# Patient Record
Sex: MALE | Race: WHITE | NOT HISPANIC OR LATINO | Employment: OTHER | URBAN - METROPOLITAN AREA
[De-identification: names, ages, dates, MRNs, and addresses within clinical notes are randomized per-mention and may not be internally consistent; named-entity substitution may affect disease eponyms.]

---

## 2017-06-08 ENCOUNTER — OFFICE VISIT (OUTPATIENT)
Dept: MEDICAL GROUP | Facility: CLINIC | Age: 62
End: 2017-06-08
Payer: COMMERCIAL

## 2017-06-08 VITALS
OXYGEN SATURATION: 95 % | DIASTOLIC BLOOD PRESSURE: 80 MMHG | BODY MASS INDEX: 27.16 KG/M2 | TEMPERATURE: 97.9 F | SYSTOLIC BLOOD PRESSURE: 120 MMHG | HEIGHT: 71 IN | WEIGHT: 194 LBS | HEART RATE: 80 BPM | RESPIRATION RATE: 16 BRPM

## 2017-06-08 DIAGNOSIS — Z00.00 LABORATORY EXAMINATION ORDERED AS PART OF A ROUTINE GENERAL MEDICAL EXAMINATION: ICD-10-CM

## 2017-06-08 DIAGNOSIS — Z12.5 PROSTATE CANCER SCREENING: ICD-10-CM

## 2017-06-08 DIAGNOSIS — Z23 NEED FOR HEPATITIS A AND B VACCINATION: ICD-10-CM

## 2017-06-08 DIAGNOSIS — Z90.81 POST-SPLENECTOMY: ICD-10-CM

## 2017-06-08 DIAGNOSIS — Z00.00 ROUTINE GENERAL MEDICAL EXAMINATION AT A HEALTH CARE FACILITY: ICD-10-CM

## 2017-06-08 DIAGNOSIS — Z87.01 HISTORY OF PNEUMONIA: ICD-10-CM

## 2017-06-08 DIAGNOSIS — Z23 NEED FOR PNEUMOCOCCAL VACCINATION: ICD-10-CM

## 2017-06-08 PROCEDURE — 90636 HEP A/HEP B VACC ADULT IM: CPT | Performed by: FAMILY MEDICINE

## 2017-06-08 PROCEDURE — 99396 PREV VISIT EST AGE 40-64: CPT | Mod: 25 | Performed by: FAMILY MEDICINE

## 2017-06-08 PROCEDURE — 90670 PCV13 VACCINE IM: CPT | Performed by: FAMILY MEDICINE

## 2017-06-08 PROCEDURE — 90472 IMMUNIZATION ADMIN EACH ADD: CPT | Performed by: FAMILY MEDICINE

## 2017-06-08 PROCEDURE — 90471 IMMUNIZATION ADMIN: CPT | Performed by: FAMILY MEDICINE

## 2017-06-08 ASSESSMENT — ENCOUNTER SYMPTOMS
SEIZURES: 0
PALPITATIONS: 0
MEMORY LOSS: 0
SPEECH CHANGE: 0
BACK PAIN: 0
DIARRHEA: 0
DIZZINESS: 0
WEIGHT LOSS: 0
HALLUCINATIONS: 0
WHEEZING: 0
DEPRESSION: 0
FOCAL WEAKNESS: 0
BRUISES/BLEEDS EASILY: 0
CHILLS: 0
HEARTBURN: 0
TINGLING: 0
INSOMNIA: 0
FEVER: 0
SENSORY CHANGE: 0
NERVOUS/ANXIOUS: 0
VOMITING: 0
NECK PAIN: 0
ORTHOPNEA: 0
DOUBLE VISION: 0
BLURRED VISION: 0
LOSS OF CONSCIOUSNESS: 0
SHORTNESS OF BREATH: 0
HEADACHES: 0
ABDOMINAL PAIN: 0
COUGH: 0
SPUTUM PRODUCTION: 0
MYALGIAS: 0
BLOOD IN STOOL: 0
NAUSEA: 0
SORE THROAT: 0
TREMORS: 0

## 2017-06-08 ASSESSMENT — LIFESTYLE VARIABLES: SUBSTANCE_ABUSE: 0

## 2017-06-08 ASSESSMENT — PATIENT HEALTH QUESTIONNAIRE - PHQ9: CLINICAL INTERPRETATION OF PHQ2 SCORE: 0

## 2017-06-08 NOTE — PROGRESS NOTES
Subjective:      Fermín Gomez is a 61 y.o. male who presents with Annual Exam        He is here for his general medical examination.    Annual Exam  Pertinent negatives include no abdominal pain, chest pain, chills, congestion, coughing, fever, headaches, myalgias, nausea, neck pain, rash, sore throat or vomiting.    has a past medical history of Spherocytosis (CMS-HCC); History of pneumonia (9/2016); and Post-splenectomy.   has past surgical history that includes removal spleen, total (1962) and colonoscopy (1/18/13).  family history includes Blood Disease in his mother; Cancer in his mother; Diabetes in his father; Heart Disease in his father.   reports that he has never smoked. He has never used smokeless tobacco. He reports that he drinks about 8.4 oz of alcohol per week. He reports that he does not use illicit drugs.      Current outpatient prescriptions:   •  Ascorbic Acid (VITAMIN C CR PO), Take 500 mg by mouth every day., Disp: , Rfl:   •  aspirin 81 MG tablet, Take 81 mg by mouth every day., Disp: , Rfl:   •  zolpidem (AMBIEN) 5 MG TABS, Take 1 Tab by mouth at bedtime as needed for Sleep. (Patient not taking: Reported on 11/4/2016), Disp: 30 Tab, Rfl: 0  is allergic to pcn.      Review of Systems   Constitutional: Negative for fever, chills, weight loss and malaise/fatigue.   HENT: Negative for congestion, hearing loss and sore throat.    Eyes: Negative for blurred vision and double vision.   Respiratory: Negative for cough, sputum production, shortness of breath and wheezing.    Cardiovascular: Negative for chest pain, palpitations, orthopnea and leg swelling.   Gastrointestinal: Negative for heartburn, nausea, vomiting, abdominal pain, diarrhea and blood in stool.   Genitourinary: Negative for dysuria, urgency, frequency and hematuria.   Musculoskeletal: Negative for myalgias, back pain, joint pain and neck pain.   Skin: Negative for rash.   Neurological: Negative for dizziness, tingling, tremors,  "sensory change, speech change, focal weakness, seizures, loss of consciousness and headaches.   Endo/Heme/Allergies: Negative for environmental allergies. Does not bruise/bleed easily.   Psychiatric/Behavioral: Negative for depression, suicidal ideas, hallucinations, memory loss and substance abuse. The patient is not nervous/anxious and does not have insomnia.           Objective:     /80 mmHg  Pulse 80  Temp(Src) 36.6 °C (97.9 °F)  Resp 16  Ht 1.803 m (5' 11\")  Wt 87.998 kg (194 lb)  BMI 27.07 kg/m2  SpO2 95%     Physical Exam   Constitutional: He is oriented to person, place, and time. He appears well-developed and well-nourished.   HENT:   Head: Normocephalic and atraumatic.   Mouth/Throat: Oropharynx is clear and moist.   Eyes: EOM are normal. Pupils are equal, round, and reactive to light. Left eye exhibits no discharge.   Neck: Normal range of motion. Neck supple. No JVD present. No thyromegaly present.   Cardiovascular: Normal rate, regular rhythm and normal heart sounds.    No murmur heard.  Pulmonary/Chest: Effort normal and breath sounds normal. No respiratory distress. He has no wheezes. He has no rales.   Abdominal: Soft. Bowel sounds are normal. He exhibits no distension and no mass. There is no tenderness.   Musculoskeletal: Normal range of motion. He exhibits no edema.   Lymphadenopathy:     He has no cervical adenopathy.   Neurological: He is alert and oriented to person, place, and time. Coordination normal.   Skin: Skin is warm and dry. No rash noted. No erythema.   Psychiatric: He has a normal mood and affect. His behavior is normal.   Vitals reviewed.              Assessment/Plan:     1. Routine general medical examination at a health care facility  He is generally well. Stable postsplenectomy for many years.    2. Laboratory examination ordered as part of a routine general medical examination  Routine orders placed  - COMP METABOLIC PANEL; Future  - LIPID PROFILE; Future  - CBC " WITHOUT DIFFERENTIAL; Future  - URINALYSIS,CULTURE IF INDICATED; Future    3. Post-splenectomy  Has been stable. Due for pneumonia vaccine.  - PNEUMOCOCCAL CONJUGATE VACCINE 13-VALENT    4. History of pneumonia  Every 4 pneumonia vaccine with history of pneumonia.  - PNEUMOCOCCAL CONJUGATE VACCINE 13-VALENT    5. Need for pneumococcal vaccination  Every 4 pneumonia vaccine.  - PNEUMOCOCCAL CONJUGATE VACCINE 13-VALENT    6. Prostate cancer screening  Order discussed and placed  - PROSTATE SPECIFIC AG SCREENING; Future    7. Need for hepatitis A and B vaccination  Due for vaccine  - HEPATITIS A HEPATITIS B COMBINED VACCINE IM

## 2017-06-08 NOTE — MR AVS SNAPSHOT
"        Fermín Gomez   2017 1:00 PM   Office Visit   MRN: [id number]    Department:  [facility]   Dept Phone:  [phone]    Description:  Male : 1955   Provider:  Sakina Brewer M.D.           Reason for Visit     Annual Exam           Allergies as of 2017     Allergen Noted Reactions    Pcn [Penicillins] 2010   Rash      You were diagnosed with     Routine general medical examination at a health care facility   [V70.0.ICD-9-CM]       Laboratory examination ordered as part of a routine general medical examination   [V72.62.ICD-9-CM]       Post-splenectomy   [952098]       History of pneumonia   [465531]       Need for pneumococcal vaccination   [922985]       Prostate cancer screening   [701511]       Need for hepatitis A and B vaccination   [685564]         Vital Signs     Blood Pressure Pulse Temperature Respirations Height Weight    120/80 mmHg 80 36.6 °C (97.9 °F) 16 1.803 m (5' 11\") 87.998 kg (194 lb)    Body Mass Index Oxygen Saturation Smoking Status             27.07 kg/m2 95% Never Smoker          Basic Information     Date Of Birth Sex Race Ethnicity Preferred Language    1955 Male White Non- English      Problem List              ICD-10-CM Priority Class Noted - Resolved    Spherocytosis (CMS-HCC) D58.0   Unknown - Present    Post-splenectomy Z90.81   Unknown - Present    History of pneumonia Z87.01   2016 - Present      Health Maintenance        Date Due Completion Dates    IMM PNEUMOCOCCAL 19-64 (ADULT) HIGHEST RISK SERIES (2 of 3 - PPSV23) 8/3/2017 2017    IMM DTaP/Tdap/Td Vaccine (2 - Td) 2020    COLONOSCOPY 2023 (Prv Comp)    Override on 2013: Previously completed (normal)            Current Immunizations     13-VALENT PCV PREVNAR 2017    Hep A/HEP B Combined Vaccine ([employer]ix) 2017    Hepatitis A Vaccine, Ped/Adol 2010    Hepatitis B Vaccine Non-Recombivax (Ped/Adol) 2010    Pneumococcal " Vaccine (UF)Historical Data 3/2/2007    Tdap Vaccine 11/11/2010    Typhoid Vaccine 11/11/2010      Below and/or attached are the medications your provider expects you to take. Review all of your home medications and newly ordered medications with your provider and/or pharmacist. Follow medication instructions as directed by your provider and/or pharmacist. Please keep your medication list with you and share with your provider. Update the information when medications are discontinued, doses are changed, or new medications (including over-the-counter products) are added; and carry medication information at all times in the event of emergency situations     Allergies:  PCN - Rash               Medications  Valid as of: June 08, 2017 -  2:29 PM    Generic Name Brand Name Tablet Size Instructions for use    Ascorbic Acid   Take 500 mg by mouth every day.        Aspirin (Tab) aspirin 81 MG Take 81 mg by mouth every day.        Zolpidem Tartrate (Tab) AMBIEN 5 MG Take 1 Tab by mouth at bedtime as needed for Sleep.        .                 Medicines prescribed today were sent to:     [id number] - [place], [place] - [address] [id number] [facility] [address]    Phone: [phone] Fax: [phone]    Open 24 Hours?: No      Medication refill instructions:       If your prescription bottle indicates you have medication refills left, it is not necessary to call your provider’s office. Please contact your pharmacy and they will refill your medication.    If your prescription bottle indicates you do not have any refills left, you may request refills at any time through one of the following ways: The online [employer] system (except Urgent Care), by calling your provider’s office, or by asking your pharmacy to contact your provider’s office with a refill request. Medication refills are processed only during regular business hours and may not be available until the next business day. Your provider may request  additional information or to have a follow-up visit with you prior to refilling your medication.   *Please Note: Medication refills are assigned a new Rx number when refilled electronically. Your pharmacy may indicate that no refills were authorized even though a new prescription for the same medication is available at the pharmacy. Please request the medicine by name with the pharmacy before contacting your provider for a refill.        Your To Do List     Future Labs/Procedures Complete By Expires    CBC WITHOUT DIFFERENTIAL  As directed 12/9/2017    COMP METABOLIC PANEL  As directed 6/9/2018    LIPID PROFILE  As directed 6/9/2018    PROSTATE SPECIFIC AG SCREENING  As directed 6/9/2018    URINALYSIS,CULTURE IF INDICATED  As directed 6/9/2018         [employer] Access Code: [email or web address]  Expires: 7/8/2017 12:47 PM    [employer]  A secure, online tool to manage your health information     [employer]® is a secure, online tool that connects you to your personalized health information from the privacy of your home -- day or night - making it very easy for you to manage your healthcare. Once the activation process is completed, you can even access your medical information using the [employer] shahram, which is available for free in the [employer] store or Google Play store.     [employer] provides the following levels of access (as shown below):   My Chart Features   [facility] Doctor [facility]  Specialists [facility]  Urgent  Care Non-[facility]  Primary Care  Doctor   Email your healthcare team securely and privately 24/7 X X X    Manage appointments: schedule your next appointment; view details of past/upcoming appointments X      Request prescription refills. X      View recent personal medical records, including lab and immunizations X X X X   View health record, including health history, allergies, medications X X X X   Read reports about your outpatient visits, procedures, consult and ER notes X X X X   See your  discharge summary, which is a recap of your hospital and/or ER visit that includes your diagnosis, lab results, and care plan. X X       How to register for [facility]:  1. Go to  [email or web address].  2. Click on the Sign Up Now box, which takes you to the New Member Sign Up page. You will need to provide the following information:  a. Enter your [facility] Access Code exactly as it appears at the top of this page. (You will not need to use this code after you’ve completed the sign-up process. If you do not sign up before the expiration date, you must request a new code.)   b. Enter your date of birth.   c. Enter your home email address.   d. Click Submit, and follow the next screen’s instructions.  3. Create a [facility] ID. This will be your [facility] login ID and cannot be changed, so think of one that is secure and easy to remember.  4. Create a [facility] password. You can change your password at any time.  5. Enter your Password Reset Question and Answer. This can be used at a later time if you forget your password.   6. Enter your e-mail address. This allows you to receive e-mail notifications when new information is available in [facility].  7. Click Sign Up. You can now view your health information.    For assistance activating your [facility] account, call [phone]

## 2017-06-09 DIAGNOSIS — G47.25 JET LAG: ICD-10-CM

## 2017-06-09 DIAGNOSIS — F51.01 PRIMARY INSOMNIA: ICD-10-CM

## 2017-06-09 RX ORDER — ZOLPIDEM TARTRATE 5 MG/1
5 TABLET ORAL NIGHTLY PRN
Qty: 30 TAB | Refills: 0 | Status: SHIPPED
Start: 2017-06-09 | End: 2017-06-14 | Stop reason: SDUPTHER

## 2017-06-12 ENCOUNTER — PATIENT MESSAGE (OUTPATIENT)
Dept: MEDICAL GROUP | Facility: CLINIC | Age: 62
End: 2017-06-12

## 2017-06-12 DIAGNOSIS — F51.01 PRIMARY INSOMNIA: ICD-10-CM

## 2017-06-14 RX ORDER — ZOLPIDEM TARTRATE 5 MG/1
5 TABLET ORAL NIGHTLY PRN
Qty: 30 TAB | Refills: 0 | Status: SHIPPED
Start: 2017-06-14 | End: 2019-03-28 | Stop reason: SDUPTHER

## 2017-07-28 ENCOUNTER — HOSPITAL ENCOUNTER (OUTPATIENT)
Dept: LAB | Facility: MEDICAL CENTER | Age: 62
End: 2017-07-28
Attending: FAMILY MEDICINE
Payer: COMMERCIAL

## 2017-07-28 DIAGNOSIS — Z12.5 PROSTATE CANCER SCREENING: ICD-10-CM

## 2017-07-28 DIAGNOSIS — Z00.00 LABORATORY EXAMINATION ORDERED AS PART OF A ROUTINE GENERAL MEDICAL EXAMINATION: ICD-10-CM

## 2017-07-28 LAB
ALBUMIN SERPL BCP-MCNC: 4.1 G/DL (ref 3.2–4.9)
ALBUMIN/GLOB SERPL: 1.8 G/DL
ALP SERPL-CCNC: 57 U/L (ref 30–99)
ALT SERPL-CCNC: 19 U/L (ref 2–50)
ANION GAP SERPL CALC-SCNC: 6 MMOL/L (ref 0–11.9)
APPEARANCE UR: CLEAR
AST SERPL-CCNC: 20 U/L (ref 12–45)
BILIRUB SERPL-MCNC: 1.3 MG/DL (ref 0.1–1.5)
BILIRUB UR QL STRIP.AUTO: NEGATIVE
BUN SERPL-MCNC: 13 MG/DL (ref 8–22)
CALCIUM SERPL-MCNC: 9.2 MG/DL (ref 8.5–10.5)
CHLORIDE SERPL-SCNC: 105 MMOL/L (ref 96–112)
CHOLEST SERPL-MCNC: 148 MG/DL (ref 100–199)
CO2 SERPL-SCNC: 27 MMOL/L (ref 20–33)
COLOR UR: YELLOW
CREAT SERPL-MCNC: 0.85 MG/DL (ref 0.5–1.4)
CULTURE IF INDICATED INDCX: NO UA CULTURE
ERYTHROCYTE [DISTWIDTH] IN BLOOD BY AUTOMATED COUNT: 44.2 FL (ref 35.9–50)
GFR SERPL CREATININE-BSD FRML MDRD: >60 ML/MIN/1.73 M 2
GLOBULIN SER CALC-MCNC: 2.3 G/DL (ref 1.9–3.5)
GLUCOSE SERPL-MCNC: 96 MG/DL (ref 65–99)
GLUCOSE UR STRIP.AUTO-MCNC: NEGATIVE MG/DL
HCT VFR BLD AUTO: 47 % (ref 42–52)
HDLC SERPL-MCNC: 51 MG/DL
HGB BLD-MCNC: 16.3 G/DL (ref 14–18)
KETONES UR STRIP.AUTO-MCNC: NEGATIVE MG/DL
LDLC SERPL CALC-MCNC: 86 MG/DL
LEUKOCYTE ESTERASE UR QL STRIP.AUTO: NEGATIVE
MCH RBC QN AUTO: 32.3 PG (ref 27–33)
MCHC RBC AUTO-ENTMCNC: 34.7 G/DL (ref 33.7–35.3)
MCV RBC AUTO: 93.1 FL (ref 81.4–97.8)
MICRO URNS: NORMAL
NITRITE UR QL STRIP.AUTO: NEGATIVE
PH UR STRIP.AUTO: 6.5 [PH]
PLATELET # BLD AUTO: 484 K/UL (ref 164–446)
PMV BLD AUTO: 10.3 FL (ref 9–12.9)
POTASSIUM SERPL-SCNC: 4 MMOL/L (ref 3.6–5.5)
PROT SERPL-MCNC: 6.4 G/DL (ref 6–8.2)
PROT UR QL STRIP: NEGATIVE MG/DL
PSA SERPL-MCNC: 2.67 NG/ML (ref 0–4)
RBC # BLD AUTO: 5.05 M/UL (ref 4.7–6.1)
RBC UR QL AUTO: NEGATIVE
SODIUM SERPL-SCNC: 138 MMOL/L (ref 135–145)
SP GR UR STRIP.AUTO: 1.01
TRIGL SERPL-MCNC: 54 MG/DL (ref 0–149)
UROBILINOGEN UR STRIP.AUTO-MCNC: 0.2 MG/DL
WBC # BLD AUTO: 6.3 K/UL (ref 4.8–10.8)

## 2017-07-28 PROCEDURE — 85027 COMPLETE CBC AUTOMATED: CPT

## 2017-07-28 PROCEDURE — 36415 COLL VENOUS BLD VENIPUNCTURE: CPT

## 2017-07-28 PROCEDURE — 80053 COMPREHEN METABOLIC PANEL: CPT

## 2017-07-28 PROCEDURE — 80061 LIPID PANEL: CPT

## 2017-07-28 PROCEDURE — 84153 ASSAY OF PSA TOTAL: CPT

## 2017-07-28 PROCEDURE — 81003 URINALYSIS AUTO W/O SCOPE: CPT

## 2018-07-02 ENCOUNTER — OFFICE VISIT (OUTPATIENT)
Dept: URGENT CARE | Facility: PHYSICIAN GROUP | Age: 63
End: 2018-07-02
Payer: COMMERCIAL

## 2018-07-02 ENCOUNTER — HOSPITAL ENCOUNTER (OUTPATIENT)
Dept: RADIOLOGY | Facility: MEDICAL CENTER | Age: 63
End: 2018-07-02
Attending: EMERGENCY MEDICINE
Payer: COMMERCIAL

## 2018-07-02 VITALS
HEIGHT: 71 IN | OXYGEN SATURATION: 94 % | SYSTOLIC BLOOD PRESSURE: 122 MMHG | WEIGHT: 191 LBS | BODY MASS INDEX: 26.74 KG/M2 | HEART RATE: 67 BPM | DIASTOLIC BLOOD PRESSURE: 76 MMHG | TEMPERATURE: 98.8 F | RESPIRATION RATE: 16 BRPM

## 2018-07-02 DIAGNOSIS — S29.9XXA TRAUMA OF CHEST, INITIAL ENCOUNTER: ICD-10-CM

## 2018-07-02 PROCEDURE — 99214 OFFICE O/P EST MOD 30 MIN: CPT | Performed by: EMERGENCY MEDICINE

## 2018-07-02 PROCEDURE — 71046 X-RAY EXAM CHEST 2 VIEWS: CPT

## 2018-07-03 ASSESSMENT — ENCOUNTER SYMPTOMS
ABDOMINAL PAIN: 0
NERVOUS/ANXIOUS: 0
VOMITING: 0
SPUTUM PRODUCTION: 0
HEMOPTYSIS: 0
COUGH: 0
SENSORY CHANGE: 0
PALPITATIONS: 0
EYE DISCHARGE: 0
EYE REDNESS: 0
NAUSEA: 0
FEVER: 0
CHILLS: 0

## 2018-07-03 NOTE — PROGRESS NOTES
Subjective:      Fermín Gomez is a 63 y.o. male who presents with Rib Pain (pain left posterior rib area, bruising x6 days)            HPI    Patient is a pleasant 63-year-old male who was hiking approximately 6 days ago when he fell onto his left rib cage. He continued hiking and since that time he developed ecchymosis under his area of contusion which is on his left rib cage mid anterior axillary line..    Patient is not on blood thinners other than a baby aspirin.    Social History     Social History   • Marital status:      Spouse name: N/A   • Number of children: N/A   • Years of education: N/A     Occupational History   • [employer]     Social History Main Topics   • Smoking status: Never Smoker   • Smokeless tobacco: Never Used   • Alcohol use 8.4 oz/week     14 Standard drinks or equivalent per week      Comment: occ   • Drug use: No   • Sexual activity: Not on file     Other Topics Concern   • Not on file     Social History Narrative   • No narrative on file     Past Medical History:   Diagnosis Date   • History of pneumonia 9/2016   • Post-splenectomy    • Spherocytosis (HCC)    • Varicose veins of both lower extremities      Current Outpatient Prescriptions on File Prior to Visit   Medication Sig Dispense Refill   • Ascorbic Acid (VITAMIN C CR PO) Take 500 mg by mouth every day.     • aspirin 81 MG tablet Take 81 mg by mouth every day.     • zolpidem (AMBIEN) 5 MG Tab Take 1 Tab by mouth at bedtime as needed for Sleep. 30 Tab 0     No current facility-administered medications on file prior to visit.      Family History   Problem Relation Age of Onset   • Blood Disease Mother      spherocytosis   • Cancer Mother      esophageal   • Diabetes Father    • Heart Disease Father      CABG 75   .a  Review of Systems   Constitutional: Negative for chills and fever.   Eyes: Negative for discharge and redness.   Respiratory: Negative for cough, hemoptysis and sputum production.     "  Cardiovascular: Negative for chest pain and palpitations.   Gastrointestinal: Negative for abdominal pain, nausea and vomiting.   Genitourinary: Negative.    Skin: Positive for rash.        Patient has a 2 x 10 cm area of ecchymosis at the inferior aspect of his left rib cage nontender. Above this is an area of tenderness.   Neurological: Negative for sensory change.   Psychiatric/Behavioral: The patient is not nervous/anxious.           Objective:     /76   Pulse 67   Temp 37.1 °C (98.8 °F)   Resp 16   Ht 1.803 m (5' 11\")   Wt 86.6 kg (191 lb)   SpO2 94%   BMI 26.64 kg/m²      Physical Exam   Constitutional: He appears well-developed and well-nourished. No distress.       HENT:   Head: Normocephalic and atraumatic.   Right Ear: External ear normal.   Left Ear: External ear normal.   Eyes: Right eye exhibits no discharge. Left eye exhibits no discharge.   Cardiovascular: Normal rate and regular rhythm.    Pulmonary/Chest: Effort normal and breath sounds normal. He exhibits tenderness.   Abdominal: He exhibits no distension. There is no guarding.   Musculoskeletal: Normal range of motion.   Neurological: He is alert.   Skin: Skin is warm. He is not diaphoretic.   2 x 10 cm area of ecchymosis. See diagram.   Psychiatric: He has a normal mood and affect.   Vitals reviewed.         Chest x-ray shows small hemothorax with questionable ninth rib fracture. No pneumothorax.     Assessment/Plan:     1. Trauma of chest, initial encounter      2. Pneumothorax.    Chest x-ray shows what appears to be a hemothorax and possible ninth rib fracture. The hemothorax is small but the patient is planning to go on vacation in [place], planning on flying and should be reevaluated.    I've ordered a repeat x-ray to have done on July 4 prior to leaving I will call them with results.  - DX-CHEST-2 VIEWS; Future  - DX-CHEST-2 VIEWS; Future      "

## 2018-07-04 ENCOUNTER — HOSPITAL ENCOUNTER (OUTPATIENT)
Dept: RADIOLOGY | Facility: MEDICAL CENTER | Age: 63
End: 2018-07-04
Attending: EMERGENCY MEDICINE
Payer: COMMERCIAL

## 2018-07-04 DIAGNOSIS — S29.9XXA TRAUMA OF CHEST, INITIAL ENCOUNTER: ICD-10-CM

## 2018-07-04 PROCEDURE — 71046 X-RAY EXAM CHEST 2 VIEWS: CPT

## 2018-07-05 ENCOUNTER — TELEPHONE (OUTPATIENT)
Dept: URGENT CARE | Facility: PHYSICIAN GROUP | Age: 63
End: 2018-07-05

## 2018-07-06 ENCOUNTER — TELEPHONE (OUTPATIENT)
Dept: URGENT CARE | Facility: PHYSICIAN GROUP | Age: 63
End: 2018-07-06

## 2019-03-28 ENCOUNTER — OFFICE VISIT (OUTPATIENT)
Dept: MEDICAL GROUP | Facility: MEDICAL CENTER | Age: 64
End: 2019-03-28
Payer: COMMERCIAL

## 2019-03-28 VITALS
TEMPERATURE: 98.4 F | WEIGHT: 190 LBS | OXYGEN SATURATION: 93 % | RESPIRATION RATE: 14 BRPM | SYSTOLIC BLOOD PRESSURE: 116 MMHG | HEIGHT: 71 IN | HEART RATE: 83 BPM | DIASTOLIC BLOOD PRESSURE: 64 MMHG | BODY MASS INDEX: 26.6 KG/M2

## 2019-03-28 DIAGNOSIS — Z90.81 POST-SPLENECTOMY: ICD-10-CM

## 2019-03-28 DIAGNOSIS — R19.8 SYMPTOMS OF GASTROESOPHAGEAL REFLUX: ICD-10-CM

## 2019-03-28 DIAGNOSIS — R10.12 INTERMITTENT LEFT UPPER QUADRANT ABDOMINAL PAIN: ICD-10-CM

## 2019-03-28 DIAGNOSIS — F51.01 PRIMARY INSOMNIA: ICD-10-CM

## 2019-03-28 PROCEDURE — 99214 OFFICE O/P EST MOD 30 MIN: CPT | Performed by: FAMILY MEDICINE

## 2019-03-28 RX ORDER — ZOLPIDEM TARTRATE 5 MG/1
5 TABLET ORAL NIGHTLY PRN
Qty: 30 TAB | Refills: 0 | Status: SHIPPED | OUTPATIENT
Start: 2019-03-28 | End: 2019-04-27

## 2019-03-28 NOTE — PROGRESS NOTES
"Chief Complaint   Patient presents with   • Abdominal Pain       Subjective:     HPI:   Fermín Gomez presents today with the followin. 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  First weeks of march was very heavy work.  He started having symptoms then.  His  symptoms are improving.  He did feel he had some reflux.  Denies blood in the stool or change in stool pattern.  He has taken TUMs, slight help only.  He was taking aleve and tylenol frequently during the warehouse job.    2. Post-splenectomy  This was done due to the anemia from his spherocytosis many years ago.  Has done well.  Will get his pneumonia vaccine in May.    3. Symptoms of gastroesophageal reflux  These symptoms have now resolved most of the time.  He did have problems this last weekend.  He had sharp pain on the left.  Mild burning this weekend as well, appetite reduced.  The burning does improve with food.      4. Primary insomnia  He usually takes this only with travel.  Has made the last prescription last over a year and a half.        Patient Active Problem List    Diagnosis Date Noted   • Primary insomnia 2017   • Post-splenectomy    • History of pneumonia 2016   • Spherocytosis (HCC)        Current medicines (including changes today)  Current Outpatient Prescriptions   Medication Sig Dispense Refill   • zolpidem (AMBIEN) 5 MG Tab Take 1 Tab by mouth at bedtime as needed for Sleep for up to 30 days. 30 Tab 0   • Ascorbic Acid (VITAMIN C CR PO) Take 500 mg by mouth every day.     • aspirin 81 MG tablet Take 81 mg by mouth every day.       No current facility-administered medications for this visit.        Allergies   Allergen Reactions   • Pcn [Penicillins] Rash       ROS: As per HPI       Objective:     Blood pressure 116/64, pulse 83, temperature 36.9 °C (98.4 °F), resp. rate 14, height 1.803 m (5' 11\"), weight 86.2 kg " (190 lb), SpO2 93 %. Body mass index is 26.5 kg/m².    Physical Exam:  Constitutional: Well-developed and well-nourished. Not diaphoretic. No distress. Lucid and fluent.  Skin: Skin is warm and dry. No rash noted.  Head: Atraumatic without lesions.  Eyes: Conjunctivae and extraocular motions are normal. Pupils are equal, round, and reactive to light. No scleral icterus.   Mouth/Throat: Tongue normal. Oropharynx is clear and moist. Posterior pharynx without erythema or exudates.  Neck: Supple, trachea midline. No thyromegaly present. No cervical or supraclavicular lymphadenopathy. No JVD or carotid bruits appreciated  Cardiovascular: Regular rate and rhythm.  Normal S1, S2 without murmur appreciated.  Chest: Effort normal. Clear to auscultation throughout. No adventitious sounds.   Abdomen: Soft, non tender, and without distention. Active bowel sounds in all four quadrants. No rebound, guarding, masses or hepatosplenomegaly.  Patient is post splenectomy.  Well healed old midline scar.  Extremities: No cyanosis, clubbing, erythema, nor edema.   Neurological: Alert and oriented x 3. Movements are symmetric, normal.  Psychiatric:  Behavior, mood, and affect are appropriate.    CXR in July showed stable cardiomegaly compared to 2016.  Hematoma was reducing in size.     Assessment and Plan:     63 y.o. male with the following issues:    1. Intermittent left upper quadrant abdominal pain  US-ABDOMEN COMPLETE SURVEY   2. Post-splenectomy  US-ABDOMEN COMPLETE SURVEY   3. Symptoms of gastroesophageal reflux     4. Primary insomnia  zolpidem (AMBIEN) 5 MG Tab         Followup: Return in about 6 weeks (around 5/10/2019), or if symptoms worsen or fail to improve.

## 2019-05-10 ENCOUNTER — OFFICE VISIT (OUTPATIENT)
Dept: MEDICAL GROUP | Facility: MEDICAL CENTER | Age: 64
End: 2019-05-10
Payer: COMMERCIAL

## 2019-05-10 VITALS
DIASTOLIC BLOOD PRESSURE: 70 MMHG | WEIGHT: 190 LBS | TEMPERATURE: 97.9 F | RESPIRATION RATE: 14 BRPM | HEIGHT: 71 IN | BODY MASS INDEX: 26.6 KG/M2 | SYSTOLIC BLOOD PRESSURE: 130 MMHG | HEART RATE: 84 BPM | OXYGEN SATURATION: 98 %

## 2019-05-10 DIAGNOSIS — N48.89 PENILE MASS: ICD-10-CM

## 2019-05-10 DIAGNOSIS — Z00.00 ROUTINE GENERAL MEDICAL EXAMINATION AT A HEALTH CARE FACILITY: ICD-10-CM

## 2019-05-10 DIAGNOSIS — Z12.5 PROSTATE CANCER SCREENING: ICD-10-CM

## 2019-05-10 DIAGNOSIS — Z00.00 LABORATORY EXAMINATION ORDERED AS PART OF A ROUTINE GENERAL MEDICAL EXAMINATION: ICD-10-CM

## 2019-05-10 DIAGNOSIS — Z23 NEED FOR VACCINATION FOR PNEUMOCOCCUS: ICD-10-CM

## 2019-05-10 PROCEDURE — 90732 PPSV23 VACC 2 YRS+ SUBQ/IM: CPT | Performed by: FAMILY MEDICINE

## 2019-05-10 PROCEDURE — 90471 IMMUNIZATION ADMIN: CPT | Performed by: FAMILY MEDICINE

## 2019-05-10 PROCEDURE — 99396 PREV VISIT EST AGE 40-64: CPT | Mod: 25 | Performed by: FAMILY MEDICINE

## 2019-05-10 ASSESSMENT — ENCOUNTER SYMPTOMS
LOSS OF CONSCIOUSNESS: 0
WEIGHT LOSS: 0
ABDOMINAL PAIN: 0
TINGLING: 0
ORTHOPNEA: 0
VOMITING: 0
FEVER: 0
NAUSEA: 0
TREMORS: 0
SENSORY CHANGE: 0
CONSTIPATION: 0
FOCAL WEAKNESS: 0
MEMORY LOSS: 0
DIAPHORESIS: 0
HALLUCINATIONS: 0
PALPITATIONS: 0
SPEECH CHANGE: 0
BLURRED VISION: 1
SHORTNESS OF BREATH: 0
CHILLS: 0
DIZZINESS: 0
NECK PAIN: 0
DOUBLE VISION: 0
SORE THROAT: 0
MYALGIAS: 0
WHEEZING: 0
INSOMNIA: 1
DEPRESSION: 0
BLOOD IN STOOL: 0
HEARTBURN: 0
DIARRHEA: 0
COUGH: 0
SPUTUM PRODUCTION: 0
HEADACHES: 0
SEIZURES: 0
BRUISES/BLEEDS EASILY: 0
BACK PAIN: 0
NERVOUS/ANXIOUS: 0

## 2019-05-10 ASSESSMENT — LIFESTYLE VARIABLES: SUBSTANCE_ABUSE: 0

## 2019-05-10 ASSESSMENT — PATIENT HEALTH QUESTIONNAIRE - PHQ9: CLINICAL INTERPRETATION OF PHQ2 SCORE: 0

## 2019-05-10 NOTE — PROGRESS NOTES
Subjective:      Fermín Gomez is a 63 y.o. male who presents with Annual Exam        He is here for his general medical examination    HPI     has a past medical history of History of pneumonia (9/2016); Post-splenectomy; Spherocytosis (HCC); and Varicose veins of both lower extremities.   has a past surgical history that includes pr removal spleen, total (1962); colonoscopy (1/18/13); and inguinal hernia repair bilateral.  family history includes Blood Disease in his mother; Cancer in his mother; Diabetes in his father; Heart Disease in his father.   reports that he has never smoked. He has never used smokeless tobacco. He reports that he drinks about 6.0 oz of alcohol per week . He reports that he does not use drugs.      Current Outpatient Prescriptions:   •  Ascorbic Acid (VITAMIN C CR PO), Take 500 mg by mouth every day., Disp: , Rfl:   •  aspirin 81 MG tablet, Take 81 mg by mouth every day., Disp: , Rfl:   is allergic to pcn [penicillins].    Review of Systems   Constitutional: Negative for chills, diaphoresis, fever, malaise/fatigue and weight loss.   HENT: Negative for congestion, hearing loss, sore throat and tinnitus.    Eyes: Positive for blurred vision. Negative for double vision.        Occasionally left eye is blurred   Respiratory: Negative for cough, sputum production, shortness of breath and wheezing.    Cardiovascular: Negative for chest pain, palpitations, orthopnea and leg swelling.   Gastrointestinal: Negative for abdominal pain, blood in stool, constipation, diarrhea, heartburn, melena, nausea and vomiting.        Abdominal pain in March has fully resolved   Genitourinary: Negative for dysuria, frequency, hematuria and urgency.   Musculoskeletal: Negative for back pain, joint pain, myalgias and neck pain.   Skin: Negative for rash.   Neurological: Negative for dizziness, tingling, tremors, sensory change, speech change, focal weakness, seizures, loss of consciousness and headaches.  "  Endo/Heme/Allergies: Positive for environmental allergies. Does not bruise/bleed easily.        Mild environmental allergies   Psychiatric/Behavioral: Negative for depression, hallucinations, memory loss, substance abuse and suicidal ideas. The patient has insomnia. The patient is not nervous/anxious.         On occasion he is having anxious dreams and sometimes lashes out.          Objective:     /70   Pulse 84   Temp 36.6 °C (97.9 °F)   Resp 14   Ht 1.803 m (5' 11\")   Wt 86.2 kg (190 lb)   SpO2 98%   BMI 26.50 kg/m²      Physical Exam   Constitutional: He is oriented to person, place, and time. He appears well-developed and well-nourished.   HENT:   Head: Normocephalic and atraumatic.   Mouth/Throat: Oropharynx is clear and moist.   Eyes: Pupils are equal, round, and reactive to light. EOM are normal. Left eye exhibits no discharge.   Neck: Normal range of motion. Neck supple. No JVD present. No thyromegaly present.   Cardiovascular: Normal rate, regular rhythm and normal heart sounds.    No murmur heard.  Pulmonary/Chest: Effort normal and breath sounds normal. No respiratory distress. He has no wheezes. He has no rales.   Abdominal: Soft. Bowel sounds are normal. He exhibits no distension and no mass. There is no tenderness.   Musculoskeletal: Normal range of motion. He exhibits no edema.   Lymphadenopathy:     He has no cervical adenopathy.   Neurological: He is alert and oriented to person, place, and time. Coordination normal.   Skin: Skin is warm and dry. No rash noted. No erythema.   Psychiatric: He has a normal mood and affect. His behavior is normal.   Vitals reviewed.       he has no tremor or cogwheeling       Assessment/Plan:     1. Routine general medical examination at a health care facility  He is generally well.    2. Laboratory examination ordered as part of a routine general medical examination  Routine orders discussed and placed  - Comp Metabolic Panel; Future  - Lipid Profile; " Future  - CBC WITHOUT DIFFERENTIAL; Future    3. Prostate cancer screening  Screening test discussed  - PROSTATE SPECIFIC AG SCREENING; Future    4. Need for vaccination for pneumococcus  Administered today  - Pneumococal Polysaccharide Vaccine 23-Valent =>1YO SQ/IM

## 2019-05-21 ENCOUNTER — HOSPITAL ENCOUNTER (OUTPATIENT)
Dept: LAB | Facility: MEDICAL CENTER | Age: 64
End: 2019-05-21
Attending: FAMILY MEDICINE
Payer: COMMERCIAL

## 2019-05-21 DIAGNOSIS — Z12.5 PROSTATE CANCER SCREENING: ICD-10-CM

## 2019-05-21 DIAGNOSIS — Z00.00 LABORATORY EXAMINATION ORDERED AS PART OF A ROUTINE GENERAL MEDICAL EXAMINATION: ICD-10-CM

## 2019-05-21 LAB
ALBUMIN SERPL BCP-MCNC: 4.6 G/DL (ref 3.2–4.9)
ALBUMIN/GLOB SERPL: 2 G/DL
ALP SERPL-CCNC: 59 U/L (ref 30–99)
ALT SERPL-CCNC: 19 U/L (ref 2–50)
ANION GAP SERPL CALC-SCNC: 8 MMOL/L (ref 0–11.9)
AST SERPL-CCNC: 23 U/L (ref 12–45)
BILIRUB SERPL-MCNC: 1.5 MG/DL (ref 0.1–1.5)
BUN SERPL-MCNC: 15 MG/DL (ref 8–22)
CALCIUM SERPL-MCNC: 9.1 MG/DL (ref 8.5–10.5)
CHLORIDE SERPL-SCNC: 106 MMOL/L (ref 96–112)
CHOLEST SERPL-MCNC: 177 MG/DL (ref 100–199)
CO2 SERPL-SCNC: 29 MMOL/L (ref 20–33)
CREAT SERPL-MCNC: 0.82 MG/DL (ref 0.5–1.4)
ERYTHROCYTE [DISTWIDTH] IN BLOOD BY AUTOMATED COUNT: 46.9 FL (ref 35.9–50)
FASTING STATUS PATIENT QL REPORTED: NORMAL
GLOBULIN SER CALC-MCNC: 2.3 G/DL (ref 1.9–3.5)
GLUCOSE SERPL-MCNC: 88 MG/DL (ref 65–99)
HCT VFR BLD AUTO: 54.3 % (ref 42–52)
HDLC SERPL-MCNC: 57 MG/DL
HGB BLD-MCNC: 17.5 G/DL (ref 14–18)
LDLC SERPL CALC-MCNC: 110 MG/DL
MCH RBC QN AUTO: 31.8 PG (ref 27–33)
MCHC RBC AUTO-ENTMCNC: 32.2 G/DL (ref 33.7–35.3)
MCV RBC AUTO: 98.5 FL (ref 81.4–97.8)
PLATELET # BLD AUTO: 525 K/UL (ref 164–446)
PMV BLD AUTO: 10.3 FL (ref 9–12.9)
POTASSIUM SERPL-SCNC: 3.9 MMOL/L (ref 3.6–5.5)
PROT SERPL-MCNC: 6.9 G/DL (ref 6–8.2)
PSA SERPL-MCNC: 2.41 NG/ML (ref 0–4)
RBC # BLD AUTO: 5.51 M/UL (ref 4.7–6.1)
SODIUM SERPL-SCNC: 143 MMOL/L (ref 135–145)
TRIGL SERPL-MCNC: 52 MG/DL (ref 0–149)
WBC # BLD AUTO: 6.9 K/UL (ref 4.8–10.8)

## 2019-05-21 PROCEDURE — 85027 COMPLETE CBC AUTOMATED: CPT

## 2019-05-21 PROCEDURE — 80061 LIPID PANEL: CPT

## 2019-05-21 PROCEDURE — 84153 ASSAY OF PSA TOTAL: CPT

## 2019-05-21 PROCEDURE — 80053 COMPREHEN METABOLIC PANEL: CPT

## 2019-05-21 PROCEDURE — 36415 COLL VENOUS BLD VENIPUNCTURE: CPT

## 2021-03-15 ENCOUNTER — OFFICE VISIT (OUTPATIENT)
Dept: MEDICAL GROUP | Facility: MEDICAL CENTER | Age: 66
End: 2021-03-15
Payer: COMMERCIAL

## 2021-03-15 VITALS
HEIGHT: 71 IN | OXYGEN SATURATION: 91 % | WEIGHT: 200 LBS | DIASTOLIC BLOOD PRESSURE: 78 MMHG | BODY MASS INDEX: 28 KG/M2 | SYSTOLIC BLOOD PRESSURE: 122 MMHG | RESPIRATION RATE: 16 BRPM | HEART RATE: 89 BPM | TEMPERATURE: 98 F

## 2021-03-15 DIAGNOSIS — Z23 NEED FOR VACCINATION: ICD-10-CM

## 2021-03-15 DIAGNOSIS — Z00.00 LABORATORY EXAMINATION ORDERED AS PART OF A ROUTINE GENERAL MEDICAL EXAMINATION: ICD-10-CM

## 2021-03-15 DIAGNOSIS — Z23 NEED FOR IMMUNIZATION AGAINST INFLUENZA: ICD-10-CM

## 2021-03-15 DIAGNOSIS — Z00.00 ROUTINE GENERAL MEDICAL EXAMINATION AT A HEALTH CARE FACILITY: ICD-10-CM

## 2021-03-15 PROCEDURE — 99396 PREV VISIT EST AGE 40-64: CPT | Mod: 25 | Performed by: FAMILY MEDICINE

## 2021-03-15 PROCEDURE — 90686 IIV4 VACC NO PRSV 0.5 ML IM: CPT | Performed by: FAMILY MEDICINE

## 2021-03-15 PROCEDURE — 90471 IMMUNIZATION ADMIN: CPT | Performed by: FAMILY MEDICINE

## 2021-03-15 RX ORDER — MELATONIN
1000 DAILY
Qty: 30 TABLET | Refills: 11 | COMMUNITY
Start: 2021-03-15

## 2021-03-15 ASSESSMENT — ENCOUNTER SYMPTOMS
BACK PAIN: 0
TINGLING: 0
MYALGIAS: 0
BRUISES/BLEEDS EASILY: 0
HEADACHES: 0
FEVER: 0
VOMITING: 0
ORTHOPNEA: 0
SORE THROAT: 0
LOSS OF CONSCIOUSNESS: 0
NECK PAIN: 0
CHILLS: 0
DIARRHEA: 0
BLOOD IN STOOL: 0
SENSORY CHANGE: 0
HEARTBURN: 0
FOCAL WEAKNESS: 0
DOUBLE VISION: 0
COUGH: 0
SEIZURES: 0
ABDOMINAL PAIN: 0
WEIGHT LOSS: 0
SPUTUM PRODUCTION: 0
WEAKNESS: 0
NAUSEA: 0
SPEECH CHANGE: 0
DIAPHORESIS: 0
DIZZINESS: 0
SHORTNESS OF BREATH: 0
WHEEZING: 0
DEPRESSION: 0
PALPITATIONS: 0
HALLUCINATIONS: 0
INSOMNIA: 0
BLURRED VISION: 0
TREMORS: 0
MEMORY LOSS: 0
NERVOUS/ANXIOUS: 0

## 2021-03-15 ASSESSMENT — LIFESTYLE VARIABLES: SUBSTANCE_ABUSE: 0

## 2021-03-15 ASSESSMENT — PATIENT HEALTH QUESTIONNAIRE - PHQ9: CLINICAL INTERPRETATION OF PHQ2 SCORE: 0

## 2021-03-15 ASSESSMENT — FIBROSIS 4 INDEX: FIB4 SCORE: 0.63

## 2021-03-15 NOTE — PROGRESS NOTES
Subjective:      Fermín Gomez is a 65 y.o. male who presents with Annual Exam        He is here for his annual medical examination    HPI     has a past medical history of History of pneumonia (9/2016), Penile mass, Post-splenectomy, Spherocytosis (HCC), and Varicose veins of both lower extremities.   has a past surgical history that includes pr removal spleen, total (1962); colonoscopy (1/18/13); and inguinal hernia repair bilateral.  family history includes Blood Disease in his mother; Cancer in his mother; Diabetes in his father; Heart Disease in his father.   reports that he has never smoked. He has never used smokeless tobacco. He reports current alcohol use of about 6.0 oz of alcohol per week. He reports that he does not use drugs.      Current Outpatient Medications:   •  vitamin D (CHOLECALCIFEROL) 1000 Unit Tab, Take 1 tablet by mouth every day., Disp: 30 tablet, Rfl: 11  •  Ascorbic Acid (VITAMIN C CR PO), Take 500 mg by mouth every day., Disp: , Rfl:   is allergic to pcn [penicillins].    Review of Systems   Constitutional: Negative for chills, diaphoresis, fever, malaise/fatigue and weight loss.   HENT: Negative for congestion, hearing loss, sore throat and tinnitus.    Eyes: Negative for blurred vision and double vision.   Respiratory: Negative for cough, sputum production, shortness of breath and wheezing.    Cardiovascular: Negative for chest pain, palpitations, orthopnea and leg swelling.   Gastrointestinal: Negative for abdominal pain, blood in stool, diarrhea, heartburn, nausea and vomiting.   Genitourinary: Negative for dysuria, frequency, hematuria and urgency.        Occasional nocturia   Musculoskeletal: Negative for back pain, joint pain, myalgias and neck pain.   Skin: Negative for rash.   Neurological: Negative for dizziness, tingling, tremors, sensory change, speech change, focal weakness, seizures, loss of consciousness, weakness and headaches.   Endo/Heme/Allergies: Positive for  "environmental allergies. Does not bruise/bleed easily.   Psychiatric/Behavioral: Negative for depression, hallucinations, memory loss, substance abuse and suicidal ideas. The patient is not nervous/anxious and does not have insomnia.         Remembers differently now.  Kicks or lashes out in dreams occasionally.          Objective:     /78   Pulse 89   Temp 36.7 °C (98 °F)   Resp 16   Ht 1.803 m (5' 11\")   Wt 90.7 kg (200 lb)   SpO2 91%   BMI 27.89 kg/m²      Physical Exam  Vitals reviewed.   Constitutional:       Appearance: He is well-developed.   HENT:      Head: Normocephalic and atraumatic.   Eyes:      General:         Left eye: No discharge.      Pupils: Pupils are equal, round, and reactive to light.   Neck:      Thyroid: No thyromegaly.      Vascular: No JVD.   Cardiovascular:      Rate and Rhythm: Normal rate and regular rhythm.      Heart sounds: Normal heart sounds. No murmur.   Pulmonary:      Effort: Pulmonary effort is normal. No respiratory distress.      Breath sounds: Normal breath sounds. No wheezing or rales.   Abdominal:      General: Bowel sounds are normal. There is no distension.      Palpations: Abdomen is soft. There is no mass.      Tenderness: There is no abdominal tenderness.   Musculoskeletal:         General: Normal range of motion.      Cervical back: Normal range of motion and neck supple.   Lymphadenopathy:      Cervical: No cervical adenopathy.   Skin:     General: Skin is warm and dry.      Findings: No erythema or rash.   Neurological:      Mental Status: He is alert and oriented to person, place, and time.      Coordination: Coordination normal.   Psychiatric:         Behavior: Behavior normal.                 Assessment/Plan:        1. Routine general medical examination at a health care facility  He is generally well and medically stable.    2. Laboratory examination ordered as part of a routine general medical examination  Routine orders discussed and placed  - " Comp Metabolic Panel; Future  - Lipid Profile; Future  - CBC WITHOUT DIFFERENTIAL; Future  - TSH; Future    3. Need for immunization against influenza  Administered today  - Influenza Vaccine Quad Injection (PF)

## 2021-03-25 DIAGNOSIS — R13.19 ESOPHAGEAL DYSPHAGIA: ICD-10-CM

## 2021-03-26 ENCOUNTER — HOSPITAL ENCOUNTER (OUTPATIENT)
Dept: LAB | Facility: MEDICAL CENTER | Age: 66
End: 2021-03-26
Attending: FAMILY MEDICINE
Payer: COMMERCIAL

## 2021-03-26 DIAGNOSIS — Z00.00 LABORATORY EXAMINATION ORDERED AS PART OF A ROUTINE GENERAL MEDICAL EXAMINATION: ICD-10-CM

## 2021-03-26 LAB
ALBUMIN SERPL BCP-MCNC: 4.4 G/DL (ref 3.2–4.9)
ALBUMIN/GLOB SERPL: 1.9 G/DL
ALP SERPL-CCNC: 75 U/L (ref 30–99)
ALT SERPL-CCNC: 18 U/L (ref 2–50)
ANION GAP SERPL CALC-SCNC: 10 MMOL/L (ref 7–16)
AST SERPL-CCNC: 26 U/L (ref 12–45)
BILIRUB SERPL-MCNC: 1.5 MG/DL (ref 0.1–1.5)
BUN SERPL-MCNC: 13 MG/DL (ref 8–22)
CALCIUM SERPL-MCNC: 9.4 MG/DL (ref 8.5–10.5)
CHLORIDE SERPL-SCNC: 107 MMOL/L (ref 96–112)
CHOLEST SERPL-MCNC: 170 MG/DL (ref 100–199)
CO2 SERPL-SCNC: 26 MMOL/L (ref 20–33)
CREAT SERPL-MCNC: 0.77 MG/DL (ref 0.5–1.4)
ERYTHROCYTE [DISTWIDTH] IN BLOOD BY AUTOMATED COUNT: 47.5 FL (ref 35.9–50)
FASTING STATUS PATIENT QL REPORTED: NORMAL
GLOBULIN SER CALC-MCNC: 2.3 G/DL (ref 1.9–3.5)
GLUCOSE SERPL-MCNC: 99 MG/DL (ref 65–99)
HCT VFR BLD AUTO: 51.7 % (ref 42–52)
HDLC SERPL-MCNC: 59 MG/DL
HGB BLD-MCNC: 17.7 G/DL (ref 14–18)
LDLC SERPL CALC-MCNC: 99 MG/DL
MCH RBC QN AUTO: 32.8 PG (ref 27–33)
MCHC RBC AUTO-ENTMCNC: 34.2 G/DL (ref 33.7–35.3)
MCV RBC AUTO: 95.9 FL (ref 81.4–97.8)
PLATELET # BLD AUTO: 601 K/UL (ref 164–446)
PMV BLD AUTO: 9.9 FL (ref 9–12.9)
POTASSIUM SERPL-SCNC: 4.6 MMOL/L (ref 3.6–5.5)
PROT SERPL-MCNC: 6.7 G/DL (ref 6–8.2)
RBC # BLD AUTO: 5.39 M/UL (ref 4.7–6.1)
SODIUM SERPL-SCNC: 143 MMOL/L (ref 135–145)
TRIGL SERPL-MCNC: 59 MG/DL (ref 0–149)
WBC # BLD AUTO: 8.1 K/UL (ref 4.8–10.8)

## 2021-03-26 PROCEDURE — 36415 COLL VENOUS BLD VENIPUNCTURE: CPT

## 2021-03-26 PROCEDURE — 80053 COMPREHEN METABOLIC PANEL: CPT

## 2021-03-26 PROCEDURE — 84443 ASSAY THYROID STIM HORMONE: CPT

## 2021-03-26 PROCEDURE — 80061 LIPID PANEL: CPT

## 2021-03-26 PROCEDURE — 85027 COMPLETE CBC AUTOMATED: CPT

## 2021-03-27 LAB — TSH SERPL DL<=0.005 MIU/L-ACNC: 2.06 UIU/ML (ref 0.38–5.33)

## 2021-04-03 ENCOUNTER — IMMUNIZATION (OUTPATIENT)
Dept: FAMILY PLANNING/WOMEN'S HEALTH CLINIC | Facility: IMMUNIZATION CENTER | Age: 66
End: 2021-04-03
Attending: INTERNAL MEDICINE
Payer: COMMERCIAL

## 2021-04-03 DIAGNOSIS — Z23 ENCOUNTER FOR VACCINATION: Primary | ICD-10-CM

## 2021-04-03 DIAGNOSIS — Z23 NEED FOR VACCINATION: ICD-10-CM

## 2021-04-03 PROCEDURE — 0001A PFIZER SARS-COV-2 VACCINE: CPT

## 2021-04-03 PROCEDURE — 91300 PFIZER SARS-COV-2 VACCINE: CPT

## 2021-04-24 ENCOUNTER — IMMUNIZATION (OUTPATIENT)
Dept: FAMILY PLANNING/WOMEN'S HEALTH CLINIC | Facility: IMMUNIZATION CENTER | Age: 66
End: 2021-04-24
Payer: COMMERCIAL

## 2021-04-24 DIAGNOSIS — Z23 ENCOUNTER FOR VACCINATION: Primary | ICD-10-CM

## 2021-04-24 PROCEDURE — 0002A PFIZER SARS-COV-2 VACCINE: CPT

## 2021-04-24 PROCEDURE — 91300 PFIZER SARS-COV-2 VACCINE: CPT

## 2021-10-12 ENCOUNTER — HOSPITAL ENCOUNTER (OUTPATIENT)
Dept: RADIOLOGY | Facility: MEDICAL CENTER | Age: 66
End: 2021-10-12
Payer: COMMERCIAL

## 2021-10-12 ENCOUNTER — HOSPITAL ENCOUNTER (INPATIENT)
Facility: MEDICAL CENTER | Age: 66
LOS: 7 days | DRG: 023 | End: 2021-10-19
Attending: EMERGENCY MEDICINE | Admitting: INTERNAL MEDICINE
Payer: COMMERCIAL

## 2021-10-12 ENCOUNTER — APPOINTMENT (OUTPATIENT)
Dept: RADIOLOGY | Facility: MEDICAL CENTER | Age: 66
DRG: 023 | End: 2021-10-12
Attending: EMERGENCY MEDICINE
Payer: COMMERCIAL

## 2021-10-12 ENCOUNTER — APPOINTMENT (OUTPATIENT)
Dept: RADIOLOGY | Facility: MEDICAL CENTER | Age: 66
DRG: 023 | End: 2021-10-12
Attending: RADIOLOGY
Payer: COMMERCIAL

## 2021-10-12 DIAGNOSIS — S06.5XAA SUBDURAL HEMATOMA (HCC): ICD-10-CM

## 2021-10-12 DIAGNOSIS — I63.411 CEREBROVASCULAR ACCIDENT (CVA) DUE TO EMBOLISM OF RIGHT MIDDLE CEREBRAL ARTERY (HCC): ICD-10-CM

## 2021-10-12 PROBLEM — Z90.81 H/O SPLENECTOMY: Status: ACTIVE | Noted: 2021-10-12

## 2021-10-12 PROBLEM — D72.810 LYMPHOPENIA: Status: ACTIVE | Noted: 2021-10-12

## 2021-10-12 PROBLEM — I63.9 STROKE (CEREBRUM) (HCC): Status: ACTIVE | Noted: 2021-10-12

## 2021-10-12 LAB
ABO + RH BLD: NORMAL
ABO GROUP BLD: NORMAL
ALBUMIN SERPL BCP-MCNC: 4.5 G/DL (ref 3.2–4.9)
ALBUMIN/GLOB SERPL: 1.8 G/DL
ALP SERPL-CCNC: 79 U/L (ref 30–99)
ALT SERPL-CCNC: 20 U/L (ref 2–50)
ANION GAP SERPL CALC-SCNC: 14 MMOL/L (ref 7–16)
APTT PPP: 30.9 SEC (ref 24.7–36)
AST SERPL-CCNC: 24 U/L (ref 12–45)
BASOPHILS # BLD AUTO: 0.8 % (ref 0–1.8)
BASOPHILS # BLD: 0.14 K/UL (ref 0–0.12)
BILIRUB SERPL-MCNC: 1.2 MG/DL (ref 0.1–1.5)
BLD GP AB SCN SERPL QL: NORMAL
BUN SERPL-MCNC: 12 MG/DL (ref 8–22)
CALCIUM SERPL-MCNC: 9.5 MG/DL (ref 8.5–10.5)
CHLORIDE SERPL-SCNC: 104 MMOL/L (ref 96–112)
CO2 SERPL-SCNC: 23 MMOL/L (ref 20–33)
CREAT SERPL-MCNC: 0.77 MG/DL (ref 0.5–1.4)
EOSINOPHIL # BLD AUTO: 0.01 K/UL (ref 0–0.51)
EOSINOPHIL NFR BLD: 0.1 % (ref 0–6.9)
ERYTHROCYTE [DISTWIDTH] IN BLOOD BY AUTOMATED COUNT: 46.1 FL (ref 35.9–50)
FLUAV RNA SPEC QL NAA+PROBE: NEGATIVE
FLUBV RNA SPEC QL NAA+PROBE: NEGATIVE
GLOBULIN SER CALC-MCNC: 2.5 G/DL (ref 1.9–3.5)
GLUCOSE SERPL-MCNC: 106 MG/DL (ref 65–99)
HCT VFR BLD AUTO: 51.6 % (ref 42–52)
HGB BLD-MCNC: 18.5 G/DL (ref 14–18)
IMM GRANULOCYTES # BLD AUTO: 0.07 K/UL (ref 0–0.11)
IMM GRANULOCYTES NFR BLD AUTO: 0.4 % (ref 0–0.9)
INR PPP: 1.15 (ref 0.87–1.13)
LYMPHOCYTES # BLD AUTO: 1.17 K/UL (ref 1–4.8)
LYMPHOCYTES NFR BLD: 7 % (ref 22–41)
MCH RBC QN AUTO: 34.1 PG (ref 27–33)
MCHC RBC AUTO-ENTMCNC: 35.9 G/DL (ref 33.7–35.3)
MCV RBC AUTO: 95 FL (ref 81.4–97.8)
MONOCYTES # BLD AUTO: 0.76 K/UL (ref 0–0.85)
MONOCYTES NFR BLD AUTO: 4.5 % (ref 0–13.4)
NEUTROPHILS # BLD AUTO: 14.68 K/UL (ref 1.82–7.42)
NEUTROPHILS NFR BLD: 87.2 % (ref 44–72)
NRBC # BLD AUTO: 0 K/UL
NRBC BLD-RTO: 0 /100 WBC
PLATELET # BLD AUTO: 545 K/UL (ref 164–446)
PMV BLD AUTO: 9.8 FL (ref 9–12.9)
POTASSIUM SERPL-SCNC: 4 MMOL/L (ref 3.6–5.5)
PROT SERPL-MCNC: 7 G/DL (ref 6–8.2)
PROTHROMBIN TIME: 14.4 SEC (ref 12–14.6)
RBC # BLD AUTO: 5.43 M/UL (ref 4.7–6.1)
RH BLD: NORMAL
RSV RNA SPEC QL NAA+PROBE: NEGATIVE
SARS-COV-2 RNA RESP QL NAA+PROBE: NOTDETECTED
SODIUM SERPL-SCNC: 141 MMOL/L (ref 135–145)
SPECIMEN SOURCE: NORMAL
TROPONIN T SERPL-MCNC: 15 NG/L (ref 6–19)
WBC # BLD AUTO: 16.8 K/UL (ref 4.8–10.8)

## 2021-10-12 PROCEDURE — B31R1ZZ FLUOROSCOPY OF INTRACRANIAL ARTERIES USING LOW OSMOLAR CONTRAST: ICD-10-PCS | Performed by: RADIOLOGY

## 2021-10-12 PROCEDURE — 85730 THROMBOPLASTIN TIME PARTIAL: CPT

## 2021-10-12 PROCEDURE — 99291 CRITICAL CARE FIRST HOUR: CPT

## 2021-10-12 PROCEDURE — C9803 HOPD COVID-19 SPEC COLLECT: HCPCS | Performed by: STUDENT IN AN ORGANIZED HEALTH CARE EDUCATION/TRAINING PROGRAM

## 2021-10-12 PROCEDURE — 80053 COMPREHEN METABOLIC PANEL: CPT

## 2021-10-12 PROCEDURE — 700117 HCHG RX CONTRAST REV CODE 255: Performed by: RADIOLOGY

## 2021-10-12 PROCEDURE — 93005 ELECTROCARDIOGRAM TRACING: CPT | Performed by: EMERGENCY MEDICINE

## 2021-10-12 PROCEDURE — 99291 CRITICAL CARE FIRST HOUR: CPT | Performed by: INTERNAL MEDICINE

## 2021-10-12 PROCEDURE — 99291 CRITICAL CARE FIRST HOUR: CPT | Performed by: PSYCHIATRY & NEUROLOGY

## 2021-10-12 PROCEDURE — 0241U HCHG SARS-COV-2 COVID-19 NFCT DS RESP RNA 4 TRGT MIC: CPT

## 2021-10-12 PROCEDURE — 770022 HCHG ROOM/CARE - ICU (200)

## 2021-10-12 PROCEDURE — 4410456 IR-THROMBO MECHANICAL ARTERY,INIT

## 2021-10-12 PROCEDURE — 71045 X-RAY EXAM CHEST 1 VIEW: CPT

## 2021-10-12 PROCEDURE — 85610 PROTHROMBIN TIME: CPT

## 2021-10-12 PROCEDURE — 03CG3ZZ EXTIRPATION OF MATTER FROM INTRACRANIAL ARTERY, PERCUTANEOUS APPROACH: ICD-10-PCS | Performed by: RADIOLOGY

## 2021-10-12 PROCEDURE — 84484 ASSAY OF TROPONIN QUANT: CPT

## 2021-10-12 PROCEDURE — 700105 HCHG RX REV CODE 258: Performed by: INTERNAL MEDICINE

## 2021-10-12 PROCEDURE — 86901 BLOOD TYPING SEROLOGIC RH(D): CPT

## 2021-10-12 PROCEDURE — 86900 BLOOD TYPING SEROLOGIC ABO: CPT

## 2021-10-12 PROCEDURE — 85025 COMPLETE CBC W/AUTO DIFF WBC: CPT

## 2021-10-12 PROCEDURE — 86850 RBC ANTIBODY SCREEN: CPT

## 2021-10-12 RX ORDER — HYDRALAZINE HYDROCHLORIDE 20 MG/ML
10 INJECTION INTRAMUSCULAR; INTRAVENOUS
Status: DISCONTINUED | OUTPATIENT
Start: 2021-10-12 | End: 2021-10-19 | Stop reason: HOSPADM

## 2021-10-12 RX ORDER — LABETALOL HYDROCHLORIDE 5 MG/ML
10 INJECTION, SOLUTION INTRAVENOUS
Status: DISCONTINUED | OUTPATIENT
Start: 2021-10-12 | End: 2021-10-19 | Stop reason: HOSPADM

## 2021-10-12 RX ORDER — HYDRALAZINE HYDROCHLORIDE 20 MG/ML
10 INJECTION INTRAMUSCULAR; INTRAVENOUS
Status: DISCONTINUED | OUTPATIENT
Start: 2021-10-12 | End: 2021-10-12

## 2021-10-12 RX ORDER — ATORVASTATIN CALCIUM 80 MG/1
80 TABLET, FILM COATED ORAL EVERY EVENING
Status: DISCONTINUED | OUTPATIENT
Start: 2021-10-12 | End: 2021-10-19 | Stop reason: HOSPADM

## 2021-10-12 RX ORDER — LABETALOL HYDROCHLORIDE 5 MG/ML
10 INJECTION, SOLUTION INTRAVENOUS
Status: DISCONTINUED | OUTPATIENT
Start: 2021-10-12 | End: 2021-10-12

## 2021-10-12 RX ORDER — SODIUM CHLORIDE, SODIUM LACTATE, POTASSIUM CHLORIDE, AND CALCIUM CHLORIDE .6; .31; .03; .02 G/100ML; G/100ML; G/100ML; G/100ML
1000 INJECTION, SOLUTION INTRAVENOUS ONCE
Status: COMPLETED | OUTPATIENT
Start: 2021-10-12 | End: 2021-10-13

## 2021-10-12 RX ADMIN — SODIUM CHLORIDE, POTASSIUM CHLORIDE, SODIUM LACTATE AND CALCIUM CHLORIDE 1000 ML: 600; 310; 30; 20 INJECTION, SOLUTION INTRAVENOUS at 20:05

## 2021-10-12 RX ADMIN — IOHEXOL 42 ML: 300 INJECTION, SOLUTION INTRAVENOUS at 19:00

## 2021-10-12 ASSESSMENT — ENCOUNTER SYMPTOMS
SEIZURES: 0
DOUBLE VISION: 0
CHILLS: 0
MEMORY LOSS: 0
TREMORS: 0
MYALGIAS: 0
FALLS: 0
COUGH: 0
NECK PAIN: 0
WEAKNESS: 1
FOCAL WEAKNESS: 1
HEADACHES: 0
PHOTOPHOBIA: 0
SENSORY CHANGE: 0
VOMITING: 0
SHORTNESS OF BREATH: 0
BACK PAIN: 0
DIZZINESS: 0
SPEECH CHANGE: 0
FEVER: 0
TINGLING: 0
LOSS OF CONSCIOUSNESS: 0
BLURRED VISION: 0
NAUSEA: 0

## 2021-10-12 ASSESSMENT — PAIN DESCRIPTION - PAIN TYPE: TYPE: ACUTE PAIN

## 2021-10-12 ASSESSMENT — FIBROSIS 4 INDEX: FIB4 SCORE: 0.63

## 2021-10-12 NOTE — ED TRIAGE NOTES
Negin Sixty-Nine  66 y.o. male  Chief Complaint   Patient presents with   • Sent by MD     Patient is a stroke IR transfer from [facility]. Patient was hiking in [facility] when he had a GLF and new onset of left arm paralysis, left facial droop and left leg weakness at approximately 1230. Right MCA occlusion noted at prior facility. tPA given at 1500 prior to arrival.     ER Door Time: 1631    Dr. Garg at charge desk, NIH score of 2 per ERP, see flowsheet.     Vitals:    10/12/21 1651   BP: 136/60   Pulse: 77   Resp:    SpO2: 98%

## 2021-10-12 NOTE — ED PROVIDER NOTES
"ED Provider Note    Scribed for Rick Garg M.D. by Myrtle Watkins. 10/12/2021, 4:35 PM.    Primary care provider: No primary care provider noted.  Means of arrival: EMS  History obtained from: Patient and EMS  History limited by: None     CHIEF COMPLAINT  Chief Complaint   Patient presents with   • Sent by MD KIT Kam Sixty-Nine is a 66 y.o. male who presents to the Emergency Department for stroke-like symptoms onset about 4 hours ago. The patient was seen at [facility] after a ground level fall. He had a completely flaccid left side on arrival there and had evaluation which showed a right MCA occlusion. He was given alteplase and transferred here for further IR. Patient denies headache or vision changes currently.  On arrival here he is actually improved and has been improving per the paramedics.  He denies any headache visual acuity changes nausea vomiting or any other s symptoms    REVIEW OF SYSTEMS  As above otherwise all other systems are negative    PAST MEDICAL HISTORY   has a past medical history of Spherocytosis (HCC).    SURGICAL HISTORY   has a past surgical history that includes splenectomy.    SOCIAL HISTORY  Social History     Tobacco Use   • Smoking status: Never Smoker   • Smokeless tobacco: Never Used   Substance Use Topics   • Alcohol use: Yes     Comment: last night   • Drug use: Yes     Types: Marijuana      Social History     Substance and Sexual Activity   Drug Use Yes   • Types: Marijuana       FAMILY HISTORY  History reviewed. No pertinent family history.    CURRENT MEDICATIONS  Home Medications    **Home medications have not yet been reviewed for this encounter**         ALLERGIES  Allergies   Allergen Reactions   • Penicillins        PHYSICAL EXAM  VITAL SIGNS: /60   Pulse 77   Resp 18   Ht 1.803 m (5' 11\")   Wt 86.2 kg (190 lb)   SpO2 98%   BMI 26.50 kg/m²     Constitutional: Well developed, Well nourished, No acute distress, Non-toxic appearance. "   HENT: Normocephalic, Atraumatic, Bilateral external ears normal, oropharynx moist, No oral exudates, Nose normal.   Eyes:conjunctiva is normal, there are no signs of exudate.   Neck: Supple, no meningeal signs.  Lymphatic: No lymphadenopathy noted.   Cardiovascular: Regular rate and rhythm without murmurs gallops or rubs.   Thorax & Lungs: No respiratory distress. Breathing comfortably. Lungs are clear to auscultation bilaterally, there are no wheezes no rales. Chest wall is nontender.  Abdomen: Soft, nontender, nondistended. Bowel sounds are present.   Skin: Warm, Dry, No erythema,   Back: No tenderness, No CVA tenderness.  Musculoskeletal: Good range of motion in all major joints. No tenderness to palpation or major deformities noted. Intact distal pulses, no clubbing, no cyanosis, no edema,   Neurologic: Alert & oriented x 3, he is able to move all 4 extremities has some mild weakness to the left upper extremity left lower extremity however his NIH is 2.  The patient has still a little facial droop on the left side as well as some mild dysarthria but the rest of the neurologic examination is grossly intact cranial nerves II through XII are otherwise grossly intact.  The patient has no drift in the upper or lower extremities.  Psychiatric: Affect normal, Judgment normal, Mood normal.     LABS  Results for orders placed or performed during the hospital encounter of 10/12/21   CBC WITH DIFFERENTIAL   Result Value Ref Range    WBC 16.8 (H) 4.8 - 10.8 K/uL    RBC 5.43 4.70 - 6.10 M/uL    Hemoglobin 18.5 (H) 14.0 - 18.0 g/dL    Hematocrit 51.6 42.0 - 52.0 %    MCV 95.0 81.4 - 97.8 fL    MCH 34.1 (H) 27.0 - 33.0 pg    MCHC 35.9 (H) 33.7 - 35.3 g/dL    RDW 46.1 35.9 - 50.0 fL    Platelet Count 545 (H) 164 - 446 K/uL    MPV 9.8 9.0 - 12.9 fL    Neutrophils-Polys 87.20 (H) 44.00 - 72.00 %    Lymphocytes 7.00 (L) 22.00 - 41.00 %    Monocytes 4.50 0.00 - 13.40 %    Eosinophils 0.10 0.00 - 6.90 %    Basophils 0.80 0.00 -  1.80 %    Immature Granulocytes 0.40 0.00 - 0.90 %    Nucleated RBC 0.00 /100 WBC    Neutrophils (Absolute) 14.68 (H) 1.82 - 7.42 K/uL    Lymphs (Absolute) 1.17 1.00 - 4.80 K/uL    Monos (Absolute) 0.76 0.00 - 0.85 K/uL    Eos (Absolute) 0.01 0.00 - 0.51 K/uL    Baso (Absolute) 0.14 (H) 0.00 - 0.12 K/uL    Immature Granulocytes (abs) 0.07 0.00 - 0.11 K/uL    NRBC (Absolute) 0.00 K/uL   COMP METABOLIC PANEL   Result Value Ref Range    Sodium 141 135 - 145 mmol/L    Potassium 4.0 3.6 - 5.5 mmol/L    Chloride 104 96 - 112 mmol/L    Co2 23 20 - 33 mmol/L    Anion Gap 14.0 7.0 - 16.0    Glucose 106 (H) 65 - 99 mg/dL    Bun 12 8 - 22 mg/dL    Creatinine 0.77 0.50 - 1.40 mg/dL    Calcium 9.5 8.5 - 10.5 mg/dL    AST(SGOT) 24 12 - 45 U/L    ALT(SGPT) 20 2 - 50 U/L    Alkaline Phosphatase 79 30 - 99 U/L    Total Bilirubin 1.2 0.1 - 1.5 mg/dL    Albumin 4.5 3.2 - 4.9 g/dL    Total Protein 7.0 6.0 - 8.2 g/dL    Globulin 2.5 1.9 - 3.5 g/dL    A-G Ratio 1.8 g/dL   PROTHROMBIN TIME   Result Value Ref Range    PT 14.4 12.0 - 14.6 sec    INR 1.15 (H) 0.87 - 1.13   APTT   Result Value Ref Range    APTT 30.9 24.7 - 36.0 sec   COD (ADULT)   Result Value Ref Range    ABO Grouping Only A     Rh Grouping Only NEG     Antibody Screen-Cod NEG    TROPONIN   Result Value Ref Range    Troponin T 15 6 - 19 ng/L   ABO Rh Confirm   Result Value Ref Range    ABO Rh Confirm A NEG    ESTIMATED GFR   Result Value Ref Range    GFR If African American >60 >60 mL/min/1.73 m 2    GFR If Non African American >60 >60 mL/min/1.73 m 2   COV-2, FLU A/B, AND RSV BY PCR (2-4 HOURS CEPHEID): Collect NP swab in VTM    Specimen: Nasopharyngeal; Respirate   Result Value Ref Range    SARS-CoV-2 Source NP Swab    EKG (NOW)   Result Value Ref Range    Report       [facility]    Test Date:  2021-10-12  Pt Name:    MALIA GUZMAN               Department: ER  MRN:        [id number]                      Room:       [facility]  Gender:     M                             Technician: CIERRA  :        [id number]                   Requested By:CHRISTOPH RODNEY MARTINEZ  Order #:    [id number]                    Reading MD:    Measurements  Intervals                                Axis  Rate:       71                           P:          69  VA:         164                          QRS:        85  QRSD:       94                           T:          -4  QT:         420  QTc:        457    Interpretive Statements  SINUS RHYTHM  BORDERLINE RIGHT AXIS DEVIATION  No previous ECG available for comparison        All labs reviewed by me.    EKG  Interpreted by me as above    RADIOLOGY  DX-CHEST-PORTABLE (1 VIEW)   Final Result      1.  There is no acute cardiopulmonary process.      OUTSIDE IMAGES-DX CHEST   Final Result      OUTSIDE IMAGES-CT HEAD   Final Result      CT-FOREIGN FILM CAT SCAN   Final Result      OUTSIDE IMAGES-CT HEAD   Final Result      IR-THROMBO MECHANICAL ARTERY,INIT    (Results Pending)   EC-ECHOCARDIOGRAM COMPLETE W/O CONT    (Results Pending)   MR-BRAIN-W/O    (Results Pending)   CT-HEAD W/O    (Results Pending)     The radiologist's interpretation of all radiological studies have been reviewed by me.    COURSE & MEDICAL DECISION MAKING  Pertinent Labs & Imaging studies reviewed. (See chart for details)    4:35 PM - Patient seen and examined at charge desk for stroke protocol. Ordered DX-Chest, CBC with diff, CMP, PT, APTT, COD, Troponin, and EKG to evaluate his symptoms.     4:43 PM - Paged Intensivist.     5:36 PM - I discussed the patient's case and the above findings with Dr. Myles (Intensivist) who agrees to assess the patient for hospitalization.       Decision Making:  Patient presents emerge department for evaluation.  Clinically he is improving from his alteplase.  The patient was then transferred directly back to interventional radiology for thrombectomy.  I did speak with Dr. Myles critical care for admission of this patient.  Help coordinate for IR as  well.    DISPOSITION:  Patient will be hospitalized by Dr. Myles in guarded condition.      FINAL IMPRESSION  1. Cerebrovascular accident (CVA) due to embolism of right middle cerebral artery (HCC)          Myrtle BRUNO (Daniel), am scribing for, and in the presence of, Rick Garg M.D..    Electronically signed by: Myrtle Watkins (Daniel), 10/12/2021    IRick M.D. personally performed the services described in this documentation, as scribed by Myrtle Watkins in my presence, and it is both accurate and complete. C    The note accurately reflects work and decisions made by me.  Rick Garg M.D.  10/12/2021  9:56 PM

## 2021-10-13 ENCOUNTER — APPOINTMENT (OUTPATIENT)
Dept: CARDIOLOGY | Facility: MEDICAL CENTER | Age: 66
DRG: 023 | End: 2021-10-13
Attending: INTERNAL MEDICINE
Payer: COMMERCIAL

## 2021-10-13 ENCOUNTER — APPOINTMENT (OUTPATIENT)
Dept: RADIOLOGY | Facility: MEDICAL CENTER | Age: 66
DRG: 023 | End: 2021-10-13
Attending: STUDENT IN AN ORGANIZED HEALTH CARE EDUCATION/TRAINING PROGRAM
Payer: COMMERCIAL

## 2021-10-13 ENCOUNTER — APPOINTMENT (OUTPATIENT)
Dept: RADIOLOGY | Facility: MEDICAL CENTER | Age: 66
DRG: 023 | End: 2021-10-13
Attending: INTERNAL MEDICINE
Payer: COMMERCIAL

## 2021-10-13 PROBLEM — R13.10 DYSPHAGIA: Status: ACTIVE | Noted: 2021-10-13

## 2021-10-13 PROBLEM — G81.94 LEFT HEMIPARESIS (HCC): Status: ACTIVE | Noted: 2021-10-13

## 2021-10-13 PROBLEM — Z90.81 H/O SPLENECTOMY: Status: RESOLVED | Noted: 2021-10-12 | Resolved: 2021-10-13

## 2021-10-13 PROBLEM — S06.5XAA SUBDURAL HEMATOMA (HCC): Status: ACTIVE | Noted: 2021-10-13

## 2021-10-13 LAB
ALBUMIN SERPL BCP-MCNC: 3.7 G/DL (ref 3.2–4.9)
ALBUMIN/GLOB SERPL: 1.9 G/DL
ALP SERPL-CCNC: 67 U/L (ref 30–99)
ALT SERPL-CCNC: 16 U/L (ref 2–50)
ANION GAP SERPL CALC-SCNC: 7 MMOL/L (ref 7–16)
AST SERPL-CCNC: 24 U/L (ref 12–45)
BASOPHILS # BLD AUTO: 0.9 % (ref 0–1.8)
BASOPHILS # BLD: 0.13 K/UL (ref 0–0.12)
BILIRUB SERPL-MCNC: 1.8 MG/DL (ref 0.1–1.5)
BUN SERPL-MCNC: 10 MG/DL (ref 8–22)
CALCIUM SERPL-MCNC: 8.5 MG/DL (ref 8.5–10.5)
CHLORIDE SERPL-SCNC: 105 MMOL/L (ref 96–112)
CHOLEST SERPL-MCNC: 150 MG/DL (ref 100–199)
CO2 SERPL-SCNC: 25 MMOL/L (ref 20–33)
CREAT SERPL-MCNC: 0.72 MG/DL (ref 0.5–1.4)
EKG IMPRESSION: NORMAL
EOSINOPHIL # BLD AUTO: 0.07 K/UL (ref 0–0.51)
EOSINOPHIL NFR BLD: 0.5 % (ref 0–6.9)
ERYTHROCYTE [DISTWIDTH] IN BLOOD BY AUTOMATED COUNT: 46 FL (ref 35.9–50)
GLOBULIN SER CALC-MCNC: 2 G/DL (ref 1.9–3.5)
GLUCOSE SERPL-MCNC: 105 MG/DL (ref 65–99)
HCT VFR BLD AUTO: 44.8 % (ref 42–52)
HDLC SERPL-MCNC: 50 MG/DL
HGB BLD-MCNC: 15.7 G/DL (ref 14–18)
IMM GRANULOCYTES # BLD AUTO: 0.05 K/UL (ref 0–0.11)
IMM GRANULOCYTES NFR BLD AUTO: 0.3 % (ref 0–0.9)
INR PPP: 1.25 (ref 0.87–1.13)
LDLC SERPL CALC-MCNC: 87 MG/DL
LV EJECT FRACT  99904: 60
LV EJECT FRACT MOD 2C 99903: 52.64
LV EJECT FRACT MOD 4C 99902: 58.68
LV EJECT FRACT MOD BP 99901: 57.8
LYMPHOCYTES # BLD AUTO: 1.01 K/UL (ref 1–4.8)
LYMPHOCYTES NFR BLD: 6.9 % (ref 22–41)
MCH RBC QN AUTO: 33 PG (ref 27–33)
MCHC RBC AUTO-ENTMCNC: 35 G/DL (ref 33.7–35.3)
MCV RBC AUTO: 94.1 FL (ref 81.4–97.8)
MONOCYTES # BLD AUTO: 1.18 K/UL (ref 0–0.85)
MONOCYTES NFR BLD AUTO: 8.1 % (ref 0–13.4)
NEUTROPHILS # BLD AUTO: 12.13 K/UL (ref 1.82–7.42)
NEUTROPHILS NFR BLD: 83.3 % (ref 44–72)
NRBC # BLD AUTO: 0 K/UL
NRBC BLD-RTO: 0 /100 WBC
PLATELET # BLD AUTO: 480 K/UL (ref 164–446)
PMV BLD AUTO: 9.6 FL (ref 9–12.9)
POTASSIUM SERPL-SCNC: 3.6 MMOL/L (ref 3.6–5.5)
PROT SERPL-MCNC: 5.7 G/DL (ref 6–8.2)
PROTHROMBIN TIME: 15.3 SEC (ref 12–14.6)
RBC # BLD AUTO: 4.76 M/UL (ref 4.7–6.1)
SODIUM SERPL-SCNC: 137 MMOL/L (ref 135–145)
TRIGL SERPL-MCNC: 65 MG/DL (ref 0–149)
WBC # BLD AUTO: 14.6 K/UL (ref 4.8–10.8)

## 2021-10-13 PROCEDURE — 93010 ELECTROCARDIOGRAM REPORT: CPT | Performed by: INTERNAL MEDICINE

## 2021-10-13 PROCEDURE — 80061 LIPID PANEL: CPT

## 2021-10-13 PROCEDURE — 99255 IP/OBS CONSLTJ NEW/EST HI 80: CPT | Performed by: HOSPITALIST

## 2021-10-13 PROCEDURE — 70450 CT HEAD/BRAIN W/O DYE: CPT

## 2021-10-13 PROCEDURE — 85025 COMPLETE CBC W/AUTO DIFF WBC: CPT

## 2021-10-13 PROCEDURE — 70551 MRI BRAIN STEM W/O DYE: CPT

## 2021-10-13 PROCEDURE — 85610 PROTHROMBIN TIME: CPT

## 2021-10-13 PROCEDURE — 700105 HCHG RX REV CODE 258: Performed by: STUDENT IN AN ORGANIZED HEALTH CARE EDUCATION/TRAINING PROGRAM

## 2021-10-13 PROCEDURE — 99291 CRITICAL CARE FIRST HOUR: CPT | Mod: GC | Performed by: INTERNAL MEDICINE

## 2021-10-13 PROCEDURE — 93306 TTE W/DOPPLER COMPLETE: CPT

## 2021-10-13 PROCEDURE — A9270 NON-COVERED ITEM OR SERVICE: HCPCS | Performed by: INTERNAL MEDICINE

## 2021-10-13 PROCEDURE — 99255 IP/OBS CONSLTJ NEW/EST HI 80: CPT | Performed by: PHYSICAL MEDICINE & REHABILITATION

## 2021-10-13 PROCEDURE — 93306 TTE W/DOPPLER COMPLETE: CPT | Mod: 26 | Performed by: INTERNAL MEDICINE

## 2021-10-13 PROCEDURE — 99233 SBSQ HOSP IP/OBS HIGH 50: CPT | Performed by: PSYCHIATRY & NEUROLOGY

## 2021-10-13 PROCEDURE — 92610 EVALUATE SWALLOWING FUNCTION: CPT

## 2021-10-13 PROCEDURE — 700102 HCHG RX REV CODE 250 W/ 637 OVERRIDE(OP): Performed by: INTERNAL MEDICINE

## 2021-10-13 PROCEDURE — 770001 HCHG ROOM/CARE - MED/SURG/GYN PRIV*

## 2021-10-13 PROCEDURE — 80053 COMPREHEN METABOLIC PANEL: CPT

## 2021-10-13 RX ORDER — ACETAMINOPHEN 325 MG/1
650 TABLET ORAL EVERY 4 HOURS PRN
Status: DISCONTINUED | OUTPATIENT
Start: 2021-10-13 | End: 2021-10-19 | Stop reason: HOSPADM

## 2021-10-13 RX ORDER — SODIUM CHLORIDE 9 MG/ML
1000 INJECTION, SOLUTION INTRAVENOUS ONCE
Status: COMPLETED | OUTPATIENT
Start: 2021-10-13 | End: 2021-10-13

## 2021-10-13 RX ORDER — OMEPRAZOLE 20 MG/1
20 CAPSULE, DELAYED RELEASE ORAL EVERY MORNING
COMMUNITY

## 2021-10-13 RX ORDER — SODIUM CHLORIDE 9 MG/ML
INJECTION, SOLUTION INTRAVENOUS CONTINUOUS
Status: DISCONTINUED | OUTPATIENT
Start: 2021-10-13 | End: 2021-10-15

## 2021-10-13 RX ORDER — ACETAMINOPHEN 325 MG/1
650 TABLET ORAL EVERY 4 HOURS PRN
Status: DISCONTINUED | OUTPATIENT
Start: 2021-10-13 | End: 2021-10-13

## 2021-10-13 RX ORDER — MULTIVIT WITH MINERALS/LUTEIN
1000 TABLET ORAL EVERY MORNING
Status: ON HOLD | COMMUNITY
End: 2021-11-08

## 2021-10-13 RX ADMIN — SODIUM CHLORIDE 1000 ML: 9 INJECTION, SOLUTION INTRAVENOUS at 08:34

## 2021-10-13 RX ADMIN — ATORVASTATIN CALCIUM 80 MG: 80 TABLET, FILM COATED ORAL at 17:16

## 2021-10-13 RX ADMIN — ACETAMINOPHEN 650 MG: 325 TABLET ORAL at 10:32

## 2021-10-13 RX ADMIN — SODIUM CHLORIDE: 9 INJECTION, SOLUTION INTRAVENOUS at 12:09

## 2021-10-13 ASSESSMENT — PAIN DESCRIPTION - PAIN TYPE
TYPE: ACUTE PAIN

## 2021-10-13 ASSESSMENT — ENCOUNTER SYMPTOMS
SPEECH CHANGE: 1
TINGLING: 0
DIZZINESS: 0
HEADACHES: 1
SHORTNESS OF BREATH: 1
ORTHOPNEA: 0
WEAKNESS: 1
NAUSEA: 0
BRUISES/BLEEDS EASILY: 0
SPUTUM PRODUCTION: 0
BACK PAIN: 0
ABDOMINAL PAIN: 0
SHORTNESS OF BREATH: 0
SENSORY CHANGE: 1
BLURRED VISION: 0
PALPITATIONS: 0
DOUBLE VISION: 0
VOMITING: 0
COUGH: 0
MYALGIAS: 0
CONSTIPATION: 0
HEMOPTYSIS: 0
DIARRHEA: 0
FEVER: 0
CHILLS: 0
FOCAL WEAKNESS: 1

## 2021-10-13 NOTE — HOSPITAL COURSE
"\"66 y.o. male previously healthy who presented 10/12/2021 with L side deficits.  He was out hiking, slipped and fell on ice.  Someone else hiking called for help. L sided deficits.  On arrival at [facility] deemed to be a TPA candidate, which he received then transferred to [facility] for thrombectomy. R M2 was partially occluded, unable to recanalize, TICI 2A flow. Goals SBP per \"  "

## 2021-10-13 NOTE — ASSESSMENT & PLAN NOTE
R MCA ischemic CVA. Given tPA on 10/12 and attempted thrombectomy. SBP goal of <160. TICI 2A. Did NOT receive ASA due to subdural hemorrhage.  -atorvastatin 80mg PO qD  -nicardipine gtt goal <160  -PT/OT/SLP  -q4h neurochecks OK per neurology  -neurology following

## 2021-10-13 NOTE — PROGRESS NOTES
"0410: pt no longer able to grasp nurse's hand with left hand , and stated \"I am\" when asked if he was trying to squeeze. Left hand still withdraws to pain.     Dr. Holden notified of change in neuro assessment. STAT CT ordered.  "

## 2021-10-13 NOTE — THERAPY
PT Contact Note    PT Consult received/acknowledged. Pt s/p TPA at [facility], activity orders begin this evening at 5:49pm. Will initiate PT assessment once indicated.    Jenae Leiva, PT, DPT  Ext. [phone]

## 2021-10-13 NOTE — CONSULTS
Referring Physician: Dr. Rick Garg    Referral Reason: Stroke code    HPI:  The patient 66 y.o. male right-handed male with no significant past medical history who was brought to emergency room at [facility] after ground-level fall with subsequent left-sided weakness.  Apparently he was running in the [place] and slipped on a piece of ice and fell down.  He was evaluated at [facility] and was found to be candidate for administration of TPA.  TPA was administered and subsequently underwent CT angiogram of the head and neck which revealed right M1 occlusion.  Patient was transferred to [facility] for possible thrombectomy.  He underwent angiogram which revealed right M2 occlusion, however, thrombectomy was not successful.  Patient has significant improvement in his symptom with TPA.  His initial NIH was 12 and his current NIH is 2.    ROS:   Review of Systems   Constitutional: Negative for chills, fever and malaise/fatigue.   HENT: Negative for hearing loss and tinnitus.    Eyes: Negative for blurred vision, double vision and photophobia.   Respiratory: Negative for shortness of breath.    Cardiovascular: Negative for chest pain.   Gastrointestinal: Negative for nausea and vomiting.   Genitourinary: Negative for hematuria.   Musculoskeletal: Negative for back pain, falls, myalgias and neck pain.   Skin: Negative for rash.   Neurological: Positive for focal weakness and weakness. Negative for dizziness, tingling, tremors, sensory change, speech change, seizures, loss of consciousness and headaches.   Psychiatric/Behavioral: Negative for memory loss.       Past Medical History:   Past Medical History:   Diagnosis Date   • Spherocytosis (HCC)        Past Surgical History:   Past Surgical History:   Procedure Laterality Date   • SPLENECTOMY         Social History:   Social History     Socioeconomic History   • Marital status: Not on file     Spouse name: Not on file   • Number of children: Not on file    • Years of education: Not on file   • Highest education level: Not on file   Occupational History   • Not on file   Tobacco Use   • Smoking status: Never Smoker   • Smokeless tobacco: Never Used   Substance and Sexual Activity   • Alcohol use: Yes     Comment: last night   • Drug use: Never   • Sexual activity: Not on file   Other Topics Concern   • Not on file   Social History Narrative   • Not on file     Social Determinants of Health     Financial Resource Strain:    • Difficulty of Paying Living Expenses:    Food Insecurity:    • Worried About Running Out of Food in the Last Year:    • Ran Out of Food in the Last Year:    Transportation Needs:    • Lack of Transportation (Medical):    • Lack of Transportation (Non-Medical):    Physical Activity:    • Days of Exercise per Week:    • Minutes of Exercise per Session:    Stress:    • Feeling of Stress :    Social Connections:    • Frequency of Communication with Friends and Family:    • Frequency of Social Gatherings with Friends and Family:    • Attends Yazidism Services:    • Active Member of Clubs or Organizations:    • Attends Club or Organization Meetings:    • Marital Status:    Intimate Partner Violence:    • Fear of Current or Ex-Partner:    • Emotionally Abused:    • Physically Abused:    • Sexually Abused:        Family Hx: History reviewed. No pertinent family history.    Current Medications:   Current Facility-Administered Medications   Medication Dose Route Frequency Provider Last Rate Last Admin   • atorvastatin (LIPITOR) tablet 80 mg  80 mg Oral Q EVENING Salome Garza M.D.       • niCARdipine (CARDENE) 25 mg in  mL Infusion  0-15 mg/hr Intravenous Continuous Salome Garza M.D.       • [COMPLETED] iohexol (OMNIPAQUE) 300 mg/mL  42 mL Intra-arterial Once Anamaria Price M.D.   42 mL at 10/12/21 1900   • lactated ringer BOLUS infusion  1,000 mL Intravenous Once Salome Garza M.D.       • hydrALAZINE (APRESOLINE) injection 10 mg  10  mg Intravenous Q2HRS PRN Salome Garza M.D.       • labetalol (NORMODYNE/TRANDATE) injection 10 mg  10 mg Intravenous Q10 MIN PRN Salome Garza M.D.           Allergies:   Allergies   Allergen Reactions   • Penicillins        Physical Exam:   Vitals:    10/12/21 1820 10/12/21 1821 10/12/21 1822 10/12/21 1827   BP:  149/60     Pulse: 70  63 66   Resp:       SpO2: 98%  98% 97%   Weight:       Height:           Physical Exam   GENERAL:  Lying in the hospital bed in no apparent distress.  Head: Normocephalic and atraumatic.   Eyes: Pupils are equal, round, and reactive to light. EOM are normal.   Cardiovascular: Normal rate and regular rhythm.    Pulmonary/Chest: Breath sounds normal.   Abdominal: Soft. Bowel sounds are normal. He exhibits no distension. There is no tenderness.   Skin: Skin is warm and dry. No rash noted. No erythema.  Neuro Exam  MENTAL STATUS:  Awake, alert, oriented times 3.  Speech is fluent, comprehension is intact.  CRANIAL NERVES:  PERRL, EOMI with no nystagmus, face is symmetric, facial sensation is intact, tongue is in the midline, palate is symmetric. Hearing is intact to finger rub bilaterally. Shoulder shrugs are normal.  MOTOR:  Motor examination showed normal strength in direct testing of right upper and lower extremities, proximal and distal.  He has a drift in left upper extremity.  SENSATION:  Intact to light touch, temperature and proprioception throughout  REFLEXES:  2+ and symmetric, toes are downgoing bilaterally  COORDINATION:  Normal finger to nose and heel to shin bilaterally  GAIT:  Deferred       NIH Stroke Scale:    1a. Level of Consciousness (Alert, drowsy, etc): 0= Alert    1b. LOC Questions (Month, age): 0= Answers both correctly    1c. LOC Commands (Open/close eyes make fist/let go): 0= Obeys both correctly    2.   Best Gaze (Eyes open - patient follows examiner's finger on face): 0= Normal    3.   Visual Fields (introduce visual stimulus/threat to patient's field  quadrants): 0= No visual loss    4.   Facial Paresis (Show teeth, raise eyebrows and squeeze eyes shut): 1= Minor     5a. Motor Arm - Left (Elevate arm to 90 degrees if patient is sitting, 45 degrees if  supine): 1= Drift    5b. Motor Arm - Right (Elevate arm to 90 degrees if patient is sitting, 45 degrees if supine): 0= No drift    6a. Motor Leg - Left (Elevate leg 30 degrees with patient supine): 0= No drift    6b. Motor Leg - Right  (Elevate leg 30 degrees with patient supine): 0= No drift    7.   Limb Ataxia (Finger-nose, heel down shin): 0= No ataxia    8.   Sensory (Pin prick to face, arm, trunk and leg - compare side to side): 0= Normal    9.  Best Language (Name item, describe a picture and read sentences): 0= No aphasia    10. Dysarthria (Evaluate speech clarity by patient repeating listed words): 0= Normal articulation    11. Extinction and Inattention (Use information from prior testing to identify neglect or  double simultaneous stimuli testing): 0= No neglect    Total NIH Score: 2     Prehospital modified Annette Scale (MRS): 0 = No symptoms      Labs:  Recent Labs     10/12/21  1644   WBC 16.8*   RBC 5.43   HEMOGLOBIN 18.5*   HEMATOCRIT 51.6   MCV 95.0   MCH 34.1*   MCHC 35.9*   RDW 46.1   PLATELETCT 545*   MPV 9.8     Recent Labs     10/12/21  1644   SODIUM 141   POTASSIUM 4.0   CHLORIDE 104   CO2 23   GLUCOSE 106*   BUN 12   CREATININE 0.77   CALCIUM 9.5     Recent Labs     10/12/21  1644   APTT 30.9   INR 1.15*                 Recent Labs     10/12/21  1644   SODIUM 141   POTASSIUM 4.0   CHLORIDE 104   CO2 23   GLUCOSE 106*   BUN 12     Recent Labs     10/12/21  1644   SODIUM 141   POTASSIUM 4.0   CHLORIDE 104   CO2 23   BUN 12   CREATININE 0.77   CALCIUM 9.5     Recent Labs     10/12/21  1644   APTT 30.9   INR 1.15*     No results found for this or any previous visit.      Imaging reviewed:    OUTSIDE IMAGES-DX CHEST   Final Result      OUTSIDE IMAGES-CT HEAD   Final Result      CT-FOREIGN FILM CAT  SCAN   Final Result      OUTSIDE IMAGES-CT HEAD   Final Result      IR-THROMBO MECHANICAL ARTERY,INIT    (Results Pending)   DX-CHEST-PORTABLE (1 VIEW)    (Results Pending)   EC-ECHOCARDIOGRAM COMPLETE W/O CONT    (Results Pending)          Assessment/Plan:   66 y.o. male with no significant past medical history who was evaluated at [facility] for profound left-sided weakness with initial NIH scale score of 12.  Patient was found to be candidate for administration of TPA and received TPA at [place] [facility].  Subsequently he underwent CT angiogram of the head and neck which revealed right M1 occlusion.  He was transferred to [facility] and underwent conventional angiogram in order to perform thrombectomy.  Angiogram revealed right M2 occlusion.  Apparently thrombectomy was not successful with TICI 2 a result.  Patient symptom has improved significantly in current NIH scale score is 2 with mild left facial weakness and mild drift of left arm.  Blood pressure goal is systolic less than 160.  He will undergo complete stroke work-up including echocardiogram with bubble study.  Obtain lipid profile and hemoglobin A1c.  Obtain brain MRI with and without contrast and if no evidence of hemorrhagic transformation start aspirin 24-hour after administration of TPA.  If MRI is not done, obtain noncontrast head CT 24-hour after administration of TPA.  He will be evaluated by physical therapy, occupational therapy and speech therapy.  Total critical care time spent was 45 minutes.

## 2021-10-13 NOTE — THERAPY
Speech Language Pathology   Clinical Swallow Evaluation     Patient Name: Fermín Gomez  AGE:  66 y.o., SEX:  male  Medical Record #: [id number]  Today's Date: 10/13/2021     Precautions  Precautions: Fall Risk, Swallow Precautions ( See Comments)  Comments: TPA 5059 10/12/21    Assessment    Patient is 66 y.o. male who presented 10/12/2021 with L side deficits.  He was out hiking, slipped and fell on ice.  Someone else hiking called for help. L sided deficits.  On arrival at [facility] deemed to be a TPA candidate, which he received then transferred to [facility] for thrombectomy. R M2 was partially occluded, unable to recanalize, TICI 2A flow.    Head CT:   1.  Small focus of subdural hemorrhage along the left side of the falx in the frontal area is again identified with no change.   2.  There is some localized brain swelling and edema in the right basal ganglia which could represent subacute infarcts.   3.  No mass effect or midline shift identified.     CXR negative for acute cardiopulmonary process.    Patient seen for a clinical swallow evaluation on this date.  Speech was mildly dysarthric and with reduced rate.  Minimal word finding deficits noted.  Patient followed directives to the oral [facility] exam with left-sided asymmetry noted, lingual weakness, and reduced coordination of lingual and labial movements.  AMRs and SMRs were delayed and imprecise.  Patient unable to make labial seal for plosive (/p/) with air emission noted.  Presentation of PO included ice chips, mildly thickened liquids, applesauce, pudding, soft solids, and thin liquids.  Patient presented with anterior bolus loss with pudding and thin and thickened liquids, prolonged mastication, and suspected delayed A-P transit.  Patient demonstrated impaired sensation and weakness on the left resulting in  pocketing to the left lateral sulcus.  Sips of thins reduced lingual residue but finger sweep was utilized to clear applesauce from the lateral sulcus.   In self feeding tasks, patient took large rapid bites of pudding and minimally reduced rate and bolus size given MOD cues; Moderate oral residue noted but cleared with a liquid wash x3.  Patient demonstrated reflexive throat clearing with thin liquids and coughing with soft solids, which is concerning for penetration/aspiration.      Clinical Impressions:     Clinical signs of oropharyngeal dysphagia, likely acute related to R MCA stroke. Patient endorses chronic esophageal dysphagia with esophageal dilation earlier this year.  Instrumental swallow study is indicated, given reduced oral sensation and impaired bolus control/pocketing with s/sx concerning for aspiration.  Patient's swallow physiology appears functional to support a modified PO diet prior to instrumental swallowing study.     Recommendations:   1. Initiate a Puree diet with Mildly Thickened Liquids   - DIRECT supervision and feeding assistance as needed   - Alternated purees and liquids to reduce oral residue   - Check for pocketing to the left buccal cavity; clear with a lingual or finger sweep  2. Crush pills in puree  3. FEES to further assess pharyngeal swallowing function  4. SLP following for dysphagia management. Will complete the speech/language evaluation in the coming days.     Plan    Recommend Speech Therapy 5 times per week until therapy goals are met for the following treatments:  Dysphagia Training and Patient / Family / Caregiver Education.    Discharge Recommendations: Recommend post-acute placement for additional speech therapy services prior to discharge home    Subjective    Pleasant and agreeable.     Objective       10/13/21 0952   Oral Motor Eval    Is Patient Able to Complete Oral Motor Eval Yes but Impaired   Labial Function   Labial Structure At Rest Moderate  (Left-sided asymmetry )   Labial Vowel Production / I /, / U / Moderate  (Left-sided asymmetry )   Labial Sequence / I /, / U / Moderate  (Left-sided asymmetry,  discoordinated, reduced rate)   Frown, Pucker Moderate  (Left-sided asymmetry )   Lingual Function   Lingual Structure At Rest Within Functional Limits   Lingual Protrude Within Functional Limits   Elevate Outside Mouth Minimal   Lateralization No Impairment Right;Moderate Left   Lick Lips (Circular) Minimal   / Pa / 5X's Minimal   / Ta / 5X's Not Tested   / Ka / 5X's Not Tested   / [place] / 5X's Moderate  (reduced rate, dysarthric, discoordinated)   Jaw   Jaw Structure At Rest Within Functional Limits   Bite ([place]) Within Functional Limits   Jaw Open / Resist Within Functional Limits   Jaw Close / Resist Within Functional Limits   Velar Function   Velar Structure At Rest Within Functional Limits   / A / Prolonged Within Functional Limits   / A /  5X's Within Functional Limits   Laryngeal Function   Voice Quality Within Functional Limits   Volutional Cough Minimal   Excursion Upon Swallow Complete   Oral Food Presentation   Ice Chips Within Functional Limits   Single Swallow Mildly Thick (2) - (Nectar Thick)  Minimal   Serial Swallow Mildly Thick (2) - (Nectar Thick) Within Functional Limits   Single Swallow Thin (0) Minimal   Serial Swallow Thin (0) Minimal   Liquidised (3) Minimal   Pureed (4) Minimal   Soft & Bite-Sized (6) - (Dysphagia III) Moderate   Self Feeding Needs Assistance   Tracheostomy   Tracheostomy  No   Dysphagia Strategies / Recommendations   Strategies / Interventions Recommended (Yes / No) Yes   Compensatory Strategies Direct Supervision During Meals;Assistance Needed for Meal Tray Set-up;Head of Bed 90 Degrees During Eating / Drinking;No Straws;No Talking During Eating / Drinking   Diet / Liquid Recommendation Puree (4);Mildly Thick (2) - (Nectar Thick)   Medication Administration  Crush all Medications in Puree   Therapy Interventions Dysphagia Therapy By Speech Language Pathologist   Dysphagia Rating   Nutritional Liquid Intake Rating Scale Thickened beverages (mildly thick unless  "otherwise specified)   Nutritional Food Intake Rating Scale Total oral diet of a single consistency   Patient / Family Goals   Patient / Family Goal #1 \"I'm thirsty\"   Short Term Goals   Short Term Goal # 1 Pt will consume PU4/MT2 diet with no overt s/sx of aspiration, given min cues to swallowing strategies.          "

## 2021-10-13 NOTE — OR SURGEON
Immediate Post- Operative Note        PostOp Diagnosis: Stroke, right MCA occlusion      Procedure(s): Rt MCA thrombectomy. Dominant inferior division Rt M2 partial occlusion, unable to be recanalized. TICI 2A      Estimated Blood Loss: Less than 5 ml      Complications: None        10/12/2021     6:20 PM     Anamaria Price M.D.

## 2021-10-13 NOTE — DISCHARGE PLANNING
JAMIW met pt's wife, Aye [phone], and daughter, Brown [phone] in ED family waiting room. Provided with brief update and made them aware that pt is in IR.     Plan: Will take family to appropriate unit once pt is roomed.

## 2021-10-13 NOTE — DISCHARGE PLANNING
[facility] Transitional Care Coordination    Referral from:  Dr Salome Garza  Insurance Provider on Facesheet: [employer]  Potential Rehab Diagnosis: Stroke    Chart review indicates patient may need going medical management and may have therapy needs to possibly meet inpatient rehab facility criteria with the goal of returning to community.    D/C support: TBD     Physiatry consultation: Forwarded per protocol.     Last Covid test:  10/12 Not detected    Stroke - s/p tPA. Pending therapy evals, PMR to consult. Waiting on additional information to determine appropriateness for acute inpatient rehabilitation. Will continue to follow.      Thank you for the referral.

## 2021-10-13 NOTE — ASSESSMENT & PLAN NOTE
Right MCA stroke, status post thrombolytics and attempted thrombectomy  Continue atorvastatin, start antiplatelet therapy and appropriate interval when approved by neurology  Blood pressure control  PT/OT/speech, acute rehab consult  Continuous hemodynamic monitoring for arrhythmia  Echocardiogram without evidence of left-to-right shunt, normal ejection fraction  Start aspirin  Continuous hemodynamic monitoring for arrhythmia, may benefit from long-term event monitor  Appreciate physiatry consult, plan for rehab pending insurance auth and bed availability.  Appreciate neurology assistance. Signed off.

## 2021-10-13 NOTE — PROGRESS NOTES
0450: pt taken to CT with 1 tech, 1 ACLS RN, on monitor    0515: pt transported back to RICU 110 on monitor. No distress, denies pain

## 2021-10-13 NOTE — PROGRESS NOTES
NEUROLOGY PROGRESS NOTE      BACKGROUND:    66 y.o. male was admitted on 10/12/2021  4:56 PM for Acute CVA (cerebrovascular accident) (HCC) [I63.9]  He is status post administration of TPA at [facility].    SUBJECTIVE:   Unfortunately last night he had worsening of left-sided weakness.  Wife and daughter are at bedside.  Repeat brain CT did not reveal acute changes.    VITALS:  Vitals:    10/13/21 0800 10/13/21 0900 10/13/21 1000 10/13/21 1100   BP: 118/58 121/59 127/61 129/61   Pulse: (!) 58 (!) 57 77 68   Resp: 17 19 (!) 23 (!) 21   Temp: 36.9 °C (98.5 °F)  36.9 °C (98.5 °F)    TempSrc: Temporal  Temporal    SpO2: 95% 95% 94% 93%   Weight:       Height:           NEUROLOGICAL EXAM:   MENTAL STATUS:  Awake, alert, oriented times 3.  Speech is slightly dysarthric.  Comprehension is intact.  CRANIAL NERVES:  PERRL, EOMI with no nystagmus, he has profound left facial weakness in an upper motor neuron pattern.  Facial sensation is intact, tongue is in the midline, palate is symmetric. Hearing is intact to finger rub bilaterally. Shoulder shrugs are normal.  MOTOR:  Motor examination showed normal strength in direct testing of right upper and lower extremities, proximal and distal.  He has 1/5 weakness of left upper extremity and 4/5 weakness of left lower extremity.  SENSATION:  Intact to light touch, temperature and proprioception throughout  REFLEXES:  2+ and symmetric, toes are downgoing bilaterally  COORDINATION:   Intact to finger-to-nose and heel shin testing on the right, he was not able to perform those on the left.  GAIT:  Deferred      OBJECTIVE:    NEUROIMAGING:    CT-HEAD W/O   Final Result         1.  Small focus of subdural hemorrhage along the left side of the falx in the frontal area is again identified with no change.      2.  There is some localized brain swelling and edema in the right basal ganglia which could represent subacute infarcts.      3.  No mass effect or midline shift identified.          DX-CHEST-PORTABLE (1 VIEW)   Final Result      1.  There is no acute cardiopulmonary process.      OUTSIDE IMAGES-DX CHEST   Final Result      OUTSIDE IMAGES-CT HEAD   Final Result      CT-FOREIGN FILM CAT SCAN   Final Result      OUTSIDE IMAGES-CT HEAD   Final Result      IR-THROMBO MECHANICAL ARTERY,INIT    (Results Pending)   EC-ECHOCARDIOGRAM COMPLETE W/O CONT    (Results Pending)   MR-BRAIN-W/O    (Results Pending)   CT-HEAD W/O    (Results Pending)       MEDICATIONS:  Current Facility-Administered Medications   Medication Dose Route Frequency Provider Last Rate Last Admin   • NS infusion   Intravenous Continuous Daniel Melara M.D. 100 mL/hr at 10/13/21 1209 New Bag at 10/13/21 1209   • acetaminophen (Tylenol) tablet 650 mg  650 mg Oral Q4HRS PRN Josr Santos Jr., D.O.   650 mg at 10/13/21 1032   • atorvastatin (LIPITOR) tablet 80 mg  80 mg Oral Q EVENING Salome Garza M.D.       • niCARdipine (CARDENE) 25 mg in  mL Infusion  0-15 mg/hr Intravenous Continuous Salome Garza M.D.   Dose not Required at 10/12/21 1815   • hydrALAZINE (APRESOLINE) injection 10 mg  10 mg Intravenous Q2HRS PRN Salome Garza M.D.       • labetalol (NORMODYNE/TRANDATE) injection 10 mg  10 mg Intravenous Q10 MIN PRN Salome Garza M.D.           LABS:      Recent Labs     10/12/21  1644 10/13/21  0320   WBC 16.8* 14.6*   RBC 5.43 4.76   HEMOGLOBIN 18.5* 15.7   HEMATOCRIT 51.6 44.8   MCV 95.0 94.1   MCH 34.1* 33.0   MCHC 35.9* 35.0   RDW 46.1 46.0   PLATELETCT 545* 480*   MPV 9.8 9.6     Recent Labs     10/12/21  1644 10/13/21  0320   SODIUM 141 137   POTASSIUM 4.0 3.6   CHLORIDE 104 105   CO2 23 25   GLUCOSE 106* 105*   BUN 12 10     INR   Date Value Ref Range Status   10/13/2021 1.25 (H) 0.87 - 1.13 Final     Comment:     INR - Non-therapeutic Reference Range: 0.87-1.13  INR - Therapeutic Reference Range: 2.0-4.0       No results found for: POCINR  Lab Results   Component Value Date/Time    CREATININE 0.72  10/13/2021 0320     Lab Results   Component Value Date/Time    IFAFRICA >60 10/13/2021 0320    IFNOTAFR >60 10/13/2021 0320         ASSESSMENT AND PLAN:   66 y.o. male with no significant past medical history who was evaluated at [facility] for profound left-sided weakness with initial NIH scale score of 12.  Patient was found to be candidate for administration of TPA and received TPA at [place].  Subsequently he underwent CT angiogram of the head and neck which revealed right M1 occlusion.  He was transferred to [facility] and underwent conventional angiogram in order to perform thrombectomy.  Angiogram revealed right M2 occlusion.  Apparently thrombectomy was not successful with TICI 2 a result.  Patient symptom has initially improved significantly but overnight he probably had some extension of his a stroke and his weakness on the left side has worsened.  Repeat brain CT earlier this morning revealed no acute changes.  The CT redemonstrated small subdural hematoma along the left falx.  Hold aspirin for now.  Echocardiogram is pending.  Lipid profile revealed LDL of 87, he will continue with atorvastatin 80 mg daily.  He will continue with physical therapy, Occupational Therapy and speech therapy.  Discussed with his wife and daughter at bedside and answer all their questions.

## 2021-10-13 NOTE — CONSULTS
Hospital Medicine Consultation    Date of Service  10/13/2021    Referring Physician  Josr Santos Jr., LIZETH.O.    Consulting Physician  Drew Ingram M.D.    Reason for Consultation  stroke    History of Presenting Illness  66 y.o. male who presented 10/12/2021 with left sided weakness    Mr Gomez has past medical history that includes hereditary severe cytosis, status post splenectomy.  Patient presented to an outside hospital emergency room with a fall and acute onset of left-sided weakness.  Imaging was concerning for right MCA occlusion he was treated with TPA and transferred to [facility] for higher level of care and neuro interventional radiology.  Patient underwent attempted right MCA thrombectomy which was not successful and was admitted to the ICU.    I during my encounter the patient is sleeping he awakens  easily and is oriented, speech is somewhat slurred but content is appropriate.  Patient complains of a mild headache, he is aware of left upper extremity weakness.  Denies shortness of breath, no chest pain.  Denies epistaxis, denies blood in his stool    Review of Systems  Review of Systems   Constitutional: Positive for malaise/fatigue. Negative for chills.   Respiratory: Negative for cough, hemoptysis and sputum production.    Cardiovascular: Negative for chest pain, palpitations and orthopnea.   Gastrointestinal: Negative for nausea and vomiting.   Skin: Negative for itching and rash.   Neurological: Positive for headaches. Negative for dizziness.   All other systems reviewed and are negative.      Past Medical History   has a past medical history of Spherocytosis (HCC).    Surgical History   has a past surgical history that includes splenectomy.    Family History  Family history reviewed and is not pertinent to presenting illness    Social History   reports that he has never smoked. He has never used smokeless tobacco. He reports current alcohol use. He reports current drug  use. Drug: Marijuana.    Medications  Prior to Admission Medications   Prescriptions Last Dose Informant Patient Reported? Taking?   Ascorbic Acid (VITAMIN C) 1000 MG Tab 10/12/2021 at AM Patient Yes Yes   Sig: Take 1,000 mg by mouth every morning.   omeprazole (PRILOSEC) 20 MG delayed-release capsule 10/12/2021 at AM Patient Yes Yes   Sig: Take 20 mg by mouth every morning.      Facility-Administered Medications: None       Allergies  Allergies   Allergen Reactions   • Penicillins        Physical Exam  Temp:  [36.8 °C (98.2 °F)-37 °C (98.6 °F)] (P) 36.9 °C (98.5 °F)  Pulse:  [54-86] 63  Resp:  [14-29] 29  BP: (105-181)/(55-81) 127/60  SpO2:  [90 %-100 %] 96 %    Physical Exam  Constitutional:       General: He is not in acute distress.     Appearance: Normal appearance. He is normal weight.   HENT:      Head: Normocephalic and atraumatic.      Right Ear: External ear normal.      Left Ear: External ear normal.      Nose: Nose normal.      Mouth/Throat:      Mouth: Mucous membranes are moist.      Pharynx: Oropharynx is clear.   Eyes:      Extraocular Movements: Extraocular movements intact.      Conjunctiva/sclera: Conjunctivae normal.      Pupils: Pupils are equal, round, and reactive to light.   Cardiovascular:      Rate and Rhythm: Normal rate and regular rhythm.      Pulses: Normal pulses.   Pulmonary:      Effort: Pulmonary effort is normal.      Breath sounds: Normal breath sounds.   Abdominal:      General: Abdomen is flat. Bowel sounds are normal.      Palpations: Abdomen is soft.   Musculoskeletal:         General: Normal range of motion.      Cervical back: Normal range of motion and neck supple.   Skin:     General: Skin is warm and dry.      Capillary Refill: Capillary refill takes less than 2 seconds.      Coloration: Skin is not jaundiced.   Neurological:      General: No focal deficit present.      Mental Status: He is alert and oriented to person, place, and time.      Cranial Nerves: No cranial  nerve deficit.      Gait: Gait normal.      Comments: Left-sided weakness, upper extremity greater than lower extremity  Dysarthric speech   Psychiatric:         Mood and Affect: Mood normal.         Behavior: Behavior normal.         Fluids  Date 10/13/21 0700 - 10/14/21 0659   Shift 0916-6886 1303-3952 7147-4190 24 Hour Total   INTAKE   I.V. 185   185   IV Piggyback 1000   1000   Shift Total 1185   1185   OUTPUT   Urine 200   200   Shift Total 200   200   Weight (kg) 87.8 87.8 87.8 87.8       Laboratory  Recent Labs     10/12/21  1644 10/13/21  0320   WBC 16.8* 14.6*   RBC 5.43 4.76   HEMOGLOBIN 18.5* 15.7   HEMATOCRIT 51.6 44.8   MCV 95.0 94.1   MCH 34.1* 33.0   MCHC 35.9* 35.0   RDW 46.1 46.0   PLATELETCT 545* 480*   MPV 9.8 9.6     Recent Labs     10/12/21  1644 10/13/21  0320   SODIUM 141 137   POTASSIUM 4.0 3.6   CHLORIDE 104 105   CO2 23 25   GLUCOSE 106* 105*   BUN 12 10   CREATININE 0.77 0.72   CALCIUM 9.5 8.5     Recent Labs     10/12/21  1644 10/13/21  0320   APTT 30.9  --    INR 1.15* 1.25*          Recent Labs     10/13/21  0320   TRIGLYCERIDE 65   HDL 50   LDL 87        Imaging  EC-ECHOCARDIOGRAM COMPLETE W/O CONT   Final Result      CT-HEAD W/O   Final Result         1.  Small focus of subdural hemorrhage along the left side of the falx in the frontal area is again identified with no change.      2.  There is some localized brain swelling and edema in the right basal ganglia which could represent subacute infarcts.      3.  No mass effect or midline shift identified.         DX-CHEST-PORTABLE (1 VIEW)   Final Result      1.  There is no acute cardiopulmonary process.      OUTSIDE IMAGES-DX CHEST   Final Result      OUTSIDE IMAGES-CT HEAD   Final Result      CT-FOREIGN FILM CAT SCAN   Final Result      OUTSIDE IMAGES-CT HEAD   Final Result      IR-THROMBO MECHANICAL ARTERY,INIT    (Results Pending)   MR-BRAIN-W/O    (Results Pending)   CT-HEAD W/O    (Results Pending)       Assessment/Plan  * Stroke  (cerebrum) (HCC)- (present on admission)  Assessment & Plan  Right MCA stroke, status post thrombolytics and attempted thrombectomy  Continue atorvastatin, start antiplatelet therapy and appropriate interval when approved by neurology  Continue atorvastatin  Blood pressure control  PT/OT/speech, acute rehab consult  Continuous hemodynamic monitoring for arrhythmia  Echocardiogram ordered and is pending      Left hemiparesis (HCC)- (present on admission)  Assessment & Plan  PT/OT/Speech    Subdural hematoma (HCC)- (present on admission)  Assessment & Plan  Holding antiplatelet therapy for now    Dysphagia- (present on admission)  Assessment & Plan  Speech following, continue modified diet    H/O splenectomy- (present on admission)  Assessment & Plan  History of splenectomy for hereditary spherocytosis    Lymphopenia- (present on admission)  Assessment & Plan  Covid negative, follow CBC

## 2021-10-13 NOTE — CONSULTS
Critical Care Consultation/H&P    Date of consult: 10/12/2021    Referring Physician  Josr Santos Jr., D.O.    Reason for Consultation  Stroke    History of Presenting Illness  66 y.o. male previously healthy who presented 10/12/2021 with L side deficits.  He was out hiking, slipped and fell on ice.  Someone else hiking called for help. L sided deficits.  On arrival at [place] deemed to be a TPA candidate, which he received then transferred to [facility] for thrombectomy. R M2 was partially occluded, unable to recanalize, TICI 2A flow. Goals SBP per      Code Status  No Order    Review of Systems  Review of Systems   Constitutional: Negative for chills and fever.   Respiratory: Negative for cough and shortness of breath.    Cardiovascular: Negative for chest pain.   Neurological: Positive for focal weakness. Negative for dizziness and sensory change.       Past Medical History   has a past medical history of Spherocytosis (HCC).    Surgical History   has a past surgical history that includes splenectomy.    Family History  family history is not on file.    Social History   reports that he has never smoked. He has never used smokeless tobacco. He reports current alcohol use. He reports current drug use. Drug: Marijuana.    Medications  Home Medications    **Home medications have not yet been reviewed for this encounter**       Current Facility-Administered Medications   Medication Dose Route Frequency Provider Last Rate Last Admin   • atorvastatin (LIPITOR) tablet 80 mg  80 mg Oral Q EVENING Salome Garza M.D.       • labetalol (NORMODYNE/TRANDATE) injection 10 mg  10 mg Intravenous Q10 MIN PRN Salome Garza M.D.       • hydrALAZINE (APRESOLINE) injection 10 mg  10 mg Intravenous Q2HRS PRN Salome Garza M.D.       • niCARdipine (CARDENE) 25 mg in  mL Infusion  0-15 mg/hr Intravenous Continuous Salome Garza M.D.         No current outpatient medications on file.       Allergies  Allergies    Allergen Reactions   • Penicillins        Vital Signs last 24 hours  Pulse:  [64-80] 64  Resp:  [18] 18  BP: (136-178)/(57-80) 150/65  SpO2:  [96 %-100 %] 99 %    Physical Exam  Physical Exam  Constitutional:       General: He is not in acute distress.     Appearance: Normal appearance.   HENT:      Head: Normocephalic and atraumatic.      Mouth/Throat:      Mouth: Mucous membranes are moist.   Eyes:      Pupils: Pupils are equal, round, and reactive to light.   Cardiovascular:      Rate and Rhythm: Normal rate and regular rhythm.      Pulses: Normal pulses.      Heart sounds: Normal heart sounds.   Pulmonary:      Effort: Pulmonary effort is normal.      Breath sounds: Normal breath sounds.   Abdominal:      General: Abdomen is flat. Bowel sounds are normal.      Palpations: Abdomen is soft.   Musculoskeletal:      Cervical back: No rigidity.      Right lower leg: No edema.      Left lower leg: No edema.   Skin:     General: Skin is warm and dry.   Neurological:      Mental Status: He is alert and oriented to person, place, and time.      Cranial Nerves: Cranial nerve deficit (L facial droop, R tongue deviation) present.      Motor: Weakness (L arm 3/5, L leg 4/5) present.      Comments: Slurred speech   Psychiatric:         Mood and Affect: Mood normal.         Fluids  No intake or output data in the 24 hours ending 10/12/21 1750    Laboratory  Recent Results (from the past 48 hour(s))   CBC WITH DIFFERENTIAL    Collection Time: 10/12/21  4:44 PM   Result Value Ref Range    WBC 16.8 (H) 4.8 - 10.8 K/uL    RBC 5.43 4.70 - 6.10 M/uL    Hemoglobin 18.5 (H) 14.0 - 18.0 g/dL    Hematocrit 51.6 42.0 - 52.0 %    MCV 95.0 81.4 - 97.8 fL    MCH 34.1 (H) 27.0 - 33.0 pg    MCHC 35.9 (H) 33.7 - 35.3 g/dL    RDW 46.1 35.9 - 50.0 fL    Platelet Count 545 (H) 164 - 446 K/uL    MPV 9.8 9.0 - 12.9 fL    Neutrophils-Polys 87.20 (H) 44.00 - 72.00 %    Lymphocytes 7.00 (L) 22.00 - 41.00 %    Monocytes 4.50 0.00 - 13.40 %     Eosinophils 0.10 0.00 - 6.90 %    Basophils 0.80 0.00 - 1.80 %    Immature Granulocytes 0.40 0.00 - 0.90 %    Nucleated RBC 0.00 /100 WBC    Neutrophils (Absolute) 14.68 (H) 1.82 - 7.42 K/uL    Lymphs (Absolute) 1.17 1.00 - 4.80 K/uL    Monos (Absolute) 0.76 0.00 - 0.85 K/uL    Eos (Absolute) 0.01 0.00 - 0.51 K/uL    Baso (Absolute) 0.14 (H) 0.00 - 0.12 K/uL    Immature Granulocytes (abs) 0.07 0.00 - 0.11 K/uL    NRBC (Absolute) 0.00 K/uL   COMP METABOLIC PANEL    Collection Time: 10/12/21  4:44 PM   Result Value Ref Range    Sodium 141 135 - 145 mmol/L    Potassium 4.0 3.6 - 5.5 mmol/L    Chloride 104 96 - 112 mmol/L    Co2 23 20 - 33 mmol/L    Anion Gap 14.0 7.0 - 16.0    Glucose 106 (H) 65 - 99 mg/dL    Bun 12 8 - 22 mg/dL    Creatinine 0.77 0.50 - 1.40 mg/dL    Calcium 9.5 8.5 - 10.5 mg/dL    AST(SGOT) 24 12 - 45 U/L    ALT(SGPT) 20 2 - 50 U/L    Alkaline Phosphatase 79 30 - 99 U/L    Total Bilirubin 1.2 0.1 - 1.5 mg/dL    Albumin 4.5 3.2 - 4.9 g/dL    Total Protein 7.0 6.0 - 8.2 g/dL    Globulin 2.5 1.9 - 3.5 g/dL    A-G Ratio 1.8 g/dL   PROTHROMBIN TIME    Collection Time: 10/12/21  4:44 PM   Result Value Ref Range    PT 14.4 12.0 - 14.6 sec    INR 1.15 (H) 0.87 - 1.13   APTT    Collection Time: 10/12/21  4:44 PM   Result Value Ref Range    APTT 30.9 24.7 - 36.0 sec   TROPONIN    Collection Time: 10/12/21  4:44 PM   Result Value Ref Range    Troponin T 15 6 - 19 ng/L   ESTIMATED GFR    Collection Time: 10/12/21  4:44 PM   Result Value Ref Range    GFR If African American >60 >60 mL/min/1.73 m 2    GFR If Non African American >60 >60 mL/min/1.73 m 2       Imaging  DX-CHEST-PORTABLE (1 VIEW)   Final Result      1.  There is no acute cardiopulmonary process.      OUTSIDE IMAGES-DX CHEST   Final Result      OUTSIDE IMAGES-CT HEAD   Final Result      CT-FOREIGN FILM CAT SCAN   Final Result      OUTSIDE IMAGES-CT HEAD   Final Result      IR-THROMBO MECHANICAL ARTERY,INIT    (Results Pending)   EC-ECHOCARDIOGRAM  COMPLETE W/O CONT    (Results Pending)   MR-BRAIN-W/O    (Results Pending)   CT-HEAD W/O    (Results Pending)       Assessment/Plan  * Stroke (cerebrum) (HCC)  Assessment & Plan  Onset of symptoms while hiking  TPA at [place] prior to transfer  Rt MCA thrombectomy: Dominant inferior division Rt M2 partial occlusion, unable to be recanalized. TICI 2A  Goal   Statin  Anti-PLT after imaging tomorrow  PT/OT/SLP  TTE  Neurology, IR consulting  Q1H neuro checks    H/O splenectomy  Assessment & Plan  For spherocytosis    Lymphopenia  Assessment & Plan  Check COVID-19 PCR  He is vaccinated      Discussed patient condition and risk of morbidity and/or mortality with Family, RN and Patient.    The patient remains critically ill post-TPA and thrombectomy requiring q1h neuro checks.  Critical care time = 45 minutes in directly providing and coordinating critical care and extensive data review.  No time overlap and excludes procedures.

## 2021-10-13 NOTE — CONSULTS
"    Physical Medicine and Rehabilitation Consultation              Date of initial consultation: 10/13/2021  Requesting provider: Salome Garza MD   Consulting provider: Janet Herrera D.O.  Reason for consultation: assess for acute inpatient rehab appropriateness  LOS: 1 Day(s)    Chief complaint: left sided weakness     HPI: The patient is a 66 y.o. right-handed male with no significant past medical history ;  who presented on 10/12/2021  4:56 PM as a transfer from [facility] after a slip and fall on ice.  Per documentation, patient was taking some troponin I someone else called for help.  Reportedly patient had left-sided deficits upon arrival to [place] patient was deemed to be a candidate for TPA based on his imaging, documentation of CT angiogram of the head and neck revealed a right M1 occlusion.  Patient was then transferred to [facility] for higher level of care and thrombectomy.  Patient underwent a right MCA thrombectomy, dominant inferior division of right M2 partial occlusion which was unable to be recannulized, TICI 2A.  Per documentation, patient had a significant improvement in his symptoms after TPA administration.  Per the neurology consult NIH was 12 which improved to an NIH of 2.  Status post mechanical thrombectomy, patient has been admitted to the ICU.  PT/OT evaluations are pending, however patient has completed a swallow eval and he has been cleared for puréed diet with mildly thickened liquids.    Patient seen and examined at bedside. Patient reports that he feels \"Ok\". Denies HA, cp, sob, n/v, abdominal pain, or any new changes in numbness tingling or weakness. Continues to have left sided weakness, improved strength in LLE after TPA. Mild improvement in LUE hand  finger flexion.     Social Hx:  Patient lives in a condo in [place] with with his wife.   1 KIMANI  At prior level of function independent with mobility and ADLs, enjoyed hiking regularly.       Tobacco: denies  Alcohol: " "occasional   Drugs: uses marijuana     THERAPY:  Restrictions: not cleared for activity   PT: Functional mobility   PT eval pending     OT: ADLs  OT note pending    SLP:   10/13 SLP Note: Puréed diet with mildly thickened liquids    IMAGING:  10/13 CT Head  IMPRESSION:        1.  Small focus of subdural hemorrhage along the left side of the falx in the frontal area is again identified with no change.     2.  There is some localized brain swelling and edema in the right basal ganglia which could represent subacute infarcts.     3.  No mass effect or midline shift identified.    PROCEDURES:  10/12 Procedure(s): Rt MCA thrombectomy. Dominant inferior division Rt M2 partial occlusion, unable to be recanalized. TICI 2A, performed by Dr. Price     PMH:  Past Medical History:   Diagnosis Date   • Spherocytosis (HCC)        PSH:  Past Surgical History:   Procedure Laterality Date   • SPLENECTOMY         FHX:  History reviewed. No pertinent family history.    Medications:  Current Facility-Administered Medications   Medication Dose   • NS infusion     • atorvastatin (LIPITOR) tablet 80 mg  80 mg   • niCARdipine (CARDENE) 25 mg in  mL Infusion  0-15 mg/hr   • hydrALAZINE (APRESOLINE) injection 10 mg  10 mg   • labetalol (NORMODYNE/TRANDATE) injection 10 mg  10 mg       Allergies:  Allergies   Allergen Reactions   • Penicillins        Physical Exam:  Vitals: /59   Pulse 61   Temp 36.9 °C (98.5 °F) (Temporal)   Resp 17   Ht 1.803 m (5' 11\")   Wt 87.8 kg (193 lb 9 oz)   SpO2 92%   Gen: NAD, laying comfortably in bed.   Head:  NC/AT   Eyes/ Nose/ Mouth: PERRLA, moist mucous membranes  Cardio: RRR, good distal perfusion, warm extremities  Pulm: normal respiratory effort, no cyanosis, no witnessed wheezes or coughing   Abd: Soft NTND, negative borborygmi   Ext: No peripheral edema. No calf tenderness. No clubbing.    Mental status:  A&Ox4 (person, place, date, situation) answers questions appropriately follows " commands  Speech: fluent, + dyarthria, slurred     CRANIAL NERVES:  2,3: visual acuity grossly intact, PERRL  3,4,6: EOMI bilaterally, no nystagmus or diplopia  5: sensation intact to light touch bilaterally and symmetric  7: + facial droop   8: hearing grossly intact    Motor:      Upper Extremity  Myotome R L   Shoulder flexion C5 5/5 0/5   Elbow flexion C5 5/5 1/5   Wrist extension C6 5/5 0/5   Elbow extension C7 5/5 1/5   Finger flexion C8 5/5 2/5   Finger abduction T1 5/5 2/5     Lower Extremity Myotome R L   Hip flexion L2 5/5 4/5   Knee extension L3 5/5 4/5   Ankle dorsiflexion L4 5/5 4/5   Toe extension L5 5/5 4/5   Ankle plantarflexion S1 5/5 5/5       Sensory:   intact to light touch through out RUE and RLE, decreased sensation to LUE     DTRs: 2+ in bilateral  biceps  No clonus at bilateral ankles  Negative babinski b/l  Negative Johnson b/l     Tone: no spasticity noted, no cogwheeling noted    Coordination:   intact finger to nose with RUE   intact fine motor with fingers with RUE       Labs: Reviewed and significant for   Recent Labs     10/12/21  1644 10/13/21  0320   RBC 5.43 4.76   HEMOGLOBIN 18.5* 15.7   HEMATOCRIT 51.6 44.8   PLATELETCT 545* 480*   PROTHROMBTM 14.4 15.3*   APTT 30.9  --    INR 1.15* 1.25*     Recent Labs     10/12/21  1644 10/13/21  0320   SODIUM 141 137   POTASSIUM 4.0 3.6   CHLORIDE 104 105   CO2 23 25   GLUCOSE 106* 105*   BUN 12 10   CREATININE 0.77 0.72   CALCIUM 9.5 8.5     Recent Results (from the past 24 hour(s))   CBC WITH DIFFERENTIAL    Collection Time: 10/12/21  4:44 PM   Result Value Ref Range    WBC 16.8 (H) 4.8 - 10.8 K/uL    RBC 5.43 4.70 - 6.10 M/uL    Hemoglobin 18.5 (H) 14.0 - 18.0 g/dL    Hematocrit 51.6 42.0 - 52.0 %    MCV 95.0 81.4 - 97.8 fL    MCH 34.1 (H) 27.0 - 33.0 pg    MCHC 35.9 (H) 33.7 - 35.3 g/dL    RDW 46.1 35.9 - 50.0 fL    Platelet Count 545 (H) 164 - 446 K/uL    MPV 9.8 9.0 - 12.9 fL    Neutrophils-Polys 87.20 (H) 44.00 - 72.00 %    Lymphocytes  7.00 (L) 22.00 - 41.00 %    Monocytes 4.50 0.00 - 13.40 %    Eosinophils 0.10 0.00 - 6.90 %    Basophils 0.80 0.00 - 1.80 %    Immature Granulocytes 0.40 0.00 - 0.90 %    Nucleated RBC 0.00 /100 WBC    Neutrophils (Absolute) 14.68 (H) 1.82 - 7.42 K/uL    Lymphs (Absolute) 1.17 1.00 - 4.80 K/uL    Monos (Absolute) 0.76 0.00 - 0.85 K/uL    Eos (Absolute) 0.01 0.00 - 0.51 K/uL    Baso (Absolute) 0.14 (H) 0.00 - 0.12 K/uL    Immature Granulocytes (abs) 0.07 0.00 - 0.11 K/uL    NRBC (Absolute) 0.00 K/uL   COMP METABOLIC PANEL    Collection Time: 10/12/21  4:44 PM   Result Value Ref Range    Sodium 141 135 - 145 mmol/L    Potassium 4.0 3.6 - 5.5 mmol/L    Chloride 104 96 - 112 mmol/L    Co2 23 20 - 33 mmol/L    Anion Gap 14.0 7.0 - 16.0    Glucose 106 (H) 65 - 99 mg/dL    Bun 12 8 - 22 mg/dL    Creatinine 0.77 0.50 - 1.40 mg/dL    Calcium 9.5 8.5 - 10.5 mg/dL    AST(SGOT) 24 12 - 45 U/L    ALT(SGPT) 20 2 - 50 U/L    Alkaline Phosphatase 79 30 - 99 U/L    Total Bilirubin 1.2 0.1 - 1.5 mg/dL    Albumin 4.5 3.2 - 4.9 g/dL    Total Protein 7.0 6.0 - 8.2 g/dL    Globulin 2.5 1.9 - 3.5 g/dL    A-G Ratio 1.8 g/dL   PROTHROMBIN TIME    Collection Time: 10/12/21  4:44 PM   Result Value Ref Range    PT 14.4 12.0 - 14.6 sec    INR 1.15 (H) 0.87 - 1.13   APTT    Collection Time: 10/12/21  4:44 PM   Result Value Ref Range    APTT 30.9 24.7 - 36.0 sec   COD (ADULT)    Collection Time: 10/12/21  4:44 PM   Result Value Ref Range    ABO Grouping Only A     Rh Grouping Only NEG     Antibody Screen-Cod NEG    TROPONIN    Collection Time: 10/12/21  4:44 PM   Result Value Ref Range    Troponin T 15 6 - 19 ng/L   ESTIMATED GFR    Collection Time: 10/12/21  4:44 PM   Result Value Ref Range    GFR If African American >60 >60 mL/min/1.73 m 2    GFR If Non African American >60 >60 mL/min/1.73 m 2   COV-2, FLU A/B, AND RSV BY PCR (2-4 HOURS CEPHEID): Collect NP swab in VTM    Collection Time: 10/12/21  9:24 PM    Specimen: Nasopharyngeal; Respirate    Result Value Ref Range    Influenza virus A RNA Negative Negative    Influenza virus B, PCR Negative Negative    RSV, PCR Negative Negative    SARS-CoV-2 by PCR NotDetected     SARS-CoV-2 Source NP Swab    ABO Rh Confirm    Collection Time: 10/12/21  9:30 PM   Result Value Ref Range    ABO Rh Confirm A NEG    EKG (NOW)    Collection Time: 10/12/21  9:38 PM   Result Value Ref Range    Report       [facility]    Test Date:  2021-10-12  Pt Name:    MALIA GUZMAN               Department: ER  MRN:        [id number]                      Room:       RUST  Gender:     M                            Technician: CIERRA  :        [id number]                   Requested By:CHRISTOPH MARTINEZ  Order #:    [id number]                    Reading MD:    Measurements  Intervals                                Axis  Rate:       71                           P:          69  IL:         164                          QRS:        85  QRSD:       94                           T:          -4  QT:         420  QTc:        457    Interpretive Statements  SINUS RHYTHM  BORDERLINE RIGHT AXIS DEVIATION  No previous ECG available for comparison     CBC WITH DIFFERENTIAL    Collection Time: 10/13/21  3:20 AM   Result Value Ref Range    WBC 14.6 (H) 4.8 - 10.8 K/uL    RBC 4.76 4.70 - 6.10 M/uL    Hemoglobin 15.7 14.0 - 18.0 g/dL    Hematocrit 44.8 42.0 - 52.0 %    MCV 94.1 81.4 - 97.8 fL    MCH 33.0 27.0 - 33.0 pg    MCHC 35.0 33.7 - 35.3 g/dL    RDW 46.0 35.9 - 50.0 fL    Platelet Count 480 (H) 164 - 446 K/uL    MPV 9.6 9.0 - 12.9 fL    Neutrophils-Polys 83.30 (H) 44.00 - 72.00 %    Lymphocytes 6.90 (L) 22.00 - 41.00 %    Monocytes 8.10 0.00 - 13.40 %    Eosinophils 0.50 0.00 - 6.90 %    Basophils 0.90 0.00 - 1.80 %    Immature Granulocytes 0.30 0.00 - 0.90 %    Nucleated RBC 0.00 /100 WBC    Neutrophils (Absolute) 12.13 (H) 1.82 - 7.42 K/uL    Lymphs (Absolute) 1.01 1.00 - 4.80 K/uL    Monos (Absolute) 1.18 (H) 0.00 - 0.85 K/uL    Eos (Absolute)  0.07 0.00 - 0.51 K/uL    Baso (Absolute) 0.13 (H) 0.00 - 0.12 K/uL    Immature Granulocytes (abs) 0.05 0.00 - 0.11 K/uL    NRBC (Absolute) 0.00 K/uL   Comp Metabolic Panel    Collection Time: 10/13/21  3:20 AM   Result Value Ref Range    Sodium 137 135 - 145 mmol/L    Potassium 3.6 3.6 - 5.5 mmol/L    Chloride 105 96 - 112 mmol/L    Co2 25 20 - 33 mmol/L    Anion Gap 7.0 7.0 - 16.0    Glucose 105 (H) 65 - 99 mg/dL    Bun 10 8 - 22 mg/dL    Creatinine 0.72 0.50 - 1.40 mg/dL    Calcium 8.5 8.5 - 10.5 mg/dL    AST(SGOT) 24 12 - 45 U/L    ALT(SGPT) 16 2 - 50 U/L    Alkaline Phosphatase 67 30 - 99 U/L    Total Bilirubin 1.8 (H) 0.1 - 1.5 mg/dL    Albumin 3.7 3.2 - 4.9 g/dL    Total Protein 5.7 (L) 6.0 - 8.2 g/dL    Globulin 2.0 1.9 - 3.5 g/dL    A-G Ratio 1.9 g/dL   Prothrombin Time    Collection Time: 10/13/21  3:20 AM   Result Value Ref Range    PT 15.3 (H) 12.0 - 14.6 sec    INR 1.25 (H) 0.87 - 1.13   Lipid Profile    Collection Time: 10/13/21  3:20 AM   Result Value Ref Range    Cholesterol,Tot 150 100 - 199 mg/dL    Triglycerides 65 0 - 149 mg/dL    HDL 50 >=40 mg/dL    LDL 87 <100 mg/dL   ESTIMATED GFR    Collection Time: 10/13/21  3:20 AM   Result Value Ref Range    GFR If African American >60 >60 mL/min/1.73 m 2    GFR If Non African American >60 >60 mL/min/1.73 m 2         ASSESSMENT:  Patient is a 66 y.o. male admitted with left-sided weakness due to right MCA CVA now status post mechanical thrombectomy.    [facility] Code / Diagnosis to Support: 0001.1 - Stroke: Left Body Involvement (Right Brain)    Rehabilitation: Impaired ADLs and mobility  Patient is a good candidate for inpatient rehab based on needs for PT, OT, and speech therapy.  Patient will also benefit from family training for mobility and ADLs.  Patient has a good discharge situation which will be home with assistance from wife.     Barriers to transfer include: Insurance authorization, TCCs to verify disposition, medical clearance and bed availability      Additional Recommendations:  Right MCA CVA  -Greatest deficits at this time are left-sided weakness, dysphagia, and dysarthira,  - s/p TPA administration at OSH   -Status post mechanical thrombectomy for right M2 partial occlusion which was unable to be recannulized, TICI 2 a  -Patient is on statin for secondary stroke prophylaxis, voiding ASA at this time  -PT/OT evaluations pending    Dysphagia  -SLP initial eval completed, patient cleared for.  Diet with mildly thickened liquids    Disposition  - Patient is currently functioning below is level of baseline, will need post acute rehab  - therapy evals are pending, but ancitipate that patient will be be appropriate for IRF and benefit from 3hrs of therapy 5 days per week  - prior to acceptance to IRF will need therapy evals completed, confirmation of families ability to provide care and insurance authorization     Medical Complexity:  Left sided weakness  Dysphagia  Impaired mobility and ADLs       DVT PPX: SCDs     Thank you for allowing us to participate in the care of this patient.     Patient was seen for 112 minutes on unit/floor of which > 50% of time was spent on counseling and coordination of care  (including attending ICU rounds)  regarding the above, including prognosis, risk reduction, benefits of treatment, and options for next stage of care.    Janet Herrera D.O.   Physical Medicine and Rehabilitation     Please note that this dictation was created using voice recognition software. I have made every reasonable attempt to correct obvious errors, but there may be errors of grammar and possibly content that I did not discover before finalizing the note.

## 2021-10-13 NOTE — PROGRESS NOTES
IR Nursing Note:    Received from [facility].  Patient on the table in IR currently undergoing a thrombectomy with Dr. Price.    Cererbral angiogram with mechanical thrombectomy by Dr Price assisted by RT Brian scrubbed out by Ingrid, Right femoral artery access site; Pre-proceudre pedal pulses +2 (per report), suction system was used to retreive clot.     Patient tolerated procedure, Vital signs were taken every 5 minutes and remained within parameters (see doc flow sheets) ;hemostasis achieved using Perclose deployed at 1808;  report given to JYOTSNA Ramey;    patient transported to ICU with RN and monitor   TICI score 2A  Procedure stop time- 1814     Post procedure pedal pulses +2    Post procedure patient AAO x 4 AWAN, no weakness or tingling.    Perclose Proglide 6F Ref 86354-92 Lot  # [id number]

## 2021-10-13 NOTE — THERAPY
Missed Therapy     Patient Name: Fermín Gomez  Age:  66 y.o., Sex:  male  Medical Record #: [id number]  Today's Date: 10/13/2021    OT Consult received/acknowledged. Pt s/p TPA at [facility], activity orders begin this evening at 5:49pm. Will initiate OT assessment once indicated     Messi Vasquez OTD, OTR/L

## 2021-10-13 NOTE — PROGRESS NOTES
[facility] Progress Note      Admit Date: 10/12/2021    Resident(s): Daniel Melara M.D.   Attending:  JOHN RAGSDALE/ Dr. Snatos    Patient ID:    Name:  Fermín Gomez     YOB: 1955  Age:  66 y.o.  male   MRN:  [id number]    Hospital Course (carried forward and updated):  Fermín Gomez is a 66 y.o. male with hx of spherocytosis; transferred 10/12 from [facility], s/p tPA after discovery of R M2 occlusion. Attempted R MCA thrombectomy attempted, difficulty with recannalisation.    Was emergently taken to [facility] after a fall on ice while hiking. He was found by another hiker, and found to have L sided weakness/deficits. At [facility] was found to have R M2 occlusion, and was given tPA prior to transfer to [facility] for neurologic monitoring. NIH on inital presentation was 12, with post transfer NIHSS of 2. By time of transfer to [facility], he had improved in regards to his L sided weakness. He underwent attempted thrombectomy on 10/12, which was unable to recannalise the artery.    Consultants:  Critical Care  Neurology (Niranjan)  IR (Albert)    Interval Events:  10/12: transferred to [facility], tPA given, unsuccessful R MCA thrombectomy  10/13: MRI head completed, neurologic improvement    Review of Systems   Constitutional: Negative for chills and fever.   Eyes: Negative for blurred vision and double vision.   Respiratory: Positive for shortness of breath. Negative for cough and sputum production.    Cardiovascular: Negative for chest pain, palpitations and leg swelling.   Gastrointestinal: Negative for abdominal pain, constipation, diarrhea, nausea and vomiting.   Genitourinary: Negative for dysuria, frequency and urgency.   Musculoskeletal: Negative for back pain, joint pain and myalgias.   Neurological: Positive for sensory change, focal weakness, weakness and headaches. Negative for dizziness.       PHYSICAL EXAM:  Vitals:    10/13/21 0400 10/13/21 0500 10/13/21 0600 10/13/21 0700   BP: 119/56 123/58 105/55  "117/59   Pulse: (!) 55 61 (!) 56 61   Resp: 16 16 14 17   Temp: 36.9 °C (98.5 °F)      TempSrc: Temporal      SpO2: 95% 95% 96% 92%   Weight:       Height:        Body mass index is 27 kg/m².  Latest Vitals:  /59   Pulse 61   Temp 36.9 °C (98.5 °F) (Temporal)   Resp 17   Ht 1.803 m (5' 11\")   Wt 87.8 kg (193 lb 9 oz)   SpO2 92%   BMI 27.00 kg/m²   O2 therapy: Pulse Oximetry: 92 %, O2 (LPM): 2, O2 Delivery Device: Silicone Nasal Cannula  Vitals Range last 24h:  Temp:  [36.8 °C (98.2 °F)-37 °C (98.6 °F)] 36.9 °C (98.5 °F)  Pulse:  [54-86] 61  Resp:  [14-24] 17  BP: (105-181)/(55-81) 117/59  SpO2:  [90 %-100 %] 92 %       Intake/Output Summary (Last 24 hours) at 10/13/2021 0753  Last data filed at 10/13/2021 0600  Gross per 24 hour   Intake 1000 ml   Output 100 ml   Net 900 ml        Physical Exam  Vitals and nursing note reviewed.   Constitutional:       General: He is not in acute distress.     Appearance: He is not ill-appearing or diaphoretic.   HENT:      Head: Normocephalic and atraumatic.      Mouth/Throat:      Mouth: Mucous membranes are dry.      Pharynx: Oropharynx is clear.   Eyes:      Comments: PERRL, EOMI   Cardiovascular:      Rate and Rhythm: Normal rate and regular rhythm.      Heart sounds: No murmur heard.   No friction rub. No gallop.    Pulmonary:      Effort: No respiratory distress.      Breath sounds: No wheezing, rhonchi or rales.   Abdominal:      General: There is no distension.      Palpations: Abdomen is soft.      Tenderness: There is no abdominal tenderness. There is no guarding or rebound.   Musculoskeletal:      Comments: LUE 2/5, LLE 2/5; RUE 5/5, RLE 5/5   Skin:     General: Skin is warm and dry.   Neurological:      Mental Status: He is alert.      Comments: EOMI/PERRL; slurred speech, no comprehension deficit. Facial sensation intact. Head rotation/shoulder elevation intact. L sided weakness 2/5, R extremity 5/5. Follows commands.             Recent Labs     " 10/12/21  1644 10/13/21  0320   SODIUM 141 137   POTASSIUM 4.0 3.6   CHLORIDE 104 105   CO2 23 25   BUN 12 10   CREATININE 0.77 0.72   CALCIUM 9.5 8.5     Recent Labs     10/12/21  1644 10/13/21  0320   ALTSGPT 20 16   ASTSGOT 24 24   ALKPHOSPHAT 79 67   TBILIRUBIN 1.2 1.8*   GLUCOSE 106* 105*     Recent Labs     10/12/21  1644 10/13/21  0320   RBC 5.43 4.76   HEMOGLOBIN 18.5* 15.7   HEMATOCRIT 51.6 44.8   PLATELETCT 545* 480*   PROTHROMBTM 14.4 15.3*   APTT 30.9  --    INR 1.15* 1.25*     Recent Labs     10/12/21  1644 10/13/21  0320   WBC 16.8* 14.6*   NEUTSPOLYS 87.20* 83.30*   LYMPHOCYTES 7.00* 6.90*   MONOCYTES 4.50 8.10   EOSINOPHILS 0.10 0.50   BASOPHILS 0.80 0.90   ASTSGOT 24 24   ALTSGPT 20 16   ALKPHOSPHAT 79 67   TBILIRUBIN 1.2 1.8*       Meds:  • atorvastatin  80 mg     • niCARdipine infusion  0-15 mg/hr Stopped (10/12/21 1815)   • hydrALAZINE  10 mg     • labetalol  10 mg          Procedures:  10/12: tPA  10/12: Thrombectomy, IR, Raghuram    Imaging:  CT-HEAD W/O   Final Result         1.  Small focus of subdural hemorrhage along the left side of the falx in the frontal area is again identified with no change.      2.  There is some localized brain swelling and edema in the right basal ganglia which could represent subacute infarcts.      3.  No mass effect or midline shift identified.         DX-CHEST-PORTABLE (1 VIEW)   Final Result      1.  There is no acute cardiopulmonary process.      OUTSIDE IMAGES-DX CHEST   Final Result      OUTSIDE IMAGES-CT HEAD   Final Result      CT-FOREIGN FILM CAT SCAN   Final Result      OUTSIDE IMAGES-CT HEAD   Final Result      IR-THROMBO MECHANICAL ARTERY,INIT    (Results Pending)   EC-ECHOCARDIOGRAM COMPLETE W/O CONT    (Results Pending)   MR-BRAIN-W/O    (Results Pending)   CT-HEAD W/O    (Results Pending)       Problem and Plan:    Fermín Gomez is a 66 y.o. male with hx of spherocytosis; transferred 10/12 from [facility], s/p tPA after discovery of R M2 occlusion.  Attempted R MCA thrombectomy attempted, difficulty with recannalisation.    * Stroke (cerebrum) (HCC)  Assessment & Plan  R MCA ischemic CVA. Given tPA on 10/12 and attempted thrombectomy. SBP goal of <160. TICI 2A. Did NOT receive ASA due to subdural hemorrhage.  -atorvastatin 80mg PO qD  -nicardipine gtt goal <160  -PT/OT/SLP  -q4h neurochecks OK per neurology  -neurology following      Lymphopenia  Assessment & Plan  Lymphopenia incidentally. COVID negative.  -CBC repeat      DISPO: Neuroscience; transfer discussed with hospitalist, who agrees to transfer of care.    CODE STATUS: FULL    Quality Measures:  Feeding: soft  Analgesia: APAP  Sedation: none  Thromboprophylaxis: held per SDH, SCD  Head of bed: >30 degrees  Ulcer prophylaxis: none  Glycemic control: none  Bowel care: bowel regimen  Indwelling lines: PIV  Deescalation of antibiotics: none      Daniel Melara M.D.

## 2021-10-13 NOTE — PROGRESS NOTES
Radiology Progress Note   Author: ALEXSANDER Valenzuela Date & Time created: 10/13/2021  2:09 PM   Date of admission  10/12/2021  Note to reader: this note follows the APSO format rather than the historical SOAP format. Assessment and plan located at the top of the note for ease of use.    Chief Complaint  66 y.o. male admitted 10/12/2021 with   Chief Complaint   Patient presents with   • Sent by MD PANTOJA  66 y.o. male who presents to the Emergency Department for stroke-like symptoms onset about 4 hours ago. The patient was seen at [place] today after a ground level fall. He had a completely flaccid left side on arrival there and had evaluation which showed a right MCA occlusion. He was given alteplase and transferred here for further IR.     Assessment/Plan  Interval History   Principal Problem:    Stroke (cerebrum) (HCC)  Active Problems:    Lymphopenia    H/O splenectomy      Plan IR  - Pending MRI results    - Neuro checks per ICU protocol   - Secondary stroke prevention per Neurology recommendations   - PT/OT eval      [id number]  IR:   10/12- Rt MCA thrombectomy. Dominant inferior division Rt M2 partial occlusion, unable to be recanalized. TICI 2A  10/13- Worsening of left-sided weakness today, 0/5 movement in left arm, 2/5 Left LE, left facial droop, sensation intact    CT this AM shows subdural hemorrhage along the left side of the falx in the frontal area is again identified with no change. There is some localized brain swelling and edema in the right basal ganglia     MD Price aware of patient status      Review of Systems  Physical Exam   Review of Systems   Constitutional: Positive for malaise/fatigue. Negative for chills and fever.   HENT: Negative for hearing loss.    Eyes: Negative for blurred vision.   Respiratory: Negative for cough, hemoptysis and shortness of breath.    Cardiovascular: Negative for chest pain and palpitations.   Gastrointestinal: Negative for abdominal pain and vomiting.    Genitourinary: Negative for dysuria.   Musculoskeletal: Negative for myalgias.   Skin: Negative for rash.   Neurological: Positive for sensory change, speech change, focal weakness, weakness and headaches. Negative for dizziness and tingling.   Endo/Heme/Allergies: Does not bruise/bleed easily.      Vitals:    10/13/21 1200   BP:    Pulse:    Resp:    Temp: (P) 36.9 °C (98.5 °F)   SpO2:         Physical Exam  Vitals and nursing note reviewed.   HENT:      Head: Normocephalic.      Comments: Left facial droop     Nose: Nose normal.      Mouth/Throat:      Mouth: Mucous membranes are moist.   Eyes:      Pupils: Pupils are equal, round, and reactive to light.   Cardiovascular:      Rate and Rhythm: Normal rate.   Pulmonary:      Effort: Pulmonary effort is normal. No respiratory distress.   Abdominal:      General: Abdomen is flat.      Tenderness: There is no abdominal tenderness.   Musculoskeletal:         General: No tenderness or deformity.      Cervical back: Normal range of motion.   Skin:     General: Skin is warm and dry.      Capillary Refill: Capillary refill takes less than 2 seconds.      Coloration: Skin is not jaundiced or pale.      Comments: IR access site CDI, no redness or swelling    Neurological:      Mental Status: He is alert and oriented to person, place, and time.      Cranial Nerves: Cranial nerve deficit and facial asymmetry present.      Sensory: Sensation is intact.      Motor: Weakness present.      Coordination: Coordination is intact.      Comments: 0/5 movement in left arm, 2/5 Left LE, left facial droop, sensation intact. Finger to Nose coordination intact on right side, unable to move left arm    Psychiatric:         Mood and Affect: Mood normal.         Behavior: Behavior normal.             Labs    Recent Labs     10/12/21  1644 10/13/21  0320   WBC 16.8* 14.6*   RBC 5.43 4.76   HEMOGLOBIN 18.5* 15.7   HEMATOCRIT 51.6 44.8   MCV 95.0 94.1   MCH 34.1* 33.0   MCHC 35.9* 35.0   RDW  46.1 46.0   PLATELETCT 545* 480*   MPV 9.8 9.6     Recent Labs     10/12/21  1644 10/13/21  0320   SODIUM 141 137   POTASSIUM 4.0 3.6   CHLORIDE 104 105   CO2 23 25   GLUCOSE 106* 105*   BUN 12 10   CREATININE 0.77 0.72   CALCIUM 9.5 8.5     Recent Labs     10/12/21  1644 10/13/21  0320   ALBUMIN 4.5 3.7   TBILIRUBIN 1.2 1.8*   ALKPHOSPHAT 79 67   TOTPROTEIN 7.0 5.7*   ALTSGPT 20 16   ASTSGOT 24 24   CREATININE 0.77 0.72     EC-ECHOCARDIOGRAM COMPLETE W/O CONT         CT-HEAD W/O   Final Result         1.  Small focus of subdural hemorrhage along the left side of the falx in the frontal area is again identified with no change.      2.  There is some localized brain swelling and edema in the right basal ganglia which could represent subacute infarcts.      3.  No mass effect or midline shift identified.         DX-CHEST-PORTABLE (1 VIEW)   Final Result      1.  There is no acute cardiopulmonary process.      OUTSIDE IMAGES-DX CHEST   Final Result      OUTSIDE IMAGES-CT HEAD   Final Result      CT-FOREIGN FILM CAT SCAN   Final Result      OUTSIDE IMAGES-CT HEAD   Final Result      IR-THROMBO MECHANICAL ARTERY,INIT    (Results Pending)   MR-BRAIN-W/O    (Results Pending)   CT-HEAD W/O    (Results Pending)       INR   Date Value Ref Range Status   10/13/2021 1.25 (H) 0.87 - 1.13 Final     Comment:     INR - Non-therapeutic Reference Range: 0.87-1.13  INR - Therapeutic Reference Range: 2.0-4.0       No results found for: POCINR     Intake/Output Summary (Last 24 hours) at 10/13/2021 1409  Last data filed at 10/13/2021 1200  Gross per 24 hour   Intake 2000 ml   Output 300 ml   Net 1700 ml      Labs not explicitly included in this progress note were reviewed by the author. Radiology/imaging not explicitly included in this progress note was reviewed by the author.   I have performed a physical exam and reviewed and updated ROS and Plan today (10/13/2021).     20 minutes in directly providing and coordinating care and  extensive data review.  No time overlap and excludes procedures.

## 2021-10-14 LAB
ALBUMIN SERPL BCP-MCNC: 3.5 G/DL (ref 3.2–4.9)
ALBUMIN/GLOB SERPL: 1.8 G/DL
ALP SERPL-CCNC: 62 U/L (ref 30–99)
ALT SERPL-CCNC: 11 U/L (ref 2–50)
ANION GAP SERPL CALC-SCNC: 8 MMOL/L (ref 7–16)
AST SERPL-CCNC: 15 U/L (ref 12–45)
BASOPHILS # BLD AUTO: 0.6 % (ref 0–1.8)
BASOPHILS # BLD: 0.11 K/UL (ref 0–0.12)
BILIRUB SERPL-MCNC: 2 MG/DL (ref 0.1–1.5)
BUN SERPL-MCNC: 8 MG/DL (ref 8–22)
CALCIUM SERPL-MCNC: 8.5 MG/DL (ref 8.5–10.5)
CHLORIDE SERPL-SCNC: 107 MMOL/L (ref 96–112)
CO2 SERPL-SCNC: 24 MMOL/L (ref 20–33)
CREAT SERPL-MCNC: 0.59 MG/DL (ref 0.5–1.4)
EOSINOPHIL # BLD AUTO: 0.06 K/UL (ref 0–0.51)
EOSINOPHIL NFR BLD: 0.3 % (ref 0–6.9)
ERYTHROCYTE [DISTWIDTH] IN BLOOD BY AUTOMATED COUNT: 46.1 FL (ref 35.9–50)
GLOBULIN SER CALC-MCNC: 2 G/DL (ref 1.9–3.5)
GLUCOSE SERPL-MCNC: 110 MG/DL (ref 65–99)
HCT VFR BLD AUTO: 43.8 % (ref 42–52)
HGB BLD-MCNC: 15.1 G/DL (ref 14–18)
IMM GRANULOCYTES # BLD AUTO: 0.09 K/UL (ref 0–0.11)
IMM GRANULOCYTES NFR BLD AUTO: 0.5 % (ref 0–0.9)
LYMPHOCYTES # BLD AUTO: 1.29 K/UL (ref 1–4.8)
LYMPHOCYTES NFR BLD: 7.4 % (ref 22–41)
MAGNESIUM SERPL-MCNC: 1.8 MG/DL (ref 1.5–2.5)
MCH RBC QN AUTO: 32.9 PG (ref 27–33)
MCHC RBC AUTO-ENTMCNC: 34.5 G/DL (ref 33.7–35.3)
MCV RBC AUTO: 95.4 FL (ref 81.4–97.8)
MONOCYTES # BLD AUTO: 1.26 K/UL (ref 0–0.85)
MONOCYTES NFR BLD AUTO: 7.2 % (ref 0–13.4)
NEUTROPHILS # BLD AUTO: 14.58 K/UL (ref 1.82–7.42)
NEUTROPHILS NFR BLD: 84 % (ref 44–72)
NRBC # BLD AUTO: 0 K/UL
NRBC BLD-RTO: 0 /100 WBC
PLATELET # BLD AUTO: 457 K/UL (ref 164–446)
PMV BLD AUTO: 9.7 FL (ref 9–12.9)
POTASSIUM SERPL-SCNC: 4.1 MMOL/L (ref 3.6–5.5)
PROT SERPL-MCNC: 5.5 G/DL (ref 6–8.2)
RBC # BLD AUTO: 4.59 M/UL (ref 4.7–6.1)
SODIUM SERPL-SCNC: 139 MMOL/L (ref 135–145)
WBC # BLD AUTO: 17.4 K/UL (ref 4.8–10.8)

## 2021-10-14 PROCEDURE — 85025 COMPLETE CBC W/AUTO DIFF WBC: CPT

## 2021-10-14 PROCEDURE — 700105 HCHG RX REV CODE 258: Performed by: STUDENT IN AN ORGANIZED HEALTH CARE EDUCATION/TRAINING PROGRAM

## 2021-10-14 PROCEDURE — 700102 HCHG RX REV CODE 250 W/ 637 OVERRIDE(OP): Performed by: PSYCHIATRY & NEUROLOGY

## 2021-10-14 PROCEDURE — 99233 SBSQ HOSP IP/OBS HIGH 50: CPT | Performed by: PSYCHIATRY & NEUROLOGY

## 2021-10-14 PROCEDURE — 83735 ASSAY OF MAGNESIUM: CPT

## 2021-10-14 PROCEDURE — A9270 NON-COVERED ITEM OR SERVICE: HCPCS | Performed by: PSYCHIATRY & NEUROLOGY

## 2021-10-14 PROCEDURE — 770020 HCHG ROOM/CARE - TELE (206)

## 2021-10-14 PROCEDURE — A9270 NON-COVERED ITEM OR SERVICE: HCPCS | Performed by: INTERNAL MEDICINE

## 2021-10-14 PROCEDURE — 99232 SBSQ HOSP IP/OBS MODERATE 35: CPT | Performed by: HOSPITALIST

## 2021-10-14 PROCEDURE — 92612 ENDOSCOPY SWALLOW (FEES) VID: CPT

## 2021-10-14 PROCEDURE — 80053 COMPREHEN METABOLIC PANEL: CPT

## 2021-10-14 PROCEDURE — 97166 OT EVAL MOD COMPLEX 45 MIN: CPT

## 2021-10-14 PROCEDURE — 700102 HCHG RX REV CODE 250 W/ 637 OVERRIDE(OP): Performed by: INTERNAL MEDICINE

## 2021-10-14 PROCEDURE — 97162 PT EVAL MOD COMPLEX 30 MIN: CPT

## 2021-10-14 RX ORDER — ASPIRIN 81 MG/1
81 TABLET, CHEWABLE ORAL DAILY
Status: DISCONTINUED | OUTPATIENT
Start: 2021-10-14 | End: 2021-10-19 | Stop reason: HOSPADM

## 2021-10-14 RX ADMIN — ATORVASTATIN CALCIUM 80 MG: 80 TABLET, FILM COATED ORAL at 18:12

## 2021-10-14 RX ADMIN — SODIUM CHLORIDE: 9 INJECTION, SOLUTION INTRAVENOUS at 00:10

## 2021-10-14 RX ADMIN — ASPIRIN 81 MG: 81 TABLET, CHEWABLE ORAL at 11:52

## 2021-10-14 ASSESSMENT — ENCOUNTER SYMPTOMS
FOCAL WEAKNESS: 1
SPEECH CHANGE: 1
SPUTUM PRODUCTION: 0
WEAKNESS: 1
VOMITING: 0
DIZZINESS: 0
COUGH: 0
PALPITATIONS: 0
NAUSEA: 0
CHILLS: 0
ORTHOPNEA: 0
HEMOPTYSIS: 0

## 2021-10-14 ASSESSMENT — ACTIVITIES OF DAILY LIVING (ADL): TOILETING: INDEPENDENT

## 2021-10-14 ASSESSMENT — COGNITIVE AND FUNCTIONAL STATUS - GENERAL
TOILETING: A LOT
MOBILITY SCORE: 9
SUGGESTED CMS G CODE MODIFIER MOBILITY: CM
DRESSING REGULAR LOWER BODY CLOTHING: A LOT
DAILY ACTIVITIY SCORE: 12
PERSONAL GROOMING: A LOT
STANDING UP FROM CHAIR USING ARMS: A LOT
WALKING IN HOSPITAL ROOM: A LOT
MOVING TO AND FROM BED TO CHAIR: UNABLE
MOVING FROM LYING ON BACK TO SITTING ON SIDE OF FLAT BED: UNABLE
TURNING FROM BACK TO SIDE WHILE IN FLAT BAD: UNABLE
SUGGESTED CMS G CODE MODIFIER DAILY ACTIVITY: CL
EATING MEALS: A LOT
CLIMB 3 TO 5 STEPS WITH RAILING: A LOT
HELP NEEDED FOR BATHING: A LOT
DRESSING REGULAR UPPER BODY CLOTHING: A LOT

## 2021-10-14 ASSESSMENT — GAIT ASSESSMENTS
DISTANCE (FEET): 3
ASSISTIVE DEVICE: HAND HELD ASSIST
GAIT LEVEL OF ASSIST: MODERATE ASSIST

## 2021-10-14 ASSESSMENT — PAIN DESCRIPTION - PAIN TYPE: TYPE: ACUTE PAIN

## 2021-10-14 NOTE — PROGRESS NOTES
Covid Surge in effect, crisis charting    Nursing Note:    Neuro: Alert and Oriented x4, PEARLA, L side facial droop, LUE Strength Defecit. NIH 10    Cardiac: SR, Normotensive, + Pulses, 11 beat run of V-tach.    Resp: 2L NC, Decreased BS    GI: Refused Dinner, Decreased Appetite, Puree diet, Mild thick liquids, Pockets food. Pills in Applesauce or pudding. Belly soft/nontender.    : Voids, Condom catheter applied.    Skin: L groin site soft/nontender, no sign of hematoma. L knee scab.    LTD: PIVx2    Gtt: NS @100ml/hr    Shift Summary:    Pt able to pivot to chair, sheets changed, gown changed. Pt staggering with transfer. 2 Incontinent urines, condom catheter applied.     MRI Results    Acute infarcts in the right MCA territory as described above predominantly involving the frontal lobe. Scattered foci of acute infarction are also noted in the right parietal lobe, Tiny foci of infarction are also noted in the left frontoparietal region. The distribution of the infarctions suggests a proximal cardiovascular embolic source.

## 2021-10-14 NOTE — THERAPY
Occupational Therapy   Initial Evaluation     Patient Name: Fermín Gomez  Age:  66 y.o., Sex:  male  Medical Record #: [id number]  Today's Date: 10/14/2021     Precautions  Precautions: (P) Fall Risk, Swallow Precautions ( See Comments)  Comments: TPA 7110 10/12/21    Assessment  Patient is 66 y.o. male admitted for GLF with left sided weakness, pt found to have R MCA occlusion. Pt normally highly independent with all mobility and ADLs at baseline living in a single floor condo with spouse who is able to assist as needed. Pt required modA for mobility, maxA for lower body ADLs and toileting. Pt able to transfer to bedside chair with HHA and modA, will continue to see for skilled therapy while admitted as well as recommend post-acute placement.    Plan    Recommend Occupational Therapy 4 times per week until therapy goals are met for the following treatments:  Adaptive Equipment, Neuro Re-Education / Balance, Self Care/Activities of Daily Living, Therapeutic Activities and Therapeutic Exercises.    DC Equipment Recommendations: (P) Unable to determine at this time  Discharge Recommendations: (P) Recommend post-acute placement for additional occupational therapy services prior to discharge home      Objective       10/14/21 0833   Prior Living Situation   Prior Services Home-Independent   Housing / Facility 1 Story Apartment / Condo   Bathroom Set up Walk In Shower   Equipment Owned None   Lives with - Patient's Self Care Capacity Spouse   Comments Spouse can assist as needed   Prior Level of ADL Function   Self Feeding Independent   Grooming / Hygiene Independent   Bathing Independent   Dressing Independent   Toileting Independent   Prior Level of IADL Function   Medication Management Independent   Laundry Independent   Kitchen Mobility Independent   Finances Independent   Home Management Independent   Shopping Independent   Prior Level Of Mobility Independent Without Device in Community   Driving / Transportation  Driving Independent   Occupation (Pre-Hospital Vocational) Retired Due To Age   Leisure Interests Exercise   Precautions   Precautions Fall Risk;Swallow Precautions ( See Comments)   Pain 0 - 10 Group   Therapist Pain Assessment Post Activity Pain Same as Prior to Activity;Nurse Notified   Cognition    Cognition / Consciousness X   Level of Consciousness Alert   Comments Pleasant, cooperative, receptive to therapy   Active ROM Upper Body   Active ROM Upper Body  X   Dominant Hand Right   Flaccid Upper Extremity Left Upper Extremity Flaccid   Strength Upper Body   Upper Body Strength  X   Comments RUE WFL, LUE flaccid   Sensation Upper Body   Upper Extremity Sensation  X   Lt Upper Extremity Light Touch Impaired   Upper Body Muscle Tone   Upper Body Muscle Tone  X   Lt Upper Extremity Muscle Tone Non Functional;Hypotonic   Neurological Concerns   Neurological Concerns Yes   Lt Upper Extremity Gross Motor Control Impaired;Absent   Lt Upper Extremity Functional Use Impaired   Balance Assessment   Sitting Balance (Static) Fair -   Sitting Balance (Dynamic) Poor +   Standing Balance (Static) Poor   Standing Balance (Dynamic) Poor -   Weight Shift Sitting Fair   Weight Shift Standing Poor   Comments w/ HHA   Bed Mobility    Supine to Sit Moderate Assist   Scooting Moderate Assist   Rolling Moderate Assist to Rt.   ADL Assessment   Upper Body Dressing Maximal Assist   Lower Body Dressing Maximal Assist   Toileting   (NT-refused need)   How much help from another person does the patient currently need...   Putting on and taking off regular lower body clothing? 2   Bathing (including washing, rinsing, and drying)? 2   Toileting, which includes using a toilet, bedpan, or urinal? 2   Putting on and taking off regular upper body clothing? 2   Taking care of personal grooming such as brushing teeth? 2   Eating meals? 2   6 Clicks Daily Activity Score 12   Modified Annette (mRS)   Modified Annette Score 5   Functional Mobility    Sit to Stand Moderate Assist   Bed, Chair, Wheelchair Transfer Moderate Assist   Toilet Transfers Refused   Transfer Method Stand Pivot   Mobility bed mobility, pivot transfer to chair, left seated in chair   Comments w/ HHA   ICU Target Mobility Level   ICU Mobility - Targeted Level Level 3B   Visual Perception   Visual Perception  X   Neglect Moderate Left   Activity Tolerance   Sitting in Chair left seated in chair   Sitting Edge of Bed 5 min   Standing 5 min   Patient / Family Goals   Patient / Family Goal #1 To get better   Short Term Goals   Short Term Goal # 1 Pt will complete ADL transfers with supervision   Short Term Goal # 2 Pt will complete LB dressing with Thelma   Short Term Goal # 3 Pt will complete toileting with Thelma   Education Group   Education Provided Role of Occupational Therapist;Stroke   Role of Occupational Therapist Patient Response Patient;Acceptance;Explanation   Stroke Patient Response Patient;Acceptance;Explanation;Reinforcement Needed   Problem List   Problem List Decreased Active Daily Living Skills;Decreased Homemaking Skills;Decreased Upper Extremity Strength Left;Decreased Upper Extremity AROM Left;Decreased Functional Mobility;Decreased Activity Tolerance;Impaired Coordination Left Upper Extremity;Impaired Sensation Left Upper Extremity;Impaired Upper Extremity Tone Left;Impaired Postural Control / Balance   Interdisciplinary Plan of Care Collaboration   IDT Collaboration with  Nursing;Physical Therapist   Patient Position at End of Therapy Seated;Call Light within Reach;Phone within Reach;Tray Table within Reach;Chair Alarm On   Collaboration Comments RN updated

## 2021-10-14 NOTE — PROGRESS NOTES
NEUROLOGY PROGRESS NOTE      BACKGROUND:    66 y.o. male was admitted on 10/12/2021  4:56 PM for Acute CVA (cerebrovascular accident) (HCC) [I63.9]  He is status post administration of TPA at [facility].    SUBJECTIVE:   No significant changes, continue with profound left-sided weakness involving face arm and leg.    VITALS:  Vitals:    10/14/21 0600 10/14/21 0700 10/14/21 0800 10/14/21 0900   BP: 115/54 126/58 123/57 119/57   Pulse: (!) 57 76 75 65   Resp: 19 (!) 9 14 20   Temp:   37.2 °C (99 °F)    TempSrc:   Temporal    SpO2: 95% 93% 95% 95%   Weight:       Height:           NEUROLOGICAL EXAM:   MENTAL STATUS:  Awake, alert, oriented times 3.  Speech is slightly dysarthric.  Comprehension is intact.  CRANIAL NERVES:  PERRL, EOMI with no nystagmus, he has profound left facial weakness in an upper motor neuron pattern.  Facial sensation is intact, tongue is in the midline, palate is symmetric. Hearing is intact to finger rub bilaterally. Shoulder shrugs are normal.  MOTOR:  Motor examination showed normal strength in direct testing of right upper and lower extremities, proximal and distal.  He has 1/5 weakness of left upper extremity and 4/5 weakness of left lower extremity.  SENSATION:  Intact to light touch, temperature and proprioception throughout  REFLEXES:  2+ and symmetric, toes are downgoing bilaterally  COORDINATION:   Intact to finger-to-nose and heel shin testing on the right, he was not able to perform those on the left.  GAIT:  Deferred      OBJECTIVE:    NEUROIMAGING:    MR-BRAIN-W/O   Final Result         Acute infarcts in the right MCA territory as described above predominantly involving the frontal lobe. Scattered foci of acute infarction are also noted in the right parietal lobe.      Tiny foci of infarction are also noted in the left frontoparietal region.      The distribution of the infarctions suggests a proximal cardiovascular embolic source.      EC-ECHOCARDIOGRAM COMPLETE W/O CONT    Final Result      CT-HEAD W/O   Final Result         1.  Small focus of subdural hemorrhage along the left side of the falx in the frontal area is again identified with no change.      2.  There is some localized brain swelling and edema in the right basal ganglia which could represent subacute infarcts.      3.  No mass effect or midline shift identified.         DX-CHEST-PORTABLE (1 VIEW)   Final Result      1.  There is no acute cardiopulmonary process.      IR-THROMBO MECHANICAL ARTERY,INIT   Final Result         66-year-old presented with right M1 occlusion at [facility] underwent IV TPA therapy and was transferred to [facility] for emergent mechanical thrombectomy. At the time of initial angiogram, nonocclusive thrombus was    identified in the distal right M1 segment with occlusive thrombus in the inferior division M2 trunk with sluggish antegrade flow into the right inferior division MCA branches.      Multiple attempts at thrombectomy were unsuccessful in recanalizing the clot at the inferior division trunk. The final angiographic images demonstrated persistent inferior division origin thrombus with sluggish antegrade flow into the inferior division    branches. There is also some flow limitation to the superior division branches due to thrombus encroaching upon the smaller superior division origin. Patient will be recovered in the ICU and the systolic blood pressure will be maintained around 160 mmHg    to encourage coverage leptomeningeal collateralization.      IAnamaria was physically present and participated during the entire procedure of the IR-THROMBO MECHANICAL ARTERY,INIT.         OUTSIDE IMAGES-DX CHEST   Final Result      OUTSIDE IMAGES-CT HEAD   Final Result      CT-FOREIGN FILM CAT SCAN   Final Result      OUTSIDE IMAGES-CT HEAD   Final Result          MEDICATIONS:  Current Facility-Administered Medications   Medication Dose Route Frequency  Provider Last Rate Last Admin   • NS infusion   Intravenous Continuous Daniel Melara M.D. 100 mL/hr at 10/14/21 0010 New Bag at 10/14/21 0010   • acetaminophen (Tylenol) tablet 650 mg  650 mg Oral Q4HRS PRN Josr Santos Jr., D.O.   650 mg at 10/13/21 1032   • atorvastatin (LIPITOR) tablet 80 mg  80 mg Oral Q EVENING Salome Garza M.D.   80 mg at 10/13/21 1716   • hydrALAZINE (APRESOLINE) injection 10 mg  10 mg Intravenous Q2HRS PRN Salome Garza M.D.       • labetalol (NORMODYNE/TRANDATE) injection 10 mg  10 mg Intravenous Q10 MIN PRN Salome Garza M.D.           LABS:      Recent Labs     10/12/21  1644 10/13/21  0320 10/14/21  0320   WBC 16.8* 14.6* 17.4*   RBC 5.43 4.76 4.59*   HEMOGLOBIN 18.5* 15.7 15.1   HEMATOCRIT 51.6 44.8 43.8   MCV 95.0 94.1 95.4   MCH 34.1* 33.0 32.9   MCHC 35.9* 35.0 34.5   RDW 46.1 46.0 46.1   PLATELETCT 545* 480* 457*   MPV 9.8 9.6 9.7     Recent Labs     10/12/21  1644 10/13/21  0320 10/14/21  0320   SODIUM 141 137 139   POTASSIUM 4.0 3.6 4.1   CHLORIDE 104 105 107   CO2 23 25 24   GLUCOSE 106* 105* 110*   BUN 12 10 8     INR   Date Value Ref Range Status   10/13/2021 1.25 (H) 0.87 - 1.13 Final     Comment:     INR - Non-therapeutic Reference Range: 0.87-1.13  INR - Therapeutic Reference Range: 2.0-4.0       No results found for: POCINR  Lab Results   Component Value Date/Time    CREATININE 0.72 10/13/2021 0320     Lab Results   Component Value Date/Time    IFAFRICA >60 10/13/2021 0320    IFNOTAFR >60 10/13/2021 0320         ASSESSMENT AND PLAN:   66 y.o. male with no significant past medical history who was evaluated at [facility] for profound left-sided weakness with initial NIH scale score of 12.  Patient was found to be candidate for administration of TPA and received TPA at [place].  Subsequently he underwent CT angiogram of the head and neck which revealed right M1 occlusion.  He was transferred to [facility] and underwent conventional angiogram in order to  perform thrombectomy.  Angiogram revealed right M2 occlusion.  Apparently thrombectomy was not successful with TICI 2 a result.    Brain MRI revealed acute infarcts in the right MCA territory predominantly involving the frontal lobe. Scattered foci of acute infarction are also noted in the right parietal lobe. Tiny foci of infarction are also noted in the left frontoparietal region.  No evidence of hemorrhage.  We will start aspirin 81 mg daily.  Lipid profile revealed LDL of 87, he will continue with atorvastatin 80 mg daily.  Echocardiogram was unremarkable.  He needs prolonged cardiac monitoring, preferably loop recorder implantation.  He will continue with physical therapy, Occupational Therapy and speech therapy.

## 2021-10-14 NOTE — THERAPY
Speech Language Pathology   Fiberoptic Endoscopic Evaluation of Swallow     Patient Name: Fermín Gomez  AGE:  66 y.o., SEX:  male  Medical Record #: [id number]  Today's Date: 10/14/2021     Precautions  Precautions: (P) Fall Risk, Swallow Precautions ( See Comments)  Comments: SHEYLA 8821 10/12/21    Assessment    Discussed with the patient/family the risks, benefits, and alternatives of the FEES procedure. Patient/family acknowledges and agreeable to proceed with the procedure.     Fiberoptic Endoscopic Evaluation of Swallowing (FEES) completed at bedside today. The endoscope was passed transnasally via L nare to evaluate the anatomy and physiology of swallowing.  Pt tolerated the procedure with no apparent distress.    Cursory view revealed adequate VF adduction during laryngeal assessment and typical anatomy.     PO trials: ice chips, thin liquid via tsp/cup/straw, mildly thick liquid via tsp/cup, liquidized, pureed, soft/bite sized solids    Oral phase:  Marked by incomplete mastication of soft solids with reduced rotary/lateral mastication pattern, slow/prolonged oral prep with soft solids, piecemeal deglutition, L oral pocketing of solids, reduced orolingual bolus control with all textures.       Pharyngeal phase:  Deficits marked by delayed pharyngeal swallow onset, reduced tongue base retraction, reduced pharyngeal constriction, reduced epiglottic inversion, reduced/delayed LVC, inconsistent sensation to pharyngeal residue, penetration and aspiration events. Deficits resulted in penetration before, after and during the swallow with thins with suspected aspiration of thins via straw with pt producing immediate prolonged cough response. Blue milky substance was seen shortly after during trials of applesauce on the tracheal shelf and just beneath the anterior commissure. Pt was able to cough and clear with a verbal cue. Suspect this was previously aspirated thin liquids as it looked more c/w milk than applesauce.  Due to mistiming of swallow response, pt consistently penetrated textures trialed before the swallow and premature spillage was seen to the pyriforms across intake, including with secretions. Moderate vallecular and pyriform sinus residue was seen with thins, applesauce, pudding and solids, which cleared with mildly thick liquid rinse.     Penetration Aspiration Scale:    Consistency PAS Score Timing   Thin Liquid 8 Post swallow   Mildly Thick Liquid 2 Pre swallow   Pudding 2 Pre swallow   Solid 4 Pre swallow and Post swallow       Impressions:  Moderate oropharyngeal dysphagia, acute in nature and consistent with R CVA. Swallow safety and swallow efficiency are both impaired. Pt appears to be at high risk for aspiration PNA due to oral charles changes s/p CVA requiring consistent and thorough oral care and mobility deficits. Pt is also at risk for malnutrition/dehydration with need for temporary diet modification to reduce aspiration risk. Swallow prognosis is good with ongoing medication management and behavioral swallow therapy. Patient appears to be a good candidate for behavioral swallow rehabilitation.     Recommendations:  1. Continue oral diet of Pureed Solids (PU4) and Mildly Thick Liquids (MT2)   -DIRECT meal supervision   -Upright for all PO intake, out of bed whenever possible, small bites/sips, slow rate of intake, check for L pocketing and clear, alternate bites/sips, two swallows per bolus   -Crush pills in puree  2. Oral care after meals and mobilize w/ staff as appropriate to mitigate risk of aspiration PNA.    Plan    Recommend Speech Therapy 5 times per week until therapy goals are met for the following treatments:  Dysphagia Training and Patient / Family / Caregiver Education.    Discharge Recommendations: (P) Recommend post-acute placement for additional speech therapy services prior to discharge home       Objective     10/14/21 1045   FEES Evaluation Prior To Procedure   Dysphagia Symptoms  Warranting Video Swallow R CVA w/ L deficits impacting swallow fxn   Procedure Performed While Patient Sitting In Bed   Seated at (Degrees) 75   A Flexible Endoscope Was Introduced Transnasally In The Left Nare   FEES  Anatomy Assessment   Anatomy For A Functional Swallow:   (typical)   FEES Laryngeal Adduction Assessment   Breath Holding Adequate   Clearing Throat Adequate   Cough Adequate   Phonation Adequate   Onset Of Swallow Other (Comments)  (delayed)   FEES Velopharyngeal Assessment    Velopharyngeal Closure: Adequate   FEES Voice Assessment    Voice: Other (Comments)  (WFL)   FEES Sensation Assessment    Presence of Scope Adequate   Presence of Residue Inadequate   Presence of Penetration Delayed Response   Presence of Aspiration Immediate Response  (suspected aspiration)   FEES Swallow Function Assessment   Swallow Trigger Level of the Valleculae;Level of the Pyriform Sinuses   Base of Tongue Retraction Impaired   Pharyngeal Constrictor Movement Impaired   White Out Phase Complete White Out   Epiglottic Movement Impaired   Laryngeal Elevation Impaired   Cricopharyngeal Function Functional   Swallow Observations / Symptoms   Vallecular Residue Mildly Thick (2) - (Nectar Thick);Thin (0);Liquidised (3);Pureed (4);Soft & Bite-Sized (6) - (Dysphagia III)   Pyriform Sinus Residue Mildly Thick (2) - (Nectar Thick);Thin (0);Liquidised (3);Pureed (4);Soft & Bite-Sized (6) - (Dysphagia III)   Posterior Pharyngeal Wall Residue Thin (0)   Penetration Before the Swallow Thin (0);Pureed (4);Soft & Bite-Sized (6) - (Dysphagia III)   Thin (0) Teaspoon;Cup;Straw   Penetration After the Swallow Thin (0);Mildly Thick (2) - (Nectar Thick);Soft & Bite-Sized (6) - (Dysphagia III)   Mildly Thick (2) - (Nectar Thick) Cup   Thin (0) Teaspoon;Cup   Aspiration Suspected During the Swallow Thin (0)   Thin (0) Straw   Compensatory Strategies Attempted   Multiple Swallows partially effective to reduce pharyngeal residue but not clear  entirely   Effortful Swallows no change in pharyngeal residue   Controlled Bolus Size effective to reduce risk of aspiration   Cough / Clear After Swallow partially effective to clear blue from below the VFs and on tracheal shelf   Liquid Wash After Swallow effective to minimize vallecular residue w/ applesauce   Three Second Prep did not eliminate penetration before the swallow w/ thins   Patient Ability To Protect Airway   Patient Ability To Protect Airway  Inconsistent   Penetration Aspiration Scale   Penetration Aspiration Scale 7 - Material passes glottis but is not ejected from airway, visible subglottic stasis despite patient's response  (PAS 6-7 w/ thins)   [place] Pharyngeal Residue Severity   Vallecular Residue Moderate, epiglottic ligament covered   Pyriform Sinus Residue  Moderate, up wall to ½ full   Evaluation Recommendations   Diet / Liquid Recommendation Puree (4);Mildly Thick (2) - (Nectar Thick)   Medication Administration  Crush all Medications in Puree   Recommended Supervision Direct   Evaluation Recommended Techniques   Techniques Oral Stage Check For Pocketing   Techniques Pharyngeal Phase Repeat Swallow 2-3 Times On Each Bolus To Clear Residue;Pacing:Control Amount Of Presentation;Pacing:Control Rate Of Presentation;No Straw Drinking;Alternate Liquids / Solids Consistency To Wash Foods Through Pharynx;Clear Throat If Wet Vocal Quality;Thicken Liquids For Better Control Of Bolus To Consistency Of Nectar   Short Term Goals   Short Term Goal # 1 Pt will consume PU4/MT2 diet with no overt s/sx of aspiration, given min cues to swallowing strategies.    Goal Outcome # 1 Progressing as expected   Short Term Goal # 2 10/14: Patient will complete oral motor, BoT, pharyngeal, laryngeal exercises to improve swallow function x15 reps per session given mod cues.

## 2021-10-14 NOTE — PROGRESS NOTES
Hospital Medicine Daily Progress Note    Date of Service  10/14/2021    Chief Complaint  Fermín Gomez is a 66 y.o. male admitted 10/12/2021 with left-sided weakness    Hospital Course  Mr Gomez has past medical history that includes hereditary severe cytosis, status post splenectomy.  Patient presented to an outside hospital emergency room with a fall and acute onset of left-sided weakness.  Imaging was concerning for right MCA occlusion he was treated with TPA and transferred to [facility] for higher level of care and neuro interventional radiology.  Patient underwent attempted right MCA thrombectomy but M2 partial occlusion could not be recanalized.       Interval Problem Update  Patient is sleepy, he wakes up he is pleasant and cooperative  Denies headache, just complains of fatigue and ongoing weakness  Denies chest pain, denies shortness of breath  Patient's wife is at the bedside, we reviewed MRI findings and plan of care    I have personally seen and examined the patient at bedside. I discussed the plan of care with patient, family and bedside RN.    Consultants/Specialty  critical care and neurology    Code Status  Full Code    Disposition  Patient is medically cleared.   Anticipate discharge to to an inpatient rehabilitation hospital.  I have placed the appropriate orders for post-discharge needs.    Review of Systems  Review of Systems   Constitutional: Positive for malaise/fatigue. Negative for chills.   Respiratory: Negative for cough, hemoptysis and sputum production.    Cardiovascular: Negative for chest pain, palpitations and orthopnea.   Gastrointestinal: Negative for nausea and vomiting.   Skin: Negative for itching and rash.   Neurological: Positive for speech change, focal weakness and weakness. Negative for dizziness.   All other systems reviewed and are negative.       Physical Exam  Temp:  [36.9 °C (98.5 °F)-37.7 °C (99.8 °F)] 37.3 °C (99.1 °F)  Pulse:  [55-85] 63  Resp:   [6-33] 17  BP: (106-138)/(50-85) 106/56  SpO2:  [88 %-99 %] 97 %    Physical Exam  Constitutional:       General: He is not in acute distress.     Appearance: Normal appearance. He is normal weight.   HENT:      Head: Normocephalic and atraumatic.      Right Ear: External ear normal.      Left Ear: External ear normal.      Nose: Nose normal.      Mouth/Throat:      Mouth: Mucous membranes are moist.      Pharynx: Oropharynx is clear.   Eyes:      Extraocular Movements: Extraocular movements intact.      Conjunctiva/sclera: Conjunctivae normal.      Pupils: Pupils are equal, round, and reactive to light.   Cardiovascular:      Rate and Rhythm: Normal rate and regular rhythm.      Pulses: Normal pulses.   Pulmonary:      Effort: Pulmonary effort is normal.      Breath sounds: Normal breath sounds.   Abdominal:      General: Abdomen is flat. Bowel sounds are normal.      Palpations: Abdomen is soft.   Musculoskeletal:         General: Normal range of motion.      Cervical back: Normal range of motion and neck supple.   Skin:     General: Skin is warm and dry.      Capillary Refill: Capillary refill takes less than 2 seconds.      Coloration: Skin is not jaundiced.   Neurological:      General: No focal deficit present.      Mental Status: He is alert and oriented to person, place, and time.      Cranial Nerves: No cranial nerve deficit.      Motor: Weakness present.      Gait: Gait normal.      Comments: Slurred/slurred speech  Left hemiparesis   Psychiatric:         Mood and Affect: Mood normal.         Behavior: Behavior normal.         Fluids    Intake/Output Summary (Last 24 hours) at 10/14/2021 1629  Last data filed at 10/14/2021 1600  Gross per 24 hour   Intake 2900 ml   Output 650 ml   Net 2250 ml       Laboratory  Recent Labs     10/12/21  1644 10/13/21  0320 10/14/21  0320   WBC 16.8* 14.6* 17.4*   RBC 5.43 4.76 4.59*   HEMOGLOBIN 18.5* 15.7 15.1   HEMATOCRIT 51.6 44.8 43.8   MCV 95.0 94.1 95.4   MCH 34.1*  33.0 32.9   MCHC 35.9* 35.0 34.5   RDW 46.1 46.0 46.1   PLATELETCT 545* 480* 457*   MPV 9.8 9.6 9.7     Recent Labs     10/12/21  1644 10/13/21  0320 10/14/21  0320   SODIUM 141 137 139   POTASSIUM 4.0 3.6 4.1   CHLORIDE 104 105 107   CO2 23 25 24   GLUCOSE 106* 105* 110*   BUN 12 10 8   CREATININE 0.77 0.72 0.59   CALCIUM 9.5 8.5 8.5     Recent Labs     10/12/21  1644 10/13/21  0320   APTT 30.9  --    INR 1.15* 1.25*         Recent Labs     10/13/21  0320   TRIGLYCERIDE 65   HDL 50   LDL 87       Imaging  MR-BRAIN-W/O   Final Result         Acute infarcts in the right MCA territory as described above predominantly involving the frontal lobe. Scattered foci of acute infarction are also noted in the right parietal lobe.      Tiny foci of infarction are also noted in the left frontoparietal region.      The distribution of the infarctions suggests a proximal cardiovascular embolic source.      EC-ECHOCARDIOGRAM COMPLETE W/O CONT   Final Result      CT-HEAD W/O   Final Result         1.  Small focus of subdural hemorrhage along the left side of the falx in the frontal area is again identified with no change.      2.  There is some localized brain swelling and edema in the right basal ganglia which could represent subacute infarcts.      3.  No mass effect or midline shift identified.         DX-CHEST-PORTABLE (1 VIEW)   Final Result      1.  There is no acute cardiopulmonary process.      IR-THROMBO MECHANICAL ARTERY,INIT   Final Result         66-year-old presented with right M1 occlusion at [facility] underwent IV TPA therapy and was transferred to [facility] for emergent mechanical thrombectomy. At the time of initial angiogram, nonocclusive thrombus was    identified in the distal right M1 segment with occlusive thrombus in the inferior division M2 trunk with sluggish antegrade flow into the right inferior division MCA branches.      Multiple attempts at thrombectomy were  unsuccessful in recanalizing the clot at the inferior division trunk. The final angiographic images demonstrated persistent inferior division origin thrombus with sluggish antegrade flow into the inferior division    branches. There is also some flow limitation to the superior division branches due to thrombus encroaching upon the smaller superior division origin. Patient will be recovered in the ICU and the systolic blood pressure will be maintained around 160 mmHg    to encourage coverage leptomeningeal collateralization.      IAnamaria was physically present and participated during the entire procedure of the IR-THROMBO MECHANICAL ARTERY,INIT.         OUTSIDE IMAGES-DX CHEST   Final Result      OUTSIDE IMAGES-CT HEAD   Final Result      CT-FOREIGN FILM CAT SCAN   Final Result      OUTSIDE IMAGES-CT HEAD   Final Result           Assessment/Plan  * Stroke (cerebrum) (HCC)- (present on admission)  Assessment & Plan  Right MCA stroke, status post thrombolytics and attempted thrombectomy  Continue atorvastatin, start antiplatelet therapy and appropriate interval when approved by neurology  Blood pressure control  PT/OT/speech, acute rehab consult  Continuous hemodynamic monitoring for arrhythmia  Echocardiogram without evidence of left-to-right shunt, normal ejection fraction  Start aspirin  Continuous hemodynamic monitoring for arrhythmia, may benefit from long-term event monitor      Left hemiparesis (HCC)- (present on admission)  Assessment & Plan  PT/OT/Speech    Subdural hematoma (HCC)- (present on admission)  Assessment & Plan  Starting aspirin  Monitor neuro exam    Dysphagia- (present on admission)  Assessment & Plan  Speech following, continue modified diet    H/O splenectomy- (present on admission)  Assessment & Plan  History of splenectomy for hereditary spherocytosis    Lymphopenia- (present on admission)  Assessment & Plan  Covid negative, follow CBC       VTE prophylaxis: SCDs/TEDs    I have  performed a physical exam and reviewed and updated ROS and Plan today (10/14/2021). In review of yesterday's note (10/13/2021), there are no changes except as documented above.

## 2021-10-14 NOTE — THERAPY
Physical Therapy   Initial Evaluation     Patient Name: Fermín Gomez  Age:  66 y.o., Sex:  male  Medical Record #: [id number]  Today's Date: 10/14/2021     Precautions  Precautions: Fall Risk;Swallow Precautions ( See Comments)    Assessment  Patient is 66 y.o. male who presented to [facility] w/ stroke like symptoms, where he rec'd tPA.  He has since been transferred to [facility] w/ left UE flaccid and left side facial droop.  S/p thrombectomy, unsuccessful.  He was indep PTA.  He lives in a single story condo w/ his wife.  Today, he presents alert, in bed, very eager to work w/ PT.  Left facial droop noted.  On MMT, no active movement LUE, however he does demonstrate trace elbow flexion w/ mobility.  LLE grossly 4/5.  Impaired light touch LUE w/ neglect.  When prompted, he is able to look to his left and he is able to locate his LUE.  Frequent cues to attend to his LUE, w/ education regarding protecting his LUE w/ his right UE.  He needs mod assist to sit eob.  He does lose his balance posteriorly and needs assist to sit back to neutral.  HE is able to self correct to neutral when placed outside his base of support to his right and left.  Min assist to stand and mod assist to ambulate several feet to the bedside chair.  He would be an excellent post acute therapy candidate.  While in house PT will address his neglect, weakness, balance and gross mobility deficits.      Plan    Recommend Physical Therapy 4 times per week until therapy goals are met for the following treatments:  Bed Mobility, Gait Training, Neuro Re-Education / Balance and Therapeutic Activities    DC Equipment Recommendations: Unable to determine at this time  Discharge Recommendations: Recommend post-acute placement for additional physical therapy services prior to discharge home       Objective     10/14/21 0839   Prior Living Situation   Housing / Facility 1 Story Apartment / Condo   Steps Into Home 1   Steps In Home 0   Equipment Owned None   Lives  with - Patient's Self Care Capacity Spouse   Prior Level of Functional Mobility   Bed Mobility Independent   Transfer Status Independent   Ambulation Independent   Assistive Devices Used None   Stairs Independent   Strength Lower Body   Comments grossly wnl except LLE grossly 4/5   Strength Upper Body   Comments trace bicep movement w/ activity, 0/5 w/ MMT   Balance Assessment   Sitting Balance (Static) Fair -   Sitting Balance (Dynamic) Poor +   Standing Balance (Static) Poor -   Standing Balance (Dynamic) Trace +   Weight Shift Sitting Fair   Weight Shift Standing Poor   Comments w/ HHA   Gait Analysis   Gait Level Of Assist Moderate Assist   Assistive Device Hand Held Assist   Distance (Feet) 3   Deviation   (left lean)   Bed Mobility    Supine to Sit Moderate Assist   Functional Mobility   Sit to Stand Minimal Assist   Bed, Chair, Wheelchair Transfer Moderate Assist   Short Term Goals    Short Term Goal # 1 Pt to move supine to/from eob w/ spv in 6 visits to improve fxl indep   Short Term Goal # 2 Pt to move sit to/from stand w/ spv in 6 visits to improve fxl indep   Short Term Goal # 3 Pt to ambulate w/ appropriate  ft w/ spv in 6 visits to improve fxl indep   Anticipated Discharge Equipment and Recommendations   DC Equipment Recommendations Unable to determine at this time   Discharge Recommendations Recommend post-acute placement for additional physical therapy services prior to discharge home

## 2021-10-15 LAB
ALBUMIN SERPL BCP-MCNC: 3.4 G/DL (ref 3.2–4.9)
ALBUMIN/GLOB SERPL: 1.5 G/DL
ALP SERPL-CCNC: 65 U/L (ref 30–99)
ALT SERPL-CCNC: 11 U/L (ref 2–50)
ANION GAP SERPL CALC-SCNC: 9 MMOL/L (ref 7–16)
AST SERPL-CCNC: 18 U/L (ref 12–45)
BASOPHILS # BLD AUTO: 0.9 % (ref 0–1.8)
BASOPHILS # BLD: 0.11 K/UL (ref 0–0.12)
BILIRUB SERPL-MCNC: 1.8 MG/DL (ref 0.1–1.5)
BUN SERPL-MCNC: 9 MG/DL (ref 8–22)
CALCIUM SERPL-MCNC: 8.5 MG/DL (ref 8.5–10.5)
CHLORIDE SERPL-SCNC: 106 MMOL/L (ref 96–112)
CO2 SERPL-SCNC: 25 MMOL/L (ref 20–33)
CREAT SERPL-MCNC: 0.6 MG/DL (ref 0.5–1.4)
EOSINOPHIL # BLD AUTO: 0.27 K/UL (ref 0–0.51)
EOSINOPHIL NFR BLD: 2.3 % (ref 0–6.9)
ERYTHROCYTE [DISTWIDTH] IN BLOOD BY AUTOMATED COUNT: 45.2 FL (ref 35.9–50)
GLOBULIN SER CALC-MCNC: 2.2 G/DL (ref 1.9–3.5)
GLUCOSE SERPL-MCNC: 108 MG/DL (ref 65–99)
HCT VFR BLD AUTO: 42.9 % (ref 42–52)
HGB BLD-MCNC: 15 G/DL (ref 14–18)
IMM GRANULOCYTES # BLD AUTO: 0.06 K/UL (ref 0–0.11)
IMM GRANULOCYTES NFR BLD AUTO: 0.5 % (ref 0–0.9)
LYMPHOCYTES # BLD AUTO: 1.23 K/UL (ref 1–4.8)
LYMPHOCYTES NFR BLD: 10.5 % (ref 22–41)
MAGNESIUM SERPL-MCNC: 1.8 MG/DL (ref 1.5–2.5)
MCH RBC QN AUTO: 33.5 PG (ref 27–33)
MCHC RBC AUTO-ENTMCNC: 35 G/DL (ref 33.7–35.3)
MCV RBC AUTO: 95.8 FL (ref 81.4–97.8)
MONOCYTES # BLD AUTO: 1.16 K/UL (ref 0–0.85)
MONOCYTES NFR BLD AUTO: 9.9 % (ref 0–13.4)
NEUTROPHILS # BLD AUTO: 8.94 K/UL (ref 1.82–7.42)
NEUTROPHILS NFR BLD: 75.9 % (ref 44–72)
NRBC # BLD AUTO: 0 K/UL
NRBC BLD-RTO: 0 /100 WBC
PLATELET # BLD AUTO: 444 K/UL (ref 164–446)
PMV BLD AUTO: 10.1 FL (ref 9–12.9)
POTASSIUM SERPL-SCNC: 3.8 MMOL/L (ref 3.6–5.5)
PROT SERPL-MCNC: 5.6 G/DL (ref 6–8.2)
RBC # BLD AUTO: 4.48 M/UL (ref 4.7–6.1)
SODIUM SERPL-SCNC: 140 MMOL/L (ref 135–145)
WBC # BLD AUTO: 11.8 K/UL (ref 4.8–10.8)

## 2021-10-15 PROCEDURE — 83735 ASSAY OF MAGNESIUM: CPT

## 2021-10-15 PROCEDURE — 700102 HCHG RX REV CODE 250 W/ 637 OVERRIDE(OP): Performed by: PSYCHIATRY & NEUROLOGY

## 2021-10-15 PROCEDURE — A9270 NON-COVERED ITEM OR SERVICE: HCPCS | Performed by: INTERNAL MEDICINE

## 2021-10-15 PROCEDURE — 770020 HCHG ROOM/CARE - TELE (206)

## 2021-10-15 PROCEDURE — A9270 NON-COVERED ITEM OR SERVICE: HCPCS | Performed by: PSYCHIATRY & NEUROLOGY

## 2021-10-15 PROCEDURE — 85025 COMPLETE CBC W/AUTO DIFF WBC: CPT

## 2021-10-15 PROCEDURE — 99232 SBSQ HOSP IP/OBS MODERATE 35: CPT | Performed by: PSYCHIATRY & NEUROLOGY

## 2021-10-15 PROCEDURE — 36415 COLL VENOUS BLD VENIPUNCTURE: CPT

## 2021-10-15 PROCEDURE — 700102 HCHG RX REV CODE 250 W/ 637 OVERRIDE(OP): Performed by: INTERNAL MEDICINE

## 2021-10-15 PROCEDURE — 99231 SBSQ HOSP IP/OBS SF/LOW 25: CPT | Performed by: INTERNAL MEDICINE

## 2021-10-15 PROCEDURE — 80053 COMPREHEN METABOLIC PANEL: CPT

## 2021-10-15 PROCEDURE — 99232 SBSQ HOSP IP/OBS MODERATE 35: CPT | Performed by: PHYSICAL MEDICINE & REHABILITATION

## 2021-10-15 RX ORDER — POLYETHYLENE GLYCOL 3350 17 G/17G
1 POWDER, FOR SOLUTION ORAL
Status: DISCONTINUED | OUTPATIENT
Start: 2021-10-15 | End: 2021-10-19 | Stop reason: HOSPADM

## 2021-10-15 RX ORDER — BISACODYL 10 MG
10 SUPPOSITORY, RECTAL RECTAL
Status: DISCONTINUED | OUTPATIENT
Start: 2021-10-15 | End: 2021-10-19 | Stop reason: HOSPADM

## 2021-10-15 RX ORDER — AMOXICILLIN 250 MG
2 CAPSULE ORAL 2 TIMES DAILY
Status: DISCONTINUED | OUTPATIENT
Start: 2021-10-15 | End: 2021-10-19 | Stop reason: HOSPADM

## 2021-10-15 RX ADMIN — ATORVASTATIN CALCIUM 80 MG: 80 TABLET, FILM COATED ORAL at 17:44

## 2021-10-15 RX ADMIN — DOCUSATE SODIUM 50 MG AND SENNOSIDES 8.6 MG 2 TABLET: 8.6; 5 TABLET, FILM COATED ORAL at 17:44

## 2021-10-15 RX ADMIN — ASPIRIN 81 MG: 81 TABLET, CHEWABLE ORAL at 05:10

## 2021-10-15 ASSESSMENT — ENCOUNTER SYMPTOMS
VOMITING: 0
SPUTUM PRODUCTION: 0
WEAKNESS: 1
DIZZINESS: 0
FOCAL WEAKNESS: 1
HEMOPTYSIS: 0
NAUSEA: 0
COUGH: 0
PALPITATIONS: 0
CHILLS: 0
ORTHOPNEA: 0
SPEECH CHANGE: 1

## 2021-10-15 ASSESSMENT — PAIN DESCRIPTION - PAIN TYPE: TYPE: ACUTE PAIN

## 2021-10-15 NOTE — PROGRESS NOTES
Hospital Medicine Daily Progress Note    Date of Service  10/15/2021    Chief Complaint  Fermín Gomez is a 66 y.o. male admitted 10/12/2021 with left-sided weakness    Hospital Course  Mr Gomez has past medical history that includes hereditary severe cytosis, status post splenectomy.  Patient presented to an outside hospital emergency room with a fall and acute onset of left-sided weakness.  Imaging was concerning for right MCA occlusion he was treated with TPA and transferred to [facility] for higher level of care and neuro interventional radiology.  Patient underwent attempted right MCA thrombectomy but M2 partial occlusion could not be recanalized.       Interval Problem Update  Normotensive.  No new complaints.   Working with therapies.   Appreciate physiatry consult for rehab appropriateness.  Appreciate neurology assistance.    I have personally seen and examined the patient at bedside. I discussed the plan of care with patient, family and bedside RN.    Consultants/Specialty  critical care and neurology    Code Status  Full Code    Disposition  Patient is medically cleared.   Anticipate discharge to to an inpatient rehabilitation hospital.  I have placed the appropriate orders for post-discharge needs.    Review of Systems  Review of Systems   Constitutional: Positive for malaise/fatigue. Negative for chills.   Respiratory: Negative for cough, hemoptysis and sputum production.    Cardiovascular: Negative for chest pain, palpitations and orthopnea.   Gastrointestinal: Negative for nausea and vomiting.   Skin: Negative for itching and rash.   Neurological: Positive for speech change, focal weakness and weakness. Negative for dizziness.   All other systems reviewed and are negative.       Physical Exam  Temp:  [36.4 °C (97.5 °F)-37.1 °C (98.7 °F)] 36.4 °C (97.6 °F)  Pulse:  [60-84] 61  Resp:  [16-24] 19  BP: (106-148)/(56-76) 127/63  SpO2:  [90 %-97 %] 91 %    Physical  Exam  Constitutional:       General: He is not in acute distress.     Appearance: Normal appearance. He is normal weight.   HENT:      Head: Normocephalic and atraumatic.      Right Ear: External ear normal.      Left Ear: External ear normal.      Nose: Nose normal.      Mouth/Throat:      Mouth: Mucous membranes are moist.      Pharynx: Oropharynx is clear.   Eyes:      Extraocular Movements: Extraocular movements intact.      Conjunctiva/sclera: Conjunctivae normal.      Pupils: Pupils are equal, round, and reactive to light.   Cardiovascular:      Rate and Rhythm: Normal rate and regular rhythm.      Pulses: Normal pulses.   Pulmonary:      Effort: Pulmonary effort is normal.      Breath sounds: Normal breath sounds.   Abdominal:      General: Abdomen is flat. Bowel sounds are normal.      Palpations: Abdomen is soft.   Musculoskeletal:         General: Normal range of motion.      Cervical back: Normal range of motion and neck supple.   Skin:     General: Skin is warm and dry.      Capillary Refill: Capillary refill takes less than 2 seconds.      Coloration: Skin is not jaundiced.   Neurological:      General: No focal deficit present.      Mental Status: He is alert and oriented to person, place, and time.      Cranial Nerves: No cranial nerve deficit.      Motor: Weakness present.      Gait: Gait normal.      Comments: Slurred/slurred speech  Left hemiparesis   Psychiatric:         Mood and Affect: Mood normal.         Behavior: Behavior normal.         Fluids    Intake/Output Summary (Last 24 hours) at 10/15/2021 1448  Last data filed at 10/15/2021 1100  Gross per 24 hour   Intake 2700 ml   Output 1600 ml   Net 1100 ml       Laboratory  Recent Labs     10/13/21  0320 10/14/21  0320 10/15/21  0201   WBC 14.6* 17.4* 11.8*   RBC 4.76 4.59* 4.48*   HEMOGLOBIN 15.7 15.1 15.0   HEMATOCRIT 44.8 43.8 42.9   MCV 94.1 95.4 95.8   MCH 33.0 32.9 33.5*   MCHC 35.0 34.5 35.0   RDW 46.0 46.1 45.2   PLATELETCT 480* 457*  444   MPV 9.6 9.7 10.1     Recent Labs     10/13/21  0320 10/14/21  0320 10/15/21  0201   SODIUM 137 139 140   POTASSIUM 3.6 4.1 3.8   CHLORIDE 105 107 106   CO2 25 24 25   GLUCOSE 105* 110* 108*   BUN 10 8 9   CREATININE 0.72 0.59 0.60   CALCIUM 8.5 8.5 8.5     Recent Labs     10/12/21  1644 10/13/21  0320   APTT 30.9  --    INR 1.15* 1.25*         Recent Labs     10/13/21  0320   TRIGLYCERIDE 65   HDL 50   LDL 87       Imaging  MR-BRAIN-W/O   Final Result         Acute infarcts in the right MCA territory as described above predominantly involving the frontal lobe. Scattered foci of acute infarction are also noted in the right parietal lobe.      Tiny foci of infarction are also noted in the left frontoparietal region.      The distribution of the infarctions suggests a proximal cardiovascular embolic source.      EC-ECHOCARDIOGRAM COMPLETE W/O CONT   Final Result      CT-HEAD W/O   Final Result         1.  Small focus of subdural hemorrhage along the left side of the falx in the frontal area is again identified with no change.      2.  There is some localized brain swelling and edema in the right basal ganglia which could represent subacute infarcts.      3.  No mass effect or midline shift identified.         DX-CHEST-PORTABLE (1 VIEW)   Final Result      1.  There is no acute cardiopulmonary process.      IR-THROMBO MECHANICAL ARTERY,INIT   Final Result         66-year-old presented with right M1 occlusion at [facility] underwent IV TPA therapy and was transferred to [facility] for emergent mechanical thrombectomy. At the time of initial angiogram, nonocclusive thrombus was    identified in the distal right M1 segment with occlusive thrombus in the inferior division M2 trunk with sluggish antegrade flow into the right inferior division MCA branches.      Multiple attempts at thrombectomy were unsuccessful in recanalizing the clot at the inferior division trunk. The final  angiographic images demonstrated persistent inferior division origin thrombus with sluggish antegrade flow into the inferior division    branches. There is also some flow limitation to the superior division branches due to thrombus encroaching upon the smaller superior division origin. Patient will be recovered in the ICU and the systolic blood pressure will be maintained around 160 mmHg    to encourage coverage leptomeningeal collateralization.      KIANA Satinderkel Price was physically present and participated during the entire procedure of the IR-THROMBO MECHANICAL ARTERY,INIT.         OUTSIDE IMAGES-DX CHEST   Final Result      OUTSIDE IMAGES-CT HEAD   Final Result      CT-FOREIGN FILM CAT SCAN   Final Result      OUTSIDE IMAGES-CT HEAD   Final Result           Assessment/Plan  * Stroke (cerebrum) (HCC)- (present on admission)  Assessment & Plan  Right MCA stroke, status post thrombolytics and attempted thrombectomy  Continue atorvastatin, start antiplatelet therapy and appropriate interval when approved by neurology  Blood pressure control  PT/OT/speech, acute rehab consult  Continuous hemodynamic monitoring for arrhythmia  Echocardiogram without evidence of left-to-right shunt, normal ejection fraction  Start aspirin  Continuous hemodynamic monitoring for arrhythmia, may benefit from long-term event monitor  Normotensive. Working with therapies. Appreciate physiatry consult for rehab appropriateness.  Appreciate neurology assistance.    Subdural hematoma (HCC)- (present on admission)  Assessment & Plan  Starting aspirin  Monitor neuro exam    Left hemiparesis (HCC)- (present on admission)  Assessment & Plan  PT/OT/Speech    Dysphagia- (present on admission)  Assessment & Plan  Speech following, continue modified diet    H/O splenectomy- (present on admission)  Assessment & Plan  History of splenectomy for hereditary spherocytosis    Lymphopenia- (present on admission)  Assessment & Plan  Covid negative, follow  CBC       VTE prophylaxis: SCDs/TEDs    I have performed a physical exam and reviewed and updated ROS and Plan today (10/15/2021). In review of yesterday's note (10/14/2021), there are no changes except as documented above.

## 2021-10-15 NOTE — PROGRESS NOTES
This patient's wedding ring was placed in the locked cabinet in pt room after offering safe keeping 2-3 times. Pt adamant that he wanted wife, Aye, to take wedding ring home tomorrow. Patients ring in clear med bag with name and room number on outside of bag and locked in pt room cabinet.

## 2021-10-15 NOTE — PROGRESS NOTES
NEUROLOGY PROGRESS NOTE      BACKGROUND:    66 y.o. male was admitted on 10/12/2021  4:56 PM for Acute CVA (cerebrovascular accident) (HCC) [I63.9]  He is status post administration of TPA at [facility].    SUBJECTIVE:   No significant changes, continue with profound left-sided weakness involving face arm and leg.  Wife and daughter are at bedside.    VITALS:  Vitals:    10/14/21 1923 10/14/21 2356 10/15/21 0407 10/15/21 0800   BP: 129/75 121/69 127/67 129/68   Pulse: 79 77 66 66   Resp: 16 16 16 18   Temp: 36.7 °C (98.1 °F) 37.1 °C (98.7 °F) 36.9 °C (98.5 °F) 36.4 °C (97.5 °F)   TempSrc: Temporal Temporal Temporal Temporal   SpO2: 95% 90% 92% 90%   Weight:       Height:           NEUROLOGICAL EXAM:   MENTAL STATUS:  Awake, alert, oriented times 3.  Speech is slightly dysarthric.  Comprehension is intact.  CRANIAL NERVES:  PERRL, EOMI with no nystagmus, he has profound left facial weakness in an upper motor neuron pattern.  Facial sensation is intact, tongue is in the midline, palate is symmetric. Hearing is intact to finger rub bilaterally. Shoulder shrugs are normal.  MOTOR:  Motor examination showed normal strength in direct testing of right upper and lower extremities, proximal and distal.  He has 1/5 weakness of left upper extremity and 4/5 weakness of left lower extremity.  SENSATION:  Intact to light touch, temperature and proprioception throughout  REFLEXES:  2+ and symmetric, toes are downgoing bilaterally  COORDINATION:   Intact to finger-to-nose and heel shin testing on the right, he was not able to perform those on the left.  GAIT:  Deferred      OBJECTIVE:    NEUROIMAGING:    MR-BRAIN-W/O   Final Result         Acute infarcts in the right MCA territory as described above predominantly involving the frontal lobe. Scattered foci of acute infarction are also noted in the right parietal lobe.      Tiny foci of infarction are also noted in the left frontoparietal region.      The distribution of the  infarctions suggests a proximal cardiovascular embolic source.      EC-ECHOCARDIOGRAM COMPLETE W/O CONT   Final Result      CT-HEAD W/O   Final Result         1.  Small focus of subdural hemorrhage along the left side of the falx in the frontal area is again identified with no change.      2.  There is some localized brain swelling and edema in the right basal ganglia which could represent subacute infarcts.      3.  No mass effect or midline shift identified.         DX-CHEST-PORTABLE (1 VIEW)   Final Result      1.  There is no acute cardiopulmonary process.      IR-THROMBO MECHANICAL ARTERY,INIT   Final Result         66-year-old presented with right M1 occlusion at [facility] underwent IV TPA therapy and was transferred to [facility] for emergent mechanical thrombectomy. At the time of initial angiogram, nonocclusive thrombus was    identified in the distal right M1 segment with occlusive thrombus in the inferior division M2 trunk with sluggish antegrade flow into the right inferior division MCA branches.      Multiple attempts at thrombectomy were unsuccessful in recanalizing the clot at the inferior division trunk. The final angiographic images demonstrated persistent inferior division origin thrombus with sluggish antegrade flow into the inferior division    branches. There is also some flow limitation to the superior division branches due to thrombus encroaching upon the smaller superior division origin. Patient will be recovered in the ICU and the systolic blood pressure will be maintained around 160 mmHg    to encourage coverage leptomeningeal collateralization.      Anamaria BRUNO was physically present and participated during the entire procedure of the IR-THROMBO MECHANICAL ARTERY,INIT.         OUTSIDE IMAGES-DX CHEST   Final Result      OUTSIDE IMAGES-CT HEAD   Final Result      CT-FOREIGN FILM CAT SCAN   Final Result      OUTSIDE IMAGES-CT HEAD   Final  Result          MEDICATIONS:  Current Facility-Administered Medications   Medication Dose Route Frequency Provider Last Rate Last Admin   • aspirin (ASA) chewable tab 81 mg  81 mg Oral DAILY Lisa Ureña M.D.   81 mg at 10/15/21 0510   • NS infusion   Intravenous Continuous Daniel Melara M.D.   Stopped at 10/14/21 1600   • acetaminophen (Tylenol) tablet 650 mg  650 mg Oral Q4HRS PRN Josr Santos Jr., D.ODarlin   650 mg at 10/13/21 1032   • atorvastatin (LIPITOR) tablet 80 mg  80 mg Oral Q EVENING Salome Garza M.D.   80 mg at 10/14/21 1812   • hydrALAZINE (APRESOLINE) injection 10 mg  10 mg Intravenous Q2HRS PRN Salome Garza M.D.       • labetalol (NORMODYNE/TRANDATE) injection 10 mg  10 mg Intravenous Q10 MIN PRN Salome Garza M.D.           LABS:      Recent Labs     10/13/21  0320 10/14/21  0320 10/15/21  0201   WBC 14.6* 17.4* 11.8*   RBC 4.76 4.59* 4.48*   HEMOGLOBIN 15.7 15.1 15.0   HEMATOCRIT 44.8 43.8 42.9   MCV 94.1 95.4 95.8   MCH 33.0 32.9 33.5*   MCHC 35.0 34.5 35.0   RDW 46.0 46.1 45.2   PLATELETCT 480* 457* 444   MPV 9.6 9.7 10.1     Recent Labs     10/13/21  0320 10/14/21  0320 10/15/21  0201   SODIUM 137 139 140   POTASSIUM 3.6 4.1 3.8   CHLORIDE 105 107 106   CO2 25 24 25   GLUCOSE 105* 110* 108*   BUN 10 8 9     INR   Date Value Ref Range Status   10/13/2021 1.25 (H) 0.87 - 1.13 Final     Comment:     INR - Non-therapeutic Reference Range: 0.87-1.13  INR - Therapeutic Reference Range: 2.0-4.0       No results found for: POCINR  Lab Results   Component Value Date/Time    CREATININE 0.72 10/13/2021 0320     Lab Results   Component Value Date/Time    IFAFRICA >60 10/13/2021 0320    IFNOTAFR >60 10/13/2021 0320         ASSESSMENT AND PLAN:   66 y.o. male with no significant past medical history who was evaluated at [facility] for profound left-sided weakness with initial NIH scale score of 12.  Patient was found to be candidate for administration of TPA and received TPA at [facility]  Subsequently he underwent CT angiogram of the head and neck which revealed right M1 occlusion.  He was transferred to [facility] and underwent conventional angiogram in order to perform thrombectomy.  Angiogram revealed right M2 occlusion.  Apparently thrombectomy was not successful with TICI 2 a result.    Brain MRI revealed acute infarcts in the right MCA territory predominantly involving the frontal lobe. Scattered foci of acute infarction are also noted in the right parietal lobe. Tiny foci of infarction are also noted in the left frontoparietal region.  No evidence of hemorrhage.  We will start aspirin 81 mg daily.  Lipid profile revealed LDL of 87, he will continue with atorvastatin 80 mg daily.  Echocardiogram was unremarkable.  He needs prolonged cardiac monitoring, preferably loop recorder implantation.  He will continue with physical therapy, Occupational Therapy and speech therapy.  I think he will be a good candidate for acute rehab.  Neurology will sign off, please call me if there is any question.

## 2021-10-15 NOTE — CARE PLAN
The patient is Stable - Low risk of patient condition declining or worsening    Shift Goals  Clinical Goals: improvement in NIH  Patient Goals: Rest  Family Goals: ELBERT    Progress made toward(s) clinical / shift goals:  patient had improvement in NIH from 14 to 10. Pt is alert and oriented x4, resting comfortably and expressed no needs at this time.     Patient is not progressing towards the following goals:

## 2021-10-15 NOTE — PROGRESS NOTES
"    Physical Medicine and Rehabilitation Consultation          Follow up note    Date of initial consultation: 10/13/2021  Requesting provider: Salome Garza MD   Consulting provider: Janet Herrera D.O.  Reason for consultation: assess for acute inpatient rehab appropriateness  LOS: 3 Day(s)    Chief complaint: left sided weakness     HPI: The patient is a 66 y.o. right-handed male with pmhx of splenectomy due to history of spherocytosis ;  who presented on 10/12/2021  4:56 PM as a transfer from [facility] after a slip and fall on ice.  Per documentation, patient was taking some troponin I someone else called for help.  Reportedly patient had left-sided deficits upon arrival to [place] patient was deemed to be a candidate for TPA based on his imaging, documentation of CT angiogram of the head and neck revealed a right M1 occlusion.  Patient was then transferred to [facility] for higher level of care and thrombectomy.  Patient underwent a right MCA thrombectomy, dominant inferior division of right M2 partial occlusion which was unable to be recannulized, TICI 2A.  Per documentation, patient had a significant improvement in his symptoms after TPA administration.  Per the neurology consult NIH was 12 which improved to an NIH of 2.  Status post mechanical thrombectomy, patient has been admitted to the ICU.  PT/OT evaluations are pending, however patient has completed a swallow eval and he has been cleared for puréed diet with mildly thickened liquids.    10/13: Patient seen and examined at bedside. Patient reports that he feels \"Ok\". Denies HA, cp, sob, n/v, abdominal pain, or any new changes in numbness tingling or weakness. Continues to have left sided weakness, improved strength in LLE after TPA. Mild improvement in LUE hand  finger flexion.     10/15: Patient seen and examined at bedside. Wife and daughter at bedside. Answered questions about plans for IRF. Patient reports he feels stronger. Denies HA, " changes in vision, CP, SOB, abdominal pain or new numbness/tingling/weakness. Patient continues to have cobb in place.     Social Hx:  Patient lives in a condo in [place] with with his wife.   1 KIMANI  At prior level of function independent with mobility and ADLs, enjoyed hiking regularly.       Tobacco: denies  Alcohol: occasional   Drugs: uses marijuana     THERAPY:  Restrictions: not cleared for activity   PT: Functional mobility   10/14 PT Note: Mod A HHA x3 ft, Mod A supine to sit, Min A sit to stand     OT: ADLs  10/14 OT Note: Mod A  Sit to stand, Max A upper body dressing, Max A lower body dressing     SLP:   10/13 SLP Note: Puréed diet with mildly thickened liquids  10/14 SLP Note:  Pureed solids with mildly thickened liquids     IMAGING:  10/13 CT Head  IMPRESSION:        1.  Small focus of subdural hemorrhage along the left side of the falx in the frontal area is again identified with no change.     2.  There is some localized brain swelling and edema in the right basal ganglia which could represent subacute infarcts.     3.  No mass effect or midline shift identified.    PROCEDURES:  10/12 Procedure(s): Rt MCA thrombectomy. Dominant inferior division Rt M2 partial occlusion, unable to be recanalized. TICI 2A, performed by Dr. Price     PMH:  Past Medical History:   Diagnosis Date   • Spherocytosis (HCC)        PSH:  Past Surgical History:   Procedure Laterality Date   • SPLENECTOMY         FHX:  History reviewed. No pertinent family history.    Medications:  Current Facility-Administered Medications   Medication Dose   • aspirin (ASA) chewable tab 81 mg  81 mg   • NS infusion     • acetaminophen (Tylenol) tablet 650 mg  650 mg   • atorvastatin (LIPITOR) tablet 80 mg  80 mg   • hydrALAZINE (APRESOLINE) injection 10 mg  10 mg   • labetalol (NORMODYNE/TRANDATE) injection 10 mg  10 mg       Allergies:  Allergies   Allergen Reactions   • Penicillins        Physical Exam:  Vitals: /68   Pulse 66    "Temp 36.4 °C (97.5 °F) (Temporal)   Resp 18   Ht 1.803 m (5' 11\")   Wt 87.8 kg (193 lb 9 oz)   SpO2 90%   Gen: NAD, laying comfortably in bed, family at bedside   Head:  NC/AT   Eyes/ Nose/ Mouth: PERRLA, moist mucous membranes  Cardio: RRR, good distal perfusion, warm extremities  Pulm: normal respiratory effort, no cyanosis, no witnessed wheezes or coughing   Abd: Soft NTND, negative borborygmi   Ext: No peripheral edema. No calf tenderness. No clubbing.    Mental status:  A&Ox4 (person, place, date, situation) answers questions appropriately follows commands  Speech: fluent, + dyarthria, slurred     CRANIAL NERVES:  2,3: visual acuity grossly intact, PERRL  3,4,6: EOMI bilaterally, no nystagmus or diplopia  5: sensation intact to light touch bilaterally and symmetric  7: + facial droop   8: hearing grossly intact    Motor:      Upper Extremity  Myotome R L   Shoulder flexion C5 5/5 0/5   Elbow flexion C5 5/5 1/5   Wrist extension C6 5/5 0/5   Elbow extension C7 5/5 1/5   Finger flexion C8 5/5 2/5   Finger abduction T1 5/5 2/5     Lower Extremity Myotome R L   Hip flexion L2 5/5 4/5   Knee extension L3 5/5 4/5   Ankle dorsiflexion L4 5/5 4/5   Toe extension L5 5/5 4/5   Ankle plantarflexion S1 5/5 5/5       Sensory:   intact to light touch through out RUE and RLE, decreased sensation to LUE     DTRs: 2+ in bilateral  biceps  No clonus at bilateral ankles  Negative babinski b/l  Negative Johnson b/l     Tone: no spasticity noted, no cogwheeling noted    Coordination:   intact finger to nose with RUE   intact fine motor with fingers with RUE       Labs: Reviewed and significant for   Recent Labs     10/12/21  1644 10/12/21  1644 10/13/21  0320 10/14/21  0320 10/15/21  0201   RBC 5.43   < > 4.76 4.59* 4.48*   HEMOGLOBIN 18.5*   < > 15.7 15.1 15.0   HEMATOCRIT 51.6   < > 44.8 43.8 42.9   PLATELETCT 545*   < > 480* 457* 444   PROTHROMBTM 14.4  --  15.3*  --   --    APTT 30.9  --   --   --   --    INR 1.15*  --  " 1.25*  --   --     < > = values in this interval not displayed.     Recent Labs     10/13/21  0320 10/14/21  0320 10/15/21  0201   SODIUM 137 139 140   POTASSIUM 3.6 4.1 3.8   CHLORIDE 105 107 106   CO2 25 24 25   GLUCOSE 105* 110* 108*   BUN 10 8 9   CREATININE 0.72 0.59 0.60   CALCIUM 8.5 8.5 8.5     Recent Results (from the past 24 hour(s))   CBC WITH DIFFERENTIAL    Collection Time: 10/15/21  2:01 AM   Result Value Ref Range    WBC 11.8 (H) 4.8 - 10.8 K/uL    RBC 4.48 (L) 4.70 - 6.10 M/uL    Hemoglobin 15.0 14.0 - 18.0 g/dL    Hematocrit 42.9 42.0 - 52.0 %    MCV 95.8 81.4 - 97.8 fL    MCH 33.5 (H) 27.0 - 33.0 pg    MCHC 35.0 33.7 - 35.3 g/dL    RDW 45.2 35.9 - 50.0 fL    Platelet Count 444 164 - 446 K/uL    MPV 10.1 9.0 - 12.9 fL    Neutrophils-Polys 75.90 (H) 44.00 - 72.00 %    Lymphocytes 10.50 (L) 22.00 - 41.00 %    Monocytes 9.90 0.00 - 13.40 %    Eosinophils 2.30 0.00 - 6.90 %    Basophils 0.90 0.00 - 1.80 %    Immature Granulocytes 0.50 0.00 - 0.90 %    Nucleated RBC 0.00 /100 WBC    Neutrophils (Absolute) 8.94 (H) 1.82 - 7.42 K/uL    Lymphs (Absolute) 1.23 1.00 - 4.80 K/uL    Monos (Absolute) 1.16 (H) 0.00 - 0.85 K/uL    Eos (Absolute) 0.27 0.00 - 0.51 K/uL    Baso (Absolute) 0.11 0.00 - 0.12 K/uL    Immature Granulocytes (abs) 0.06 0.00 - 0.11 K/uL    NRBC (Absolute) 0.00 K/uL   Comp Metabolic Panel    Collection Time: 10/15/21  2:01 AM   Result Value Ref Range    Sodium 140 135 - 145 mmol/L    Potassium 3.8 3.6 - 5.5 mmol/L    Chloride 106 96 - 112 mmol/L    Co2 25 20 - 33 mmol/L    Anion Gap 9.0 7.0 - 16.0    Glucose 108 (H) 65 - 99 mg/dL    Bun 9 8 - 22 mg/dL    Creatinine 0.60 0.50 - 1.40 mg/dL    Calcium 8.5 8.5 - 10.5 mg/dL    AST(SGOT) 18 12 - 45 U/L    ALT(SGPT) 11 2 - 50 U/L    Alkaline Phosphatase 65 30 - 99 U/L    Total Bilirubin 1.8 (H) 0.1 - 1.5 mg/dL    Albumin 3.4 3.2 - 4.9 g/dL    Total Protein 5.6 (L) 6.0 - 8.2 g/dL    Globulin 2.2 1.9 - 3.5 g/dL    A-G Ratio 1.5 g/dL   MAGNESIUM     Collection Time: 10/15/21  2:01 AM   Result Value Ref Range    Magnesium 1.8 1.5 - 2.5 mg/dL   ESTIMATED GFR    Collection Time: 10/15/21  2:01 AM   Result Value Ref Range    GFR If African American >60 >60 mL/min/1.73 m 2    GFR If Non African American >60 >60 mL/min/1.73 m 2         ASSESSMENT:  Patient is a 66 y.o. male admitted with left-sided weakness due to right MCA CVA now status post mechanical thrombectomy.    [facility] Code / Diagnosis to Support: 0001.1 - Stroke: Left Body Involvement (Right Brain)    Rehabilitation: Impaired ADLs and mobility  Patient is a good candidate for inpatient rehab based on needs for PT, OT, and speech therapy.  Patient will also benefit from family training for mobility and ADLs.  Patient has a good discharge situation which will be home with assistance from wife.     Barriers to transfer include: Insurance authorization, TCCs to verify disposition, medical clearance and bed availability     Additional Recommendations:  Right MCA CVA  -Greatest deficits at this time are left-sided weakness, dysphagia, and dysarthira,  - s/p TPA administration at OSH   -Status post mechanical thrombectomy for right M2 partial occlusion which was unable to be recannulized, TICI 2 a  -Patient is on statin for secondary stroke prophylaxis, voiding ASA at this time  - continue with PT/OT, patient currently functioning at a mod A level and max for ADLs     Dysphagia  -SLP initial eval completed, patient cleared for puree Diet with mildly thickened liquids  - continue to monitor for signs/symptoms of aspiration     Leukocytosis   - WBC elevated to 17.8 on 10/14, improved to 11.8 on 10/15  - remains afebrile, at risk for UTI and PNA s/p CVA , continuing to monitor     Neurogenic bladder  - patient has cobb in place still as of 10/15  - considering patient will need to wait approx 3 days for IRF, would recommend beginning void trials  - recommend DC cobb, begin void trials, start PVR after each void, cath  if > 200ml   - bladder scan if no void > 4hrs     Disposition  - Patient is currently functioning below is level of baseline, will need post acute rehab  - recommend IRF level therapy with 3hrs of therapy 5 days per week     Medical Complexity:  R MCA CVA  Left sided weakness  Dysphagia  Leukocytosis   History of splenectomy  Hx of hereditary spherocytosis   Impaired mobility and ADLs       DVT PPX: SCDs     Thank you for allowing us to participate in the care of this patient.     Patient was seen for 33 minutes on unit/floor of which > 50% of time was spent on counseling and coordination of care  regarding the above, including prognosis, risk reduction, benefits of treatment, and options for next stage of care.    Janet Herrera D.O.   Physical Medicine and Rehabilitation     Please note that this dictation was created using voice recognition software. I have made every reasonable attempt to correct obvious errors, but there may be errors of grammar and possibly content that I did not discover before finalizing the note.

## 2021-10-15 NOTE — PROGRESS NOTES
Pt arrived to floor via transport and ICU RN. Pt stood and took 4 steps from ICU bed to bed in room. 2x assist to bed. Pt AOx4, left sided facial droop, left sided weakness/drift. Family at bedside. Safety precautions in place, bed alarm in place.     COVID-19 surge in effect.

## 2021-10-15 NOTE — HOSPITAL COURSE
Mr Gomez has past medical history that includes hereditary severe cytosis, status post splenectomy.  Patient presented to an outside hospital emergency room with a fall and acute onset of left-sided weakness.  Imaging was concerning for right MCA occlusion he was treated with TPA and transferred to [facility] for higher level of care and neuro interventional radiology.  Patient underwent attempted right MCA thrombectomy but M2 partial occlusion could not be recanalized.

## 2021-10-15 NOTE — DISCHARGE PLANNING
Submitted to insurance.     1036--Insurance remains pending.  No beds available today.  Will re-assess on Monday.  Therefore, would appreciate updated PT/OT evals on Sunday for review by Physiatry/insurance on Monday morning.  Msg placed to Michelle GARVEY.

## 2021-10-16 LAB
ALBUMIN SERPL BCP-MCNC: 3.4 G/DL (ref 3.2–4.9)
ALBUMIN/GLOB SERPL: 1.5 G/DL
ALP SERPL-CCNC: 68 U/L (ref 30–99)
ALT SERPL-CCNC: 14 U/L (ref 2–50)
ANION GAP SERPL CALC-SCNC: 8 MMOL/L (ref 7–16)
AST SERPL-CCNC: 17 U/L (ref 12–45)
BASOPHILS # BLD AUTO: 1.3 % (ref 0–1.8)
BASOPHILS # BLD: 0.13 K/UL (ref 0–0.12)
BILIRUB SERPL-MCNC: 1.3 MG/DL (ref 0.1–1.5)
BUN SERPL-MCNC: 8 MG/DL (ref 8–22)
CALCIUM SERPL-MCNC: 8.8 MG/DL (ref 8.5–10.5)
CHLORIDE SERPL-SCNC: 103 MMOL/L (ref 96–112)
CO2 SERPL-SCNC: 27 MMOL/L (ref 20–33)
CREAT SERPL-MCNC: 0.73 MG/DL (ref 0.5–1.4)
EOSINOPHIL # BLD AUTO: 0.36 K/UL (ref 0–0.51)
EOSINOPHIL NFR BLD: 3.6 % (ref 0–6.9)
ERYTHROCYTE [DISTWIDTH] IN BLOOD BY AUTOMATED COUNT: 44.2 FL (ref 35.9–50)
GLOBULIN SER CALC-MCNC: 2.3 G/DL (ref 1.9–3.5)
GLUCOSE SERPL-MCNC: 97 MG/DL (ref 65–99)
HCT VFR BLD AUTO: 43.3 % (ref 42–52)
HGB BLD-MCNC: 15.1 G/DL (ref 14–18)
IMM GRANULOCYTES # BLD AUTO: 0.03 K/UL (ref 0–0.11)
IMM GRANULOCYTES NFR BLD AUTO: 0.3 % (ref 0–0.9)
LYMPHOCYTES # BLD AUTO: 1.75 K/UL (ref 1–4.8)
LYMPHOCYTES NFR BLD: 17.3 % (ref 22–41)
MAGNESIUM SERPL-MCNC: 1.9 MG/DL (ref 1.5–2.5)
MCH RBC QN AUTO: 32.8 PG (ref 27–33)
MCHC RBC AUTO-ENTMCNC: 34.9 G/DL (ref 33.7–35.3)
MCV RBC AUTO: 94.1 FL (ref 81.4–97.8)
MONOCYTES # BLD AUTO: 0.98 K/UL (ref 0–0.85)
MONOCYTES NFR BLD AUTO: 9.7 % (ref 0–13.4)
NEUTROPHILS # BLD AUTO: 6.85 K/UL (ref 1.82–7.42)
NEUTROPHILS NFR BLD: 67.8 % (ref 44–72)
NRBC # BLD AUTO: 0 K/UL
NRBC BLD-RTO: 0 /100 WBC
PLATELET # BLD AUTO: 472 K/UL (ref 164–446)
PMV BLD AUTO: 9.9 FL (ref 9–12.9)
POTASSIUM SERPL-SCNC: 3.8 MMOL/L (ref 3.6–5.5)
PROT SERPL-MCNC: 5.7 G/DL (ref 6–8.2)
RBC # BLD AUTO: 4.6 M/UL (ref 4.7–6.1)
SODIUM SERPL-SCNC: 138 MMOL/L (ref 135–145)
WBC # BLD AUTO: 10.1 K/UL (ref 4.8–10.8)

## 2021-10-16 PROCEDURE — 85025 COMPLETE CBC W/AUTO DIFF WBC: CPT

## 2021-10-16 PROCEDURE — 99231 SBSQ HOSP IP/OBS SF/LOW 25: CPT | Performed by: INTERNAL MEDICINE

## 2021-10-16 PROCEDURE — A9270 NON-COVERED ITEM OR SERVICE: HCPCS | Performed by: INTERNAL MEDICINE

## 2021-10-16 PROCEDURE — 36415 COLL VENOUS BLD VENIPUNCTURE: CPT

## 2021-10-16 PROCEDURE — 80053 COMPREHEN METABOLIC PANEL: CPT

## 2021-10-16 PROCEDURE — A9270 NON-COVERED ITEM OR SERVICE: HCPCS | Performed by: PSYCHIATRY & NEUROLOGY

## 2021-10-16 PROCEDURE — 700102 HCHG RX REV CODE 250 W/ 637 OVERRIDE(OP): Performed by: INTERNAL MEDICINE

## 2021-10-16 PROCEDURE — 770001 HCHG ROOM/CARE - MED/SURG/GYN PRIV*

## 2021-10-16 PROCEDURE — 700102 HCHG RX REV CODE 250 W/ 637 OVERRIDE(OP): Performed by: PSYCHIATRY & NEUROLOGY

## 2021-10-16 PROCEDURE — 83735 ASSAY OF MAGNESIUM: CPT

## 2021-10-16 RX ORDER — CHOLECALCIFEROL (VITAMIN D3) 125 MCG
5 CAPSULE ORAL NIGHTLY
Status: DISCONTINUED | OUTPATIENT
Start: 2021-10-16 | End: 2021-10-19 | Stop reason: HOSPADM

## 2021-10-16 RX ADMIN — ATORVASTATIN CALCIUM 80 MG: 80 TABLET, FILM COATED ORAL at 17:21

## 2021-10-16 RX ADMIN — ASPIRIN 81 MG: 81 TABLET, CHEWABLE ORAL at 04:45

## 2021-10-16 RX ADMIN — DOCUSATE SODIUM 50 MG AND SENNOSIDES 8.6 MG 2 TABLET: 8.6; 5 TABLET, FILM COATED ORAL at 04:44

## 2021-10-16 RX ADMIN — DOCUSATE SODIUM 50 MG AND SENNOSIDES 8.6 MG 2 TABLET: 8.6; 5 TABLET, FILM COATED ORAL at 17:21

## 2021-10-16 ASSESSMENT — ENCOUNTER SYMPTOMS
CHILLS: 0
ORTHOPNEA: 0
SPEECH CHANGE: 1
HEMOPTYSIS: 0
COUGH: 0
WEAKNESS: 1
PALPITATIONS: 0
NAUSEA: 0
FOCAL WEAKNESS: 1
SPUTUM PRODUCTION: 0
VOMITING: 0
DIZZINESS: 0

## 2021-10-16 ASSESSMENT — PAIN DESCRIPTION - PAIN TYPE
TYPE: ACUTE PAIN
TYPE: ACUTE PAIN

## 2021-10-16 NOTE — CARE PLAN
The patient is Stable - Low risk of patient condition declining or worsening    Shift Goals  Clinical Goals: monitor HR  Patient Goals: working with PT/OT, regain strength  Family Goals: ronnie    Progress made toward(s) clinical / shift goals:  assisted pt up to chair for meal.  and tele monitoring implemented.    Patient is not progressing towards the following goals:

## 2021-10-16 NOTE — DISCHARGE PLANNING
Pending insurance authorization and bed availability. Would appreciate updated therapy notes for physiatry to review Monday morning.

## 2021-10-16 NOTE — PROGRESS NOTES
Monitor Summary: SB-SR 53-67 HI 0.14 QRS 0.08 QT 0.43, with rare PVCs rare triplets per strip from monitor room.

## 2021-10-16 NOTE — PROGRESS NOTES
Hospital Medicine Daily Progress Note    Date of Service  10/16/2021    Chief Complaint  Fermín Gomez is a 66 y.o. male admitted 10/12/2021 with left-sided weakness    Hospital Course  Mr Gomez has past medical history that includes hereditary severe cytosis, status post splenectomy.  Patient presented to an outside hospital emergency room with a fall and acute onset of left-sided weakness.  Imaging was concerning for right MCA occlusion he was treated with TPA and transferred to [facility] for higher level of care and neuro interventional radiology.  Patient underwent attempted right MCA thrombectomy but M2 partial occlusion could not be recanalized.       Interval Problem Update  Normotensive.  No new complaints.   Working with therapies.   Appreciate physiatry consult, plan for rehab pending insurance auth and bed availability.  Appreciate neurology assistance. Signed off.    I have personally seen and examined the patient at bedside. I discussed the plan of care with patient, family and bedside RN.    Consultants/Specialty  critical care and neurology    Code Status  Full Code    Disposition  Patient is medically cleared.   Anticipate discharge to to an inpatient rehabilitation hospital.  I have placed the appropriate orders for post-discharge needs.    Review of Systems  Review of Systems   Constitutional: Positive for malaise/fatigue. Negative for chills.   Respiratory: Negative for cough, hemoptysis and sputum production.    Cardiovascular: Negative for chest pain, palpitations and orthopnea.   Gastrointestinal: Negative for nausea and vomiting.   Skin: Negative for itching and rash.   Neurological: Positive for speech change, focal weakness and weakness. Negative for dizziness.   All other systems reviewed and are negative.       Physical Exam  Temp:  [35.8 °C (96.4 °F)-36.7 °C (98.1 °F)] 36.4 °C (97.6 °F)  Pulse:  [46-73] 60  Resp:  [16-18] 17  BP: (106-136)/(64-94) 119/65  SpO2:  [90  %-96 %] 96 %    Physical Exam  Constitutional:       General: He is not in acute distress.     Appearance: Normal appearance. He is normal weight.   HENT:      Head: Normocephalic and atraumatic.      Right Ear: External ear normal.      Left Ear: External ear normal.      Nose: Nose normal.      Mouth/Throat:      Mouth: Mucous membranes are moist.      Pharynx: Oropharynx is clear.   Eyes:      Extraocular Movements: Extraocular movements intact.      Conjunctiva/sclera: Conjunctivae normal.      Pupils: Pupils are equal, round, and reactive to light.   Cardiovascular:      Rate and Rhythm: Normal rate and regular rhythm.      Pulses: Normal pulses.   Pulmonary:      Effort: Pulmonary effort is normal.      Breath sounds: Normal breath sounds.   Abdominal:      General: Abdomen is flat. Bowel sounds are normal.      Palpations: Abdomen is soft.   Musculoskeletal:         General: Normal range of motion.      Cervical back: Normal range of motion and neck supple.   Skin:     General: Skin is warm and dry.      Capillary Refill: Capillary refill takes less than 2 seconds.      Coloration: Skin is not jaundiced.   Neurological:      General: No focal deficit present.      Mental Status: He is alert and oriented to person, place, and time.      Cranial Nerves: No cranial nerve deficit.      Motor: Weakness present.      Gait: Gait normal.      Comments: Slurred/slurred speech  Left hemiparesis   Psychiatric:         Mood and Affect: Mood normal.         Behavior: Behavior normal.         Fluids    Intake/Output Summary (Last 24 hours) at 10/16/2021 1501  Last data filed at 10/16/2021 0800  Gross per 24 hour   Intake 640 ml   Output 1350 ml   Net -710 ml       Laboratory  Recent Labs     10/14/21  0320 10/15/21  0201 10/16/21  0123   WBC 17.4* 11.8* 10.1   RBC 4.59* 4.48* 4.60*   HEMOGLOBIN 15.1 15.0 15.1   HEMATOCRIT 43.8 42.9 43.3   MCV 95.4 95.8 94.1   MCH 32.9 33.5* 32.8   MCHC 34.5 35.0 34.9   RDW 46.1 45.2 44.2    PLATELETCT 457* 444 472*   MPV 9.7 10.1 9.9     Recent Labs     10/14/21  0320 10/15/21  0201 10/16/21  0123   SODIUM 139 140 138   POTASSIUM 4.1 3.8 3.8   CHLORIDE 107 106 103   CO2 24 25 27   GLUCOSE 110* 108* 97   BUN 8 9 8   CREATININE 0.59 0.60 0.73   CALCIUM 8.5 8.5 8.8                   Imaging  MR-BRAIN-W/O   Final Result         Acute infarcts in the right MCA territory as described above predominantly involving the frontal lobe. Scattered foci of acute infarction are also noted in the right parietal lobe.      Tiny foci of infarction are also noted in the left frontoparietal region.      The distribution of the infarctions suggests a proximal cardiovascular embolic source.      EC-ECHOCARDIOGRAM COMPLETE W/O CONT   Final Result      CT-HEAD W/O   Final Result         1.  Small focus of subdural hemorrhage along the left side of the falx in the frontal area is again identified with no change.      2.  There is some localized brain swelling and edema in the right basal ganglia which could represent subacute infarcts.      3.  No mass effect or midline shift identified.         DX-CHEST-PORTABLE (1 VIEW)   Final Result      1.  There is no acute cardiopulmonary process.      IR-THROMBO MECHANICAL ARTERY,INIT   Final Result         66-year-old presented with right M1 occlusion at [facility] underwent IV TPA therapy and was transferred to [facility] for emergent mechanical thrombectomy. At the time of initial angiogram, nonocclusive thrombus was    identified in the distal right M1 segment with occlusive thrombus in the inferior division M2 trunk with sluggish antegrade flow into the right inferior division MCA branches.      Multiple attempts at thrombectomy were unsuccessful in recanalizing the clot at the inferior division trunk. The final angiographic images demonstrated persistent inferior division origin thrombus with sluggish antegrade flow into the inferior  division    branches. There is also some flow limitation to the superior division branches due to thrombus encroaching upon the smaller superior division origin. Patient will be recovered in the ICU and the systolic blood pressure will be maintained around 160 mmHg    to encourage coverage leptomeningeal collateralization.      Anamaria BRUNO was physically present and participated during the entire procedure of the IR-THROMBO MECHANICAL ARTERY,INIT.         OUTSIDE IMAGES-DX CHEST   Final Result      OUTSIDE IMAGES-CT HEAD   Final Result      CT-FOREIGN FILM CAT SCAN   Final Result      OUTSIDE IMAGES-CT HEAD   Final Result           Assessment/Plan  * Stroke (cerebrum) (HCC)- (present on admission)  Assessment & Plan  Right MCA stroke, status post thrombolytics and attempted thrombectomy  Continue atorvastatin, start antiplatelet therapy and appropriate interval when approved by neurology  Blood pressure control  PT/OT/speech, acute rehab consult  Continuous hemodynamic monitoring for arrhythmia  Echocardiogram without evidence of left-to-right shunt, normal ejection fraction  Start aspirin  Continuous hemodynamic monitoring for arrhythmia, may benefit from long-term event monitor  Appreciate physiatry consult, plan for rehab pending insurance auth and bed availability.  Appreciate neurology assistance. Signed off.    Subdural hematoma (HCC)- (present on admission)  Assessment & Plan  Starting aspirin  Monitor neuro exam    Left hemiparesis (HCC)- (present on admission)  Assessment & Plan  PT/OT/Speech    Dysphagia- (present on admission)  Assessment & Plan  Speech following, continue modified diet    H/O splenectomy- (present on admission)  Assessment & Plan  History of splenectomy for hereditary spherocytosis    Lymphopenia- (present on admission)  Assessment & Plan  Covid negative, follow CBC       VTE prophylaxis: SCDs/TEDs    I have performed a physical exam and reviewed and updated ROS and Plan today  (10/16/2021). In review of yesterday's note (10/15/2021), there are no changes except as documented above.

## 2021-10-17 LAB
ALBUMIN SERPL BCP-MCNC: 3.8 G/DL (ref 3.2–4.9)
ALBUMIN/GLOB SERPL: 1.6 G/DL
ALP SERPL-CCNC: 75 U/L (ref 30–99)
ALT SERPL-CCNC: 13 U/L (ref 2–50)
ANION GAP SERPL CALC-SCNC: 13 MMOL/L (ref 7–16)
AST SERPL-CCNC: 19 U/L (ref 12–45)
BASOPHILS # BLD AUTO: 1.2 % (ref 0–1.8)
BASOPHILS # BLD: 0.13 K/UL (ref 0–0.12)
BILIRUB SERPL-MCNC: 1.6 MG/DL (ref 0.1–1.5)
BUN SERPL-MCNC: 13 MG/DL (ref 8–22)
CALCIUM SERPL-MCNC: 9.4 MG/DL (ref 8.5–10.5)
CHLORIDE SERPL-SCNC: 102 MMOL/L (ref 96–112)
CO2 SERPL-SCNC: 25 MMOL/L (ref 20–33)
CREAT SERPL-MCNC: 0.68 MG/DL (ref 0.5–1.4)
EOSINOPHIL # BLD AUTO: 0.3 K/UL (ref 0–0.51)
EOSINOPHIL NFR BLD: 2.7 % (ref 0–6.9)
ERYTHROCYTE [DISTWIDTH] IN BLOOD BY AUTOMATED COUNT: 43.2 FL (ref 35.9–50)
GLOBULIN SER CALC-MCNC: 2.4 G/DL (ref 1.9–3.5)
GLUCOSE SERPL-MCNC: 101 MG/DL (ref 65–99)
HCT VFR BLD AUTO: 45.6 % (ref 42–52)
HGB BLD-MCNC: 16.6 G/DL (ref 14–18)
IMM GRANULOCYTES # BLD AUTO: 0.05 K/UL (ref 0–0.11)
IMM GRANULOCYTES NFR BLD AUTO: 0.5 % (ref 0–0.9)
LYMPHOCYTES # BLD AUTO: 1.4 K/UL (ref 1–4.8)
LYMPHOCYTES NFR BLD: 12.8 % (ref 22–41)
MAGNESIUM SERPL-MCNC: 1.9 MG/DL (ref 1.5–2.5)
MCH RBC QN AUTO: 33.7 PG (ref 27–33)
MCHC RBC AUTO-ENTMCNC: 36.4 G/DL (ref 33.7–35.3)
MCV RBC AUTO: 92.5 FL (ref 81.4–97.8)
MONOCYTES # BLD AUTO: 0.99 K/UL (ref 0–0.85)
MONOCYTES NFR BLD AUTO: 9 % (ref 0–13.4)
NEUTROPHILS # BLD AUTO: 8.07 K/UL (ref 1.82–7.42)
NEUTROPHILS NFR BLD: 73.8 % (ref 44–72)
NRBC # BLD AUTO: 0 K/UL
NRBC BLD-RTO: 0 /100 WBC
PLATELET # BLD AUTO: 524 K/UL (ref 164–446)
PMV BLD AUTO: 9.9 FL (ref 9–12.9)
POTASSIUM SERPL-SCNC: 3.8 MMOL/L (ref 3.6–5.5)
PROT SERPL-MCNC: 6.2 G/DL (ref 6–8.2)
RBC # BLD AUTO: 4.93 M/UL (ref 4.7–6.1)
SODIUM SERPL-SCNC: 140 MMOL/L (ref 135–145)
WBC # BLD AUTO: 10.9 K/UL (ref 4.8–10.8)

## 2021-10-17 PROCEDURE — 97116 GAIT TRAINING THERAPY: CPT

## 2021-10-17 PROCEDURE — 97112 NEUROMUSCULAR REEDUCATION: CPT

## 2021-10-17 PROCEDURE — A9270 NON-COVERED ITEM OR SERVICE: HCPCS | Performed by: INTERNAL MEDICINE

## 2021-10-17 PROCEDURE — 80053 COMPREHEN METABOLIC PANEL: CPT

## 2021-10-17 PROCEDURE — 99231 SBSQ HOSP IP/OBS SF/LOW 25: CPT | Performed by: INTERNAL MEDICINE

## 2021-10-17 PROCEDURE — A9270 NON-COVERED ITEM OR SERVICE: HCPCS | Performed by: PSYCHIATRY & NEUROLOGY

## 2021-10-17 PROCEDURE — 700102 HCHG RX REV CODE 250 W/ 637 OVERRIDE(OP): Performed by: INTERNAL MEDICINE

## 2021-10-17 PROCEDURE — 85025 COMPLETE CBC W/AUTO DIFF WBC: CPT

## 2021-10-17 PROCEDURE — 700102 HCHG RX REV CODE 250 W/ 637 OVERRIDE(OP): Performed by: PSYCHIATRY & NEUROLOGY

## 2021-10-17 PROCEDURE — 770020 HCHG ROOM/CARE - TELE (206)

## 2021-10-17 PROCEDURE — 36415 COLL VENOUS BLD VENIPUNCTURE: CPT

## 2021-10-17 PROCEDURE — 83735 ASSAY OF MAGNESIUM: CPT

## 2021-10-17 RX ADMIN — ASPIRIN 81 MG: 81 TABLET, CHEWABLE ORAL at 05:10

## 2021-10-17 RX ADMIN — Medication 5 MG: at 20:58

## 2021-10-17 RX ADMIN — ATORVASTATIN CALCIUM 80 MG: 80 TABLET, FILM COATED ORAL at 17:45

## 2021-10-17 RX ADMIN — DOCUSATE SODIUM 50 MG AND SENNOSIDES 8.6 MG 2 TABLET: 8.6; 5 TABLET, FILM COATED ORAL at 05:10

## 2021-10-17 ASSESSMENT — PAIN DESCRIPTION - PAIN TYPE
TYPE: ACUTE PAIN
TYPE: ACUTE PAIN

## 2021-10-17 ASSESSMENT — ENCOUNTER SYMPTOMS
ORTHOPNEA: 0
DIZZINESS: 0
HEMOPTYSIS: 0
VOMITING: 0
COUGH: 0
PALPITATIONS: 0
SPUTUM PRODUCTION: 0
SPEECH CHANGE: 1
NAUSEA: 0
WEAKNESS: 1
CHILLS: 0
FOCAL WEAKNESS: 1

## 2021-10-17 ASSESSMENT — COGNITIVE AND FUNCTIONAL STATUS - GENERAL
MOBILITY SCORE: 8
MOVING TO AND FROM BED TO CHAIR: UNABLE
MOVING FROM LYING ON BACK TO SITTING ON SIDE OF FLAT BED: UNABLE
STANDING UP FROM CHAIR USING ARMS: A LOT
SUGGESTED CMS G CODE MODIFIER MOBILITY: CM
CLIMB 3 TO 5 STEPS WITH RAILING: TOTAL
TURNING FROM BACK TO SIDE WHILE IN FLAT BAD: UNABLE
WALKING IN HOSPITAL ROOM: A LOT

## 2021-10-17 NOTE — PROGRESS NOTES
MS: rhythm: SB/SR, HR 40's-70's, TN 0.17/QRS0.09/QT0.42 with rare pvc per strip from monitor room    Late entry:  1000: tele monitor reported pt experienced sinus lynne with HR down to 45. Assessed pt, pt was sleeping, denied chest pain, SOB. Notified dr. Cabrera.  acknowledged. Will cont to monitor

## 2021-10-17 NOTE — CARE PLAN
The patient is Stable - Low risk of patient condition declining or worsening    Shift Goals  Clinical Goals: up for mealsx3  Patient Goals: comfort  Family Goals: ELBERT     Progress made toward(s) clinical / shift goals:     Patient is not progressing towards the following goals:

## 2021-10-17 NOTE — PROGRESS NOTES
Hospital Medicine Daily Progress Note    Date of Service  10/17/2021    Chief Complaint  Fermín Gomez is a 66 y.o. male admitted 10/12/2021 with left-sided weakness    Hospital Course  Mr Gomez has past medical history that includes hereditary severe cytosis, status post splenectomy.  Patient presented to an outside hospital emergency room with a fall and acute onset of left-sided weakness.  Imaging was concerning for right MCA occlusion he was treated with TPA and transferred to [facility] for higher level of care and neuro interventional radiology.  Patient underwent attempted right MCA thrombectomy but M2 partial occlusion could not be recanalized.       Interval Problem Update  No new complaints.    Appreciate physiatry consult, plan for rehab pending insurance auth and bed availability.    I have personally seen and examined the patient at bedside. I discussed the plan of care with patient, family and bedside RN.    Consultants/Specialty  critical care and neurology    Code Status  Full Code    Disposition  Patient is medically cleared.   Anticipate discharge to to an inpatient rehabilitation hospital.  I have placed the appropriate orders for post-discharge needs.    Review of Systems  Review of Systems   Constitutional: Positive for malaise/fatigue. Negative for chills.   Respiratory: Negative for cough, hemoptysis and sputum production.    Cardiovascular: Negative for chest pain, palpitations and orthopnea.   Gastrointestinal: Negative for nausea and vomiting.   Skin: Negative for itching and rash.   Neurological: Positive for speech change, focal weakness and weakness. Negative for dizziness.   All other systems reviewed and are negative.       Physical Exam  Temp:  [36.1 °C (96.9 °F)-36.5 °C (97.7 °F)] 36.4 °C (97.5 °F)  Pulse:  [52-65] 52  Resp:  [16-18] 16  BP: (119-152)/(65-80) 132/74  SpO2:  [91 %-96 %] 95 %    Physical Exam  Constitutional:       General: He is not in acute  distress.     Appearance: Normal appearance. He is normal weight.   HENT:      Head: Normocephalic and atraumatic.      Right Ear: External ear normal.      Left Ear: External ear normal.      Nose: Nose normal.      Mouth/Throat:      Mouth: Mucous membranes are moist.      Pharynx: Oropharynx is clear.   Eyes:      Extraocular Movements: Extraocular movements intact.      Conjunctiva/sclera: Conjunctivae normal.      Pupils: Pupils are equal, round, and reactive to light.   Cardiovascular:      Rate and Rhythm: Normal rate and regular rhythm.      Pulses: Normal pulses.   Pulmonary:      Effort: Pulmonary effort is normal.      Breath sounds: Normal breath sounds.   Abdominal:      General: Abdomen is flat. Bowel sounds are normal.      Palpations: Abdomen is soft.   Musculoskeletal:         General: Normal range of motion.      Cervical back: Normal range of motion and neck supple.   Skin:     General: Skin is warm and dry.      Capillary Refill: Capillary refill takes less than 2 seconds.      Coloration: Skin is not jaundiced.   Neurological:      General: No focal deficit present.      Mental Status: He is alert and oriented to person, place, and time.      Cranial Nerves: No cranial nerve deficit.      Motor: Weakness present.      Gait: Gait normal.      Comments: Slurred/slurred speech  Left hemiparesis   Psychiatric:         Mood and Affect: Mood normal.         Behavior: Behavior normal.         Fluids    Intake/Output Summary (Last 24 hours) at 10/17/2021 1150  Last data filed at 10/17/2021 0400  Gross per 24 hour   Intake 150 ml   Output 550 ml   Net -400 ml       Laboratory  Recent Labs     10/15/21  0201 10/16/21  0123 10/17/21  0223   WBC 11.8* 10.1 10.9*   RBC 4.48* 4.60* 4.93   HEMOGLOBIN 15.0 15.1 16.6   HEMATOCRIT 42.9 43.3 45.6   MCV 95.8 94.1 92.5   MCH 33.5* 32.8 33.7*   MCHC 35.0 34.9 36.4*   RDW 45.2 44.2 43.2   PLATELETCT 444 472* 524*   MPV 10.1 9.9 9.9     Recent Labs     10/15/21  0201  10/16/21  0123 10/17/21  0223   SODIUM 140 138 140   POTASSIUM 3.8 3.8 3.8   CHLORIDE 106 103 102   CO2 25 27 25   GLUCOSE 108* 97 101*   BUN 9 8 13   CREATININE 0.60 0.73 0.68   CALCIUM 8.5 8.8 9.4                   Imaging  MR-BRAIN-W/O   Final Result         Acute infarcts in the right MCA territory as described above predominantly involving the frontal lobe. Scattered foci of acute infarction are also noted in the right parietal lobe.      Tiny foci of infarction are also noted in the left frontoparietal region.      The distribution of the infarctions suggests a proximal cardiovascular embolic source.      EC-ECHOCARDIOGRAM COMPLETE W/O CONT   Final Result      CT-HEAD W/O   Final Result         1.  Small focus of subdural hemorrhage along the left side of the falx in the frontal area is again identified with no change.      2.  There is some localized brain swelling and edema in the right basal ganglia which could represent subacute infarcts.      3.  No mass effect or midline shift identified.         DX-CHEST-PORTABLE (1 VIEW)   Final Result      1.  There is no acute cardiopulmonary process.      IR-THROMBO MECHANICAL ARTERY,INIT   Final Result         66-year-old presented with right M1 occlusion at [facility] underwent IV TPA therapy and was transferred to [facility] for emergent mechanical thrombectomy. At the time of initial angiogram, nonocclusive thrombus was    identified in the distal right M1 segment with occlusive thrombus in the inferior division M2 trunk with sluggish antegrade flow into the right inferior division MCA branches.      Multiple attempts at thrombectomy were unsuccessful in recanalizing the clot at the inferior division trunk. The final angiographic images demonstrated persistent inferior division origin thrombus with sluggish antegrade flow into the inferior division    branches. There is also some flow limitation to the superior division  branches due to thrombus encroaching upon the smaller superior division origin. Patient will be recovered in the ICU and the systolic blood pressure will be maintained around 160 mmHg    to encourage coverage leptomeningeal collateralization.      IAnamaria was physically present and participated during the entire procedure of the IR-THROMBO MECHANICAL ARTERY,INIT.         OUTSIDE IMAGES-DX CHEST   Final Result      OUTSIDE IMAGES-CT HEAD   Final Result      CT-FOREIGN FILM CAT SCAN   Final Result      OUTSIDE IMAGES-CT HEAD   Final Result           Assessment/Plan  * Stroke (cerebrum) (HCC)- (present on admission)  Assessment & Plan  Right MCA stroke, status post thrombolytics and attempted thrombectomy  Continue atorvastatin, start antiplatelet therapy and appropriate interval when approved by neurology  Blood pressure control  PT/OT/speech, acute rehab consult  Continuous hemodynamic monitoring for arrhythmia  Echocardiogram without evidence of left-to-right shunt, normal ejection fraction  Start aspirin  Continuous hemodynamic monitoring for arrhythmia, may benefit from long-term event monitor  Appreciate physiatry consult, plan for rehab pending insurance auth and bed availability.  Appreciate neurology assistance. Signed off.    Subdural hematoma (HCC)- (present on admission)  Assessment & Plan  Starting aspirin  Monitor neuro exam    Left hemiparesis (HCC)- (present on admission)  Assessment & Plan  PT/OT/Speech    Dysphagia- (present on admission)  Assessment & Plan  Speech following, continue modified diet    H/O splenectomy- (present on admission)  Assessment & Plan  History of splenectomy for hereditary spherocytosis    Lymphopenia- (present on admission)  Assessment & Plan  Covid negative, follow CBC       VTE prophylaxis: SCDs/TEDs    I have performed a physical exam and reviewed and updated ROS and Plan today (10/17/2021). In review of yesterday's note (10/16/2021), there are no changes  except as documented above.

## 2021-10-17 NOTE — THERAPY
Physical Therapy   Daily Treatment     Patient Name: Fermín Gomez  Age:  66 y.o., Sex:  male  Medical Record #: [id number]  Today's Date: 10/17/2021     Precautions  Precautions: Fall Risk;Swallow Precautions ( See Comments)  Comments: L sided deficits    Assessment    Patient demonstrated improved balance, coordination, and functional use of L extremities this session. He continues to be limited by impaired motor function, specifically in LUE, but is motivated to progress and was agreeable to therapy. Provided education regarding neurological recovery and patient verbalized understanding. He initially required min A to maintain upright sitting balance but progressed during session to intermittent CGA and demonstrated ability to initiate return to midline. In standing he required facilitation of hip and trunk extension, weight shifting to RLE, and knee extension in LLE but he was able to weight shift and perform side steps up EOB with mod A. Will continue to follow, increasing frequency to 5x/wk. Recommend patient mobilize to chair 3x/day at meal times with RN staff.    Plan    Treatment plan modified to 5 times per week until therapy goals are met for the following treatments:  Bed Mobility, Community Re-integration, Equipment, Gait Training, Manual Therapy, Neuro Re-Education / Balance, Self Care/Home Evaluation, Stair Training, Therapeutic Activities and Therapeutic Exercises.    DC Equipment Recommendations: Unable to determine at this time  Discharge Recommendations: Recommend post-acute placement for additional physical therapy services prior to discharge home     Objective       10/17/21 1310   Total Time Spent   Total Time Spent (Mins) 40   Charge Group   Charges  Yes   PT Gait Training 1   PT Neuromuscular Re-Education / Balance 2   Precautions   Precautions Fall Risk;Swallow Precautions ( See Comments)   Comments L sided deficits   Vitals   O2 (LPM) 2   O2 Delivery Device Silicone Nasal Cannula   Pain 0 -  10 Group   Therapist Pain Assessment   (no pain complaint during session)   Cognition    Cognition / Consciousness X   Level of Consciousness Alert   Comments slight delay with dysarthria. pleasant, cooperative, motivated   Passive ROM Lower Body   Passive ROM Lower Body WDL   Active ROM Lower Body    Active ROM Lower Body  WDL   Comments increased time and effort with LLE   Strength Lower Body   Lower Body Strength  X   Comments RLE 5/5, LLE 4/5   Sensation Lower Body   Lower Extremity Sensation   Not Tested   Lower Body Muscle Tone   Lower Body Muscle Tone  WDL   Neuro-Muscular Treatments   Neuro-Muscular Treatments Anterior weight shift;Compensatory Strategies;Facilitation;Joint Approximation;Postural Changes;Postural Facilitation;Sequencing;Tactile Cuing;Tapping;Verbal Cuing;Weight Shift Right;Weight Shift Left   Neurological Concerns   Neurological Concerns Yes   Comments given medical dx and presentation   Balance   Sitting Balance (Static) Fair -   Sitting Balance (Dynamic) Poor +   Standing Balance (Static) Poor -   Standing Balance (Dynamic) Trace +   Weight Shift Sitting Fair   Weight Shift Standing Poor   Skilled Intervention Compensatory Strategies;Facilitation;Postural Facilitation;Sequencing;Tactile Cuing;Verbal Cuing   Comments L lateral lean in sitting and standing, able to initiate correction to midline but difficulty maintaining   Gait Analysis   Gait Level Of Assist   (deferred this session, pregait at EOB only)   Weight Bearing Status no restrictions   Vision Deficits Impacting Mobility NT   Bed Mobility    Supine to Sit Minimal Assist   Sit to Supine   (NT, left in chair)   Scooting Moderate Assist  (seated)   Skilled Intervention Compensatory Strategies;Facilitation;Sequencing;Tactile Cuing;Verbal Cuing   Functional Mobility   Sit to Stand Minimal Assist   Bed, Chair, Wheelchair Transfer Moderate Assist   Transfer Method Stand Pivot   How much difficulty does the patient currently have...    Turning over in bed (including adjusting bedclothes, sheets and blankets)? 1   Sitting down on and standing up from a chair with arms (e.g., wheelchair, bedside commode, etc.) 1   Moving from lying on back to sitting on the side of the bed? 1   How much help from another person does the patient currently need...   Moving to and from a bed to a chair (including a wheelchair)? 2   Need to walk in a hospital room? 2   Climbing 3-5 steps with a railing? 1   6 clicks Mobility Score 8   Activity Tolerance   Sitting in Chair left in chair   Sitting Edge of Bed 25 min   Standing 10 min   Comments no overt pain, fatigue, SOB, dizziness   Patient / Family Goals    Patient / Family Goal #1 home   Short Term Goals    Short Term Goal # 1 Pt to move supine to/from eob w/ spv in 6 visits to improve fxl indep   Goal Outcome # 1 goal not met   Short Term Goal # 2 Pt to move sit to/from stand w/ spv in 6 visits to improve fxl indep   Goal Outcome # 2 Goal not met   Short Term Goal # 3 Pt to ambulate w/ appropriate  ft w/ spv in 6 visits to improve fxl indep   Goal Outcome # 3 Goal not met   Education Group   Additional Comments edu re neurological recovery   Anticipated Discharge Equipment and Recommendations   DC Equipment Recommendations Unable to determine at this time   Discharge Recommendations Recommend post-acute placement for additional physical therapy services prior to discharge home   Interdisciplinary Plan of Care Collaboration   IDT Collaboration with  Nursing   Patient Position at End of Therapy Seated;Chair Alarm On;Call Light within Reach;Tray Table within Reach;Phone within Reach   Collaboration Comments RN aware of visit, response   Session Information   Date / Session Number  10/17-2 (2/5, 10/20)   Priority   (CVA)

## 2021-10-17 NOTE — PROGRESS NOTES
Monitor Summary: SR/SB 55 - 64, HI .19, QRS .09, QT .40, with no ectopy per strip from monitor room.

## 2021-10-18 LAB
ALBUMIN SERPL BCP-MCNC: 3.6 G/DL (ref 3.2–4.9)
ALBUMIN/GLOB SERPL: 1.6 G/DL
ALP SERPL-CCNC: 76 U/L (ref 30–99)
ALT SERPL-CCNC: 14 U/L (ref 2–50)
ANION GAP SERPL CALC-SCNC: 8 MMOL/L (ref 7–16)
AST SERPL-CCNC: 21 U/L (ref 12–45)
BASOPHILS # BLD AUTO: 1.5 % (ref 0–1.8)
BASOPHILS # BLD: 0.13 K/UL (ref 0–0.12)
BILIRUB SERPL-MCNC: 1.7 MG/DL (ref 0.1–1.5)
BUN SERPL-MCNC: 15 MG/DL (ref 8–22)
CALCIUM SERPL-MCNC: 8.9 MG/DL (ref 8.5–10.5)
CHLORIDE SERPL-SCNC: 105 MMOL/L (ref 96–112)
CO2 SERPL-SCNC: 26 MMOL/L (ref 20–33)
CREAT SERPL-MCNC: 0.75 MG/DL (ref 0.5–1.4)
EOSINOPHIL # BLD AUTO: 0.38 K/UL (ref 0–0.51)
EOSINOPHIL NFR BLD: 4.3 % (ref 0–6.9)
ERYTHROCYTE [DISTWIDTH] IN BLOOD BY AUTOMATED COUNT: 43.8 FL (ref 35.9–50)
GLOBULIN SER CALC-MCNC: 2.3 G/DL (ref 1.9–3.5)
GLUCOSE SERPL-MCNC: 110 MG/DL (ref 65–99)
HCT VFR BLD AUTO: 45.9 % (ref 42–52)
HGB BLD-MCNC: 16.2 G/DL (ref 14–18)
IMM GRANULOCYTES # BLD AUTO: 0.04 K/UL (ref 0–0.11)
IMM GRANULOCYTES NFR BLD AUTO: 0.5 % (ref 0–0.9)
LYMPHOCYTES # BLD AUTO: 1.62 K/UL (ref 1–4.8)
LYMPHOCYTES NFR BLD: 18.5 % (ref 22–41)
MAGNESIUM SERPL-MCNC: 1.9 MG/DL (ref 1.5–2.5)
MCH RBC QN AUTO: 32.9 PG (ref 27–33)
MCHC RBC AUTO-ENTMCNC: 35.3 G/DL (ref 33.7–35.3)
MCV RBC AUTO: 93.3 FL (ref 81.4–97.8)
MONOCYTES # BLD AUTO: 0.9 K/UL (ref 0–0.85)
MONOCYTES NFR BLD AUTO: 10.3 % (ref 0–13.4)
NEUTROPHILS # BLD AUTO: 5.7 K/UL (ref 1.82–7.42)
NEUTROPHILS NFR BLD: 64.9 % (ref 44–72)
NRBC # BLD AUTO: 0 K/UL
NRBC BLD-RTO: 0 /100 WBC
PLATELET # BLD AUTO: 523 K/UL (ref 164–446)
PMV BLD AUTO: 9.9 FL (ref 9–12.9)
POTASSIUM SERPL-SCNC: 4 MMOL/L (ref 3.6–5.5)
PROT SERPL-MCNC: 5.9 G/DL (ref 6–8.2)
RBC # BLD AUTO: 4.92 M/UL (ref 4.7–6.1)
SODIUM SERPL-SCNC: 139 MMOL/L (ref 135–145)
WBC # BLD AUTO: 8.8 K/UL (ref 4.8–10.8)

## 2021-10-18 PROCEDURE — 80053 COMPREHEN METABOLIC PANEL: CPT

## 2021-10-18 PROCEDURE — 36415 COLL VENOUS BLD VENIPUNCTURE: CPT

## 2021-10-18 PROCEDURE — 92523 SPEECH SOUND LANG COMPREHEN: CPT

## 2021-10-18 PROCEDURE — A9270 NON-COVERED ITEM OR SERVICE: HCPCS | Performed by: PSYCHIATRY & NEUROLOGY

## 2021-10-18 PROCEDURE — 700102 HCHG RX REV CODE 250 W/ 637 OVERRIDE(OP): Performed by: INTERNAL MEDICINE

## 2021-10-18 PROCEDURE — 97535 SELF CARE MNGMENT TRAINING: CPT | Mod: CO

## 2021-10-18 PROCEDURE — A9270 NON-COVERED ITEM OR SERVICE: HCPCS | Performed by: INTERNAL MEDICINE

## 2021-10-18 PROCEDURE — 97112 NEUROMUSCULAR REEDUCATION: CPT | Mod: CO

## 2021-10-18 PROCEDURE — 770020 HCHG ROOM/CARE - TELE (206)

## 2021-10-18 PROCEDURE — 99231 SBSQ HOSP IP/OBS SF/LOW 25: CPT | Performed by: INTERNAL MEDICINE

## 2021-10-18 PROCEDURE — 83735 ASSAY OF MAGNESIUM: CPT

## 2021-10-18 PROCEDURE — 85025 COMPLETE CBC W/AUTO DIFF WBC: CPT

## 2021-10-18 PROCEDURE — 700102 HCHG RX REV CODE 250 W/ 637 OVERRIDE(OP): Performed by: PSYCHIATRY & NEUROLOGY

## 2021-10-18 RX ADMIN — Medication 5 MG: at 21:06

## 2021-10-18 RX ADMIN — ASPIRIN 81 MG: 81 TABLET, CHEWABLE ORAL at 04:10

## 2021-10-18 RX ADMIN — ATORVASTATIN CALCIUM 80 MG: 80 TABLET, FILM COATED ORAL at 16:53

## 2021-10-18 ASSESSMENT — PAIN DESCRIPTION - PAIN TYPE: TYPE: ACUTE PAIN

## 2021-10-18 ASSESSMENT — COGNITIVE AND FUNCTIONAL STATUS - GENERAL
HELP NEEDED FOR BATHING: A LOT
DRESSING REGULAR LOWER BODY CLOTHING: A LOT
DAILY ACTIVITIY SCORE: 12
SUGGESTED CMS G CODE MODIFIER DAILY ACTIVITY: CL
DRESSING REGULAR UPPER BODY CLOTHING: A LOT
TOILETING: A LOT
PERSONAL GROOMING: A LOT
EATING MEALS: A LOT

## 2021-10-18 ASSESSMENT — ENCOUNTER SYMPTOMS
HEMOPTYSIS: 0
WEAKNESS: 1
NAUSEA: 0
COUGH: 0
SPUTUM PRODUCTION: 0
VOMITING: 0
ORTHOPNEA: 0
DIZZINESS: 0
CHILLS: 0
SPEECH CHANGE: 1
PALPITATIONS: 0
FOCAL WEAKNESS: 1

## 2021-10-18 NOTE — PROGRESS NOTES
Monitor summary: SB/SR 49-66, CT 0.14, QRS 0.10, QT 0.45, with rare PVCs, PACs and bigeminy per strip from monitor room.

## 2021-10-18 NOTE — DISCHARGE PLANNING
Insurance remains pending.  MSG left for Berkley with [facility].  Would appreciate an updated OT eval once appropriate.  Msg placed to Dr. Cabrera-verifying medical clearance.  Michelle  is aware.     0816-Fermín remains medically cleared per Dr. Cabrera.     0825-Berkley with [facility] has authorized. Spoke with Aye, spouse regarding [facility] and D/C resources/support.  She is agreeable with an admission.  They live in a 1LV condo with [facility] to enter.  Aye is retired and able to provide 24/7 supervision/min assist. They have 3 adult kids that live just minutes away and are very supportive as well.  Hiring Caregivers is an option as well if needed.      1027-Unfortunately, I do not have a bed available today.  I do apologize for the delay.  Dr. Cabrera is aware.  Msg placed to Michelle GARVEY.

## 2021-10-18 NOTE — PROGRESS NOTES
COVID 19 surge in effect.    0400: Pt BP 99/56, per [place] (on call hospitalist) give 500mL LR fluid bolus. If SBP remains under 100, bolus another 500 mL of LR.     0445: BP after bolus 120/64

## 2021-10-18 NOTE — THERAPY
Speech Language Pathology   Initial Cognitive-Linguistic Assessment     Patient Name: Fermín Gomez  AGE:  66 y.o., SEX:  male  Medical Record #: [id number]  Today's Date: 10/18/2021     Precautions  Precautions: Fall Risk, Swallow Precautions ( See Comments)  Comments: L sided deficits    Assessment    Patient is 66 y.o. male admitted with L side deficits.  He was out hiking, slipped and fell on ice.  Someone else hiking called for help. On arrival at [place] deemed to be a TPA candidate, which he received then transferred to [facility] for thrombectomy. R M2 was partially occluded, unable to recanalize, TICI 2A flow.  Head CT: 1.  Small focus of subdural hemorrhage along the left side of the falx in the frontal area is again identified with no change.  2. There is some localized brain swelling and edema in the right basal ganglia which could represent subacute infarcts.    The Cognistat was administered in whole, which assesses functioning in the following cognitive domains: LOC, Orientation, Attention, Language (Comprehension, Repetition and Naming), Visual Construction, Memory, Calculations and Reasoning (Similarities and Judgement).  Pt demonstrated mild deficits with short term memory and visual construction.  He was WNL for all other domains on the cognistat.      In addition to Cognistat, pt was given non-standardized assessments of functional reading and writing,  integrity of gnosis, temporal relationships and Right-Left Orientation.  He was WNL for all these domains as well.  Pt continues to have mild dysarthria and needs extra time to process in conversation.  Although pt is doing well overall from a cognitive perspective, SLP will continue to follow to address stated deficits and for further assessment of higher level cognitive functioning.      Plan    Recommend Speech Therapy 2 times per week until therapy goals are met for the following treatments:  Cognitive-Linguistic Training and Patient / Family /  Caregiver Education.  SLP following more often to address dysphagia.      Discharge Recommendations: Recommend post-acute placement for additional speech therapy services prior to discharge home       Objective     10/18/21 1432   Prior Living Situation   Prior Services Home-Independent   Housing / Facility 1 Story Apartment / Condo   Lives with - Patient's Self Care Capacity Spouse   Comments Spouse can assist as needed   Prior Level Of Function   Communication Within Functional Limits   Swallow Within Functional Limits   Dentition Intact   Dentures None   Hearing Within Functional Limits for Evaluation   Hearing Aid None   Vision Within Functional Limits for Evaluation  (Pt reports wears glasses as needed)   Patient's Primary Language English   Occupation (Pre-Hospital Vocational) Retired Due To Age   Verbal Expression   Verbal Expression / Aphasia Eval (WDL) X   Verbal Output Conversation Supervision (5)   Dysarthria Minimal (4)   Comments Pt very functional, mild dysarthria and needs extra time    Auditory Comprehension   Auditory Comprehension (WDL) WDL   Reading Comprehension   Reading Short Paragraphs  Within Functional Limits (6-7)   Written Expression   Formulates: Sentence Within Functional Limits (6-7)   Dominant Hand Right   Cognitive-Linguistic   Cognitive-Linguistic (WDL) X   Level of Consciousness Alert   Orientation Level Oriented x 4   Sustained Attention Within Functional Limits (6-7)   Short Term Memory Minimal (4)   Immediate Memory Within Functional Limits (6-7)   Simple Reasoning / Problem Solving Within Functional Limits (6-7)   Complex Reasoning  / Problem Solving Within Functional Limits (6-7)   Greer Reasoning Within Functional Limits (6-7)   Abstract Reasoning Supervision (5)   Auditory Math Within Functional Limits (6-7)   OTHER   Visual Short Term Memory Minimal (4)

## 2021-10-18 NOTE — THERAPY
Occupational Therapy  Daily Treatment     Patient Name: Fermín Gomez  Age:  66 y.o., Sex:  male  Medical Record #: [id number]  Today's Date: 10/18/2021       Precautions: Fall Risk, Swallow Precautions ( See Comments)  Comments: L side deficits     Assessment    Pt seen for OT tx. Continues to be limited by decreased activity tolerance, balance deficits, inattention and LUE weakness impacting ability to complete ADLs and ADL transfers independently. Min A supine > sit, Min A sit > stand, min A txf from EOB > chair. LUE continues to be grossly weak, proximally weaker than distally. Subluxation in L shoulder, educated about keeping pillow under LUE for joint protection. Will continue to benefit from OT service while in house.     Plan    Continue current treatment plan.    DC Equipment Recommendations: Unable to determine at this time  Discharge Recommendations: Recommend post-acute placement for additional occupational therapy services prior to discharge home       10/18/21 6013   Cognition    Cognition / Consciousness X   Comments pleasant and cooperative, dysarthric    Active ROM Upper Body   Active ROM Upper Body  X   Comments LUE limited by weakness, unable to complete full end ROM but able to initiate ROM on all planes but unable to sustain it d/t weakness    Strength Upper Body   Upper Body Strength  X   Comments initiating elbow flexion/extension, wrist extension/flexion, weak grasp and weak proximally    Balance   Sitting Balance (Static) Fair -   Sitting Balance (Dynamic) Fair -   Standing Balance (Static) Poor +   Standing Balance (Dynamic) Poor   Weight Shift Sitting Fair   Weight Shift Standing Poor   Bed Mobility    Supine to Sit Minimal Assist   Sit to Supine   (left up in chair )   Scooting Minimal Assist   Activities of Daily Living   Grooming Minimal Assist;Seated   Upper Body Dressing Moderate Assist   Lower Body Dressing Maximal Assist   Toileting Maximal Assist   Functional Mobility   Sit to Stand  Minimal Assist   Bed, Chair, Wheelchair Transfer Minimal Assist   Toilet Transfers Minimal Assist   Short Term Goals   Short Term Goal # 1 Pt will complete ADL transfers with supervision   Goal Outcome # 1 Progressing as expected   Short Term Goal # 2 Pt will complete LB dressing with Thelma   Goal Outcome # 2 Progressing as expected   Short Term Goal # 3 Pt will complete toileting with Thelma   Goal Outcome # 3 Progressing as expected   Anticipated Discharge Equipment and Recommendations   DC Equipment Recommendations Unable to determine at this time   Discharge Recommendations Recommend post-acute placement for additional occupational therapy services prior to discharge home

## 2021-10-18 NOTE — DISCHARGE PLANNING
Anticipated Discharge Disposition[facility]    Action: LSW received request for OT evel. OT eval in. Insurance auth has been received and [facility] notified patient's spouse. Anticipate admission to rehab. LSW awaiting dc time from [facility].    Barriers to Discharge: transport    Plan: LSW to f/u with [facility] on transport time.     1035: LSW notified by [place] with rehab that no bed available for patient today. LSW to f/u tomorrow on bed availability.

## 2021-10-18 NOTE — PREADMISSION SCREENING NOTE
Pre-Admission Screening Form    Patient Information:   Name: Fermín Gomez     MRN: [id number]       : 1955      Age: 66 y.o.   Gender: male      Race: White [7]       Marital Status:  [2]  Family Contact: Aye Gomez Andrea        Relationship: Spouse [17]  Daughter [2]  Home Phone:              Cell Phone: [phone]  Advanced Directives: None  Code Status:  FULL  Current Attending Provider: Parvin Cabrera M.D.  Referring Physician: Dr. Garza  Physiatrist Consult: Dr. Herrera   Referral Date: 10-13-21  Primary Payor Source:  [facility]  Secondary Payor Source:      Medical Information:   Date of Admission to Acute Care Setting:10/12/2021  Room Number: S183/02  Rehabilitation Diagnosis: 0001.1 - Stroke: Left Body Involvement (Right Brain)    There is no immunization history on file for this patient.  Allergies   Allergen Reactions   • Penicillins      Past Medical History:   Diagnosis Date   • Spherocytosis (HCC)      Past Surgical History:   Procedure Laterality Date   • SPLENECTOMY         History Leading to Admission, Conditions that Caused the Need for Rehab (CMS):     Dr. Garza (Critical Care) consultation:  Reason for Consultation  Stroke     History of Presenting Illness  66 y.o. male previously healthy who presented 10/12/2021 with L side deficits.  He was out hiking, slipped and fell on ice.  Someone else hiking called for help. L sided deficits.  On arrival at [facility] deemed to be a TPA candidate, which he received then transferred to [facility] for thrombectomy. R M2 was partially occluded, unable to recanalize, TICI 2A flow. Goals SBP per    Assessment/Plan  * Stroke (cerebrum) ([facility])  Assessment & Plan  Onset of symptoms while hiking  TPA at [facility] prior to transfer  Rt MCA thrombectomy: Dominant inferior division Rt M2 partial occlusion, unable to be recanalized. TICI 2A  Goal   Statin  Anti-PLT after imaging tomorrow  PT/OT/SLP  TTE  Neurology,  IR consulting  Q1H neuro checks     H/O splenectomy  Assessment & Plan  For spherocytosis     Lymphopenia  Assessment & Plan  Check COVID-19 PCR  He is vaccinated    Dr. Ureña (Neurology) recommendationb:  Assessment/Plan:   66 y.o. male with no significant past medical history who was evaluated at [facility] for profound left-sided weakness with initial NIH scale score of 12.  Patient was found to be candidate for administration of TPA and received TPA at [place].  Subsequently he underwent CT angiogram of the head and neck which revealed right M1 occlusion.  He was transferred to [facility] and underwent conventional angiogram in order to perform thrombectomy.  Angiogram revealed right M2 occlusion.  Apparently thrombectomy was not successful with TICI 2 a result.  Patient symptom has improved significantly in current NIH scale score is 2 with mild left facial weakness and mild drift of left arm.  Blood pressure goal is systolic less than 160.  He will undergo complete stroke work-up including echocardiogram with bubble study.  Obtain lipid profile and hemoglobin A1c.  Obtain brain MRI with and without contrast and if no evidence of hemorrhagic transformation start aspirin 24-hour after administration of TPA.  If MRI is not done, obtain noncontrast head CT 24-hour after administration of TPA.  He will be evaluated by physical therapy, occupational therapy and speech therapy.  Total critical care time spent was 45 minutes.    Dr. Herrera (Physiatry) recommendations:  ASSESSMENT:  Patient is a 66 y.o. male admitted with left-sided weakness due to right MCA CVA now status post mechanical thrombectomy.     [facility] Code / Diagnosis to Support: 0001.1 - Stroke: Left Body Involvement (Right Brain)     Rehabilitation: Impaired ADLs and mobility  Patient is a good candidate for inpatient rehab based on needs for PT, OT, and speech therapy.  Patient will also benefit from family training for mobility and ADLs.  Patient  has a good discharge situation which will be home with assistance from wife.      Barriers to transfer include: Insurance authorization, TCCs to verify disposition, medical clearance and bed availability      Additional Recommendations:  Right MCA CVA  -Greatest deficits at this time are left-sided weakness, dysphagia, and dysarthira,  - s/p TPA administration at OSH   -Status post mechanical thrombectomy for right M2 partial occlusion which was unable to be recannulized, TICI 2 a  -Patient is on statin for secondary stroke prophylaxis, voiding ASA at this time  -PT/OT evaluations pending     Dysphagia  -SLP initial eval completed, patient cleared for.  Diet with mildly thickened liquids     Disposition  - Patient is currently functioning below is level of baseline, will need post acute rehab  - therapy evals are pending, but ancitipate that patient will be be appropriate for IRF and benefit from 3hrs of therapy 5 days per week  - prior to acceptance to IRF will need therapy evals completed, confirmation of families ability to provide care and insurance authorization      Medical Complexity:  Left sided weakness  Dysphagia  Impaired mobility and ADLs         DVT PPX: SCDs     Dr. Ingram ([facility]) recommendations:  Assessment/Plan  * Stroke (cerebrum) (HCC)- (present on admission)  Assessment & Plan  Right MCA stroke, status post thrombolytics and attempted thrombectomy  Continue atorvastatin, start antiplatelet therapy and appropriate interval when approved by neurology  Continue atorvastatin  Blood pressure control  PT/OT/speech, acute rehab consult  Continuous hemodynamic monitoring for arrhythmia  Echocardiogram ordered and is pending        Left hemiparesis (HCC)- (present on admission)  Assessment & Plan  PT/OT/Speech     Subdural hematoma (HCC)- (present on admission)  Assessment & Plan  Holding antiplatelet therapy for now     Dysphagia- (present on admission)  Assessment & Plan  Speech following,  "continue modified diet     H/O splenectomy- (present on admission)  Assessment & Plan  History of splenectomy for hereditary spherocytosis     Lymphopenia- (present on admission)  Assessment & Plan  Covid negative, follow CBC    Immediate Post- Operative Note           PostOp Diagnosis: Stroke, right MCA occlusion        Procedure(s): Rt MCA thrombectomy. Dominant inferior division Rt M2 partial occlusion, unable to be recanalized. TICI 2A        Estimated Blood Loss: Less than 5 ml        Complications: None           10/12/2021     6:20 PM     Anamaria Price M.D.    Brain MRI 10-13-21:  IMPRESSION:        Acute infarcts in the right MCA territory as described above predominantly involving the frontal lobe. Scattered foci of acute infarction are also noted in the right parietal lobe.     Tiny foci of infarction are also noted in the left frontoparietal region.     The distribution of the infarctions suggests a proximal cardiovascular embolic source.    Co-morbidities: See PMH  Potential Risk - Complications: Aphasia, Cognitive Impairment, Contractures, Deep Vein Thrombosis, Dysphagia, Incontinence, Malnutrition, Pain, Perceptual Impairment, Pneumonia, Pressure Ulcer, Seizures and Urinary Tract Infection  Level of Risk: High    Ongoing Medical Management Needed (Medical/Nursing Needs):   Patient Active Problem List    Diagnosis Date Noted   • Dysphagia 10/13/2021   • Left hemiparesis (HCC) 10/13/2021   • Subdural hematoma (HCC) 10/13/2021   • Stroke (cerebrum) ([facility]) 10/12/2021   • Lymphopenia 10/12/2021   • H/O splenectomy 10/12/2021     Alert with periods of forgetfulness.    Current Vital Signs:   Temperature: 36.6 °C (97.8 °F) Pulse: 60 Respiration: 18 Blood Pressure: 123/61  Weight: 87.8 kg (193 lb 9 oz) Height: 180.3 cm (5' 11\")  Pulse Oximetry: 92 % O2 (LPM): 2      Completed Laboratory Reports:  Recent Labs     10/16/21  0123 10/17/21  0223 10/18/21  0227   WBC 10.1 10.9* 8.8   HEMOGLOBIN 15.1 16.6 16.2 "   HEMATOCRIT 43.3 45.6 45.9   PLATELETCT 472* 524* 523*   SODIUM 138 140 139   POTASSIUM 3.8 3.8 4.0   BUN 8 13 15   CREATININE 0.73 0.68 0.75   ALBUMIN 3.4 3.8 3.6   GLUCOSE 97 101* 110*     Additional Labs: Not Applicable    Prior Living Situation:   Housing / Facility: 1 Story Apartment / Condo  Steps Into Home: 1  Steps In Home: 0  Lives with - Patient's Self Care Capacity: Spouse  Equipment Owned: None    Prior Level of Function / Living Situation:   Physical Therapy: Prior Services: Home-Independent  Housing / Facility: 1 Story Apartment / Condo  Steps Into Home: 1  Steps In Home: 0  Bathroom Set up: Walk In Shower  Equipment Owned: None  Lives with - Patient's Self Care Capacity: Spouse  Bed Mobility: Independent  Transfer Status: Independent  Ambulation: Independent  Assistive Devices Used: None  Stairs: Independent  Current Level of Function:   Gait Level Of Assist:  (deferred this session, pregait at EOB only)  Assistive Device: Hand Held Assist  Distance (Feet): 3  Deviation:  (left lean)  Weight Bearing Status: no restrictions  Supine to Sit: Minimal Assist  Sit to Supine:  (left up in chair )  Scooting: Minimal Assist  Rolling: Moderate Assist to Rt.  Skilled Intervention: Verbal Cuing, Tactile Cuing, Facilitation  Sit to Stand: Minimal Assist  Bed, Chair, Wheelchair Transfer: Minimal Assist  Toilet Transfers: Minimal Assist  Transfer Method: Stand Pivot  Skilled Intervention: Verbal Cuing, Tactile Cuing, Facilitation  Sitting in Chair: left in chair  Sitting Edge of Bed: 25 min  Standing: 10 min  Occupational Therapy:   Self Feeding: Independent  Grooming / Hygiene: Independent  Bathing: Independent  Dressing: Independent  Toileting: Independent  Medication Management: Independent  Laundry: Independent  Kitchen Mobility: Independent  Finances: Independent  Home Management: Independent  Shopping: Independent  Prior Level Of Mobility: Independent Without Device in Community  Driving / Transportation:  Driving Independent  Prior Services: Home-Independent  Housing / Facility: 1 Story Apartment / Condo  Occupation (Pre-Hospital Vocational): Retired Due To Age  Leisure Interests: Exercise  Current Level of Function:   Upper Body Dressing: Moderate Assist  Lower Body Dressing: Maximal Assist  Toileting: Maximal Assist  Skilled Intervention: Verbal Cuing, Tactile Cuing, Facilitation  Speech Language Pathology:   Problem List: Cognitive-Linguistic Deficits  Diet / Liquid Recommendation: Puree (4), Mildly Thick (2) - ([place] Thick)  Rehabilitation Prognosis/Potential: Good  Estimated Length of Stay: 14-21 days    Nursing:      Crockett in Place    Scope/Intensity of Services Recommended:  Physical Therapy: 1 hr / day  5 days / week. Therapeutic Interventions Required: Maximize Endurance, Mobility, Strength and Safety  Occupational Therapy: 1 hr / day 5 days / week. Therapeutic Interventions Required: Maximize Self Care, ADLs, IADLs and Energy Conservation  Speech & Language Pathology: 1 hr / day 5 days / week. Therapeutic Interventions Required: Maximize Cognition, Swallowing and Safety  Rehabilitation Nursin/7. Therapeutic Interventions Required: Monitor Pain, Skin, Vital Signs, Intake and Output, Labs, Safety, Aspiration Risk and Family Training  Rehabilitation Physician: 3 - 5 days / week. Therapeutic Interventions Required: Medical Management  Respiratory Care: Pulmonary Toileting. Therapeutic Interventions Required: Pulmonary Toileting, O2 Weaning and Aspiration Risk    He requires 24-hour rehabilitation nursing to manage bowel and bladder function, skin care, nutrition and fluid intake, pulmonary hygiene, pain control, safety, medication management and patient/family goals. In addition, rehabilitation nursing will reiterate and reinforce therapy skills and equipment use, including ADLs, as well as provide education to the patient and family. Fermín Gomez is willing to participate in and is able to tolerate the  proposed plan of care.    Rehabilitation Goals and Plan (Expected frequency & duration of treatment in the IRF):   Return to the Community, Minimal Assist Level of Care and Family Able to Provide 24/7 Assistance  Anticipated Date of Rehabilitation Admission: 10-19-21  Patient/Family oriented IRF level of care/facility/plan: Yes  Patient/Family willing to participate in IRF care/facility/plan: Yes  Patient able to tolerate IRF level of care proposed: Yes  Patient has potential to benefit IRF level of care proposed: Yes  Comments: Not Applicable    Special Needs or Precautions - Medical Necessity:  Safety Concerns/Precautions:  Fall Risk / High Risk for Falls, Balance, Cognition and Bed / Chair Alarm  Pain Management  IV Site: Peripheral  Requires Oxygen  Current Medications:    Current Facility-Administered Medications Ordered in Epic   Medication Dose Route Frequency Provider Last Rate Last Admin   • melatonin tablet 5 mg  5 mg Oral Nightly Parvin Cabrera M.D.   5 mg at 10/17/21 2058   • senna-docusate (PERICOLACE or SENOKOT S) 8.6-50 MG per tablet 2 Tablet  2 Tablet Oral BID Parvin Cabrera M.D.   2 Tablet at 10/17/21 0510    And   • polyethylene glycol/lytes (MIRALAX) PACKET 1 Packet  1 Packet Oral QDAY PRN Parvin Cabrera M.D.        And   • magnesium hydroxide (MILK OF MAGNESIA) suspension 30 mL  30 mL Oral QDAY PRN Parvin Cabrera M.D.        And   • bisacodyl (DULCOLAX) suppository 10 mg  10 mg Rectal QDAY PRN Parvin Cabrera M.D.       • aspirin (ASA) chewable tab 81 mg  81 mg Oral DAILY Lisa Ureña M.D.   81 mg at 10/18/21 0410   • acetaminophen (Tylenol) tablet 650 mg  650 mg Oral Q4HRS PRN Josr Santos Jr., D.O.   650 mg at 10/13/21 1032   • atorvastatin (LIPITOR) tablet 80 mg  80 mg Oral Q EVENING Salome Garza M.D.   80 mg at 10/17/21 1745   • hydrALAZINE (APRESOLINE) injection 10 mg  10 mg Intravenous Q2HRS PRN Salome Garza M.D.       • labetalol (NORMODYNE/TRANDATE) injection 10 mg   10 mg Intravenous Q10 MIN PRN Salome Garza M.D.         No current [facility]-ordered outpatient medications on file.     Diet:   DIET ORDERS (From admission to next 24h)     Start     Ordered    10/13/21 1428  Diet Order Diet: Level 4 - Pureed; Liquid level: Level 2 - Mildly Thick  ALL MEALS        Question Answer Comment   Diet: Level 4 - Pureed    Liquid level Level 2 - Mildly Thick        10/13/21 1428                Anticipated Discharge Destination / Patient/Family Goal:  Destination: Home with Assistance Support System: Spouse, Family  and Attendant  Anticipated home health services: OT, PT, SLP, Nursing, Social Work and Aide  Previously used  service/ provider: Not Applicable  Anticipated DME Needs: Oxygen, Walker, Wheelchair, Commode, Hospital Bed and Life Line  Outpatient Services: OT, PT and SLP  Alternative resources to address additional identified needs:     Pre-Screen Completed: 10/18/2021 3:00 PM Ethan Dougherty L.P.N.

## 2021-10-18 NOTE — PROGRESS NOTES
Monitor summary: SR 45-67 (touching down to 44 a few times), MO 0.19, QRS 0.10, QT 0.45, with rare PVCs and triplets, per strip from monitor room.

## 2021-10-19 ENCOUNTER — HOSPITAL ENCOUNTER (INPATIENT)
Facility: REHABILITATION | Age: 66
LOS: 20 days | DRG: 057 | End: 2021-11-08
Attending: PHYSICAL MEDICINE & REHABILITATION | Admitting: PHYSICAL MEDICINE & REHABILITATION
Payer: COMMERCIAL

## 2021-10-19 VITALS
WEIGHT: 193.56 LBS | TEMPERATURE: 97.3 F | HEIGHT: 71 IN | BODY MASS INDEX: 27.1 KG/M2 | SYSTOLIC BLOOD PRESSURE: 113 MMHG | OXYGEN SATURATION: 94 % | RESPIRATION RATE: 18 BRPM | DIASTOLIC BLOOD PRESSURE: 75 MMHG | HEART RATE: 65 BPM

## 2021-10-19 DIAGNOSIS — R33.9 URINARY RETENTION: ICD-10-CM

## 2021-10-19 PROBLEM — K21.00 GASTROESOPHAGEAL REFLUX DISEASE WITH ESOPHAGITIS WITHOUT HEMORRHAGE: Status: ACTIVE | Noted: 2021-10-19

## 2021-10-19 PROBLEM — D72.810 LYMPHOPENIA: Status: RESOLVED | Noted: 2021-10-12 | Resolved: 2021-10-19

## 2021-10-19 PROBLEM — B37.0 ORAL THRUSH: Status: ACTIVE | Noted: 2021-10-19

## 2021-10-19 LAB
ALBUMIN SERPL BCP-MCNC: 3.7 G/DL (ref 3.2–4.9)
ALBUMIN/GLOB SERPL: 1.6 G/DL
ALP SERPL-CCNC: 78 U/L (ref 30–99)
ALT SERPL-CCNC: 15 U/L (ref 2–50)
ANION GAP SERPL CALC-SCNC: 9 MMOL/L (ref 7–16)
AST SERPL-CCNC: 22 U/L (ref 12–45)
BASOPHILS # BLD AUTO: 1.4 % (ref 0–1.8)
BASOPHILS # BLD: 0.15 K/UL (ref 0–0.12)
BILIRUB SERPL-MCNC: 1.8 MG/DL (ref 0.1–1.5)
BUN SERPL-MCNC: 14 MG/DL (ref 8–22)
CALCIUM SERPL-MCNC: 9 MG/DL (ref 8.5–10.5)
CHLORIDE SERPL-SCNC: 103 MMOL/L (ref 96–112)
CO2 SERPL-SCNC: 26 MMOL/L (ref 20–33)
CREAT SERPL-MCNC: 0.68 MG/DL (ref 0.5–1.4)
EOSINOPHIL # BLD AUTO: 0.37 K/UL (ref 0–0.51)
EOSINOPHIL NFR BLD: 3.5 % (ref 0–6.9)
ERYTHROCYTE [DISTWIDTH] IN BLOOD BY AUTOMATED COUNT: 43.8 FL (ref 35.9–50)
GLOBULIN SER CALC-MCNC: 2.3 G/DL (ref 1.9–3.5)
GLUCOSE SERPL-MCNC: 103 MG/DL (ref 65–99)
HCT VFR BLD AUTO: 45.9 % (ref 42–52)
HGB BLD-MCNC: 16.2 G/DL (ref 14–18)
IMM GRANULOCYTES # BLD AUTO: 0.07 K/UL (ref 0–0.11)
IMM GRANULOCYTES NFR BLD AUTO: 0.7 % (ref 0–0.9)
LYMPHOCYTES # BLD AUTO: 1.4 K/UL (ref 1–4.8)
LYMPHOCYTES NFR BLD: 13.1 % (ref 22–41)
MAGNESIUM SERPL-MCNC: 1.9 MG/DL (ref 1.5–2.5)
MCH RBC QN AUTO: 32.9 PG (ref 27–33)
MCHC RBC AUTO-ENTMCNC: 35.3 G/DL (ref 33.7–35.3)
MCV RBC AUTO: 93.3 FL (ref 81.4–97.8)
MONOCYTES # BLD AUTO: 1.06 K/UL (ref 0–0.85)
MONOCYTES NFR BLD AUTO: 9.9 % (ref 0–13.4)
NEUTROPHILS # BLD AUTO: 7.67 K/UL (ref 1.82–7.42)
NEUTROPHILS NFR BLD: 71.4 % (ref 44–72)
NRBC # BLD AUTO: 0 K/UL
NRBC BLD-RTO: 0 /100 WBC
PLATELET # BLD AUTO: 529 K/UL (ref 164–446)
PMV BLD AUTO: 9.9 FL (ref 9–12.9)
POTASSIUM SERPL-SCNC: 3.8 MMOL/L (ref 3.6–5.5)
PROT SERPL-MCNC: 6 G/DL (ref 6–8.2)
RBC # BLD AUTO: 4.92 M/UL (ref 4.7–6.1)
SODIUM SERPL-SCNC: 138 MMOL/L (ref 135–145)
WBC # BLD AUTO: 10.7 K/UL (ref 4.8–10.8)

## 2021-10-19 PROCEDURE — U0005 INFEC AGEN DETEC AMPLI PROBE: HCPCS

## 2021-10-19 PROCEDURE — 85025 COMPLETE CBC W/AUTO DIFF WBC: CPT

## 2021-10-19 PROCEDURE — 36415 COLL VENOUS BLD VENIPUNCTURE: CPT

## 2021-10-19 PROCEDURE — 700102 HCHG RX REV CODE 250 W/ 637 OVERRIDE(OP): Performed by: PSYCHIATRY & NEUROLOGY

## 2021-10-19 PROCEDURE — 700102 HCHG RX REV CODE 250 W/ 637 OVERRIDE(OP): Performed by: PHYSICAL MEDICINE & REHABILITATION

## 2021-10-19 PROCEDURE — 90686 IIV4 VACC NO PRSV 0.5 ML IM: CPT | Performed by: INTERNAL MEDICINE

## 2021-10-19 PROCEDURE — U0003 INFECTIOUS AGENT DETECTION BY NUCLEIC ACID (DNA OR RNA); SEVERE ACUTE RESPIRATORY SYNDROME CORONAVIRUS 2 (SARS-COV-2) (CORONAVIRUS DISEASE [COVID-19]), AMPLIFIED PROBE TECHNIQUE, MAKING USE OF HIGH THROUGHPUT TECHNOLOGIES AS DESCRIBED BY CMS-2020-01-R: HCPCS

## 2021-10-19 PROCEDURE — 83735 ASSAY OF MAGNESIUM: CPT

## 2021-10-19 PROCEDURE — 700111 HCHG RX REV CODE 636 W/ 250 OVERRIDE (IP): Performed by: INTERNAL MEDICINE

## 2021-10-19 PROCEDURE — 99239 HOSP IP/OBS DSCHRG MGMT >30: CPT | Performed by: INTERNAL MEDICINE

## 2021-10-19 PROCEDURE — A9270 NON-COVERED ITEM OR SERVICE: HCPCS | Performed by: PSYCHIATRY & NEUROLOGY

## 2021-10-19 PROCEDURE — 99223 1ST HOSP IP/OBS HIGH 75: CPT | Performed by: PHYSICAL MEDICINE & REHABILITATION

## 2021-10-19 PROCEDURE — 80053 COMPREHEN METABOLIC PANEL: CPT

## 2021-10-19 PROCEDURE — A9270 NON-COVERED ITEM OR SERVICE: HCPCS | Performed by: PHYSICAL MEDICINE & REHABILITATION

## 2021-10-19 PROCEDURE — 94760 N-INVAS EAR/PLS OXIMETRY 1: CPT

## 2021-10-19 PROCEDURE — A9270 NON-COVERED ITEM OR SERVICE: HCPCS | Performed by: INTERNAL MEDICINE

## 2021-10-19 PROCEDURE — 700102 HCHG RX REV CODE 250 W/ 637 OVERRIDE(OP): Performed by: INTERNAL MEDICINE

## 2021-10-19 PROCEDURE — 770010 HCHG ROOM/CARE - REHAB SEMI PRIVAT*

## 2021-10-19 RX ORDER — ONDANSETRON 4 MG/1
4 TABLET, ORALLY DISINTEGRATING ORAL 4 TIMES DAILY PRN
Status: DISCONTINUED | OUTPATIENT
Start: 2021-10-19 | End: 2021-11-08 | Stop reason: HOSPADM

## 2021-10-19 RX ORDER — ATORVASTATIN CALCIUM 40 MG/1
80 TABLET, FILM COATED ORAL EVERY EVENING
Status: DISCONTINUED | OUTPATIENT
Start: 2021-10-19 | End: 2021-11-08 | Stop reason: HOSPADM

## 2021-10-19 RX ORDER — ENEMA 19; 7 G/133ML; G/133ML
1 ENEMA RECTAL
Status: DISCONTINUED | OUTPATIENT
Start: 2021-10-19 | End: 2021-11-08 | Stop reason: HOSPADM

## 2021-10-19 RX ORDER — HYDROXYZINE HYDROCHLORIDE 25 MG/1
50 TABLET, FILM COATED ORAL EVERY 6 HOURS PRN
Status: DISCONTINUED | OUTPATIENT
Start: 2021-10-19 | End: 2021-11-08 | Stop reason: HOSPADM

## 2021-10-19 RX ORDER — ASPIRIN 81 MG/1
81 TABLET, CHEWABLE ORAL DAILY
Status: CANCELLED | OUTPATIENT
Start: 2021-10-20

## 2021-10-19 RX ORDER — POLYETHYLENE GLYCOL 3350 17 G/17G
1 POWDER, FOR SOLUTION ORAL
Status: DISCONTINUED | OUTPATIENT
Start: 2021-10-19 | End: 2021-10-21

## 2021-10-19 RX ORDER — ONDANSETRON 2 MG/ML
4 INJECTION INTRAMUSCULAR; INTRAVENOUS 4 TIMES DAILY PRN
Status: DISCONTINUED | OUTPATIENT
Start: 2021-10-19 | End: 2021-11-08 | Stop reason: HOSPADM

## 2021-10-19 RX ORDER — CHOLECALCIFEROL (VITAMIN D3) 125 MCG
5 CAPSULE ORAL
Qty: 30 TABLET | Status: SHIPPED
Start: 2021-10-19 | End: 2021-11-10

## 2021-10-19 RX ORDER — ASPIRIN 81 MG/1
81 TABLET, CHEWABLE ORAL DAILY
Status: DISCONTINUED | OUTPATIENT
Start: 2021-10-20 | End: 2021-11-08 | Stop reason: HOSPADM

## 2021-10-19 RX ORDER — ALUMINA, MAGNESIA, AND SIMETHICONE 2400; 2400; 240 MG/30ML; MG/30ML; MG/30ML
20 SUSPENSION ORAL
Status: DISCONTINUED | OUTPATIENT
Start: 2021-10-19 | End: 2021-11-08 | Stop reason: HOSPADM

## 2021-10-19 RX ORDER — ACETAMINOPHEN 325 MG/1
650 TABLET ORAL EVERY 4 HOURS PRN
Qty: 30 TABLET | Refills: 0 | Status: ON HOLD
Start: 2021-10-19 | End: 2021-11-08

## 2021-10-19 RX ORDER — BISACODYL 10 MG
10 SUPPOSITORY, RECTAL RECTAL
Refills: 0 | Status: ON HOLD
Start: 2021-10-19 | End: 2021-11-08

## 2021-10-19 RX ORDER — BISACODYL 10 MG
10 SUPPOSITORY, RECTAL RECTAL
Status: DISCONTINUED | OUTPATIENT
Start: 2021-10-19 | End: 2021-10-21

## 2021-10-19 RX ORDER — POLYVINYL ALCOHOL 14 MG/ML
1 SOLUTION/ DROPS OPHTHALMIC PRN
Status: DISCONTINUED | OUTPATIENT
Start: 2021-10-19 | End: 2021-11-08 | Stop reason: HOSPADM

## 2021-10-19 RX ORDER — ECHINACEA PURPUREA EXTRACT 125 MG
2 TABLET ORAL PRN
Status: DISCONTINUED | OUTPATIENT
Start: 2021-10-19 | End: 2021-11-08 | Stop reason: HOSPADM

## 2021-10-19 RX ORDER — HYDRALAZINE HYDROCHLORIDE 10 MG/1
10 TABLET, FILM COATED ORAL EVERY 8 HOURS PRN
Status: DISCONTINUED | OUTPATIENT
Start: 2021-10-19 | End: 2021-11-08 | Stop reason: HOSPADM

## 2021-10-19 RX ORDER — TRAZODONE HYDROCHLORIDE 50 MG/1
50 TABLET ORAL
Status: DISCONTINUED | OUTPATIENT
Start: 2021-10-19 | End: 2021-10-21

## 2021-10-19 RX ORDER — QUETIAPINE FUMARATE 25 MG/1
25 TABLET, FILM COATED ORAL 3 TIMES DAILY PRN
Status: DISCONTINUED | OUTPATIENT
Start: 2021-10-19 | End: 2021-11-08 | Stop reason: HOSPADM

## 2021-10-19 RX ORDER — AMOXICILLIN 250 MG
2 CAPSULE ORAL 2 TIMES DAILY
Qty: 30 TABLET | Refills: 0 | Status: ON HOLD
Start: 2021-10-19 | End: 2021-11-08

## 2021-10-19 RX ORDER — AMOXICILLIN 250 MG
2 CAPSULE ORAL 2 TIMES DAILY
Status: DISCONTINUED | OUTPATIENT
Start: 2021-10-19 | End: 2021-10-21

## 2021-10-19 RX ORDER — ASPIRIN 81 MG/1
81 TABLET, CHEWABLE ORAL DAILY
Qty: 100 TABLET | Status: SHIPPED
Start: 2021-10-20

## 2021-10-19 RX ORDER — LANOLIN ALCOHOL/MO/W.PET/CERES
3 CREAM (GRAM) TOPICAL NIGHTLY PRN
Status: DISCONTINUED | OUTPATIENT
Start: 2021-10-19 | End: 2021-10-21

## 2021-10-19 RX ORDER — ATORVASTATIN CALCIUM 80 MG/1
80 TABLET, FILM COATED ORAL EVERY EVENING
Status: CANCELLED | OUTPATIENT
Start: 2021-10-19

## 2021-10-19 RX ORDER — ATORVASTATIN CALCIUM 80 MG/1
80 TABLET, FILM COATED ORAL EVERY EVENING
Qty: 30 TABLET | Status: SHIPPED
Start: 2021-10-19 | End: 2021-12-01

## 2021-10-19 RX ORDER — BISACODYL 10 MG
10 SUPPOSITORY, RECTAL RECTAL
Status: CANCELLED | OUTPATIENT
Start: 2021-10-19

## 2021-10-19 RX ORDER — POLYETHYLENE GLYCOL 3350 17 G/17G
17 POWDER, FOR SOLUTION ORAL
Refills: 3 | Status: ON HOLD
Start: 2021-10-19 | End: 2021-11-08

## 2021-10-19 RX ORDER — POLYETHYLENE GLYCOL 3350 17 G/17G
1 POWDER, FOR SOLUTION ORAL
Status: CANCELLED | OUTPATIENT
Start: 2021-10-19

## 2021-10-19 RX ORDER — AMOXICILLIN 250 MG
2 CAPSULE ORAL 2 TIMES DAILY
Status: CANCELLED | OUTPATIENT
Start: 2021-10-19

## 2021-10-19 RX ORDER — OMEPRAZOLE 20 MG/1
20 CAPSULE, DELAYED RELEASE ORAL DAILY
Status: DISCONTINUED | OUTPATIENT
Start: 2021-10-19 | End: 2021-10-19

## 2021-10-19 RX ORDER — SIMETHICONE 80 MG
80 TABLET,CHEWABLE ORAL 3 TIMES DAILY PRN
Status: DISCONTINUED | OUTPATIENT
Start: 2021-10-19 | End: 2021-11-08 | Stop reason: HOSPADM

## 2021-10-19 RX ORDER — ACETAMINOPHEN 325 MG/1
650 TABLET ORAL EVERY 4 HOURS PRN
Status: DISCONTINUED | OUTPATIENT
Start: 2021-10-19 | End: 2021-11-08 | Stop reason: HOSPADM

## 2021-10-19 RX ADMIN — INFLUENZA A VIRUS A/VICTORIA/2570/2019 IVR-215 (H1N1) ANTIGEN (FORMALDEHYDE INACTIVATED), INFLUENZA A VIRUS A/TASMANIA/503/2020 IVR-221 (H3N2) ANTIGEN (FORMALDEHYDE INACTIVATED), INFLUENZA B VIRUS B/PHUKET/3073/2013 ANTIGEN (FORMALDEHYDE INACTIVATED), AND INFLUENZA B VIRUS B/WASHINGTON/02/2019 ANTIGEN (FORMALDEHYDE INACTIVATED) 0.5 ML: 15; 15; 15; 15 INJECTION, SUSPENSION INTRAMUSCULAR at 11:39

## 2021-10-19 RX ADMIN — ASPIRIN 81 MG: 81 TABLET, CHEWABLE ORAL at 04:29

## 2021-10-19 RX ADMIN — DOCUSATE SODIUM 50 MG AND SENNOSIDES 8.6 MG 2 TABLET: 8.6; 5 TABLET, FILM COATED ORAL at 04:31

## 2021-10-19 RX ADMIN — DOCUSATE SODIUM 50 MG AND SENNOSIDES 8.6 MG 2 TABLET: 8.6; 5 TABLET, FILM COATED ORAL at 21:43

## 2021-10-19 RX ADMIN — NYSTATIN 500000 UNITS: 100000 SUSPENSION ORAL at 17:55

## 2021-10-19 RX ADMIN — NYSTATIN 500000 UNITS: 100000 SUSPENSION ORAL at 21:43

## 2021-10-19 RX ADMIN — OMEPRAZOLE 20 MG: 20 CAPSULE, DELAYED RELEASE ORAL at 15:07

## 2021-10-19 RX ADMIN — ATORVASTATIN CALCIUM 80 MG: 40 TABLET, FILM COATED ORAL at 21:43

## 2021-10-19 ASSESSMENT — LIFESTYLE VARIABLES
EVER FELT BAD OR GUILTY ABOUT YOUR DRINKING: NO
HOW MANY TIMES IN THE PAST YEAR HAVE YOU HAD 5 OR MORE DRINKS IN A DAY: 0
CONSUMPTION TOTAL: NEGATIVE
TOTAL SCORE: 0
ALCOHOL_USE: YES
TOTAL SCORE: 0
AVERAGE NUMBER OF DAYS PER WEEK YOU HAVE A DRINK CONTAINING ALCOHOL: 5
HAVE YOU EVER FELT YOU SHOULD CUT DOWN ON YOUR DRINKING: NO
HAVE PEOPLE ANNOYED YOU BY CRITICIZING YOUR DRINKING: NO
TOTAL SCORE: 0
EVER_SMOKED: NEVER
ON A TYPICAL DAY WHEN YOU DRINK ALCOHOL HOW MANY DRINKS DO YOU HAVE: 1
EVER HAD A DRINK FIRST THING IN THE MORNING TO STEADY YOUR NERVES TO GET RID OF A HANGOVER: NO

## 2021-10-19 ASSESSMENT — PAIN DESCRIPTION - PAIN TYPE: TYPE: ACUTE PAIN

## 2021-10-19 ASSESSMENT — PATIENT HEALTH QUESTIONNAIRE - PHQ9
1. LITTLE INTEREST OR PLEASURE IN DOING THINGS: NOT AT ALL
2. FEELING DOWN, DEPRESSED, IRRITABLE, OR HOPELESS: NOT AT ALL
SUM OF ALL RESPONSES TO PHQ9 QUESTIONS 1 AND 2: 0

## 2021-10-19 ASSESSMENT — FIBROSIS 4 INDEX: FIB4 SCORE: 0.69

## 2021-10-19 NOTE — H&P
"REHABILITATION HISTORY AND PHYSICAL/POST ADMISSION EVALUATION    10/19/2021  3:29 PM  Fermín Gomez  RH21/01  Admission  10/19/2021  1:13 PM  [facility] Code/Reason for admission: 0001.1 - Stroke: Left Body Involvement (Right Brain)   Etiologic diagnosis/problem: Stroke (cerebrum) (HCC)  Chief Complaint: left sided weakness    HPI:  Per Dr. Herrera consult: \"The patient is a 66 y.o. right-handed male with spherocytosis s/p splenectomy, GERD; who presented on 10/12/2021  4:56 PM as a transfer from [facility] after a slip and fall on ice. Reportedly patient had left-sided deficits upon arrival to [place] patient was deemed to be a candidate for TPA based on his imaging, documentation of CT angiogram of the head and neck revealed a right M1 occlusion.  Patient was then transferred to [facility] for higher level of care and thrombectomy.  Patient underwent a right MCA thrombectomy, dominant inferior division of right M2 partial occlusion which was unable to be recannulized, TICI 2A.  Per documentation, patient had a significant improvement in his symptoms after TPA administration.  Per the neurology consult NIH was 12 which improved to an NIH of 2.  Status post mechanical thrombectomy, patient has was admitted to the ICU.\"    HgbA1c not tested, LDL 87, ECHO EF 60 %. Elevated T bili. Repeat Head imaging with small left SDH over frontal lobe.     Patient current reports left sided weakness. He is right hand dominant. He denies any paresthesias or visual changes. No issues with urination, moved his bowels several days ago. Denies any pain. Has had his COVID vaccines.     Patient was evaluated by Rehab Medicine physician and Physical Therapy, Occupational Therapy and Speech Therapy and determined to be appropriate for acute inpatient rehab and was transferred to [facility] on 10/19/2021  1:13 PM.    With this acute therapeutic intervention, this patient hopes to improve his functional status, and return to " independent living with the supportive care of spouse.    REVIEW OF SYSTEMS:     A complete review of systems was performed and was negative in detail with the exception of items mentioned elsewhere in this document.    PMH:  Past Medical History:   Diagnosis Date   • GERD (gastroesophageal reflux disease)    • Spherocytosis (HCC)        PSH:  Past Surgical History:   Procedure Laterality Date   • HERNIA REPAIR     • SPLENECTOMY     • TONSILLECTOMY         No family history on file.   Not stroke in mother or father.     MEDICATIONS:  Current Facility-Administered Medications   Medication Dose   • hydrOXYzine HCl (ATARAX) tablet 50 mg  50 mg   • QUEtiapine (Seroquel) tablet 25 mg  25 mg   • simethicone (MYLICON) chewable tab 80 mg  80 mg   • melatonin tablet 3 mg  3 mg   • Respiratory Therapy Consult     • Pharmacy Consult Request ...Pain Management Review 1 Each  1 Each   • hydrALAZINE (APRESOLINE) tablet 10 mg  10 mg   • acetaminophen (Tylenol) tablet 650 mg  650 mg   • sodium phosphate (Fleet) enema 133 mL  1 Each   • omeprazole (PRILOSEC) capsule 20 mg  20 mg   • artificial tears ophthalmic solution 1 Drop  1 Drop   • benzocaine-menthol (CEPACOL) lozenge 1 Lozenge  1 Lozenge   • mag hydrox-al hydrox-simeth (MAALOX PLUS ES or MYLANTA DS) suspension 20 mL  20 mL   • ondansetron (ZOFRAN ODT) dispertab 4 mg  4 mg    Or   • ondansetron (ZOFRAN) syringe/vial injection 4 mg  4 mg   • traZODone (DESYREL) tablet 50 mg  50 mg   • sodium chloride (OCEAN) 0.65 % nasal spray 2 Spray  2 Spray   • atorvastatin (LIPITOR) tablet 80 mg  80 mg   • [START ON 10/20/2021] aspirin (ASA) chewable tab 81 mg  81 mg   • senna-docusate (PERICOLACE or SENOKOT S) 8.6-50 MG per tablet 2 Tablet  2 Tablet    And   • polyethylene glycol/lytes (MIRALAX) PACKET 1 Packet  1 Packet    And   • magnesium hydroxide (MILK OF MAGNESIA) suspension 30 mL  30 mL    And   • bisacodyl (DULCOLAX) suppository 10 mg  10 mg  "      ALLERGIES:  Penicillins    PSYCHOSOCIAL HISTORY:  Patient lives in a condo in [place] with with his wifeDarlin BOYD. They have been  for 41 years, have children and grandchildren.    Patient is retired. He likes to [id number] and camp.    Tobacco: denies  Alcohol: 2-3 drinks daily  Drugs: uses marijuana regularly    LEVEL OF FUNCTION PRIOR TO DISABILTY:  Independent    LEVEL OF FUNCTION PRIOR TO ADMISSION to [facility]:  PT:    Gait Level Of Assist:  (deferred this session, pregait at EOB only)  Assistive Device: Hand Held Assist  Distance (Feet): 3  Deviation:  (left lean)  Weight Bearing Status: no restrictions  Supine to Sit: Minimal Assist  Sit to Supine:  (left up in chair )  Scooting: Minimal Assist  Rolling: Moderate Assist to Rt.  Skilled Intervention: Verbal Cuing, Tactile Cuing, Facilitation  Sit to Stand: Minimal Assist  Bed, Chair, Wheelchair Transfer: Minimal Assist  Toilet Transfers: Minimal Assist  Transfer Method: Stand Pivot  Skilled Intervention: Verbal Cuing, Tactile Cuing, Facilitation  Sitting in Chair: left in chair  Sitting Edge of Bed: 25 min  Standing: 10 min    OT:    Upper Body Dressing: Moderate Assist  Lower Body Dressing: Maximal Assist  Toileting: Maximal Assist  Skilled Intervention: Verbal Cuing, Tactile Cuing, Facilitation    SLP:    Problem List: Cognitive-Linguistic Deficits  Diet / Liquid Recommendation: Puree (4), Mildly Thick (2) - (Nectar Thick)    CURRENT LEVEL OF FUNCTION:   Same as level of function prior to admission to [facility]    PHYSICAL EXAM:     VITAL SIGNS:   height is 1.558 m (5' 1.32\") and weight is 85.7 kg (188 lb 15 oz). His oral temperature is 36.8 °C (98.2 °F). His blood pressure is 124/75 and his pulse is 65. His respiration is 18 and oxygen saturation is 99%.     GENERAL: No apparent distress  HEENT: Normocephalic/atraumatic, yellow plaque on tongue  CARDIAC: Regular rate and rhythm, normal S1, S2, no murmurs, no " peripheral edema   LUNGS: Clear to auscultation, normal respiratory effort, on 2 L O2  ABDOMINAL: bowel sounds present, soft, nontender and nondistended    EXTREMITIES: no spasticity, no edema or no calf tenderness bilaterally, large healing scab on left knee  MSK: no joint swelling    NEURO:    Mental status:  A&Ox4 (person, place, date, situation) answers questions appropriately follows commands  Speech: fluent, no aphasia or dysarthria    CRANIAL NERVES:  2,3: visual acuity grossly intact, PERRL  3,4,6: EOMI bilaterally, no nystagmus or diplopia  5: intact in all branches  7: left facial droop  8: hearing grossly intact  9,10: symmetric palate elevation  11: SCM/Trapezius strength 5/5 bilaterally  12: tongue protrudes midline    Motor:  Shoulder flexors:  Right -  5/5, Left -  1-2/5  Elbow flexors:  Right -  5/5, Left -  2-3/5  Elbow extensors:  Right -  5/5, Left -  2-3/5  Weak  on left  Hip flexors:  Right -  5/5, Left -  2/5  Knee ext:  Right -  5/5, Left -  2/5  Dorsiflexors:  Right -  5/5, Left -  2-3/5  Plantar flexors:  Right -  5/5, Left -  3/5   Negative Pronator drift bilaterally    Sensory:   intact to light touch through out      RADIOLOGY:        Results for orders placed during the hospital encounter of 10/12/21    MR-BRAIN-W/O    Impression  Acute infarcts in the right MCA territory as described above predominantly involving the frontal lobe. Scattered foci of acute infarction are also noted in the right parietal lobe.    Tiny foci of infarction are also noted in the left frontoparietal region.    The distribution of the infarctions suggests a proximal cardiovascular embolic source.                                                                                                              LABS:  Recent Labs     10/17/21  0223 10/18/21  0227 10/19/21  0309   SODIUM 140 139 138   POTASSIUM 3.8 4.0 3.8   CHLORIDE 102 105 103   CO2 25 26 26   GLUCOSE 101* 110* 103*   BUN 13 15 14   CREATININE 0.68  0.75 0.68   CALCIUM 9.4 8.9 9.0     Recent Labs     10/17/21  0223 10/18/21  0227 10/19/21  0309   WBC 10.9* 8.8 10.7   RBC 4.93 4.92 4.92   HEMOGLOBIN 16.6 16.2 16.2   HEMATOCRIT 45.6 45.9 45.9   MCV 92.5 93.3 93.3   MCH 33.7* 32.9 32.9   MCHC 36.4* 35.3 35.3   RDW 43.2 43.8 43.8   PLATELETCT 524* 523* 529*   MPV 9.9 9.9 9.9         PRIMARY REHAB DIAGNOSIS:    This patient is a 66 y.o. male admitted for acute inpatient rehabilitation with Stroke (cerebrum) ([facility]).    IMPAIRMENTS:   Cognitive  ADLs/IADLs  Mobility  Swallow    SECONDARY DIAGNOSIS/MEDICAL CO-MORBIDITIES AFFECTING FUNCTION:    Subdural hematoma  Oral thrush  Thrombocytosis  Hyperglycemia  GERD    RELEVANT CHANGES SINCE PREADMISSION EVALUATION:    Status unchanged    The patient's rehabilitation potential is excellent  The patient's medical prognosis is excellent    PLAN:   Discussion and Recommendations, discussed with the patient and/or family:   1. The patient requires an acute inpatient rehabilitation program with a coordinated program of care at an intensity and frequency not available at a lower level of care. This recommendation is substantiated by the patient's medical physicians who recommend that the patient's intervention and assessment of medical issues needs to be done at an acute level of care for patient's safety and maximum outcome.     2. A coordinated program of care will be supplied by an interdisciplinary team of physical therapy, occupational therapy, rehab physician, rehab nursing, and, if needed, speech therapy and rehab psychology. Rehab team presents a patient-specific rehabilitation and education program concentrating on prevention of future problems related to accessibility, mobility, skin, bowel, bladder, sexuality, and psychosocial and medical/surgical problems.     3. Need for Rehabilitation Physician: The rehab physician will be evaluating the patient on a multi-weekly basis to help coordinate the program of care. The rehab  physician communicates between medical physicians, therapists, and nurses to maximize the patient's potential outcome. Specific areas in which the rehab physician will be providing daily assessment include the following:   A. Assessing the patient's heart rate and blood pressure response (vitals monitoring) to activity and making adjustments in medications or conservative measures as needed.   B. The rehab physician will be assessing the frequency at which the program can be increased to allow the patient to reach optimal functional outcome.   C. The rehab physician will also provide assessments in daily skin care, especially in light of patient's impairments in mobility.   D. The rehab physician will provide special expertise in understanding how to work with functional impairment and recommend appropriate interventions, compensatory techniques, and education that will facilitate the patient's outcome.     4. Rehab R.N.   The rehab RN will be working with patient to carry over in room mobility and activities of daily living when the patient is not in 3 hours of skilled therapy. Rehab nursing will be working in conjunction with rehab physician to address all the medical issues above and continue to assess laboratory work and discuss abnormalities with the treating physicians, assess vitals, and response to activity, and discuss and report abnormalities with the rehab physician. Rehab RN will also continue daily skin care, supervise bladder/bowel program, instruct in medication administration, and ensure patient safety.     5. Therapies to treat at intensity and frequency of (may change after completion of evaluation by all therapeutic disciplines):       PT:  Physical therapy to address mobility, transfer, gait training and evaluation for adaptive equipment needs 1hour/day at least 5 days/week for the duration of the ELOS (see below)       OT:  Occupational therapy to address ADLs, self-care, home management  training, functional mobility/transfers and assistive device evaluation, and community re-integration 1hour/day at least 5 days/week for the duration of the ELOS (see below).        ST/Dysphagia:  Speech therapy to address speech, language, and cognitive deficits as well as swallowing difficulties with retraining/dysphagia management and community re-integration with comprehension, expression, cognitive training 1hour/day at least 5 days/week for the duration of the ELOS (see below).     6. Medical management / Rehabilitation Issues/Adverse Potential affecting function as part of rehabilitation plan.    Subdural hematoma  Likely due to fall  Monitor    Oral thrush  Start Nystatin    Thrombocytosis  Stress reaction  Check am labs    Hyperglycemia  Check HgbA1c    GERD  Omeprazole    I performed a complete drug regimen review and did not identify any potential clinically significant medication issues.    The patient's CODE STATUS was confirmed as FULL CODE on admission, with the patient and/or family at bedside.    REHABILITATION ISSUES/ADVERSE POTENTIAL:  1.  CVA (Cerebrovascular Accident): Continue aspirin for secondary prophylaxis as well as lipid and blood pressure management. Patient demonstrates functional deficits in strength, balance, coordination, and ADL's. Patient is admitted to [facility] for comprehensive rehabilitation therapy as described below.   Rehabilitation nursing monitors bowel and bladder control, educates on medication administration, co-morbidities and monitors patient safety.    2.  DVT prophylaxis:  Patient is on nothing for anticoagulation upon transfer due to his SDH. Encourage OOB. Monitor daily for signs and symptoms of DVT including but not limited to swelling and pain to prevent the development of DVT that may interfere with therapies.    3.  Pain: No issues with pain currently / Controlled with as needed oral analgesics.    4.  Nutrition/Dysphagia: Dietician  monitors nutrient intake, recommend supplements prn and provide nutrition education to pt/family to promote optimal nutrition for wound healing/recovery.     5.  Bladder/bowel:  Start bowel and bladder program, to prevent constipation, urinary retention (which may lead to UTI), and urinary incontinence (which will impact upon pt's functional independence).   - TV Q3h while awake with post void bladder scans, I&O cath for PVRs >400  - up to commode after meal     6.  Skin/dermal ulcer prophylaxis: Monitor for new skin conditions with q.2 h. turns as required to prevent the development of skin breakdown.     7.  Cognition/Behavior:  Psychologist Dr. Orellana provides adjustment counseling to illness and psychosocial barriers that may be potential barriers to rehabilitation.     8. Respiratory therapy: RT performs O2 management prn, breathing retraining, pulmonary hygiene and bronchospasm management prn to optimize participation in therapies.    Pt was seen today for 71 min, and entire time spent in face-to-face contact was >50% in counseling and coordination of care as detailed in A/P above.        GOALS/EXPECTED LEVEL OF FUNCTION BASED ON CURRENT MEDICAL AND FUNCTIONAL STATUS (may change based on patient's medical status and rate of impairment recovery):  Transfers:   Supervision  Mobility/Gait:   Supervision  ADL's:   Minimal Assistance  Cognition:  Least Verbal Cues  Swallowing:  Least Restrictive diet    DISPOSITION: Discharge to pre-morbid independent living setting with the supportive care of patient's spouse.      ELOS: 14-21 days    Ruth Samuels M.D.  Physical Medicine and Rehabilitation

## 2021-10-19 NOTE — PROGRESS NOTES
Monitor summary: SR/SB 55-75, WV .14, QRS .11, QT .47, with rare PVCs per strip from monitor room.

## 2021-10-19 NOTE — DISCHARGE SUMMARY
Discharge Summary    CHIEF COMPLAINT ON ADMISSION  Chief Complaint   Patient presents with   • Sent by MD       Reason for Admission  IR TRANSFER     CODE STATUS  Full Code    HPI & HOSPITAL COURSE    Mr Gomez has past medical history that includes hereditary spherocytosis, status post splenectomy.  Patient presented to an outside hospital emergency room with a fall and acute onset of left-sided weakness.  Imaging was concerning for right MCA occlusion he was treated with TPA and transferred to [facility] for higher level of care and neuro interventional radiology.  Patient underwent attempted right MCA thrombectomy but M2 partial occlusion could not be recanalized. Neurology was consulted.  MRI brain showed acute infarcts in the right MCA territory predominantly involving the frontal lobe. Scattered foci of acute infarction are also noted in the right pareital lobe. Echo performed which did not showed any left-to-right shunt, normal EF.  He was started on ASA, statin. Patient does need prolonged cardiac monitoring, preferably loop recorder implantation.  He was evaluated by PT/OT/ST and recommended acute inpatient rehab. He will need follow up at [facility].          Therefore, he is discharged in fair and stable condition to an inpatient rehabilitation hospital.    The patient met 2-midnight criteria for an inpatient stay at the time of discharge.      FOLLOW UP ITEMS POST DISCHARGE  Neurology follow up  Cardiac monitoring (loop recorder)    DISCHARGE DIAGNOSES  Principal Problem:    Stroke (cerebrum) (HCC) POA: Yes  Active Problems:    H/O splenectomy POA: Yes    Dysphagia POA: Yes    Left hemiparesis (HCC) POA: Yes    Subdural hematoma (HCC) POA: Yes  Resolved Problems:    Lymphopenia POA: Yes      FOLLOW UP  No future appointments.  No follow-up provider specified.    MEDICATIONS ON DISCHARGE     Medication List      START taking these medications      Instructions   acetaminophen  325 MG Tabs  Commonly known as: Tylenol   Take 2 Tablets by mouth every four hours as needed.  Dose: 650 mg     aspirin 81 MG Chew chewable tablet  Start taking on: October 20, 2021  Commonly known as: ASA   Chew 1 Tablet every day.  Dose: 81 mg     atorvastatin 80 MG tablet  Commonly known as: LIPITOR   Take 1 Tablet by mouth every evening.  Dose: 80 mg     bisacodyl 10 MG Supp  Commonly known as: DULCOLAX   Insert 1 Suppository into the rectum 1 time a day as needed (if magnesium hydroxide ineffective after 24 hours).  Dose: 10 mg     magnesium hydroxide 400 MG/5ML Susp  Commonly known as: MILK OF MAGNESIA   Take 30 mL by mouth 1 time a day as needed (if polyethylene glycol ineffective after 24 hours).  Dose: 30 mL     melatonin 5 mg Tabs   Take 1 Tablet by mouth at bedtime.  Dose: 5 mg     polyethylene glycol/lytes 17 g Pack  Commonly known as: MIRALAX   Take 1 Packet by mouth 1 time a day as needed (if sennosides and docusate ineffective after 24 hours).  Dose: 17 g     senna-docusate 8.6-50 MG Tabs  Commonly known as: PERICOLACE or SENOKOT S   Take 2 Tablets by mouth 2 times a day.  Dose: 2 Tablet        CONTINUE taking these medications      Instructions   omeprazole 20 MG delayed-release capsule  Commonly known as: PRILOSEC   Take 20 mg by mouth every morning.  Dose: 20 mg     Vitamin C 1000 MG Tabs   Take 1,000 mg by mouth every morning.  Dose: 1,000 mg            Allergies  Allergies   Allergen Reactions   • Penicillins        DIET  Orders Placed This Encounter   Procedures   • Diet Order Diet: Level 4 - Pureed; Liquid level: Level 2 - Mildly Thick     Standing Status:   Standing     Number of Occurrences:   1     Order Specific Question:   Diet:     Answer:   Level 4 - Pureed [25]     Order Specific Question:   Liquid level     Answer:   Level 2 - Mildly Thick       ACTIVITY  As tolerated and directed by rehab.  Weight bearing as tolerated    LINES, DRAINS, AND WOUNDS  This is an automated list. Peripheral  IVs will be removed prior to discharge.  Peripheral IV 10/12/21 20 G Left Antecubital (Active)   Site Assessment Clean;Dry;Intact 10/18/21 2000   Dressing Type Transparent;Occlusive 10/18/21 2000   Line Status Scrubbed the hub prior to access;Flushed;Saline locked 10/18/21 2000   Dressing Status Clean;Dry;Intact 10/18/21 2000   Dressing Intervention N/A 10/18/21 2000   Dressing Change Due 10/16/21 10/14/21 0800   Date Primary Tubing Changed 10/12/21 10/13/21 0900   NEXT Primary Tubing Change  10/16/21 10/13/21 0900   Infiltration Grading ([facility]) 0 10/18/21 2000   Phlebitis Scale ([facility] Only) 0 10/18/21 2000       Peripheral IV 10/12/21 18 G Posterior;Right Hand (Active)   Site Assessment Clean;Dry;Intact 10/18/21 2000   Dressing Type Transparent 10/18/21 2000   Line Status Scrubbed the hub prior to access;Flushed;Saline locked 10/18/21 2000   Dressing Status Clean;Dry;Intact 10/18/21 2000   Dressing Intervention N/A 10/18/21 2000   Dressing Change Due 10/16/21 10/14/21 0800   Infiltration Grading ([facility]) 0 10/18/21 2000   Phlebitis Scale ([facility] Only) 0 10/18/21 2000     External Urinary Catheter ([facility]) (Active)   Collection Container Standard drainage bag 10/18/21 2000   Output (mL) 550 mL 10/17/21 0400         Peripheral IV 10/12/21 20 G Left Antecubital (Active)   Site Assessment Clean;Dry;Intact 10/18/21 2000   Dressing Type Transparent;Occlusive 10/18/21 2000   Line Status Scrubbed the hub prior to access;Flushed;Saline locked 10/18/21 2000   Dressing Status Clean;Dry;Intact 10/18/21 2000   Dressing Intervention N/A 10/18/21 2000   Dressing Change Due 10/16/21 10/14/21 0800   Date Primary Tubing Changed 10/12/21 10/13/21 0900   NEXT Primary Tubing Change  10/16/21 10/13/21 0900   Infiltration Grading ([facility]) 0 10/18/21 2000   Phlebitis Scale ([facility] Only) 0 10/18/21 2000       Peripheral IV 10/12/21 18 G Posterior;Right Hand (Active)   Site Assessment Clean;Dry;Intact 10/18/21 2000    Dressing Type Transparent 10/18/21 2000   Line Status Scrubbed the hub prior to access;Flushed;Saline locked 10/18/21 2000   Dressing Status Clean;Dry;Intact 10/18/21 2000   Dressing Intervention N/A 10/18/21 2000   Dressing Change Due 10/16/21 10/14/21 0800   Infiltration Grading ([facility]) 0 10/18/21 2000   Phlebitis Scale ([facility] Only) 0 10/18/21 2000               MENTAL STATUS ON TRANSFER             CONSULTATIONS  Neurology    PROCEDURES  10/12/21  Procedure(s): Rt MCA thrombectomy. Dominant inferior division Rt M2 partial occlusion, unable to be recanalized. TICI 2A     Discharge Exam:  Physical Exam  Constitutional:       General: He is not in acute distress.     Appearance: He is not toxic-appearing or diaphoretic.   HENT:      Head: Normocephalic and atraumatic.      Nose: Nose normal.      Mouth/Throat:      Mouth: Mucous membranes are moist.   Eyes:      Extraocular Movements: Extraocular movements intact.      Conjunctiva/sclera: Conjunctivae normal.      Pupils: Pupils are equal, round, and reactive to light.   Cardiovascular:      Rate and Rhythm: Normal rate and regular rhythm.      Heart sounds: No murmur heard.   No friction rub. No gallop.    Pulmonary:      Effort: Pulmonary effort is normal.      Breath sounds: Normal breath sounds.   Abdominal:      General: Bowel sounds are normal. There is no distension.      Palpations: Abdomen is soft.      Tenderness: There is no abdominal tenderness.   Musculoskeletal:      Cervical back: Normal range of motion.      Right lower leg: No edema.      Left lower leg: No edema.   Skin:     Coloration: Skin is not jaundiced.      Findings: No rash.   Neurological:      Mental Status: He is alert and oriented to person, place, and time.      Cranial Nerves: Cranial nerve deficit (left sided facial droop) present.      Motor: Weakness (LUE weakness 2/5, LLE 3/5) present.   Psychiatric:         Mood and Affect: Mood normal.         Behavior: Behavior  normal.           LABORATORY  Lab Results   Component Value Date    SODIUM 138 10/19/2021    POTASSIUM 3.8 10/19/2021    CHLORIDE 103 10/19/2021    CO2 26 10/19/2021    GLUCOSE 103 (H) 10/19/2021    BUN 14 10/19/2021    CREATININE 0.68 10/19/2021        Lab Results   Component Value Date    WBC 10.7 10/19/2021    HEMOGLOBIN 16.2 10/19/2021    HEMATOCRIT 45.9 10/19/2021    PLATELETCT 529 (H) 10/19/2021        Total time of the discharge process exceeds 34 minutes.

## 2021-10-19 NOTE — DISCHARGE PLANNING
Dr. Torrez has medically cleared.  Will discuss this case with the Admissions Team this morning.  Anticipate an admission pending bed availability.     1016-A bed has come available. Dr. Samuels has accepted.  Transport has been arranged via [facility]  Dr. Torrez, Kalpana LAWN, Aye jackson & Oriana GARVEY are aware.

## 2021-10-19 NOTE — PROGRESS NOTES
Assumed pt care at 1900. Pt alert and oriented x4. Denies any pain or discomfort. Bed locked and in lowest position. Call light within reach. Hourly rounding continues.    COVID 19 surge in effect.

## 2021-10-19 NOTE — PROGRESS NOTES
Hospital Medicine Daily Progress Note    Date of Service  10/18/2021    Chief Complaint  Fermín Gomez is a 66 y.o. male admitted 10/12/2021 with left-sided weakness    Hospital Course  Mr Gomez has past medical history that includes hereditary severe cytosis, status post splenectomy.  Patient presented to an outside hospital emergency room with a fall and acute onset of left-sided weakness.  Imaging was concerning for right MCA occlusion he was treated with TPA and transferred to [facility] for higher level of care and neuro interventional radiology.  Patient underwent attempted right MCA thrombectomy but M2 partial occlusion could not be recanalized.       Interval Problem Update  No new complaints.  Discontinue telemetry.     Appreciate physiatry consult, plan for rehab pending bed availability.    I have personally seen and examined the patient at bedside. I discussed the plan of care with patient, family and bedside RN.    Consultants/Specialty  critical care and neurology    Code Status  Full Code    Disposition  Patient is medically cleared.   Anticipate discharge to to an inpatient rehabilitation hospital.  I have placed the appropriate orders for post-discharge needs.    Review of Systems  Review of Systems   Constitutional: Positive for malaise/fatigue. Negative for chills.   Respiratory: Negative for cough, hemoptysis and sputum production.    Cardiovascular: Negative for chest pain, palpitations and orthopnea.   Gastrointestinal: Negative for nausea and vomiting.   Skin: Negative for itching and rash.   Neurological: Positive for speech change, focal weakness and weakness. Negative for dizziness.   All other systems reviewed and are negative.       Physical Exam  Temp:  [36.1 °C (97 °F)-36.6 °C (97.8 °F)] 36.6 °C (97.8 °F)  Pulse:  [56-70] 63  Resp:  [18] 18  BP: ()/(56-64) 121/60  SpO2:  [92 %] 92 %    Physical Exam  Constitutional:       General: He is not in acute  distress.     Appearance: Normal appearance. He is normal weight.   HENT:      Head: Normocephalic and atraumatic.      Right Ear: External ear normal.      Left Ear: External ear normal.      Nose: Nose normal.      Mouth/Throat:      Mouth: Mucous membranes are moist.      Pharynx: Oropharynx is clear.   Eyes:      Extraocular Movements: Extraocular movements intact.      Conjunctiva/sclera: Conjunctivae normal.      Pupils: Pupils are equal, round, and reactive to light.   Cardiovascular:      Rate and Rhythm: Normal rate and regular rhythm.      Pulses: Normal pulses.   Pulmonary:      Effort: Pulmonary effort is normal.      Breath sounds: Normal breath sounds.   Abdominal:      General: Abdomen is flat. Bowel sounds are normal.      Palpations: Abdomen is soft.   Musculoskeletal:         General: Normal range of motion.      Cervical back: Normal range of motion and neck supple.   Skin:     General: Skin is warm and dry.      Capillary Refill: Capillary refill takes less than 2 seconds.      Coloration: Skin is not jaundiced.   Neurological:      General: No focal deficit present.      Mental Status: He is alert and oriented to person, place, and time.      Cranial Nerves: No cranial nerve deficit.      Motor: Weakness present.      Gait: Gait normal.      Comments: Slurred/slurred speech  Left hemiparesis   Psychiatric:         Mood and Affect: Mood normal.         Behavior: Behavior normal.         Fluids    Intake/Output Summary (Last 24 hours) at 10/18/2021 2014  Last data filed at 10/18/2021 0800  Gross per 24 hour   Intake 240 ml   Output 500 ml   Net -260 ml       Laboratory  Recent Labs     10/16/21  0123 10/17/21  0223 10/18/21  0227   WBC 10.1 10.9* 8.8   RBC 4.60* 4.93 4.92   HEMOGLOBIN 15.1 16.6 16.2   HEMATOCRIT 43.3 45.6 45.9   MCV 94.1 92.5 93.3   MCH 32.8 33.7* 32.9   MCHC 34.9 36.4* 35.3   RDW 44.2 43.2 43.8   PLATELETCT 472* 524* 523*   MPV 9.9 9.9 9.9     Recent Labs     10/16/21  0123  10/17/21  0223 10/18/21  0227   SODIUM 138 140 139   POTASSIUM 3.8 3.8 4.0   CHLORIDE 103 102 105   CO2 27 25 26   GLUCOSE 97 101* 110*   BUN 8 13 15   CREATININE 0.73 0.68 0.75   CALCIUM 8.8 9.4 8.9                   Imaging  MR-BRAIN-W/O   Final Result         Acute infarcts in the right MCA territory as described above predominantly involving the frontal lobe. Scattered foci of acute infarction are also noted in the right parietal lobe.      Tiny foci of infarction are also noted in the left frontoparietal region.      The distribution of the infarctions suggests a proximal cardiovascular embolic source.      EC-ECHOCARDIOGRAM COMPLETE W/O CONT   Final Result      CT-HEAD W/O   Final Result         1.  Small focus of subdural hemorrhage along the left side of the falx in the frontal area is again identified with no change.      2.  There is some localized brain swelling and edema in the right basal ganglia which could represent subacute infarcts.      3.  No mass effect or midline shift identified.         DX-CHEST-PORTABLE (1 VIEW)   Final Result      1.  There is no acute cardiopulmonary process.      IR-THROMBO MECHANICAL ARTERY,INIT   Final Result         66-year-old presented with right M1 occlusion at [facility] underwent IV TPA therapy and was transferred to [facility] for emergent mechanical thrombectomy. At the time of initial angiogram, nonocclusive thrombus was    identified in the distal right M1 segment with occlusive thrombus in the inferior division M2 trunk with sluggish antegrade flow into the right inferior division MCA branches.      Multiple attempts at thrombectomy were unsuccessful in recanalizing the clot at the inferior division trunk. The final angiographic images demonstrated persistent inferior division origin thrombus with sluggish antegrade flow into the inferior division    branches. There is also some flow limitation to the superior division  branches due to thrombus encroaching upon the smaller superior division origin. Patient will be recovered in the ICU and the systolic blood pressure will be maintained around 160 mmHg    to encourage coverage leptomeningeal collateralization.      IAnamaria was physically present and participated during the entire procedure of the IR-THROMBO MECHANICAL ARTERY,INIT.         OUTSIDE IMAGES-DX CHEST   Final Result      OUTSIDE IMAGES-CT HEAD   Final Result      CT-FOREIGN FILM CAT SCAN   Final Result      OUTSIDE IMAGES-CT HEAD   Final Result           Assessment/Plan  * Stroke (cerebrum) (HCC)- (present on admission)  Assessment & Plan  Right MCA stroke, status post thrombolytics and attempted thrombectomy  Continue atorvastatin, start antiplatelet therapy and appropriate interval when approved by neurology  Blood pressure control  PT/OT/speech, acute rehab consult  Continuous hemodynamic monitoring for arrhythmia  Echocardiogram without evidence of left-to-right shunt, normal ejection fraction  Start aspirin  Continuous hemodynamic monitoring for arrhythmia, may benefit from long-term event monitor  Appreciate physiatry consult, plan for rehab pending insurance auth and bed availability.  Appreciate neurology assistance. Signed off.    Subdural hematoma (HCC)- (present on admission)  Assessment & Plan  Starting aspirin  Monitor neuro exam    Left hemiparesis (HCC)- (present on admission)  Assessment & Plan  PT/OT/Speech    Dysphagia- (present on admission)  Assessment & Plan  Speech following, continue modified diet    H/O splenectomy- (present on admission)  Assessment & Plan  History of splenectomy for hereditary spherocytosis    Lymphopenia- (present on admission)  Assessment & Plan  Covid negative, follow CBC       VTE prophylaxis: SCDs/TEDs    I have performed a physical exam and reviewed and updated ROS and Plan today (10/18/2021). In review of yesterday's note (10/17/2021), there are no changes  except as documented above.

## 2021-10-19 NOTE — PROGRESS NOTES
Covid 19 surge crisis charting in effect.    Assumed care of patient at 0700. Patient AOx4, resting in bed, no complaints at this time. Hourly rounding continues, patient educated on use of call light.     0800 telemetry monitoring d/c'd per ADT order.

## 2021-10-19 NOTE — DISCHARGE INSTRUCTIONS
Discharge Instructions    Discharged to other by medical transportation with escort. Discharged via ambulance, hospital escort: Yes.  Special equipment needed: Not Applicable    Be sure to schedule a follow-up appointment with your primary care doctor or any specialists as instructed.     Discharge Plan:   Diet Plan: Discussed  Activity Level: Discussed  Confirmed Follow up Appointment: Appointment Scheduled  Confirmed Symptoms Management: Discussed  Medication Reconciliation Updated: Yes    I understand that a diet low in cholesterol, fat, and sodium is recommended for good health. Unless I have been given specific instructions below for another diet, I accept this instruction as my diet prescription.   Other diet: Regular, puree texture, mildly thickened liquids    Special Instructions:     Stroke/CVA/TIA/Hemorrhagic Ischemia Discharge Instructions  You have had a stroke. Your risk factors have been identified as follows:  Age - Over 55  Gender - Men are at a higher risk than women  It is important that you reduce your risk factors to avoid another stroke in the future. Here are some general guidelines to follow:  · Eat healthy - avoid food high in fat.  · Get regular exercise.  · Maintain a healthy weight.  · Avoid smoking.  · Avoid alcohol and illegal drug use.  · Take your medications as directed.  For more information regarding risk factors, refer to pages 17-19 in your Stroke Patient Education Guide. Stroke Education Guide was given to patient.    Warning signs of a stroke include (which can also be found on page 3 of your Stroke Patient Education Guide):  · Sudden numbness of weakness of the face, arm or leg (especially on one side of the body).  · Sudden confusion, trouble speaking or understanding.  · Sudden trouble seeing in one or both eyes.  · Sudden trouble walking, dizziness, loss of balance or coordination.  · Sudden severe headache with no known cause.  It is very important to get treatment quickly  when a stroke occurs. If you experience any of the above warning signs, call [phone] immediately.     Some patients who have had a stroke will be going home on a blood thinner medication called Warfarin (Coumadin).  This medication requires very close monitoring and follow up.  This follow up can be provided by either your Primary Care Physician or by [facility] Outpatient Anticoagulation Service.  The Outpatient Anticoagulation Service is located at the [facility] at [facility] ([facility]).  If you do not know when your follow up appointment is scheduled, call [phone] to verify your appointment time.      · Is patient discharged on Warfarin / Coumadin?   No     Depression / Suicide Risk    As you are discharged from this [facility], it is important to learn how to keep safe from harming yourself.    Recognize the warning signs:  · Abrupt changes in personality, positive or negative- including increase in energy   · Giving away possessions  · Change in eating patterns- significant weight changes-  positive or negative  · Change in sleeping patterns- unable to sleep or sleeping all the time   · Unwillingness or inability to communicate  · Depression  · Unusual sadness, discouragement and loneliness  · Talk of wanting to die  · Neglect of personal appearance   · Rebelliousness- reckless behavior  · Withdrawal from people/activities they love  · Confusion- inability to concentrate     If you or a loved one observes any of these behaviors or has concerns about self-harm, here's what you can do:  · Talk about it- your feelings and reasons for harming yourself  · Remove any means that you might use to hurt yourself (examples: pills, rope, extension cords, firearm)  · Get professional help from the community (Mental Health, Substance Abuse, psychological counseling)  · Do not be alone:Call your Safe Contact- someone whom you trust who will be there for  you.  · Call your local CRISIS HOTLINE 632-3157 or 188-657-4623  · Call your local Children's Mobile Crisis Response Team [place] (210) 240-4340 or www.Connectipity  · Call the toll free National Suicide Prevention Hotlines   · National Suicide Prevention Lifeline 394-174-JXIJ (8572)  · National [place] Line Network 800-SUICIDE (111-6688)

## 2021-10-19 NOTE — DISCHARGE PLANNING
Anticipated Discharge Disposition:   [facility] Acute Rehab    Action:    Pt accepted by Dr. Samuels to [facility] and transportation scheduled for 1200 via [facility]. Pt and spouse agreeable.  Verbal for Cobra obtained.    Cobra signed by Dr. Torrez.    Cobra form placed in S 183-2 slot and bedside RN Kalpana informed.    Barriers to Discharge:    None    Plan:    DC

## 2021-10-20 ENCOUNTER — APPOINTMENT (OUTPATIENT)
Dept: RADIOLOGY | Facility: REHABILITATION | Age: 66
DRG: 057 | End: 2021-10-20
Attending: PHYSICAL MEDICINE & REHABILITATION
Payer: COMMERCIAL

## 2021-10-20 PROBLEM — R33.9 URINARY RETENTION: Status: ACTIVE | Noted: 2021-10-20

## 2021-10-20 PROBLEM — Z78.9 IMPAIRED MOBILITY AND ADLS: Status: ACTIVE | Noted: 2021-10-20

## 2021-10-20 PROBLEM — G47.09 OTHER INSOMNIA: Status: ACTIVE | Noted: 2021-10-20

## 2021-10-20 PROBLEM — D72.829 LEUKOCYTOSIS: Status: ACTIVE | Noted: 2021-10-20

## 2021-10-20 PROBLEM — Z74.09 IMPAIRED MOBILITY AND ADLS: Status: ACTIVE | Noted: 2021-10-20

## 2021-10-20 LAB
25(OH)D3 SERPL-MCNC: 38 NG/ML (ref 30–100)
ALBUMIN SERPL BCP-MCNC: 3.8 G/DL (ref 3.2–4.9)
ALBUMIN/GLOB SERPL: 1.7 G/DL
ALP SERPL-CCNC: 86 U/L (ref 30–99)
ALT SERPL-CCNC: 17 U/L (ref 2–50)
ANION GAP SERPL CALC-SCNC: 9 MMOL/L (ref 7–16)
APPEARANCE UR: CLEAR
AST SERPL-CCNC: 21 U/L (ref 12–45)
BACTERIA #/AREA URNS HPF: NEGATIVE /HPF
BASOPHILS # BLD AUTO: 1.2 % (ref 0–1.8)
BASOPHILS # BLD: 0.15 K/UL (ref 0–0.12)
BILIRUB SERPL-MCNC: 1.6 MG/DL (ref 0.1–1.5)
BILIRUB UR QL STRIP.AUTO: ABNORMAL
BUN SERPL-MCNC: 16 MG/DL (ref 8–22)
CALCIUM SERPL-MCNC: 9 MG/DL (ref 8.5–10.5)
CHLORIDE SERPL-SCNC: 104 MMOL/L (ref 96–112)
CO2 SERPL-SCNC: 26 MMOL/L (ref 20–33)
COLOR UR: ABNORMAL
CREAT SERPL-MCNC: 0.68 MG/DL (ref 0.5–1.4)
EOSINOPHIL # BLD AUTO: 0.36 K/UL (ref 0–0.51)
EOSINOPHIL NFR BLD: 2.9 % (ref 0–6.9)
EPI CELLS #/AREA URNS HPF: NEGATIVE /HPF
ERYTHROCYTE [DISTWIDTH] IN BLOOD BY AUTOMATED COUNT: 43.4 FL (ref 35.9–50)
EST. AVERAGE GLUCOSE BLD GHB EST-MCNC: 85 MG/DL
GLOBULIN SER CALC-MCNC: 2.3 G/DL (ref 1.9–3.5)
GLUCOSE SERPL-MCNC: 97 MG/DL (ref 65–99)
GLUCOSE UR STRIP.AUTO-MCNC: NEGATIVE MG/DL
HBA1C MFR BLD: 4.6 % (ref 4–5.6)
HCT VFR BLD AUTO: 46 % (ref 42–52)
HGB BLD-MCNC: 16.4 G/DL (ref 14–18)
IMM GRANULOCYTES # BLD AUTO: 0.05 K/UL (ref 0–0.11)
IMM GRANULOCYTES NFR BLD AUTO: 0.4 % (ref 0–0.9)
KETONES UR STRIP.AUTO-MCNC: 15 MG/DL
LEUKOCYTE ESTERASE UR QL STRIP.AUTO: ABNORMAL
LYMPHOCYTES # BLD AUTO: 1.77 K/UL (ref 1–4.8)
LYMPHOCYTES NFR BLD: 14.2 % (ref 22–41)
MAGNESIUM SERPL-MCNC: 1.9 MG/DL (ref 1.5–2.5)
MCH RBC QN AUTO: 33.1 PG (ref 27–33)
MCHC RBC AUTO-ENTMCNC: 35.7 G/DL (ref 33.7–35.3)
MCV RBC AUTO: 92.9 FL (ref 81.4–97.8)
MICRO URNS: ABNORMAL
MONOCYTES # BLD AUTO: 1.06 K/UL (ref 0–0.85)
MONOCYTES NFR BLD AUTO: 8.5 % (ref 0–13.4)
NEUTROPHILS # BLD AUTO: 9.06 K/UL (ref 1.82–7.42)
NEUTROPHILS NFR BLD: 72.8 % (ref 44–72)
NITRITE UR QL STRIP.AUTO: NEGATIVE
NRBC # BLD AUTO: 0 K/UL
NRBC BLD-RTO: 0 /100 WBC
PH UR STRIP.AUTO: 6.5 [PH] (ref 5–8)
PLATELET # BLD AUTO: 565 K/UL (ref 164–446)
PMV BLD AUTO: 10.1 FL (ref 9–12.9)
POTASSIUM SERPL-SCNC: 3.8 MMOL/L (ref 3.6–5.5)
PROT SERPL-MCNC: 6.1 G/DL (ref 6–8.2)
PROT UR QL STRIP: NEGATIVE MG/DL
RBC # BLD AUTO: 4.95 M/UL (ref 4.7–6.1)
RBC # URNS HPF: ABNORMAL /HPF
RBC UR QL AUTO: NEGATIVE
SARS-COV-2 RNA RESP QL NAA+PROBE: NOTDETECTED
SODIUM SERPL-SCNC: 139 MMOL/L (ref 135–145)
SP GR UR STRIP.AUTO: 1.03
SPECIMEN SOURCE: NORMAL
UROBILINOGEN UR STRIP.AUTO-MCNC: 4 MG/DL
WBC # BLD AUTO: 12.5 K/UL (ref 4.8–10.8)
WBC #/AREA URNS HPF: ABNORMAL /HPF

## 2021-10-20 PROCEDURE — 36415 COLL VENOUS BLD VENIPUNCTURE: CPT

## 2021-10-20 PROCEDURE — 700111 HCHG RX REV CODE 636 W/ 250 OVERRIDE (IP): Performed by: PHYSICAL MEDICINE & REHABILITATION

## 2021-10-20 PROCEDURE — 97166 OT EVAL MOD COMPLEX 45 MIN: CPT

## 2021-10-20 PROCEDURE — 97530 THERAPEUTIC ACTIVITIES: CPT

## 2021-10-20 PROCEDURE — 83036 HEMOGLOBIN GLYCOSYLATED A1C: CPT

## 2021-10-20 PROCEDURE — A9270 NON-COVERED ITEM OR SERVICE: HCPCS | Performed by: PHYSICAL MEDICINE & REHABILITATION

## 2021-10-20 PROCEDURE — 83735 ASSAY OF MAGNESIUM: CPT

## 2021-10-20 PROCEDURE — 80053 COMPREHEN METABOLIC PANEL: CPT

## 2021-10-20 PROCEDURE — 85025 COMPLETE CBC W/AUTO DIFF WBC: CPT

## 2021-10-20 PROCEDURE — 700102 HCHG RX REV CODE 250 W/ 637 OVERRIDE(OP): Performed by: PHYSICAL MEDICINE & REHABILITATION

## 2021-10-20 PROCEDURE — 770010 HCHG ROOM/CARE - REHAB SEMI PRIVAT*

## 2021-10-20 PROCEDURE — 99233 SBSQ HOSP IP/OBS HIGH 50: CPT | Performed by: PHYSICAL MEDICINE & REHABILITATION

## 2021-10-20 PROCEDURE — 97162 PT EVAL MOD COMPLEX 30 MIN: CPT

## 2021-10-20 PROCEDURE — 92523 SPEECH SOUND LANG COMPREHEN: CPT

## 2021-10-20 PROCEDURE — 97535 SELF CARE MNGMENT TRAINING: CPT

## 2021-10-20 PROCEDURE — 82306 VITAMIN D 25 HYDROXY: CPT

## 2021-10-20 PROCEDURE — 92610 EVALUATE SWALLOWING FUNCTION: CPT

## 2021-10-20 PROCEDURE — 71045 X-RAY EXAM CHEST 1 VIEW: CPT

## 2021-10-20 PROCEDURE — 81001 URINALYSIS AUTO W/SCOPE: CPT

## 2021-10-20 RX ORDER — TERAZOSIN 1 MG/1
2 CAPSULE ORAL EVERY EVENING
Status: DISCONTINUED | OUTPATIENT
Start: 2021-10-20 | End: 2021-10-21

## 2021-10-20 RX ADMIN — DOCUSATE SODIUM 50 MG AND SENNOSIDES 8.6 MG 2 TABLET: 8.6; 5 TABLET, FILM COATED ORAL at 20:13

## 2021-10-20 RX ADMIN — NYSTATIN 500000 UNITS: 100000 SUSPENSION ORAL at 08:42

## 2021-10-20 RX ADMIN — ASPIRIN 81 MG CHEWABLE TABLET 81 MG: 81 TABLET CHEWABLE at 08:42

## 2021-10-20 RX ADMIN — NYSTATIN 500000 UNITS: 100000 SUSPENSION ORAL at 20:15

## 2021-10-20 RX ADMIN — TERAZOSIN HYDROCHLORIDE 2 MG: 1 CAPSULE ORAL at 20:13

## 2021-10-20 RX ADMIN — OMEPRAZOLE 40 MG: KIT at 08:42

## 2021-10-20 RX ADMIN — ATORVASTATIN CALCIUM 80 MG: 40 TABLET, FILM COATED ORAL at 20:15

## 2021-10-20 RX ADMIN — ENOXAPARIN SODIUM 40 MG: 40 INJECTION SUBCUTANEOUS at 20:13

## 2021-10-20 RX ADMIN — NYSTATIN 500000 UNITS: 100000 SUSPENSION ORAL at 14:36

## 2021-10-20 RX ADMIN — MELATONIN TAB 3 MG 3 MG: 3 TAB at 20:28

## 2021-10-20 RX ADMIN — DOCUSATE SODIUM 50 MG AND SENNOSIDES 8.6 MG 2 TABLET: 8.6; 5 TABLET, FILM COATED ORAL at 08:42

## 2021-10-20 RX ADMIN — NYSTATIN 500000 UNITS: 100000 SUSPENSION ORAL at 17:32

## 2021-10-20 ASSESSMENT — ACTIVITIES OF DAILY LIVING (ADL)
TOILETING: INDEPENDENT
BED_CHAIR_WHEELCHAIR_TRANSFER_DESCRIPTION: ADAPTIVE EQUIPMENT;INCREASED TIME;SET-UP OF EQUIPMENT;SUPERVISION FOR SAFETY;VERBAL CUEING
TUB_SHOWER_TRANSFER_DESCRIPTION: GRAB BAR;SHOWER BENCH

## 2021-10-20 ASSESSMENT — GAIT ASSESSMENTS
ASSISTIVE DEVICE: FRONT WHEEL WALKER
DISTANCE (FEET): 20
GAIT LEVEL OF ASSIST: MINIMAL ASSIST
DEVIATION: ATAXIC;STEP TO;DECREASED BASE OF SUPPORT;BRADYKINETIC;DECREASED HEEL STRIKE;DECREASED TOE OFF

## 2021-10-20 ASSESSMENT — BRIEF INTERVIEW FOR MENTAL STATUS (BIMS)
INITIAL REPETITION OF BED BLUE SOCK - FIRST ATTEMPT: 3
WHAT MONTH IS IT: ACCURATE WITHIN 5 DAYS
WHAT YEAR IS IT: CORRECT
BIMS SUMMARY SCORE: 14
ASKED TO RECALL BED: YES, AFTER CUEING (A PIECE OF FURNITURE")"
ASKED TO RECALL BLUE: YES, NO CUE REQUIRED
ASKED TO RECALL SOCK: YES, NO CUE REQUIRED
WHAT DAY OF THE WEEK IS IT: CORRECT

## 2021-10-20 NOTE — CARE PLAN
The patient is Watcher - Medium risk of patient condition declining or worsening    Shift Goals  Clinical Goals: safety  Patient Goals: Sleep well      Problem: Skin Integrity  Goal: Patient's skin integrity will be maintained or improve  Outcome: Progressing Patient's skin remains intact and free from new or accidental injury this shift.  Will continue to monitor.      Problem: Bladder / Voiding  Goal: Patient will establish and maintain regular urinary output  Outcome: Not Met condom cath removed and patient is continent of urine.

## 2021-10-20 NOTE — FLOWSHEET NOTE
10/19/21 1941   Patient History   Pulmonary Diagnosis None   Procedures Relevant to Respiratory Status None   Home O2 No   Nocturnal CPAP No   Home Treatments/Frequency No   Protocol Pathways   Protocol Pathways None

## 2021-10-20 NOTE — CARE PLAN
"  Problem: Knowledge Deficit - Standard  Goal: Patient and family/care givers will demonstrate understanding of plan of care, disease process/condition, diagnostic tests and medications  Outcome: Progressing  Note: Pt agrees with plan of care tonight regarding medications and safety.  Will continue to monitor patient.      Problem: Bladder / Voiding  Goal: Patient will establish and maintain regular urinary output  Note: Pt is incontinent of bladder.  Has condom cath in place.  Will continue to monitor patient.     Problem: Fall Risk - Rehab  Goal: Patient will remain free from falls  Outcome: Progressing  Note: Bita Barron Fall risk Assessment Score:  13        Moderate fall risk Interventions  - Bed and strip alarm   - Yellow sign by the door   - Yellow wrist band \"Fall risk\"  - Room near to the nurse station  - Do not leave patient unattended in the bathroom  - Fall risk education provided             The patient is Stable - Low risk of patient condition declining or worsening    Shift Goals  Clinical Goals: Safety  Patient Goals: Sleep well    Progress made toward(s) clinical / shift goals:  progressing        "

## 2021-10-20 NOTE — THERAPY
10/20/21 1029   Prior Living Situation   Prior Services Home-Independent;None   Housing / Facility 1 Story House   Steps Into Home 0   Steps In Home 0   Bathroom Set up Walk In Shower   Equipment Owned None   Lives with - Patient's Self Care Capacity Spouse   Prior Functioning: Everyday Activities   Self Care Independent   Indoor Mobility (Ambulation) Independent   Stairs Independent   Functional Cognition Independent   Prior Device Use None of the given options   Pain 0 - 10 Group   Therapist Pain Assessment 0   Cognition    Level of Consciousness Alert   ABS (Agitated Behavior Scale)   Agitated Behavior Scale Performed No   Passive ROM Lower Body   Passive ROM Lower Body WDL   Active ROM Lower Body    Active ROM Lower Body  WDL   Strength Lower Body   Lt Hip Extension Strength 3- (F-)   Lt Hip Flexion Strength 3- (F-)   Lt Hip Abduction Strength 3- (F-)   Lt Hip Adduction Strength 3 (F)   Lt Knee Flexion Strength 3+ (F+)   Lt Knee Extension Strength 3 (F)   Lt Ankle Dorsiflexion Strength 2 (P)   Lt Ankle Plantar Flexion Strength 3- (F-)   Sensation Lower Body   Lower Extremity Sensation   WDL   Lower Body Muscle Tone   Lower Body Muscle Tone  X   Lt Lower Extremity Muscle Tone Hypotonic   Balance Assessment   Sitting Balance (Static) Fair -   Sitting Balance (Dynamic) Poor +   Standing Balance (Static) Fair -   Standing Balance (Dynamic) Poor +   Weight Shift Sitting Fair   Weight Shift Standing Poor   Bed Mobility    Supine to Sit Contact Guard Assist   Sit to Supine Stand by Assist   Sit to Stand Minimal Assist   Scooting Contact Guard Assist   Rolling Supervised   Neurological Concerns   Neurological Concerns Yes   Footdrop Present   Coordination Lower Body    Coordination Lower Body  Not Tested   Roll Left and Right   Assistance Needed Adaptive equipment;Verbal cues;Incidental touching   Physical Assistance Level No physical assistance   CARE Score - Roll Left and Right 4   Roll Left and Right Discharge  Goal   Discharge Goal 6   Sit to Lying   Assistance Needed Supervision;Verbal cues;Adaptive equipment   Physical Assistance Level No physical assistance   CARE Score - Sit to Lying 4   Sit to Lying Discharge Goal   Discharge Goal 6   Lying to Sitting on Side of Bed   Assistance Needed Supervision;Verbal cues;Incidental touching;Adaptive equipment   Physical Assistance Level No physical assistance   CARE Score - Lying to Sitting on Side of Bed 4   Lying to Sitting on Side of Bed Discharge Goal   Discharge Goal 6   Sit to Stand   Assistance Needed Adaptive equipment;Supervision;Verbal cues;Incidental touching   Physical Assistance Level 25% or less   CARE Score - Sit to Stand 3   Sit to Stand Discharge Goal   Discharge Goal 6   Chair/Bed-to-Chair Transfer   Assistance Needed Incidental touching;Verbal cues;Adaptive equipment   Physical Assistance Level No physical assistance   CARE Score - Chair/Bed-to-Chair Transfer 4   Chair/Bed-to-Chair Transfer Discharge Goal   Discharge Goal 6   Toilet Transfer   Reason if not Attempted Activity not applicable  (Not assessed by PT)   CARE Score - Toilet Transfer 9   Toilet Transfer Discharge Goal   Discharge Goal 4   Car Transfer   Reason if not Attempted Environmental limitations   CARE Score - Car Transfer 10   Car Transfer Discharge Goal   Discharge Goal 4   Walk 10 Feet   Assistance Needed Incidental touching;Verbal cues;Adaptive equipment   Physical Assistance Level 25% or less   CARE Score - Walk 10 Feet 3   Walk 10 Feet Discharge Goal   Discharge Goal 4   Walk 50 Feet with Two Turns   Reason if not Attempted Safety concerns   CARE Score - Walk 50 Feet with Two Turns 88   Walk 50 Feet with Two Turns Discharge Goal   Discharge Goal 4   Walk 150 Feet   Reason if not Attempted Safety concerns   CARE Score - Walk 150 Feet 88   Walk 150 Feet Discharge Goal   Discharge Goal 4   Walking 10 Feet on Uneven Surfaces   Reason if not Attempted Safety concerns   CARE Score - Walking 10  Feet on Uneven Surfaces 88   Walking 10 Feet on Uneven Surfaces Discharge Goal   Discharge Goal 6   1 Step (Curb)   Reason if not Attempted Safety concerns   CARE Score - 1 Step (Curb) 88   1 Step (Curb) Discharge Goal   Discharge Goal 4   4 Steps   Reason if not Attempted Environmental limitations   CARE Score - 4 Steps 10   4 Steps Discharge Goal   Discharge Goal 4   12 Steps   Reason if not Attempted Activity not applicable   CARE Score - 12 Steps 9   12 Steps Discharge Goal   Discharge Goal 9   Picking Up Object   Reason if not Attempted Safety concerns   CARE Score - Picking Up Object 88   Picking Up Object Discharge Goal   Discharge Goal 6   Wheel 50 Feet with Two Turns   Reason if not Attempted Safety concerns   CARE Score - Wheel 50 Feet with Two Turns 88   Type of Wheelchair/Scooter Manual   Wheel 50 Feet with Two Turns Discharge Goal   Discharge Goal 4   Wheel 150 Feet   Reason if not Attempted Safety concerns   CARE Score - Wheel 150 Feet 88   Type of Wheelchair/Scooter Manual   Wheel 150 Feet Discharge Goal   Discharge Goal 4   Gait Functional Level of Assist    Gait Level Of Assist Minimal Assist   Assistive Device Front Wheel Walker   Distance (Feet) 20   # of Times Distance was Traveled 1   Deviation Ataxic;Step To;Decreased Base Of Support;Bradykinetic;Decreased Heel Strike;Decreased Toe Off  (Inconsistent step length, left leaning)   Wheelchair Functional Level of Assist   Wheelchair Assist Minimal Assist   Distance Wheelchair (Feet or Distance) 12   Wheelchair Description Assistance with steering;Extra time;Leg rest management;Limited by fatigue;Requires assist to advance foot;Supervision for safety;Requires incidental assist   Stairs Functional Level of Assist   Level of Assist with Stairs Unable to Participate   Transfer Functional Level of Assist   Bed, Chair, Wheelchair Transfer Minimal Assist   Bed Chair Wheelchair Transfer Description Adaptive equipment;Increased time;Set-up of  equipment;Supervision for safety;Verbal cueing  (FWW)   Toilet Transfers Refused   Problem List    Problems Impaired Bed Mobility;Impaired Transfers;Impaired Ambulation;Functional ROM Deficit;Functional Strength Deficit;Impaired Balance;Impaired Coordination;Decreased Activity Tolerance;Limited Knowledge of Post-Op Precautions;Motor Planning / Sequencing   Precautions   Precautions Fall Risk;Swallow Precautions ( See Comments)   Comments Modified diet, purée with mildly thick liquids   Current Discharge Plan   Current Discharge Plan Return to Prior Living Situation   Interdisciplinary Plan of Care Collaboration   Patient Position at End of Therapy Seated;Chair Alarm On;Self Releasing Lap Belt Applied;Call Light within Reach;Tray Table within Reach;Phone within Reach   Benefit   Therapy Benefit Patient Would Benefit from Inpatient Rehabilitation Physical Therapy to Maximize Functional [place] with ADLs, IADLs and Mobility.   PT Total Time Spent   PT Individual Total Time Spent (Mins) 60   PT Charge Group   Charges Yes   PT Therapeutic Activities 1   PT Evaluation PT Evaluation Mod   Physical Therapy   Initial Evaluation     Patient Name: Fermín Gomez  Age:  66 y.o., Sex:  male  Medical Record #: [id number]  Today's Date: 10/20/2021     Subjective    The patient agreed PT evaluation.  He had no complaints of pain or lightheadedness.     Objective    PT evaluation was completed.  The patient was able to participate in some of the components such as bed mobility, transfers, wheelchair mobility, gait.  However, not all of the components were possible due to COVID-19 protocol and safety factors.    Assessment  Patient is 66 y.o. male with a diagnosis of right CVA MCA occlusion, s/p TPA and thrombectomy, left hemiparesis.  Additional factors influencing patient status / progress (ie: cognitive factors, co-morbidities, social support, etc): Impaired bed mobility, transfers, sitting and standing posture and balance,  gait, fall risk.      Plan  Recommend Physical Therapy  minutes per day 5-7 days per week for 3 weeks for the following treatments:  PT E Stim Attended, PT Gait Training, PT Therapeutic Exercises, PT Neuro Re-Ed/Balance, PT Therapeutic Activity and PT Evaluation.    Passport items to be completed:  Passport items to be completed:  Get in/out of bed safely, in/out of a vehicle, safely use mobility device, walk or wheel around home/community, navigate up and down stairs, show how to get up/down from the ground, ensure home is accessible, demonstrate HEP, complete caregiver training    Goals:  Long term and short term goals have been discussed with patient and they are in agreement.    Physical Therapy Problems (Active)     Problem: Balance     Dates: Start: 10/20/21       Goal: STG-Within one week, patient will maintain static standing     Dates: Start: 10/20/21       Goal Note filed on 10/20/21 1514 by JEANIE Kam     1) Individualized goal:   Maintain midline static standing balance, CGA 1 week  2) Interventions:  PT Therapeutic Exercises, PT Neuro Re-Ed/Balance, and PT Therapeutic Activity               Problem: Mobility     Dates: Start: 10/20/21       Goal: STG-Within one week, patient will     Dates: Start: 10/20/21       Goal Note filed on 10/20/21 1514 by JEANIE Kam     1) Individualized goal:   Gait CGA/facilitation, FWW 50 FT 1 week  2) Interventions:  PT Gait Training, PT Therapeutic Exercises, PT Neuro Re-Ed/Balance, and PT Therapeutic Activity               Problem: Mobility Transfers     Dates: Start: 10/20/21       Goal: STG-Within one week, patient will transfer bed to chair     Dates: Start: 10/20/21       Goal Note filed on 10/20/21 1514 by JEANIE Kam     1) Individualized goal:   Transfer bed to/from chair fww CGA, 1 week  2) Interventions:  PT Gait Training, PT Therapeutic Exercises, PT Neuro Re-Ed/Balance, and PT Therapeutic Activity            Goal:  STG-Within one week, patient will move supine to sit     Dates: Start: 10/20/21       Goal Note filed on 10/20/21 1514 by JEANIE Kam     1) Individualized goal:   Transfer supine to/from sit CGA 1 week  2) Interventions:  PT Therapeutic Exercises, PT Neuro Re-Ed/Balance, and PT Therapeutic Activity               Problem: PT-Long Term Goals     Dates: Start: 10/20/21       Goal: LTG-By discharge, patient will ambulate     Dates: Start: 10/20/21       Goal Note filed on 10/20/21 1514 by JEANIE Kam     1) Individualized goal:   Gait FWW/ FT, supervision at discharge  2) Interventions:  PT Gait Training, PT Therapeutic Exercises, PT Neuro Re-Ed/Balance, and PT Therapeutic Activity            Goal: LTG-By discharge, patient will transfer one surface to another     Dates: Start: 10/20/21       Goal Note filed on 10/20/21 1514 by JEANIE Kam     1) Individualized goal:   Transfer 1 surface to another fww/spc supervision at discharge  2) Interventions:  PT Gait Training, PT Therapeutic Exercises, PT Neuro Re-Ed/Balance, and PT Therapeutic Activity            Goal: LTG-By discharge, patient will ambulate up/down 4-6 stairs     Dates: Start: 10/20/21       Goal Note filed on 10/20/21 1514 by JEANIE Kam     1) Individualized goal:   Up/down 4-6 stairs handrails, supervision at discharge  2) Interventions:  PT Gait Training, PT Therapeutic Exercises, PT Neuro Re-Ed/Balance, and PT Therapeutic Activity            Goal: LTG-By discharge, patient will transfer in/out of a car     Dates: Start: 10/20/21       Goal Note filed on 10/20/21 1514 by JEANIE Kam     1) Individualized goal:   Car transfer fww/spc supervision at discharge  2) Interventions:  PT Gait Training, PT Therapeutic Exercises, PT Neuro Re-Ed/Balance, and PT Therapeutic Activity

## 2021-10-20 NOTE — PROGRESS NOTES
Patient care assumed. Report received from [facility] ANA LAURA Orellana. Patient is alert and calm, resting in bed. Call light and bedside table within reach. Will continue to monitor.

## 2021-10-20 NOTE — PROGRESS NOTES
"Rehab Progress Note     Date of Service: 10/20/2021  Chief Complaint: follow up stroke    Interval Events (Subjective)      Patient seen and examined today in his room.   He did not sleep well last night.  He denies any pain.  To have swallow study tomorrow.  Unable to urinate.  No other interval events.    ROS: No changes to bowel, bladder, pain, or mood.        Objective:  VITAL SIGNS: /68   Pulse 68   Temp 36.6 °C (97.8 °F) (Oral)   Resp 16   Ht 1.558 m (5' 1.32\")   Wt 85.7 kg (188 lb 15 oz)   SpO2 97%   BMI 35.33 kg/m²   Gen: alert, no apparent distress  Neuro: notable for left hemiparesis      Recent Results (from the past 72 hour(s))   CBC WITH DIFFERENTIAL    Collection Time: 10/18/21  2:27 AM   Result Value Ref Range    WBC 8.8 4.8 - 10.8 K/uL    RBC 4.92 4.70 - 6.10 M/uL    Hemoglobin 16.2 14.0 - 18.0 g/dL    Hematocrit 45.9 42.0 - 52.0 %    MCV 93.3 81.4 - 97.8 fL    MCH 32.9 27.0 - 33.0 pg    MCHC 35.3 33.7 - 35.3 g/dL    RDW 43.8 35.9 - 50.0 fL    Platelet Count 523 (H) 164 - 446 K/uL    MPV 9.9 9.0 - 12.9 fL    Neutrophils-Polys 64.90 44.00 - 72.00 %    Lymphocytes 18.50 (L) 22.00 - 41.00 %    Monocytes 10.30 0.00 - 13.40 %    Eosinophils 4.30 0.00 - 6.90 %    Basophils 1.50 0.00 - 1.80 %    Immature Granulocytes 0.50 0.00 - 0.90 %    Nucleated RBC 0.00 /100 WBC    Neutrophils (Absolute) 5.70 1.82 - 7.42 K/uL    Lymphs (Absolute) 1.62 1.00 - 4.80 K/uL    Monos (Absolute) 0.90 (H) 0.00 - 0.85 K/uL    Eos (Absolute) 0.38 0.00 - 0.51 K/uL    Baso (Absolute) 0.13 (H) 0.00 - 0.12 K/uL    Immature Granulocytes (abs) 0.04 0.00 - 0.11 K/uL    NRBC (Absolute) 0.00 K/uL   Comp Metabolic Panel    Collection Time: 10/18/21  2:27 AM   Result Value Ref Range    Sodium 139 135 - 145 mmol/L    Potassium 4.0 3.6 - 5.5 mmol/L    Chloride 105 96 - 112 mmol/L    Co2 26 20 - 33 mmol/L    Anion Gap 8.0 7.0 - 16.0    Glucose 110 (H) 65 - 99 mg/dL    Bun 15 8 - 22 mg/dL    Creatinine 0.75 0.50 - 1.40 mg/dL    " Calcium 8.9 8.5 - 10.5 mg/dL    AST(SGOT) 21 12 - 45 U/L    ALT(SGPT) 14 2 - 50 U/L    Alkaline Phosphatase 76 30 - 99 U/L    Total Bilirubin 1.7 (H) 0.1 - 1.5 mg/dL    Albumin 3.6 3.2 - 4.9 g/dL    Total Protein 5.9 (L) 6.0 - 8.2 g/dL    Globulin 2.3 1.9 - 3.5 g/dL    A-G Ratio 1.6 g/dL   MAGNESIUM    Collection Time: 10/18/21  2:27 AM   Result Value Ref Range    Magnesium 1.9 1.5 - 2.5 mg/dL   ESTIMATED GFR    Collection Time: 10/18/21  2:27 AM   Result Value Ref Range    GFR If African American >60 >60 mL/min/1.73 m 2    GFR If Non African American >60 >60 mL/min/1.73 m 2   CBC WITH DIFFERENTIAL    Collection Time: 10/19/21  3:09 AM   Result Value Ref Range    WBC 10.7 4.8 - 10.8 K/uL    RBC 4.92 4.70 - 6.10 M/uL    Hemoglobin 16.2 14.0 - 18.0 g/dL    Hematocrit 45.9 42.0 - 52.0 %    MCV 93.3 81.4 - 97.8 fL    MCH 32.9 27.0 - 33.0 pg    MCHC 35.3 33.7 - 35.3 g/dL    RDW 43.8 35.9 - 50.0 fL    Platelet Count 529 (H) 164 - 446 K/uL    MPV 9.9 9.0 - 12.9 fL    Neutrophils-Polys 71.40 44.00 - 72.00 %    Lymphocytes 13.10 (L) 22.00 - 41.00 %    Monocytes 9.90 0.00 - 13.40 %    Eosinophils 3.50 0.00 - 6.90 %    Basophils 1.40 0.00 - 1.80 %    Immature Granulocytes 0.70 0.00 - 0.90 %    Nucleated RBC 0.00 /100 WBC    Neutrophils (Absolute) 7.67 (H) 1.82 - 7.42 K/uL    Lymphs (Absolute) 1.40 1.00 - 4.80 K/uL    Monos (Absolute) 1.06 (H) 0.00 - 0.85 K/uL    Eos (Absolute) 0.37 0.00 - 0.51 K/uL    Baso (Absolute) 0.15 (H) 0.00 - 0.12 K/uL    Immature Granulocytes (abs) 0.07 0.00 - 0.11 K/uL    NRBC (Absolute) 0.00 K/uL   Comp Metabolic Panel    Collection Time: 10/19/21  3:09 AM   Result Value Ref Range    Sodium 138 135 - 145 mmol/L    Potassium 3.8 3.6 - 5.5 mmol/L    Chloride 103 96 - 112 mmol/L    Co2 26 20 - 33 mmol/L    Anion Gap 9.0 7.0 - 16.0    Glucose 103 (H) 65 - 99 mg/dL    Bun 14 8 - 22 mg/dL    Creatinine 0.68 0.50 - 1.40 mg/dL    Calcium 9.0 8.5 - 10.5 mg/dL    AST(SGOT) 22 12 - 45 U/L    ALT(SGPT) 15 2 - 50  U/L    Alkaline Phosphatase 78 30 - 99 U/L    Total Bilirubin 1.8 (H) 0.1 - 1.5 mg/dL    Albumin 3.7 3.2 - 4.9 g/dL    Total Protein 6.0 6.0 - 8.2 g/dL    Globulin 2.3 1.9 - 3.5 g/dL    A-G Ratio 1.6 g/dL   MAGNESIUM    Collection Time: 10/19/21  3:09 AM   Result Value Ref Range    Magnesium 1.9 1.5 - 2.5 mg/dL   ESTIMATED GFR    Collection Time: 10/19/21  3:09 AM   Result Value Ref Range    GFR If African American >60 >60 mL/min/1.73 m 2    GFR If Non African American >60 >60 mL/min/1.73 m 2   SARS-CoV-2 PCR (24 hour In-House): Collect [facility] swab in [facility]    Collection Time: 10/19/21  2:30 PM    Specimen: Nasopharyngeal; Respirate   Result Value Ref Range    SARS-CoV-2 Source NP Swab    CBC with Differential    Collection Time: 10/20/21  5:37 AM   Result Value Ref Range    WBC 12.5 (H) 4.8 - 10.8 K/uL    RBC 4.95 4.70 - 6.10 M/uL    Hemoglobin 16.4 14.0 - 18.0 g/dL    Hematocrit 46.0 42.0 - 52.0 %    MCV 92.9 81.4 - 97.8 fL    MCH 33.1 (H) 27.0 - 33.0 pg    MCHC 35.7 (H) 33.7 - 35.3 g/dL    RDW 43.4 35.9 - 50.0 fL    Platelet Count 565 (H) 164 - 446 K/uL    MPV 10.1 9.0 - 12.9 fL    Neutrophils-Polys 72.80 (H) 44.00 - 72.00 %    Lymphocytes 14.20 (L) 22.00 - 41.00 %    Monocytes 8.50 0.00 - 13.40 %    Eosinophils 2.90 0.00 - 6.90 %    Basophils 1.20 0.00 - 1.80 %    Immature Granulocytes 0.40 0.00 - 0.90 %    Nucleated RBC 0.00 /100 WBC    Neutrophils (Absolute) 9.06 (H) 1.82 - 7.42 K/uL    Lymphs (Absolute) 1.77 1.00 - 4.80 K/uL    Monos (Absolute) 1.06 (H) 0.00 - 0.85 K/uL    Eos (Absolute) 0.36 0.00 - 0.51 K/uL    Baso (Absolute) 0.15 (H) 0.00 - 0.12 K/uL    Immature Granulocytes (abs) 0.05 0.00 - 0.11 K/uL    NRBC (Absolute) 0.00 K/uL   Comp Metabolic Panel (CMP)    Collection Time: 10/20/21  5:37 AM   Result Value Ref Range    Sodium 139 135 - 145 mmol/L    Potassium 3.8 3.6 - 5.5 mmol/L    Chloride 104 96 - 112 mmol/L    Co2 26 20 - 33 mmol/L    Anion Gap 9.0 7.0 - 16.0    Glucose 97 65 - 99 mg/dL    Bun 16 8 - 22  mg/dL    Creatinine 0.68 0.50 - 1.40 mg/dL    Calcium 9.0 8.5 - 10.5 mg/dL    AST(SGOT) 21 12 - 45 U/L    ALT(SGPT) 17 2 - 50 U/L    Alkaline Phosphatase 86 30 - 99 U/L    Total Bilirubin 1.6 (H) 0.1 - 1.5 mg/dL    Albumin 3.8 3.2 - 4.9 g/dL    Total Protein 6.1 6.0 - 8.2 g/dL    Globulin 2.3 1.9 - 3.5 g/dL    A-G Ratio 1.7 g/dL   Magnesium    Collection Time: 10/20/21  5:37 AM   Result Value Ref Range    Magnesium 1.9 1.5 - 2.5 mg/dL   Vitamin D, 25-hydroxy (blood)    Collection Time: 10/20/21  5:37 AM   Result Value Ref Range    25-Hydroxy   Vitamin D 25 38 30 - 100 ng/mL   HEMOGLOBIN A1C    Collection Time: 10/20/21  5:37 AM   Result Value Ref Range    Glycohemoglobin 4.6 4.0 - 5.6 %    Est Avg Glucose 85 mg/dL   ESTIMATED GFR    Collection Time: 10/20/21  5:37 AM   Result Value Ref Range    GFR If African American >60 >60 mL/min/1.73 m 2    GFR If Non African American >60 >60 mL/min/1.73 m 2       Current Facility-Administered Medications   Medication Frequency   • terazosin (HYTRIN) capsule 2 mg Q EVENING   • hydrOXYzine HCl (ATARAX) tablet 50 mg Q6HRS PRN   • QUEtiapine (Seroquel) tablet 25 mg TID PRN   • simethicone (MYLICON) chewable tab 80 mg TID PRN   • melatonin tablet 3 mg HS PRN   • Respiratory Therapy Consult Continuous RT   • Pharmacy Consult Request ...Pain Management Review 1 Each PHARMACY TO DOSE   • hydrALAZINE (APRESOLINE) tablet 10 mg Q8HRS PRN   • acetaminophen (Tylenol) tablet 650 mg Q4HRS PRN   • sodium phosphate (Fleet) enema 133 mL QDAY PRN   • artificial tears ophthalmic solution 1 Drop PRN   • benzocaine-menthol (CEPACOL) lozenge 1 Lozenge Q2HRS PRN   • mag hydrox-al hydrox-simeth (MAALOX PLUS ES or MYLANTA DS) suspension 20 mL Q2HRS PRN   • ondansetron (ZOFRAN ODT) dispertab 4 mg 4X/DAY PRN    Or   • ondansetron (ZOFRAN) syringe/vial injection 4 mg 4X/DAY PRN   • traZODone (DESYREL) tablet 50 mg QHS PRN   • sodium chloride (OCEAN) 0.65 % nasal spray 2 Spray PRN   • atorvastatin (LIPITOR)  tablet 80 mg Q EVENING   • aspirin (ASA) chewable tab 81 mg DAILY   • senna-docusate (PERICOLACE or SENOKOT S) 8.6-50 MG per tablet 2 Tablet BID    And   • polyethylene glycol/lytes (MIRALAX) PACKET 1 Packet QDAY PRN    And   • magnesium hydroxide (MILK OF MAGNESIA) suspension 30 mL QDAY PRN    And   • bisacodyl (DULCOLAX) suppository 10 mg QDAY PRN   • omeprazole (FIRST-OMEPRAZOLE) 2 mg/mL oral susp 40 mg QAM AC   • nystatin (MYCOSTATIN) 654894 UNIT/ML suspension 500,000 Units 4X/DAY       Orders Placed This Encounter   Procedures   • Diet Order Diet: Level 4 - Pureed; Liquid level: Level 2 - Mildly Thick     Standing Status:   Standing     Number of Occurrences:   1     Order Specific Question:   Diet:     Answer:   Level 4 - Pureed [25]     Order Specific Question:   Liquid level     Answer:   Level 2 - Mildly Thick       Assessment:  Active Hospital Problems    Diagnosis    • *Stroke (cerebrum) (HCC)    • Urinary retention    • Leukocytosis    • Impaired mobility and ADLs    • Other insomnia    • Gastroesophageal reflux disease with esophagitis without hemorrhage    • Oral thrush    • Dysphagia    • Left hemiparesis (HCC)    • Subdural hematoma (HCC)    • H/O splenectomy      This patient is a 66 y.o. male admitted for acute inpatient rehabilitation with Stroke (cerebrum) (HCC).    Medical Decision Making and Plan:    Right MCA stroke  S/p tPA  S/p thrombectomy  Left hemiparesis  Dysphagia  Continue full rehab program  PT/OT/SLP, 1 hr each discipline, 5 days per week    Aspirin  Statin    Outpatient follow up with Dr. Cifuentes, stroke bridge clinic    Subdural hematoma, small  Likely due to fall     Oral thrush  Continue Nystatin     Thrombocytosis  Stress reaction  Monitor    Leukocytosis  Check UA, pending  Checked CXR - negative     Hyperglycemia  Check HgbA1c     GERD  Omeprazole    Bowel program  Continue bowel medications  Last BM 10/18    Bladder program  Urinary retention  Start Hytrin 2 mg  Check  PVRs  Bladder scan for no voids  ICP for over 400 cc - required today  Scheduled toileting    DVT prophylaxis  Start Lovenox, patient 8 days out from small SDH      Total time:  35 minutes.  I spent greater than 50% of the time for patient care, counseling, and coordination on this date, including patient face-to face time, unit/floor time with review of records/pertinent lab data and studies, as well as discussing diagnostic evaluation/work up, planned therapeutic interventions, and future disposition of care, as per the interval events/subjective and the assessment and plan as noted above.      Ruth Samuels M.D.   Physical Medicine and Rehabilitation

## 2021-10-20 NOTE — CARE PLAN
Problem: Knowledge Deficit - Standard  Goal: Patient and family/care givers will demonstrate understanding of plan of care, disease process/condition, diagnostic tests and medications  Outcome: Progressing   Pt education given regarding plan of care, pt shows good understanding, will continue to reinforce education and continue to monitor.      Problem: Fall Risk - Rehab  Goal: Patient will remain free from falls  Outcome: Progressing   Pt education given regarding fall precautions AND safety measures, pt shows good understanding, has not attempted to self transfer this shift, will continue to reinforce education and continue to monitor.

## 2021-10-20 NOTE — FLOWSHEET NOTE
10/19/21 1942   Events/Summary/Plan   Events/Summary/Plan RT assessment   Vital Signs   Pulse 64   Respiration 16   Pulse Oximetry 92 %   $ Pulse Oximetry (Spot Check) Yes   Oxygen   O2 Delivery Device None - Room Air   Room Air Challenge Pass   Smoking History   Have you ever smoked Never

## 2021-10-20 NOTE — PROGRESS NOTES
2 RN skin check done with admitting RN and JYOTSNA Doran. Face photo and skin photos documented in media. Appropriate LDAs opened.   Pt with Darius score of 19, RN wound protocol not indicated at this time, orders current. Prevention measures in place including condom cath and pillows for positioning.

## 2021-10-20 NOTE — PROGRESS NOTES
Pt arrived at [facility] from [facility] via pt transport. MD Samuels to follow for diagnosis of stroke. Initial assessment initiated. Pt oriented to room and facility routine and safety measures; pt education binder provided and discussed. Pt A/O x 4, cont of bowel and incont of bladder. mod assist for transfers. All wounds photographed and documented; photos uploaded to . Pt denies pain or discomfort at this time. Pt positioned for comfort in bed. Call light within reach, safety measures in place. Will continue to monitor.

## 2021-10-20 NOTE — THERAPY
Speech Language Pathology   Initial Assessment     Patient Name: Fermín Gomez  AGE:  66 y.o., SEX:  male  Medical Record #: [id number]  Today's Date: 10/20/2021     Subjective    Pt was seated in WC upon SLP arrival. Pt was pleasant and cooperative throughout evaluation.      Objective       10/20/21 0803   Precautions   Precautions Fall Risk;Swallow Precautions ( See Comments)   Comments Modified diet, puree with mildly thick liquids   Prior Living Situation   Prior Services Home-Independent   Housing / Facility 1 Story House   Lives with - Patient's Self Care Capacity Spouse   Prior Level Of Function   Communication Within Functional Limits   Swallow Impaired   Dentition Intact   Dentures None   Hearing Within Functional Limits for Evaluation   Hearing Aid None   Vision Distance   Patient's Primary Language English   Education Some College or Trade School   Occupation (Pre-Hospital Vocational) Retired Due To Age   Speech Mechanisms / Voice Production   Facial Weakness Left Moderate (3)   Conversational Intelligibility Within Functional Limits (6-7)   Labial Function   Labial Structure At Rest Moderate   Lingual Function   Lingual Protrude Moderate   Lateralization Moderate Left   Jaw Function   Jaw Structure At Rest Within Functional Limits   Velar Function   Velar Structure At Rest Within Functional Limits   Laryngeal Function   Max Phonation Time (Seconds) 5-6 seconds   Swallowing   Thick Nectar / Honey / Liquid Within Functional Limits   Pureed (4) Minimal   Thin Liquid Within Functional Limits   Dysphagia Strategies / Recommendations   Strategies / Interventions Recommended (Yes / No) Yes   Compensatory Strategies To Be Assessed   Therapy Interventions MBSS Evaluation   Barriers To Oral Feeding   Barriers To Oral Feeding Dysarthria (Min / Mod / Severe);Generalized Weakness   Cervical Ausculatation Not Tested   Pre-Feeding Oral Stimulation Trial Not Tested   Swallowing/Nutritional Status   Swallowing/Nutritional  Status Modified food consistency   Eating   Assistance Needed Independent;Set-up / clean-up   Physical Assistance Level No physical assistance   CARE Score - Eating -   Eating Discharge Goal   Discharge Goal 6   Oral Hygiene   Assistance Needed Physical assistance   Comment Severe thrush   CARE Score - Oral Hygiene -   Oral Hygiene Discharge Goal   Discharge Goal 6   Functional Level of Assist   Eating Independent   Eating Description Checking for pocketing of food;Increased time;Set-up of equipment or meal/tube feeding;Supervision for safety   Comprehension Independent   Comprehension Description Glasses   Expression Independent   Expression Description Set-up of equipment   Social Interaction Independent   Problem Solving Moderate Assist   Problem Solving Description Adaptive equipment   Memory Moderate Assist   Outcome Measures   [facility]   Problem List   Problem List Dysphagia;Memory Deficit   Current Discharge Plan   Current Discharge Plan Return to Prior Living Situation   Benefit   Therapy Benefit Patient would benefit from Inpatient Rehab Speech-Language Pathology to address above identified deficits.   Interdisciplinary Plan of Care Collaboration   IDT Collaboration with  Speech Therapist;Physical Therapist;;Nursing;Occupational Therapist;Dietician;Physician   Patient Position at End of Therapy Chair Alarm On;Call Light within Reach;Tray Table within Reach;Phone within Reach   Strengths & Barriers   Strengths Able to follow instructions;Adequate strength;Alert and oriented;Effective communication skills;Good insight into deficits/needs;Independent prior level of function;Pleasant and cooperative;Willingly participates in therapeutic activities;Motivated for self care and independence   Barriers Impaired balance   Speech Language Pathologist Assigned   Assigned SLP / Extension LC 60, No split, swallow         Assessment    Patient is 66 y.o. male with a diagnosis of Stroke.   Additional factors influencing patient status/progress (ie: cognitive factors, co-morbidities, social support, etc): pt indep and able to care for self prior, retired, lives with SO in [place].    BSE completed at this time. Oral mech exam revealed moderate left side weakness with mild-mod impairments related to ROM. Suspected thrush noted across tongue blade. Pt reporting no difficulty with swallow function at this time however did report recent stretching of UES due to stricture in July of 2021. Pt assessed with current diet of puree and mildly thick liquids. No overt S/SX of asp/pen noted throughout PO intake, however pt was pocketing food in left side cheek required verbal cues to clear. Pt assessed with trials of thin liquids via cup, no overt s/sx of asp/pen noted. Pt with appropriate rate of intake and bites/sip sizes consumed. Rec: pt remain on current diet of puree with mildly thick liquids with medications to be crushed in puree, enrollment into therapeutic dining, MBSS to further assess swallow and rule out aspiration with upgrade of diet as appropriate.    SCCAN was initiated however unable to be completed due to time constraints, assessment to be completed in full during next therapy session. Thus far pt presenting with mild cognitive impairment in the area of short term memory recall. Full assessment to be documented with results and additional goals to follow as appropriate.       Plan  Recommend Speech Therapy 60 minutes per day 5 days per week for patient for the following treatments:  SLP Speech Language Treatment, SLP Swallowing Dysfunction Treatment, SLP Oral Pharyngeal Evaluation, SLP Video Swallow Evaluation, SLP Self Care / ADL Training , SLP Cognitive Skill Development and SLP Group Treatment.      Goals:  Long term and short term goals have been discussed with patient and they are in agreement.    Speech Therapy Problems (Active)     Problem: Problem Solving STGs     Dates: Start: 10/20/21        Description: 1) Individualized goal:  Pt will complete the SCCAN assessment with results and  follow goals as appropriate  2) Interventions: SLP Speech Language Treatment, SLP Self Care / ADL Training , SLP Cognitive Skill Development, and SLP Group Treatment        Goal: STG-Within one week, patient will     Dates: Start: 10/20/21       Description: 1) Individualized goal:  complete the SCCAN assessment with results and additional goals to follow as appropriate.  2) Interventions: SLP Speech Language Treatment, SLP Self Care / ADL Training , SLP Cognitive Skill Development, and SLP Group Treatment              Problem: Speech/Swallowing LTGs     Dates: Start: 10/20/21       Goal: LTG-By discharge, patient will safely swallow     Dates: Start: 10/20/21       Description: 1) Individualized goal:  regular textures with thin liquids with no overt S/SX with 100% accuracy provided Mod I  2) Interventions: SLP Swallowing Dysfunction Treatment, SLP Oral Pharyngeal Evaluation, SLP Video Swallow Evaluation, SLP Self Care / ADL Training , SLP Cognitive Skill Development, and SLP Group Treatment           Goal: LTG-By discharge, patient will     Dates: Start: 10/20/21       Description: 1) Individualized goal: complete complex problem solving and recall information with 80% accuracy provided Mod I  2) Interventions:  SLP Self Care / ADL Training , SLP Cognitive Skill Development, and SLP Group Treatment              Problem: Swallowing STGs     Dates: Start: 10/20/21       Goal: STG-Within one week, patient will safely swallow     Dates: Start: 10/20/21       Description: 1) Individualized goal:  trials of minced and moist textures and thin liquids with no overt S/SX on 80% of the trials provided min A  2) Interventions:  SLP Swallowing Dysfunction Treatment, SLP Oral Pharyngeal Evaluation, SLP Video Swallow Evaluation, SLP Self Care / ADL Training , SLP Cognitive Skill Development, and SLP Group Treatment           Goal:  STG-Within one week, patient will     Dates: Start: 10/20/21       Description: 1) Individualized goal:  participate in MBSS within 1 week   2) Interventions:  SLP Swallowing Dysfunction Treatment, SLP Oral Pharyngeal Evaluation, SLP Video Swallow Evaluation, SLP Self Care / ADL Training , SLP Cognitive Skill Development, and SLP Group Treatment

## 2021-10-20 NOTE — PROGRESS NOTES
Received bedside shift report from Messi LOBATO RN regarding patient and assumed care. Patient awake, calm and stable, currently positioned in bed for comfort and safety; call light within reach. Denies pain or discomfort at this time. Will continue to monitor.

## 2021-10-20 NOTE — THERAPY
"Occupational Therapy   Initial Evaluation     Patient Name: Fermín Gomez  Age:  66 y.o., Sex:  male  Medical Record #: [id number]  Today's Date: 10/20/2021     Subjective    \"Thank you for helping me so much today.\"     Objective       10/20/21 0701   Prior Living Situation   Prior Services None   Housing / Facility 1 Story Apartment / Condo   Bathroom Set up Walk In Shower  (no grab bars or chair)   Equipment Owned None  (trekking sticks when hiking but no other AE/DME)   Lives with - Patient's Self Care Capacity Spouse   Prior Level of ADL Function   Self Feeding Independent   Grooming / Hygiene Independent   Bathing Independent   Dressing Independent   Toileting Independent   Prior Level of IADL Function   Medication Management Independent   Laundry Independent   Kitchen Mobility Independent   Finances Independent   Home Management Independent   Shopping Independent   Prior Level Of Mobility Independent Without Device in Community   Driving / Transportation Driving Independent  (wife Aye does not drive)   Occupation (Pre-Hospital Vocational) Retired Due To Age   Leisure Interests Travel;Pets;Family  ([place] named [place]; international travel)   Prior Functioning: Everyday Activities   Self Care Independent   Indoor Mobility (Ambulation) Independent   Stairs Independent   Functional Cognition Independent   Prior Device Use None of the given options   Vitals   O2 Delivery Device None - Room Air   Vitals Comments no s/s of distress. CNA reported 88 SpO2 at start of session, but this raised with upright posture   Cognition    Level of Consciousness Alert   Cognitive Pattern Assessment   Cognitive Pattern Assessment Used BIMS   Brief Interview for Mental Status (BIMS)   Repetition of Three Words (First Attempt) 3   Temporal Orientation: Year Correct   Temporal Orientation: Month Accurate within 5 days   Temporal Orientation: Day Correct   Recall: \"Sock\" Yes, no cue required   Recall: \"Blue\" Yes, no cue required " "  Recall: \"Bed\" Yes, after cueing (\"a piece of furniture\")   BIMS Summary Score 14   Passive ROM Upper Body   Passive ROM Upper Body WDL   Active ROM Upper Body   Lt Shoulder Flexion Degrees 25   Lt Elbow Flexion Degrees   (WFL)   Lt Elbow Extension Degrees   (WFL)   Lt Wrist Flexion Degrees   (cannot move against gravity)   Lt Wrist Extension Degrees   (cannot move against gravity)   Strength Upper Body   Comments difficult for pt to achieve AROM listed above. Unable to withstand any resistance for any joints.   Sensation Upper Body   Comments reports no changes   Balance Assessment   Sitting Balance (Static) Poor +   Sitting Balance (Dynamic) Poor -   Standing Balance (Static) Trace +   Standing Balance (Dynamic)   (not tested 2/2 safety)   Bed Mobility    Supine to Sit Moderate Assist   Sit to Stand Moderate Assist   Scooting Minimal Assist   Eating   Assistance Needed Set-up / clean-up;Supervision   Physical Assistance Level No physical assistance   CARE Score - Eating 4   Eating Discharge Goal   Discharge Goal 6   Oral Hygiene   Assistance Needed Physical assistance   Physical Assistance Level 25% or less   CARE Score - Oral Hygiene 3   Oral Hygiene Discharge Goal   Discharge Goal 6   Shower/Bathe Self   Assistance Needed Physical assistance;Adaptive equipment;Supervision;Set-up / clean-up;Verbal cues   Physical Assistance Level 51%-75%   CARE Score - Shower/Bathe Self 2   Shower/Bathe Self Discharge Goal   Discharge Goal 4   Upper Body Dressing   Assistance Needed Physical assistance   Physical Assistance Level 76% or more   CARE Score - Upper Body Dressing 2   Upper Body Dressing Discharge Goal   Discharge Goal 4   Lower Body Dressing   Assistance Needed Physical assistance   Physical Assistance Level Total assistance   CARE Score - Lower Body Dressing 1   Lower Body Dressing Discharge Goal   Discharge Goal 4   Putting On/Taking Off Footwear   Assistance Needed Physical assistance   Physical Assistance Level " 51%-75%   CARE Score - Putting On/Taking Off Footwear 2   Putting On/Taking Off Footwear Discharge Goal   Discharge Goal 4   Toileting Hygiene   Assistance Needed Physical assistance   Physical Assistance Level 76% or more   CARE Score - Toileting Hygiene 2   Toileting Hygiene Discharge Goal   Discharge Goal 4   Toilet Transfer   Assistance Needed Physical assistance   Physical Assistance Level 51%-75%   CARE Score - Toilet Transfer 2   Toilet Transfer Discharge Goal   Discharge Goal 4   Hearing, Speech, and Vision   Expression of Ideas and Wants Some difficulty   Understanding Verbal and Non-Verbal Content Understands   Functional Level of Assist   Eating Stand by Assist   Eating Description Increased time;Supervision for safety  (food precut)   Grooming Minimal Assist;Seated   Bathing Maximal Assist  (constant CGA-Min for seated balance. A BLE, buttocks, back)   Upper Body Dressing Maximal Assist   Lower Body Dressing Total Assist   Toileting Total Assist   Toileting Description Grab bar;Increased time;Assist to pull pants up;Assist to pull pants down;Assist for standing balance;Assist for hygiene   Bed, Chair, Wheelchair Transfer Moderate Assist   Toilet Transfers Moderate Assist  (mod-max)   Tub / Shower Transfers Maximal Assist   Tub Shower Transfer Description Grab bar;Shower bench   Problem List   Problem List Decreased Active Daily Living Skills;Decreased Homemaking Skills;Decreased Upper Extremity Strength Left;Decreased Upper Extremity AROM Left;Decreased Functional Mobility;Decreased Activity Tolerance;Safety Awareness Deficits / Cognition;Impaired Coordination Left Upper Extremity;Impaired Postural Control / Balance;Limited Knowledge of Post Op Precautions   Precautions   Precautions Fall Risk;Swallow Precautions ( See Comments)   Comments L alden, significant L lean, Modified diet (puree with mildly thick liquids)   Current Discharge Plan   Current Discharge Plan Return to Prior Living Situation    Benefit    Therapy Benefit Patient Would Benefit from Inpatient Rehab Occupational Therapy to Maximize [place] with ADLs, IADLs and Functional Mobility.   Interdisciplinary Plan of Care Collaboration   IDT Collaboration with  Certified Nursing Assistant;Speech Therapist   Patient Position at End of Therapy Seated;Self Releasing Lap Belt Applied;Call Light within Reach;Tray Table within Reach;Phone within Reach   Collaboration Comments re: CLOF, POC, and results of eval. Also wrote on communication board. Pt is not to be left alone on toilet   Equipment Needs   Assistive Device / DME Grab Bars In Shower / Tub;Grab Bars By Toilet;Shower Chair;Bedside Commode  (device for ambulation as determined by PT)   Adaptive Equipment Reacher;Sock Aide;Dressing Stick;Long Handled Sponge;Leg    Strengths & Barriers   Strengths Alert and oriented;Independent prior level of function;Motivated for self care and independence;Pleasant and cooperative;Supportive family;Willingly participates in therapeutic activities   Barriers Hemiparesis;Decreased endurance;Home accessibility;Impaired activity tolerance;Impaired balance;Impaired functional cognition;Limited mobility;Fatigue  (impaired termination and initiation, impaired sequencing)   OT Total Time Spent   OT Individual Total Time Spent (Mins) 60   OT Charge Group   OT Self Care / ADL 1   OT Evaluation OT Evaluation Mod       Assessment  Patient is 66 y.o. male with a diagnosis of CVA- pt was hiking in [facility] when he had a GLF and L sided weakness and found to have R MCA occlusion s/p R MCA thrombectomy , dominant inferior division of right M2 partial occlusion which was unable to be recannulized, TICI 2A. Pt also presents with SDH likely due to fall.  Additional factors influencing patient status / progress (ie: cognitive factors, co-morbidities, social support, etc): PMH of spherocytosis s/p splenectomy, and GERD. Pt significantly below baseline. Pt presents with L  hemiparesis and impairments to seated/standing balance, coordination, sequencing, initiation, termination, strength, endurance, and cognition which impacts safety and independence with I/ADLs and driving. Pt is the only  in his household. Pt's 3 children live nearby.    Plan  Recommend Occupational Therapy  minutes per day 5-7 days per week for 14-21 days for the following treatments:  OT Group Therapy, OT Self Care/ADL, OT Cognitive Skill Dev, OT Community Reintegration, OT Manual Ther Technique, OT Neuro Re-Ed/Balance, OT Sensory Int Techniques, OT Therapeutic Activity, OT Evaluation and OT Therapeutic Exercise.    Passport items to be completed:  Perform bathroom transfers, complete dressing, complete feeding, get ready for the day, prepare a simple meal, participate in household tasks, adapt home for safety needs, demonstrate home exercise program, complete caregiver training     Goals:  Long term and short term goals have been discussed with patient and they are in agreement.    Occupational Therapy Goals (Active)     Problem: Dressing     Dates: Start: 10/20/21       Goal: STG-Within one week, patient will dress UB with Mod A and AE as needed     Dates: Start: 10/20/21          Goal: STG-Within one week, patient will dress LB with Mod A and AE as needed     Dates: Start: 10/20/21             Problem: Functional Transfers     Dates: Start: 10/20/21       Goal: STG-Within one week, patient will transfer to toilet with Min A and AE as needed     Dates: Start: 10/20/21             Problem: Grooming     Dates: Start: 10/20/21       Goal: STG-Within one week, patient will complete grooming with SBA     Dates: Start: 10/20/21             Problem: OT Long Term Goals     Dates: Start: 10/20/21       Goal: LTG-By discharge, patient will complete basic self care tasks with CGA- mod I and AE as needed     Dates: Start: 10/20/21          Goal: LTG-By discharge, patient will perform bathroom transfers with CGA-  mod I and AE as needed     Dates: Start: 10/20/21

## 2021-10-21 ENCOUNTER — APPOINTMENT (OUTPATIENT)
Dept: RADIOLOGY | Facility: REHABILITATION | Age: 66
DRG: 057 | End: 2021-10-21
Attending: PHYSICAL MEDICINE & REHABILITATION
Payer: COMMERCIAL

## 2021-10-21 DIAGNOSIS — I63.9 CEREBROVASCULAR ACCIDENT (CVA), UNSPECIFIED MECHANISM (HCC): ICD-10-CM

## 2021-10-21 PROCEDURE — 92526 ORAL FUNCTION THERAPY: CPT

## 2021-10-21 PROCEDURE — 97530 THERAPEUTIC ACTIVITIES: CPT

## 2021-10-21 PROCEDURE — 97112 NEUROMUSCULAR REEDUCATION: CPT | Mod: CO

## 2021-10-21 PROCEDURE — 700111 HCHG RX REV CODE 636 W/ 250 OVERRIDE (IP): Performed by: PHYSICAL MEDICINE & REHABILITATION

## 2021-10-21 PROCEDURE — 74230 X-RAY XM SWLNG FUNCJ C+: CPT

## 2021-10-21 PROCEDURE — A9270 NON-COVERED ITEM OR SERVICE: HCPCS | Performed by: PHYSICAL MEDICINE & REHABILITATION

## 2021-10-21 PROCEDURE — 700102 HCHG RX REV CODE 250 W/ 637 OVERRIDE(OP): Performed by: PHYSICAL MEDICINE & REHABILITATION

## 2021-10-21 PROCEDURE — 770010 HCHG ROOM/CARE - REHAB SEMI PRIVAT*

## 2021-10-21 PROCEDURE — 92611 MOTION FLUOROSCOPY/SWALLOW: CPT

## 2021-10-21 PROCEDURE — 97130 THER IVNTJ EA ADDL 15 MIN: CPT

## 2021-10-21 PROCEDURE — 99233 SBSQ HOSP IP/OBS HIGH 50: CPT | Performed by: PHYSICAL MEDICINE & REHABILITATION

## 2021-10-21 PROCEDURE — 97129 THER IVNTJ 1ST 15 MIN: CPT

## 2021-10-21 PROCEDURE — 97112 NEUROMUSCULAR REEDUCATION: CPT

## 2021-10-21 RX ORDER — LANOLIN ALCOHOL/MO/W.PET/CERES
3 CREAM (GRAM) TOPICAL
Status: DISCONTINUED | OUTPATIENT
Start: 2021-10-21 | End: 2021-11-08 | Stop reason: HOSPADM

## 2021-10-21 RX ORDER — TRAZODONE HYDROCHLORIDE 50 MG/1
50 TABLET ORAL
Status: DISCONTINUED | OUTPATIENT
Start: 2021-10-21 | End: 2021-11-04

## 2021-10-21 RX ORDER — BISACODYL 10 MG
10 SUPPOSITORY, RECTAL RECTAL
Status: DISCONTINUED | OUTPATIENT
Start: 2021-10-21 | End: 2021-11-08 | Stop reason: HOSPADM

## 2021-10-21 RX ORDER — TAMSULOSIN HYDROCHLORIDE 0.4 MG/1
0.4 CAPSULE ORAL EVERY EVENING
Status: DISCONTINUED | OUTPATIENT
Start: 2021-10-21 | End: 2021-11-08 | Stop reason: HOSPADM

## 2021-10-21 RX ORDER — AMOXICILLIN 250 MG
2 CAPSULE ORAL 2 TIMES DAILY PRN
Status: DISCONTINUED | OUTPATIENT
Start: 2021-10-21 | End: 2021-11-08 | Stop reason: HOSPADM

## 2021-10-21 RX ORDER — OMEPRAZOLE 20 MG/1
20 CAPSULE, DELAYED RELEASE ORAL
Status: DISCONTINUED | OUTPATIENT
Start: 2021-10-22 | End: 2021-11-08 | Stop reason: HOSPADM

## 2021-10-21 RX ORDER — POLYETHYLENE GLYCOL 3350 17 G/17G
1 POWDER, FOR SOLUTION ORAL
Status: DISCONTINUED | OUTPATIENT
Start: 2021-10-21 | End: 2021-11-08 | Stop reason: HOSPADM

## 2021-10-21 RX ADMIN — DOCUSATE SODIUM 50 MG AND SENNOSIDES 8.6 MG 2 TABLET: 8.6; 5 TABLET, FILM COATED ORAL at 09:24

## 2021-10-21 RX ADMIN — NYSTATIN 500000 UNITS: 100000 SUSPENSION ORAL at 14:54

## 2021-10-21 RX ADMIN — NYSTATIN 500000 UNITS: 100000 SUSPENSION ORAL at 18:05

## 2021-10-21 RX ADMIN — ATORVASTATIN CALCIUM 80 MG: 40 TABLET, FILM COATED ORAL at 20:38

## 2021-10-21 RX ADMIN — MELATONIN TAB 3 MG 3 MG: 3 TAB at 20:38

## 2021-10-21 RX ADMIN — TAMSULOSIN HYDROCHLORIDE 0.4 MG: 0.4 CAPSULE ORAL at 20:38

## 2021-10-21 RX ADMIN — ENOXAPARIN SODIUM 40 MG: 40 INJECTION SUBCUTANEOUS at 20:39

## 2021-10-21 RX ADMIN — NYSTATIN 500000 UNITS: 100000 SUSPENSION ORAL at 20:38

## 2021-10-21 RX ADMIN — OMEPRAZOLE 40 MG: KIT at 09:25

## 2021-10-21 RX ADMIN — TRAZODONE HYDROCHLORIDE 50 MG: 50 TABLET ORAL at 20:38

## 2021-10-21 RX ADMIN — NYSTATIN 500000 UNITS: 100000 SUSPENSION ORAL at 09:23

## 2021-10-21 RX ADMIN — ASPIRIN 81 MG CHEWABLE TABLET 81 MG: 81 TABLET CHEWABLE at 09:24

## 2021-10-21 ASSESSMENT — GAIT ASSESSMENTS
DEVIATION: ATAXIC;DECREASED BASE OF SUPPORT;BRADYKINETIC;DECREASED HEEL STRIKE;DECREASED TOE OFF;OTHER (COMMENT)
DISTANCE (FEET): 20
ASSISTIVE DEVICE: OTHER (COMMENTS)
GAIT LEVEL OF ASSIST: CONTACT GUARD ASSIST

## 2021-10-21 ASSESSMENT — ACTIVITIES OF DAILY LIVING (ADL): BED_CHAIR_WHEELCHAIR_TRANSFER_DESCRIPTION: ADAPTIVE EQUIPMENT;INCREASED TIME;SUPERVISION FOR SAFETY;VERBAL CUEING

## 2021-10-21 NOTE — THERAPY
Speech Language Pathology  Daily Treatment     Patient Name: Fermín Gomez  Age:  66 y.o., Sex:  male  Medical Record #: [id number]  Today's Date: 10/21/2021     Precautions  Precautions: Fall Risk, Swallow Precautions ( See Comments)  Comments: Modified diet, purée with mildly thick liquids    Subjective    Pt was pleasant and cooperative throughout therapy session. Pt was about to start eating breakfast in T-dine upon SLP arrival, pt requested coffee.     Objective       10/21/21 0833   SCCAN (Scales of Cognitive and Communicative Ability for Neurorehabilitation)   Oral Expression - Raw Score 19   Oral Expression - Scale Performance Score 100   Orientation - Raw Score 12   Orientation - Scale Performance Score 100   Memory - Raw Score 14   Memory - Scale Performance Score 74   Speech Comprehension - Raw Score 12   Speech Comprehension - Scale Performance Score 92   Reading Comprehension - Raw Score 12   Reading Comprehension - Scale Performance Score 100   Writing - Raw Score 7   Writing - Scale Performance Score 100   Attention - Raw Score 14   Attention - Scale Performance Score 88   Problem Solving - Raw Score 21   Problem Solving - Scale Performance Score 91   SCCAN Total Raw Score 86   SCCAN Degree of Severity Mild Impairment       Assessment    Pt assessed with current diet of puree textures with mildly thick liquids. Pt tolerated trials of thin liquids (coffee) during breakfast. No overt S/SX of aspiration or penetration noted, complete MBSS to rule out aspiration and adjust diet as appropriate.  Cognitive assessment completed with use of SCCAN to assess a variety of cognitive domains and determine level of impairment. Pt completed assessment with a raw score of 86 achieved which indicates MILD borderline WFL (1 point away from typical functioning) cognitive impairments. Pt with greatest impairment in area of short term memory achieving 74%, all other scores in the  range with the exception of attention  which was scored at 88%. Rec: brief cognitive intervention for implementation of functional memory strategies to assist in recall of day to day information as well as new information related to hospitalization and diagnosis.     Strengths: Able to follow instructions, Adequate strength, Alert and oriented, Effective communication skills, Good insight into deficits/needs, Independent prior level of function, Pleasant and cooperative, Willingly participates in therapeutic activities, Motivated for self care and independence  Barriers: Impaired balance, Aspiration risk, Impaired functional cognition    Plan    Complete MBSS, update diet as appropriate  Implement functional memory strategies, stroke education.   Passport items to be completed:      Speech Therapy Problems (Active)     Problem: Problem Solving STGs     Dates: Start: 10/20/21       Description: 1) Individualized goal:  Pt will complete the SCCAN assessment with results and  follow goals as appropriate  2) Interventions: SLP Speech Language Treatment, SLP Self Care / ADL Training , SLP Cognitive Skill Development, and SLP Group Treatment        Goal: STG-Within one week, patient will     Dates: Start: 10/20/21       Description: 1) Individualized goal:  complete the SCCAN assessment with results and additional goals to follow as appropriate.  2) Interventions: SLP Speech Language Treatment, SLP Self Care / ADL Training , SLP Cognitive Skill Development, and SLP Group Treatment              Problem: Speech/Swallowing LTGs     Dates: Start: 10/20/21       Goal: LTG-By discharge, patient will safely swallow     Dates: Start: 10/20/21       Description: 1) Individualized goal:  regular textures with thin liquids with no overt S/SX with 100% accuracy provided Mod I  2) Interventions: SLP Swallowing Dysfunction Treatment, SLP Oral Pharyngeal Evaluation, SLP Video Swallow Evaluation, SLP Self Care / ADL Training , SLP Cognitive Skill Development, and SLP Group  Treatment           Goal: LTG-By discharge, patient will     Dates: Start: 10/20/21       Description: 1) Individualized goal: complete complex problem solving and recall information with 80% accuracy provided Mod I  2) Interventions:  SLP Self Care / ADL Training , SLP Cognitive Skill Development, and SLP Group Treatment              Problem: Swallowing STGs     Dates: Start: 10/20/21       Goal: STG-Within one week, patient will safely swallow     Dates: Start: 10/20/21       Description: 1) Individualized goal:  trials of minced and moist textures and thin liquids with no overt S/SX on 80% of the trials provided min A  2) Interventions:  SLP Swallowing Dysfunction Treatment, SLP Oral Pharyngeal Evaluation, SLP Video Swallow Evaluation, SLP Self Care / ADL Training , SLP Cognitive Skill Development, and SLP Group Treatment           Goal: STG-Within one week, patient will     Dates: Start: 10/20/21       Description: 1) Individualized goal:  participate in MBSS within 1 week   2) Interventions:  SLP Swallowing Dysfunction Treatment, SLP Oral Pharyngeal Evaluation, SLP Video Swallow Evaluation, SLP Self Care / ADL Training , SLP Cognitive Skill Development, and SLP Group Treatment

## 2021-10-21 NOTE — THERAPY
"Occupational Therapy  Daily Treatment     Patient Name: Fermín Gomez  Age:  66 y.o., Sex:  male  Medical Record #: [id number]  Today's Date: 10/21/2021     Precautions  Precautions: (P) Fall Risk, Swallow Precautions ( See Comments)  Comments: (P) L hemiparesis, modified diet purée with mildly thick liquids         Subjective    \"Ok  Agreeable to tx activity      Objective       10/21/21 1331   Precautions   Precautions Fall Risk;Swallow Precautions ( See Comments)   Comments L hemiparesis, modified diet purée with mildly thick liquids   Functional Level of Assist   Bed, Chair, Wheelchair Transfer Contact Guard Assist  (w/c  <-> sitting on mat )   Neuro-Muscular Treatments   Neuro-Muscular Treatments Tapping;Verbal Cuing;Weight Shift Left   Comments seated edge of mat  left scapular mobilization  followed by weight bearing through left UE while reaching with the right.  supine on mat  worked on active assisted chest presses  x  5 reps x 2   with PVC pipe frame.       Interdisciplinary Plan of Care Collaboration   IDT Collaboration with  Family / Caregiver   Patient Position at End of Therapy Seated  (hand off to ST for tx )   Collaboration Comments deepika Griffiths present and observing tx.   educated both regarding purpose of tx activities performed  edcuated regarding roles of OT and PT in Alvarez's recovery.    OT Total Time Spent   OT Individual Total Time Spent (Mins) 30   OT Charge Group   OT Neuromuscular Re-education / Balance 2       Assessment    Completed tx no complaints.  Left UE  Weakness more proximal than distal     Strengths: Alert and oriented, Independent prior level of function, Motivated for self care and independence, Pleasant and cooperative, Supportive family, Willingly participates in therapeutic activities  Barriers: Hemiparesis, Decreased endurance, Home accessibility, Impaired activity tolerance, Impaired balance, Impaired functional cognition, Limited mobility, Fatigue (impaired termination and " initiation, impaired sequencing)    Plan      Neuro re ed , ther ex and activity to improve left UE functional use.    ADL , related mobility and transfer     Passport items to be completed:  Perform bathroom transfers, complete dressing, complete feeding, get ready for the day, prepare a simple meal, participate in household tasks, adapt home for safety needs, demonstrate home exercise program, complete caregiver training     Occupational Therapy Goals (Active)     Problem: Dressing     Dates: Start: 10/20/21       Goal: STG-Within one week, patient will dress UB with Mod A and AE as needed     Dates: Start: 10/20/21          Goal: STG-Within one week, patient will dress LB with Mod A and AE as needed     Dates: Start: 10/20/21             Problem: Functional Transfers     Dates: Start: 10/20/21       Goal: STG-Within one week, patient will transfer to toilet with Min A and AE as needed     Dates: Start: 10/20/21             Problem: Grooming     Dates: Start: 10/20/21       Goal: STG-Within one week, patient will complete grooming with SBA     Dates: Start: 10/20/21             Problem: OT Long Term Goals     Dates: Start: 10/20/21       Goal: LTG-By discharge, patient will complete basic self care tasks with CGA- mod I and AE as needed     Dates: Start: 10/20/21          Goal: LTG-By discharge, patient will perform bathroom transfers with CGA- mod I and AE as needed     Dates: Start: 10/20/21

## 2021-10-21 NOTE — THERAPY
"Speech Language Pathology  Video Swallow     Patient Name:  Fermín Gomez  AGE:  66 y.o., SEX:  male  Medical Record #:  [id number]  Today's Date: 10/21/2021     Objective    Pt agreeable to MBSS, highly motivated to resume regular diet.     Assessment     10/21/21 1404   History / Background Information   Prior Level of Function for Eating / Swallowing Pt previously tolerating regular textures/thin liquids   Diagnosis Acute infarcts in the right MCA territory as described above predominantly involving the frontal lobe. Scattered foci of acute infarction are also noted in the right parietal lobe.   Onset Date Of Dysphagia 10/12/21   Dysphagia Symptoms Warranting Video Swallow Pt on modified diet of puree/MT2, previous penetration, suspected aspiration on FEES at [facility]   General Anatomy / Physiology unremarkable   \"Dry\" / Saliva Swallow Observations not tested   Dentition Intact   Procedure   Patient Seated in  w/c   Seated at (Degrees) 90   Views Completed Lateral;Anterior / Posterior   Fluoroscopy Time 229.5   Consistencies / Presentation Method   Consistencies / Presentation Method Tested   Mildly Thick (2) - (Nectar Thick) Teaspoon;Cup   Thin (0) Teaspoon;Cup   Pureed (4) Teaspoon   Soft & Bite-Sized (6) - (Dysphagia III) Teaspoon   Regular (7)   (bite size cookie)   Barium Tablet Cup  (thin liquid wash)   Oral Phase   Oral Phase X   Mildly Thick (2) - (Nectar Thick) Oral Residue After the Swallow;Premature Spillage Into Valleculae;Premature Spillage Into Lateral Channels;Premature Spillage Into Pyriform Sinus   Thin (0) Oral Residue After the Swallow;Premature Spillage Into Valleculae;Premature Spillage Into Lateral Channels;Premature Spillage Into Pyriform Sinus   Pureed (4) Oral Residue After the Swallow;Premature Spillage Into Valleculae;Premature Spillage Into Lateral Channels   Soft & Bite-Sized (6) - (Dysphagia III) Premature Spillage Into Valleculae;Premature Spillage Into Lateral Channels;Premature Spillage " Into Pyriform Sinus   Regular (7) Premature Spillage Into Valleculae;Premature Spillage Into Lateral Channels   Pharyngeal Phase   Pharyngeal Phase X   Mildly Thick (2) - (Nectar Thick) Residue In Valleculae;Residue In Pyriform Sinus;Residue On Posterior Pharyngeal Wall;Reduced Tongue Base Retraction   Thin (0) Residue In Valleculae;Residue In Pyriform Sinus;Residue On Posterior Pharyngeal Wall;Aspiration During Swallow;Reduced Tongue Base Retraction  (immediate cough response )   Pureed (4) Residue In Pyriform Sinus;Residue In Valleculae;Residue On Posterior Pharyngeal Wall;Reduced Tongue Base Retraction    Soft & Bite-Sized (6) - (Dysphagia III) Reduced Tongue Base Retraction;Residue In Valleculae;Residue In Pyriform Sinus;Residue On Posterior Pharyngeal Wall   Regular (7) Reduced Tongue Base Retraction;Residue In Valleculae;Residue In Pyriform Sinus;Residue On Posterior Pharyngeal Wall   Additional Comments brennon aspiration on initial trial cup sip thins with immediate cough response. All subsequent trials pt completing 3 second prep with no aspiration. Delayed onset of 2nd swallow to clear oral and pharyngeal residue   Esophageal Phase   Esophageal Phase X   Thin (0) Esophageal Dysmotility ;Esophageal Reflux   Puree Esophageal Dysmotility;Esophageal Reflux   Esophageal Phase Comments upper 1/3 of esophagus noted that have significant barium residue, decreased motility and slight ascending motion of bolus, however, no retrograde flow past the level of the UES   Compensatory Strategies Attempted   Compensatory Strategies Attempted Yes   Multiple Swallows effective, however, pt's initiation of second swallow is delayed   Effortful Swallows effective when paired with serial swallows   Cough / Clear After Swallow immediate cough response following aspiration of thin liquids by cup   Liquid Wash After Swallow effective when paired with 3 second prep and serial swallows   Three Second Prep effective for all subsequent  trials of thin liquids on this study   Impression   Dysphagia Present Yes   Oral - Pharyngeal Mild - Moderate Impairment   Pharyngeal - Esophageal Moderate Impairment   Additional Comments MBSS completed this date with trials of thin liquids (5mL, cup sip), Mildly thick liquids (5mL, cup), puree (5mL), soft solids, hard solids, barium capsule. Pt presents with premature spillage of all liquid boluses to the level of the pyriforms, brennon aspiration noted on initial trial of cup sip thin liquids, all subsequent trials completed with 3 second prep which greatly improved oral containment, and swallow timeliness with no further instances of penetration or aspiration demonstrated (5 additional trials assessed during study). Oral residue noted with all consistencies, more prevalent with puree and solid textures, requiring serial swallows to clear, however, pt's initiation of second swallow is delayed with tongue pumping noted in an effort to initiate. Pt independently completing serial swallow with liquid boluses. Vallecular, pyriform and PPW residue noted with all textures, again requiring serial swallows to clear. Pt demonstrates reduced tongue base contraction, impacting abillity to fully clear pharyngeal residue. Liquid wash, paired with 3 second prep aided in clearance of residue of puree. Upon A-P pt's esophagus with considerable barium residue in upper 1/3, trial of puree and thin liquids by cup demonstrated slowed motility and slight ascending movement (no retrograde flow past the level of UES). Recommend pt advance to soft/bite size, thin liquids via cup with consistent use of 3 second prep. Pt to continue in therapeutic dining setting with supervision and ongoing training in use of compensatory strategies. Meds whole with thin liquid wash 1:1. Pt would also benefit from ongoing strengthening exercises and use of sEMG for visual biofeedback and in effort to increase BOT strength and swallow coordination.     Prognosis   Prognosis for Improvement Good   Barriers to Improvement facial weakness, BOT weakness   Positive Indicators for Improvement Pt is highly motivated, is able to implement strategies, good carryover   Recommendations   Diet / Liquid Recommendation Thin (0);Soft & Bite-Sized (6) - (Dysphagia III)   Medication Administration  Whole with Liquid Wash  (one at a time with thin liquid wash)   Strategies / Precautions Supervision Required;Small Bites;Small Sips;No Straws;No Talking while Eating;Multiple Swallows;Effortful Swallow;Alternate Solids / Liquids;Sitting Upright at 90 Degrees while Eating;Stay Upright at Least 30 Minutes After Meals;Oral Care After Meals;Monitor Temperature and Lung Sounds;Other (See Comments)  (3 second prep for all liquids)   Interventions Dysphagia Therapy by SLP;Compensatory Safe Swallow Strategy Training;Therapeutic Dining Program for Meals;Pharyngeal - Laryngeal Strengthening Exercises;Surface Electromyographic Biofeedback (SEMG);Patient / Caregiver Education / Training   SLP Contact Information (Name / Extension) Kristine Galan MS, CCC-SLP, extension [phone]   Video Tape Number [phone]   Interdisciplinary Plan of Care Collaboration   IDT Collaboration with  Family / Caregiver;Speech Therapist   Patient Position at End of Therapy Seated;Family / Friend in Room   Collaboration Comments education with spouse and primary SLP re: results of study and POC   SLP Total Time Spent   SLP Individual Total Time Spent (Mins) 30   Charge Group   SLP Video Swallow / FEES Videofluoroscopic Evaluation       Plan    Advance to soft/bite size, thin liquids via cup, meds whole one at a time with thin liquid wash. Pt seated up in chair for all meals in therapeutic dining. Consistent use of 3 second prep for all liquids.

## 2021-10-21 NOTE — PROGRESS NOTES
"Rehab Progress Note     Date of Service: 10/21/2021  Chief Complaint: follow up stroke    Interval Events (Subjective)      Patient seen and examined today in the therapy gym.   He did not sleep well last night on the melatonin.  He had a bowel incontinence this am, wants to decrease the stool softener.  Has some edema in the left ankle/foot.  He denies any pain.     ROS: No changes to bladder, pain, or mood.          Objective:  VITAL SIGNS: /66   Pulse 60   Temp 36.6 °C (97.9 °F) (Oral)   Resp 18   Ht 1.558 m (5' 1.32\")   Wt 85.7 kg (188 lb 15 oz)   SpO2 91%   BMI 35.33 kg/m²   Gen: alert, no apparent distress  CV: mild edema in left foot  Neuro: notable for left hemiparesis        Recent Results (from the past 72 hour(s))   CBC WITH DIFFERENTIAL    Collection Time: 10/19/21  3:09 AM   Result Value Ref Range    WBC 10.7 4.8 - 10.8 K/uL    RBC 4.92 4.70 - 6.10 M/uL    Hemoglobin 16.2 14.0 - 18.0 g/dL    Hematocrit 45.9 42.0 - 52.0 %    MCV 93.3 81.4 - 97.8 fL    MCH 32.9 27.0 - 33.0 pg    MCHC 35.3 33.7 - 35.3 g/dL    RDW 43.8 35.9 - 50.0 fL    Platelet Count 529 (H) 164 - 446 K/uL    MPV 9.9 9.0 - 12.9 fL    Neutrophils-Polys 71.40 44.00 - 72.00 %    Lymphocytes 13.10 (L) 22.00 - 41.00 %    Monocytes 9.90 0.00 - 13.40 %    Eosinophils 3.50 0.00 - 6.90 %    Basophils 1.40 0.00 - 1.80 %    Immature Granulocytes 0.70 0.00 - 0.90 %    Nucleated RBC 0.00 /100 WBC    Neutrophils (Absolute) 7.67 (H) 1.82 - 7.42 K/uL    Lymphs (Absolute) 1.40 1.00 - 4.80 K/uL    Monos (Absolute) 1.06 (H) 0.00 - 0.85 K/uL    Eos (Absolute) 0.37 0.00 - 0.51 K/uL    Baso (Absolute) 0.15 (H) 0.00 - 0.12 K/uL    Immature Granulocytes (abs) 0.07 0.00 - 0.11 K/uL    NRBC (Absolute) 0.00 K/uL   Comp Metabolic Panel    Collection Time: 10/19/21  3:09 AM   Result Value Ref Range    Sodium 138 135 - 145 mmol/L    Potassium 3.8 3.6 - 5.5 mmol/L    Chloride 103 96 - 112 mmol/L    Co2 26 20 - 33 mmol/L    Anion Gap 9.0 7.0 - 16.0    " Glucose 103 (H) 65 - 99 mg/dL    Bun 14 8 - 22 mg/dL    Creatinine 0.68 0.50 - 1.40 mg/dL    Calcium 9.0 8.5 - 10.5 mg/dL    AST(SGOT) 22 12 - 45 U/L    ALT(SGPT) 15 2 - 50 U/L    Alkaline Phosphatase 78 30 - 99 U/L    Total Bilirubin 1.8 (H) 0.1 - 1.5 mg/dL    Albumin 3.7 3.2 - 4.9 g/dL    Total Protein 6.0 6.0 - 8.2 g/dL    Globulin 2.3 1.9 - 3.5 g/dL    A-G Ratio 1.6 g/dL   MAGNESIUM    Collection Time: 10/19/21  3:09 AM   Result Value Ref Range    Magnesium 1.9 1.5 - 2.5 mg/dL   ESTIMATED GFR    Collection Time: 10/19/21  3:09 AM   Result Value Ref Range    GFR If African American >60 >60 mL/min/1.73 m 2    GFR If Non African American >60 >60 mL/min/1.73 m 2   SARS-CoV-2 PCR (24 hour In-House): Collect NP swab in [facility]    Collection Time: 10/19/21  2:30 PM    Specimen: Nasopharyngeal; Respirate   Result Value Ref Range    SARS-CoV-2 Source NP Swab     SARS-CoV-2 by PCR NotDetected    CBC with Differential    Collection Time: 10/20/21  5:37 AM   Result Value Ref Range    WBC 12.5 (H) 4.8 - 10.8 K/uL    RBC 4.95 4.70 - 6.10 M/uL    Hemoglobin 16.4 14.0 - 18.0 g/dL    Hematocrit 46.0 42.0 - 52.0 %    MCV 92.9 81.4 - 97.8 fL    MCH 33.1 (H) 27.0 - 33.0 pg    MCHC 35.7 (H) 33.7 - 35.3 g/dL    RDW 43.4 35.9 - 50.0 fL    Platelet Count 565 (H) 164 - 446 K/uL    MPV 10.1 9.0 - 12.9 fL    Neutrophils-Polys 72.80 (H) 44.00 - 72.00 %    Lymphocytes 14.20 (L) 22.00 - 41.00 %    Monocytes 8.50 0.00 - 13.40 %    Eosinophils 2.90 0.00 - 6.90 %    Basophils 1.20 0.00 - 1.80 %    Immature Granulocytes 0.40 0.00 - 0.90 %    Nucleated RBC 0.00 /100 WBC    Neutrophils (Absolute) 9.06 (H) 1.82 - 7.42 K/uL    Lymphs (Absolute) 1.77 1.00 - 4.80 K/uL    Monos (Absolute) 1.06 (H) 0.00 - 0.85 K/uL    Eos (Absolute) 0.36 0.00 - 0.51 K/uL    Baso (Absolute) 0.15 (H) 0.00 - 0.12 K/uL    Immature Granulocytes (abs) 0.05 0.00 - 0.11 K/uL    NRBC (Absolute) 0.00 K/uL   Comp Metabolic Panel (CMP)    Collection Time: 10/20/21  5:37 AM   Result  Value Ref Range    Sodium 139 135 - 145 mmol/L    Potassium 3.8 3.6 - 5.5 mmol/L    Chloride 104 96 - 112 mmol/L    Co2 26 20 - 33 mmol/L    Anion Gap 9.0 7.0 - 16.0    Glucose 97 65 - 99 mg/dL    Bun 16 8 - 22 mg/dL    Creatinine 0.68 0.50 - 1.40 mg/dL    Calcium 9.0 8.5 - 10.5 mg/dL    AST(SGOT) 21 12 - 45 U/L    ALT(SGPT) 17 2 - 50 U/L    Alkaline Phosphatase 86 30 - 99 U/L    Total Bilirubin 1.6 (H) 0.1 - 1.5 mg/dL    Albumin 3.8 3.2 - 4.9 g/dL    Total Protein 6.1 6.0 - 8.2 g/dL    Globulin 2.3 1.9 - 3.5 g/dL    A-G Ratio 1.7 g/dL   Magnesium    Collection Time: 10/20/21  5:37 AM   Result Value Ref Range    Magnesium 1.9 1.5 - 2.5 mg/dL   Vitamin D, 25-hydroxy (blood)    Collection Time: 10/20/21  5:37 AM   Result Value Ref Range    25-Hydroxy   Vitamin D 25 38 30 - 100 ng/mL   HEMOGLOBIN A1C    Collection Time: 10/20/21  5:37 AM   Result Value Ref Range    Glycohemoglobin 4.6 4.0 - 5.6 %    Est Avg Glucose 85 mg/dL   ESTIMATED GFR    Collection Time: 10/20/21  5:37 AM   Result Value Ref Range    GFR If African American >60 >60 mL/min/1.73 m 2    GFR If Non African American >60 >60 mL/min/1.73 m 2   URINALYSIS    Collection Time: 10/20/21  3:07 PM    Specimen: Urine, Clean Catch   Result Value Ref Range    Color DK Yellow     Character Clear     Specific Gravity 1.028 <1.035    Ph 6.5 5.0 - 8.0    Glucose Negative Negative mg/dL    Ketones 15 (A) Negative mg/dL    Protein Negative Negative mg/dL    Bilirubin Small (A) Negative    Urobilinogen, Urine 4.0 Negative    Nitrite Negative Negative    Leukocyte Esterase Trace (A) Negative    Occult Blood Negative Negative    Micro Urine Req Microscopic    URINE MICROSCOPIC (W/UA)    Collection Time: 10/20/21  3:07 PM   Result Value Ref Range    WBC 0-2 (A) /hpf    RBC 0-2 (A) /hpf    Bacteria Negative None /hpf    Epithelial Cells Negative /hpf       Current Facility-Administered Medications   Medication Frequency   • senna-docusate (PERICOLACE or SENOKOT S) 8.6-50 MG  per tablet 2 Tablet BID PRN    And   • polyethylene glycol/lytes (MIRALAX) PACKET 1 Packet QDAY PRN    And   • magnesium hydroxide (MILK OF MAGNESIA) suspension 30 mL QDAY PRN    And   • bisacodyl (DULCOLAX) suppository 10 mg QDAY PRN   • melatonin tablet 3 mg QHS   • traZODone (DESYREL) tablet 50 mg QHS   • terazosin (HYTRIN) capsule 2 mg Q EVENING   • enoxaparin (LOVENOX) inj 40 mg DAILY   • hydrOXYzine HCl (ATARAX) tablet 50 mg Q6HRS PRN   • QUEtiapine (Seroquel) tablet 25 mg TID PRN   • simethicone (MYLICON) chewable tab 80 mg TID PRN   • Respiratory Therapy Consult Continuous RT   • Pharmacy Consult Request ...Pain Management Review 1 Each PHARMACY TO DOSE   • hydrALAZINE (APRESOLINE) tablet 10 mg Q8HRS PRN   • acetaminophen (Tylenol) tablet 650 mg Q4HRS PRN   • sodium phosphate (Fleet) enema 133 mL QDAY PRN   • artificial tears ophthalmic solution 1 Drop PRN   • benzocaine-menthol (CEPACOL) lozenge 1 Lozenge Q2HRS PRN   • mag hydrox-al hydrox-simeth (MAALOX PLUS ES or MYLANTA DS) suspension 20 mL Q2HRS PRN   • ondansetron (ZOFRAN ODT) dispertab 4 mg 4X/DAY PRN    Or   • ondansetron (ZOFRAN) syringe/vial injection 4 mg 4X/DAY PRN   • sodium chloride (OCEAN) 0.65 % nasal spray 2 Spray PRN   • atorvastatin (LIPITOR) tablet 80 mg Q EVENING   • aspirin (ASA) chewable tab 81 mg DAILY   • omeprazole (FIRST-OMEPRAZOLE) 2 mg/mL oral susp 40 mg QAM AC   • nystatin (MYCOSTATIN) 428919 UNIT/ML suspension 500,000 Units 4X/DAY       Orders Placed This Encounter   Procedures   • Diet Order Diet: Level 4 - Pureed; Liquid level: Level 2 - Mildly Thick     Standing Status:   Standing     Number of Occurrences:   1     Order Specific Question:   Diet:     Answer:   Level 4 - Pureed [25]     Order Specific Question:   Liquid level     Answer:   Level 2 - Mildly Thick       Assessment:  Active Hospital Problems    Diagnosis    • *Stroke (cerebrum) (HCC)    • Urinary retention    • Leukocytosis    • Impaired mobility and ADLs    •  Other insomnia    • Gastroesophageal reflux disease with esophagitis without hemorrhage    • Oral thrush    • Dysphagia    • Left hemiparesis (HCC)    • Subdural hematoma (HCC)    • H/O splenectomy      This patient is a 66 y.o. male admitted for acute inpatient rehabilitation with Stroke (cerebrum) (HCC).    Medical Decision Making and Plan:    Right MCA stroke  S/p tPA  S/p thrombectomy  Left hemiparesis  Dysphagia  Continue full rehab program  PT/OT/SLP, 1 hr each discipline, 5 days per week    Aspirin  Statin    Outpatient follow up with Dr. Cifuentes, stroke bridge clinic, referrals made    Subdural hematoma, small  Likely due to fall     Oral thrush  Continue Nystatin     Thrombocytosis  Stress reaction  Monitor    Leukocytosis  Check UA, negative  Checked CXR - negative  Recheck in am     Hyperglycemia  Check HgbA1c     GERD  Omeprazole    Insomnia  Schedule melatonin and trazodone    Bowel program  PRN bowel medications  Last BM 10/21    Bladder program  Urinary retention  Started Hytrin 2 mg  Check PVRs - 37  Bladder scan for no voids  ICP for over 400 cc - last required 10/20  Scheduled toileting    DVT prophylaxis  Started Lovenox 10/20, patient 8 days out from small SDH      Total time:  37 minutes.  I spent greater than 50% of the time for patient care, counseling, and coordination on this date, including patient face-to face time, unit/floor time with review of records/pertinent lab data and studies, as well as discussing diagnostic evaluation/work up, planned therapeutic interventions, and future disposition of care, as per the interval events/subjective and the assessment and plan as noted above.    I have performed a physical exam, reviewed and updated ROS, as well as the assessment and plan today 10/21/2021. In review of note from 10/20/2021 there are no new changes except as documented above.          Ruth Samuels M.D.   Physical Medicine and Rehabilitation

## 2021-10-21 NOTE — CARE PLAN
"The patient is Watcher - Medium risk of patient condition declining or worsening    Shift Goals  Clinical Goals: safety  Patient Goals: Sleep well    Melatonin PO given for sleep. Sleeping in-between care.     Problem: Bladder / Voiding  Goal: Patient will establish and maintain regular urinary output  Outcome: Progressing  Note: Pt started on Hytrin for bladder retention. Incontinent X 1, then voided in the bathroom. PVR 37 ml. ICP not needed on NOC shift. Denies bladder pain or fullness.      Problem: Bowel Elimination  Goal: Patient will participate in bowel management program  Note: LBM 10/20, large formed. Denies constipation.      Problem: Fall Risk - Rehab  Goal: Patient will remain free from falls  Note: Bita Barron Fall risk Assessment Score: 14    Moderate fall risk Interventions  - Bed and strip alarm   - Yellow sign by the door   - Yellow wrist band \"Fall risk\"  - Room near to the nurse station  - Do not leave patient unattended in the bathroom  - Fall risk education provided       Problem: Skin Integrity  Goal: Skin integrity is maintained or improved  Outcome: Progressing  Note: Patient's skin remains intact and free from new or accidental injury this shift.  Will continue to monitor.        "

## 2021-10-21 NOTE — THERAPY
10/21/21 1129   Precautions   Precautions Fall Risk;Swallow Precautions ( See Comments)   Comments L hemiparesis, modified diet purée with mildly thick liquids   Pain 0 - 10 Group   Therapist Pain Assessment 0   Cognition    Level of Consciousness Alert   Gait Functional Level of Assist    Gait Level Of Assist Contact Guard Assist   Assistive Device Other (Comments)  (Right hallway railing)   Distance (Feet) 20   # of Times Distance was Traveled 33   Deviation Ataxic;Decreased Base Of Support;Bradykinetic;Decreased Heel Strike;Decreased Toe Off;Other (Comment)  (Decreased step length)   Transfer Functional Level of Assist   Bed, Chair, Wheelchair Transfer Contact Guard Assist   Bed Chair Wheelchair Transfer Description Adaptive equipment;Increased time;Supervision for safety;Verbal cueing  (fww)   Neuro-Muscular Treatments   Neuro-Muscular Treatments Facilitation;Joint Approximation;Postural Facilitation;Sequencing;Tactile Cuing;Tapping;Verbal Cuing;Weight Shift Right;Weight Shift Left;Anterior weight shift   Comments Sequencing sit to/from stand wheelchair/fww, transfer wheelchair/bed fww, sequencing seated scooting forward in the wheelchair, sequencing and facilitation of sit to/from stand, midline orientation standing, evaluation of midline awareness with prolonged right leaning at the right hallway railing, facilitated dynamic standing weight shift during gait and facilitation of left lower extremity motor control during gait   PT Total Time Spent   PT Individual Total Time Spent (Mins) 60   PT Charge Group   Charges Yes   PT Neuromuscular Re-Education / Balance 2   PT Therapeutic Activities 2   Physical Therapy   Daily Treatment     Patient Name: Fermín Gomez  Age:  66 y.o., Sex:  male  Medical Record #: [id number]  Today's Date: 10/21/2021     Precautions  Precautions: Fall Risk, Swallow Precautions ( See Comments)  Comments: L hemiparesis, modified diet purée with mildly thick liquids    Subjective    The  patient was sitting in his chair and he agreed to PT.  He had no complaints of pain or lightheadedness during the activities.     Objective    The patient required verbal cues and CGA to stand and transfer from the wheelchair to edge of bed using a walker.  Sitting edge of the bed, PT provided assistance for lower body dressing to put on a pair of sweatpants.  The patient stood up with a walker and PT and the patient adjusted the sweatpants.  Patient sat edge of bed again and PT provided assistance to put the T-shirt on by assisting the left upper extremity and then the patient was able to put on the T-shirt for the right upper extremity and pull it over his head.  He needed assistance to adjust it in the back.  In the gym the patient stood had a right hallway railing for evaluation of standing posture and static standing midline orientation.  He tended to lean to the left but with a brief session of right prolonged leaning he was able to correct the left leaning and stand statically in midline.  PT facilitated static standing posture followed by gait training using a right hallway railing.  The patient needed facilitation at the pelvis for right weight shift and occasional facilitation to advance the left lower extremity during gait.  The patient tolerated 20 FT x3 with minimal assistance for weight shifting and left lower extremity control and stability.    Assessment    The patient benefited from the activities described above for static standing posture, static standing midline orientation, dynamic standing weight shift and gait training with facilitation for right weight shift and left lower extremity control for consistent step forward and stability of the left lower extremity.  He will benefit from  left lower extremity treatment.       Plan    Safety education, neuromuscular reeducation for bed mobility, static midline sitting, out of midline reaching and return to midline, static standing midline  orientation, dynamic standing weight shift during gait and facilitation of the left lower extremity for consistent step length, control and stability    Passport items to be completed:  Passport items to be completed:  Get in/out of bed safely, in/out of a vehicle, safely use mobility device, walk or wheel around home/community, navigate up and down stairs, show how to get up/down from the ground, ensure home is accessible, demonstrate HEP, complete caregiver training    Physical Therapy Problems (Active)     Problem: Balance     Dates: Start: 10/20/21       Goal: STG-Within one week, patient will maintain static standing     Dates: Start: 10/20/21       Goal Note filed on 10/20/21 1514 by JEANIE Kam     1) Individualized goal:   Maintain midline static standing balance, CGA 1 week  2) Interventions:  PT Therapeutic Exercises, PT Neuro Re-Ed/Balance, and PT Therapeutic Activity               Problem: Mobility     Dates: Start: 10/20/21       Goal: STG-Within one week, patient will     Dates: Start: 10/20/21       Goal Note filed on 10/20/21 1514 by JEANIE Kam     1) Individualized goal:   Gait CGA/facilitation, FWW 50 FT 1 week  2) Interventions:  PT Gait Training, PT Therapeutic Exercises, PT Neuro Re-Ed/Balance, and PT Therapeutic Activity               Problem: Mobility Transfers     Dates: Start: 10/20/21       Goal: STG-Within one week, patient will transfer bed to chair     Dates: Start: 10/20/21       Goal Note filed on 10/20/21 1514 by JEANIE Kam     1) Individualized goal:   Transfer bed to/from chair fww CGA, 1 week  2) Interventions:  PT Gait Training, PT Therapeutic Exercises, PT Neuro Re-Ed/Balance, and PT Therapeutic Activity            Goal: STG-Within one week, patient will move supine to sit     Dates: Start: 10/20/21       Goal Note filed on 10/20/21 1514 by JEANIE Kam     1) Individualized goal:   Transfer supine to/from sit CGA 1 week  2) Interventions:   PT Therapeutic Exercises, PT Neuro Re-Ed/Balance, and PT Therapeutic Activity               Problem: PT-Long Term Goals     Dates: Start: 10/20/21       Goal: LTG-By discharge, patient will ambulate     Dates: Start: 10/20/21       Goal Note filed on 10/20/21 1514 by JEANIE Kam     1) Individualized goal:   Gait FWW/ FT, supervision at discharge  2) Interventions:  PT Gait Training, PT Therapeutic Exercises, PT Neuro Re-Ed/Balance, and PT Therapeutic Activity            Goal: LTG-By discharge, patient will transfer one surface to another     Dates: Start: 10/20/21       Goal Note filed on 10/20/21 1514 by JEANIE Kam     1) Individualized goal:   Transfer 1 surface to another fww/spc supervision at discharge  2) Interventions:  PT Gait Training, PT Therapeutic Exercises, PT Neuro Re-Ed/Balance, and PT Therapeutic Activity            Goal: LTG-By discharge, patient will ambulate up/down 4-6 stairs     Dates: Start: 10/20/21       Goal Note filed on 10/20/21 1514 by JEANIE Kam     1) Individualized goal:   Up/down 4-6 stairs handrails, supervision at discharge  2) Interventions:  PT Gait Training, PT Therapeutic Exercises, PT Neuro Re-Ed/Balance, and PT Therapeutic Activity            Goal: LTG-By discharge, patient will transfer in/out of a car     Dates: Start: 10/20/21       Goal Note filed on 10/20/21 1514 by JEANIE Kam     1) Individualized goal:   Car transfer fww/spc supervision at discharge  2) Interventions:  PT Gait Training, PT Therapeutic Exercises, PT Neuro Re-Ed/Balance, and PT Therapeutic Activity

## 2021-10-22 ENCOUNTER — APPOINTMENT (OUTPATIENT)
Dept: RADIOLOGY | Facility: REHABILITATION | Age: 66
DRG: 057 | End: 2021-10-22
Attending: PHYSICAL MEDICINE & REHABILITATION
Payer: COMMERCIAL

## 2021-10-22 ENCOUNTER — ANCILLARY PROCEDURE (OUTPATIENT)
Dept: CARDIOLOGY | Facility: REHABILITATION | Age: 66
DRG: 057 | End: 2021-10-22
Attending: HOSPITALIST
Payer: COMMERCIAL

## 2021-10-22 PROBLEM — R60.0 EDEMA OF LEFT LOWER EXTREMITY: Status: ACTIVE | Noted: 2021-10-22

## 2021-10-22 LAB
BASOPHILS # BLD AUTO: 1.2 % (ref 0–1.8)
BASOPHILS # BLD: 0.18 K/UL (ref 0–0.12)
EOSINOPHIL # BLD AUTO: 0.58 K/UL (ref 0–0.51)
EOSINOPHIL NFR BLD: 3.7 % (ref 0–6.9)
ERYTHROCYTE [DISTWIDTH] IN BLOOD BY AUTOMATED COUNT: 43.8 FL (ref 35.9–50)
HCT VFR BLD AUTO: 44.4 % (ref 42–52)
HGB BLD-MCNC: 15.6 G/DL (ref 14–18)
IMM GRANULOCYTES # BLD AUTO: 0.08 K/UL (ref 0–0.11)
IMM GRANULOCYTES NFR BLD AUTO: 0.5 % (ref 0–0.9)
LYMPHOCYTES # BLD AUTO: 1.33 K/UL (ref 1–4.8)
LYMPHOCYTES NFR BLD: 8.5 % (ref 22–41)
MCH RBC QN AUTO: 32.8 PG (ref 27–33)
MCHC RBC AUTO-ENTMCNC: 35.1 G/DL (ref 33.7–35.3)
MCV RBC AUTO: 93.3 FL (ref 81.4–97.8)
MONOCYTES # BLD AUTO: 1.24 K/UL (ref 0–0.85)
MONOCYTES NFR BLD AUTO: 7.9 % (ref 0–13.4)
NEUTROPHILS # BLD AUTO: 12.19 K/UL (ref 1.82–7.42)
NEUTROPHILS NFR BLD: 78.2 % (ref 44–72)
NRBC # BLD AUTO: 0 K/UL
NRBC BLD-RTO: 0 /100 WBC
PLATELET # BLD AUTO: 636 K/UL (ref 164–446)
PMV BLD AUTO: 10.1 FL (ref 9–12.9)
RBC # BLD AUTO: 4.76 M/UL (ref 4.7–6.1)
WBC # BLD AUTO: 15.6 K/UL (ref 4.8–10.8)

## 2021-10-22 PROCEDURE — 700102 HCHG RX REV CODE 250 W/ 637 OVERRIDE(OP): Performed by: HOSPITALIST

## 2021-10-22 PROCEDURE — 92526 ORAL FUNCTION THERAPY: CPT

## 2021-10-22 PROCEDURE — 97112 NEUROMUSCULAR REEDUCATION: CPT | Mod: CO

## 2021-10-22 PROCEDURE — 93971 EXTREMITY STUDY: CPT | Mod: LT

## 2021-10-22 PROCEDURE — 770010 HCHG ROOM/CARE - REHAB SEMI PRIVAT*

## 2021-10-22 PROCEDURE — 99223 1ST HOSP IP/OBS HIGH 75: CPT | Performed by: HOSPITALIST

## 2021-10-22 PROCEDURE — 87086 URINE CULTURE/COLONY COUNT: CPT

## 2021-10-22 PROCEDURE — 97535 SELF CARE MNGMENT TRAINING: CPT | Mod: CO

## 2021-10-22 PROCEDURE — 87077 CULTURE AEROBIC IDENTIFY: CPT

## 2021-10-22 PROCEDURE — 700102 HCHG RX REV CODE 250 W/ 637 OVERRIDE(OP): Performed by: PHYSICAL MEDICINE & REHABILITATION

## 2021-10-22 PROCEDURE — A9270 NON-COVERED ITEM OR SERVICE: HCPCS | Performed by: HOSPITALIST

## 2021-10-22 PROCEDURE — 81001 URINALYSIS AUTO W/SCOPE: CPT

## 2021-10-22 PROCEDURE — 85025 COMPLETE CBC W/AUTO DIFF WBC: CPT

## 2021-10-22 PROCEDURE — A9270 NON-COVERED ITEM OR SERVICE: HCPCS | Performed by: PHYSICAL MEDICINE & REHABILITATION

## 2021-10-22 PROCEDURE — 87186 SC STD MICRODIL/AGAR DIL: CPT

## 2021-10-22 PROCEDURE — 97129 THER IVNTJ 1ST 15 MIN: CPT

## 2021-10-22 PROCEDURE — 97116 GAIT TRAINING THERAPY: CPT

## 2021-10-22 PROCEDURE — 97130 THER IVNTJ EA ADDL 15 MIN: CPT

## 2021-10-22 PROCEDURE — 700111 HCHG RX REV CODE 636 W/ 250 OVERRIDE (IP): Performed by: PHYSICAL MEDICINE & REHABILITATION

## 2021-10-22 PROCEDURE — 36415 COLL VENOUS BLD VENIPUNCTURE: CPT

## 2021-10-22 PROCEDURE — 97112 NEUROMUSCULAR REEDUCATION: CPT

## 2021-10-22 PROCEDURE — 71045 X-RAY EXAM CHEST 1 VIEW: CPT

## 2021-10-22 PROCEDURE — 99233 SBSQ HOSP IP/OBS HIGH 50: CPT | Performed by: PHYSICAL MEDICINE & REHABILITATION

## 2021-10-22 RX ORDER — LEVOFLOXACIN 250 MG/1
500 TABLET, FILM COATED ORAL DAILY
Status: COMPLETED | OUTPATIENT
Start: 2021-10-22 | End: 2021-10-28

## 2021-10-22 RX ORDER — METRONIDAZOLE 500 MG/1
500 TABLET ORAL EVERY 8 HOURS
Status: COMPLETED | OUTPATIENT
Start: 2021-10-22 | End: 2021-10-29

## 2021-10-22 RX ADMIN — NYSTATIN 500000 UNITS: 100000 SUSPENSION ORAL at 13:00

## 2021-10-22 RX ADMIN — TAMSULOSIN HYDROCHLORIDE 0.4 MG: 0.4 CAPSULE ORAL at 20:51

## 2021-10-22 RX ADMIN — NYSTATIN 500000 UNITS: 100000 SUSPENSION ORAL at 20:51

## 2021-10-22 RX ADMIN — ATORVASTATIN CALCIUM 80 MG: 40 TABLET, FILM COATED ORAL at 20:50

## 2021-10-22 RX ADMIN — OMEPRAZOLE 20 MG: 20 CAPSULE, DELAYED RELEASE ORAL at 08:46

## 2021-10-22 RX ADMIN — NYSTATIN 500000 UNITS: 100000 SUSPENSION ORAL at 18:48

## 2021-10-22 RX ADMIN — METRONIDAZOLE 500 MG: 500 TABLET ORAL at 18:47

## 2021-10-22 RX ADMIN — MELATONIN TAB 3 MG 3 MG: 3 TAB at 20:51

## 2021-10-22 RX ADMIN — TRAZODONE HYDROCHLORIDE 50 MG: 50 TABLET ORAL at 20:51

## 2021-10-22 RX ADMIN — ENOXAPARIN SODIUM 40 MG: 40 INJECTION SUBCUTANEOUS at 20:51

## 2021-10-22 RX ADMIN — NYSTATIN 500000 UNITS: 100000 SUSPENSION ORAL at 09:56

## 2021-10-22 RX ADMIN — METRONIDAZOLE 500 MG: 500 TABLET ORAL at 20:51

## 2021-10-22 RX ADMIN — ASPIRIN 81 MG CHEWABLE TABLET 81 MG: 81 TABLET CHEWABLE at 09:56

## 2021-10-22 RX ADMIN — LEVOFLOXACIN 500 MG: 250 TABLET, FILM COATED ORAL at 18:47

## 2021-10-22 ASSESSMENT — GAIT ASSESSMENTS
ASSISTIVE DEVICE: FRONT WHEEL WALKER
GAIT LEVEL OF ASSIST: CONTACT GUARD ASSIST
DISTANCE (FEET): 55
DEVIATION: ATAXIC;DECREASED BASE OF SUPPORT;BRADYKINETIC;DECREASED HEEL STRIKE;DECREASED TOE OFF;OTHER (COMMENT)

## 2021-10-22 NOTE — CARE PLAN
"The patient is Stable - Low risk of patient condition declining or worsening    Shift Goals  Clinical Goals: safety  Patient Goals: Sleep well    Progress made toward(s) clinical / shift goals:    Problem: Knowledge Deficit - Standard  Goal: Patient and family/care givers will demonstrate understanding of plan of care, disease process/condition, diagnostic tests and medications      Outcome: Progressing    Note: Alert and oriented X 4. VSS. Trazodone PO given for sleep, sleeping soundly throughout the night. Diet was upgraded to level 6 soft-bite size, pt states he's tolerating them well. Whole meds and thin liquids tolerated well.     No fall events.    Bita Barron Fall risk Assessment Score: 14  Moderate fall risk Interventions  - Bed and strip alarm   - Yellow sign by the door   - Yellow wrist band \"Fall risk\"  - Room near to the nurse station  - Do not leave patient unattended in the bathroom  - Fall risk education provided    Pt uses call light consistently and appropriately. Waits for assistance does not attempt self transfer this shift. Able to verbalize needs.     Patient's skin remains intact and free from new or accidental injury this shift.  Will continue to monitor.     Patient is not progressing towards the following goals:      "

## 2021-10-22 NOTE — THERAPY
"Occupational Therapy  Daily Treatment     Patient Name: Fermín Gomez  Age:  66 y.o., Sex:  male  Medical Record #: [id number]  Today's Date: 10/22/2021     Precautions  Precautions: Fall Risk, Swallow Precautions ( See Comments)  Comments: L hemiparesis, modified diet purée with mildly thick liquids         Subjective    \" I haven't done that in a few days.\" when asked if he wanted to brush his teeth      Objective       10/22/21 0701   Precautions   Precautions Fall Risk;Swallow Precautions ( See Comments)   Comments L hemiparesis, modified diet purée with mildly thick liquids   Functional Level of Assist   Grooming Supervision  (setup   to perfrom oral care  and wash /dry face and hands)   Lower Body Dressing Maximal Assist  (to don pants)   Bed, Chair, Wheelchair Transfer Minimal Assist  (bed to w/c and  w/c <-> supine on mat )   Neuro-Muscular Treatments   Neuro-Muscular Treatments Electrical Stimulation;Vibration;Weight Shift Left   Comments seated edge of mat  left scapular mobilization (  retraction and  upward rotation ) followed by weight bearing through left UE (  elbow extension with external rotation)  while reaching with the right.    supine on mat  worked on shoulde stabilization   performing chest press with small PVC pipe frame   right hand grasp maintained with  ace wrap to allow focus on  shoulder movement.    achieved full eblow extension 2 of 10 trials   with vibration   and max cues to attend to the left.    trialed Estim to triceps to achieve full eblow extension    achieved full extension  2 of 5 trials  limited by UE fatigue.    Bed Mobility    Supine to Sit Minimal Assist  (from bed   SBA and cues log roll from mat )   Sit to Supine Minimal Assist   Scooting Contact Guard Assist   Interdisciplinary Plan of Care Collaboration   Patient Position at End of Therapy Seated  (in T dine for breakfast )   OT Total Time Spent   OT Individual Total Time Spent (Mins) 60   OT Charge Group   OT Self Care " / ADL 2   OT Neuromuscular Re-education / Balance 2       Assessment     continues with left UE  Proximal / shoulder weakness   Limiting functional use      Strengths: Alert and oriented, Independent prior level of function, Motivated for self care and independence, Pleasant and cooperative, Supportive family, Willingly participates in therapeutic activities  Barriers: Hemiparesis, Decreased endurance, Home accessibility, Impaired activity tolerance, Impaired balance, Impaired functional cognition, Limited mobility, Fatigue (impaired termination and initiation, impaired sequencing)    Plan     Aggressive left UE neuro  Re ed with  Primary focus on shoulder.  Weight bearing,  NMES  , Vibration ,  MAS   SAEBO   consider setup of   For shoulder  Biceps, triceps    Ant. delt , scap mobilizers    ADL , related transfer and mobility     Passport items to be completed:  Perform bathroom transfers, complete dressing, complete feeding, get ready for the day, prepare a simple meal, participate in household tasks, adapt home for safety needs, demonstrate home exercise program, complete caregiver training     Occupational Therapy Goals (Active)     Problem: Dressing     Dates: Start: 10/20/21       Goal: STG-Within one week, patient will dress UB with Mod A and AE as needed     Dates: Start: 10/20/21          Goal: STG-Within one week, patient will dress LB with Mod A and AE as needed     Dates: Start: 10/20/21             Problem: Functional Transfers     Dates: Start: 10/20/21       Goal: STG-Within one week, patient will transfer to toilet with Min A and AE as needed     Dates: Start: 10/20/21             Problem: Grooming     Dates: Start: 10/20/21       Goal: STG-Within one week, patient will complete grooming with SBA     Dates: Start: 10/20/21             Problem: OT Long Term Goals     Dates: Start: 10/20/21       Goal: LTG-By discharge, patient will complete basic self care tasks with CGA- mod I and AE as needed      Dates: Start: 10/20/21          Goal: LTG-By discharge, patient will perform bathroom transfers with CGA- mod I and AE as needed     Dates: Start: 10/20/21

## 2021-10-22 NOTE — PROGRESS NOTES
"Rehab Progress Note     Date of Service: 10/22/2021  Chief Complaint: follow up stroke    Interval Events (Subjective)      Patient seen and examined today in his room.   His wife Aye is present.  We reviewed imaging and discusses stroke prognosis and prevention.  Patient needs cardiac monitor, have reached out to neuro/cardio team.  Hospitalist consulted for increasing leukocytosis.      ROS: No changes to bowel, bladder, pain, mood, or sleep.             Objective:  VITAL SIGNS: /68   Pulse 68   Temp 36.8 °C (98.3 °F) (Temporal)   Resp 18   Ht 1.558 m (5' 1.32\")   Wt 85.7 kg (188 lb 15 oz)   SpO2 90%   BMI 35.33 kg/m²   Gen: alert, no apparent distress  Neuro: notable for improving left hemiparesis        Recent Results (from the past 72 hour(s))   CBC with Differential    Collection Time: 10/20/21  5:37 AM   Result Value Ref Range    WBC 12.5 (H) 4.8 - 10.8 K/uL    RBC 4.95 4.70 - 6.10 M/uL    Hemoglobin 16.4 14.0 - 18.0 g/dL    Hematocrit 46.0 42.0 - 52.0 %    MCV 92.9 81.4 - 97.8 fL    MCH 33.1 (H) 27.0 - 33.0 pg    MCHC 35.7 (H) 33.7 - 35.3 g/dL    RDW 43.4 35.9 - 50.0 fL    Platelet Count 565 (H) 164 - 446 K/uL    MPV 10.1 9.0 - 12.9 fL    Neutrophils-Polys 72.80 (H) 44.00 - 72.00 %    Lymphocytes 14.20 (L) 22.00 - 41.00 %    Monocytes 8.50 0.00 - 13.40 %    Eosinophils 2.90 0.00 - 6.90 %    Basophils 1.20 0.00 - 1.80 %    Immature Granulocytes 0.40 0.00 - 0.90 %    Nucleated RBC 0.00 /100 WBC    Neutrophils (Absolute) 9.06 (H) 1.82 - 7.42 K/uL    Lymphs (Absolute) 1.77 1.00 - 4.80 K/uL    Monos (Absolute) 1.06 (H) 0.00 - 0.85 K/uL    Eos (Absolute) 0.36 0.00 - 0.51 K/uL    Baso (Absolute) 0.15 (H) 0.00 - 0.12 K/uL    Immature Granulocytes (abs) 0.05 0.00 - 0.11 K/uL    NRBC (Absolute) 0.00 K/uL   Comp Metabolic Panel (CMP)    Collection Time: 10/20/21  5:37 AM   Result Value Ref Range    Sodium 139 135 - 145 mmol/L    Potassium 3.8 3.6 - 5.5 mmol/L    Chloride 104 96 - 112 mmol/L    Co2 26 20 - " 33 mmol/L    Anion Gap 9.0 7.0 - 16.0    Glucose 97 65 - 99 mg/dL    Bun 16 8 - 22 mg/dL    Creatinine 0.68 0.50 - 1.40 mg/dL    Calcium 9.0 8.5 - 10.5 mg/dL    AST(SGOT) 21 12 - 45 U/L    ALT(SGPT) 17 2 - 50 U/L    Alkaline Phosphatase 86 30 - 99 U/L    Total Bilirubin 1.6 (H) 0.1 - 1.5 mg/dL    Albumin 3.8 3.2 - 4.9 g/dL    Total Protein 6.1 6.0 - 8.2 g/dL    Globulin 2.3 1.9 - 3.5 g/dL    A-G Ratio 1.7 g/dL   Magnesium    Collection Time: 10/20/21  5:37 AM   Result Value Ref Range    Magnesium 1.9 1.5 - 2.5 mg/dL   Vitamin D, 25-hydroxy (blood)    Collection Time: 10/20/21  5:37 AM   Result Value Ref Range    25-Hydroxy   Vitamin D 25 38 30 - 100 ng/mL   HEMOGLOBIN A1C    Collection Time: 10/20/21  5:37 AM   Result Value Ref Range    Glycohemoglobin 4.6 4.0 - 5.6 %    Est Avg Glucose 85 mg/dL   ESTIMATED GFR    Collection Time: 10/20/21  5:37 AM   Result Value Ref Range    GFR If African American >60 >60 mL/min/1.73 m 2    GFR If Non African American >60 >60 mL/min/1.73 m 2   URINALYSIS    Collection Time: 10/20/21  3:07 PM    Specimen: Urine, Clean Catch   Result Value Ref Range    Color DK Yellow     Character Clear     Specific Gravity 1.028 <1.035    Ph 6.5 5.0 - 8.0    Glucose Negative Negative mg/dL    Ketones 15 (A) Negative mg/dL    Protein Negative Negative mg/dL    Bilirubin Small (A) Negative    Urobilinogen, Urine 4.0 Negative    Nitrite Negative Negative    Leukocyte Esterase Trace (A) Negative    Occult Blood Negative Negative    Micro Urine Req Microscopic    URINE MICROSCOPIC (W/UA)    Collection Time: 10/20/21  3:07 PM   Result Value Ref Range    WBC 0-2 (A) /hpf    RBC 0-2 (A) /hpf    Bacteria Negative None /hpf    Epithelial Cells Negative /hpf   CBC WITH DIFFERENTIAL    Collection Time: 10/22/21  5:53 AM   Result Value Ref Range    WBC 15.6 (H) 4.8 - 10.8 K/uL    RBC 4.76 4.70 - 6.10 M/uL    Hemoglobin 15.6 14.0 - 18.0 g/dL    Hematocrit 44.4 42.0 - 52.0 %    MCV 93.3 81.4 - 97.8 fL    MCH  32.8 27.0 - 33.0 pg    MCHC 35.1 33.7 - 35.3 g/dL    RDW 43.8 35.9 - 50.0 fL    Platelet Count 636 (H) 164 - 446 K/uL    MPV 10.1 9.0 - 12.9 fL    Neutrophils-Polys 78.20 (H) 44.00 - 72.00 %    Lymphocytes 8.50 (L) 22.00 - 41.00 %    Monocytes 7.90 0.00 - 13.40 %    Eosinophils 3.70 0.00 - 6.90 %    Basophils 1.20 0.00 - 1.80 %    Immature Granulocytes 0.50 0.00 - 0.90 %    Nucleated RBC 0.00 /100 WBC    Neutrophils (Absolute) 12.19 (H) 1.82 - 7.42 K/uL    Lymphs (Absolute) 1.33 1.00 - 4.80 K/uL    Monos (Absolute) 1.24 (H) 0.00 - 0.85 K/uL    Eos (Absolute) 0.58 (H) 0.00 - 0.51 K/uL    Baso (Absolute) 0.18 (H) 0.00 - 0.12 K/uL    Immature Granulocytes (abs) 0.08 0.00 - 0.11 K/uL    NRBC (Absolute) 0.00 K/uL       Current Facility-Administered Medications   Medication Frequency   • levoFLOXacin (LEVAQUIN) tablet 500 mg DAILY   • metroNIDAZOLE (FLAGYL) tablet 500 mg Q8HRS   • senna-docusate (PERICOLACE or SENOKOT S) 8.6-50 MG per tablet 2 Tablet BID PRN    And   • polyethylene glycol/lytes (MIRALAX) PACKET 1 Packet QDAY PRN    And   • magnesium hydroxide (MILK OF MAGNESIA) suspension 30 mL QDAY PRN    And   • bisacodyl (DULCOLAX) suppository 10 mg QDAY PRN   • melatonin tablet 3 mg QHS   • traZODone (DESYREL) tablet 50 mg QHS   • omeprazole (PRILOSEC) capsule 20 mg QAM AC   • tamsulosin (FLOMAX) capsule 0.4 mg Q EVENING   • enoxaparin (LOVENOX) inj 40 mg DAILY   • hydrOXYzine HCl (ATARAX) tablet 50 mg Q6HRS PRN   • QUEtiapine (Seroquel) tablet 25 mg TID PRN   • simethicone (MYLICON) chewable tab 80 mg TID PRN   • Respiratory Therapy Consult Continuous RT   • Pharmacy Consult Request ...Pain Management Review 1 Each PHARMACY TO DOSE   • hydrALAZINE (APRESOLINE) tablet 10 mg Q8HRS PRN   • acetaminophen (Tylenol) tablet 650 mg Q4HRS PRN   • sodium phosphate (Fleet) enema 133 mL QDAY PRN   • artificial tears ophthalmic solution 1 Drop PRN   • benzocaine-menthol (CEPACOL) lozenge 1 Lozenge Q2HRS PRN   • mag hydrox-al  hydrox-simeth (MAALOX PLUS ES or MYLANTA DS) suspension 20 mL Q2HRS PRN   • ondansetron (ZOFRAN ODT) dispertab 4 mg 4X/DAY PRN    Or   • ondansetron (ZOFRAN) syringe/vial injection 4 mg 4X/DAY PRN   • sodium chloride (OCEAN) 0.65 % nasal spray 2 Spray PRN   • atorvastatin (LIPITOR) tablet 80 mg Q EVENING   • aspirin (ASA) chewable tab 81 mg DAILY   • nystatin (MYCOSTATIN) 344617 UNIT/ML suspension 500,000 Units 4X/DAY       Orders Placed This Encounter   Procedures   • Diet Order Diet: Level 6 - Soft and Bite Sized (meds whole 1:1 with thins); Liquid level: Level 0 - Thin     Standing Status:   Standing     Number of Occurrences:   1     Order Specific Question:   Diet:     Answer:   Level 6 - Soft and Bite Sized [23]     Comments:   meds whole 1:1 with thins     Order Specific Question:   Liquid level     Answer:   Level 0 - Thin       Assessment:  Active Hospital Problems    Diagnosis    • *Stroke (cerebrum) (HCC)    • Urinary retention    • Leukocytosis    • Impaired mobility and ADLs    • Other insomnia    • Gastroesophageal reflux disease with esophagitis without hemorrhage    • Oral thrush    • Dysphagia    • Left hemiparesis (HCC)    • Subdural hematoma (HCC)    • H/O splenectomy      This patient is a 66 y.o. male admitted for acute inpatient rehabilitation with Stroke (cerebrum) (HCC).    Medical Decision Making and Plan:    Right MCA stroke  S/p tPA  S/p thrombectomy  Left hemiparesis, improved  Dysphagia, improved  Continue full rehab program  PT/OT/SLP, 1 hr each discipline, 5 days per week    Aspirin  Statin    Reviewed imaging today with patient and wife  Needs cardiac monitor placed, reached out to neuro/cards    Outpatient follow up with Dr. Cifuentes, stroke bridge clinic, referrals made    Subdural hematoma, small  Likely due to fall     Oral thrush  Continue Nystatin     Thrombocytosis  Stress reaction  Monitor    Leukocytosis, increased  Checked UA, negative x 2  Checked CXR - negative x 2  Consulted  hospitalist    Suspected aspiration pneumonia  Started on antibiotics     Hyperglycemia  Checked HgbA1c - 4.6  Likely stress response     GERD  Omeprazole    Insomnia  Schedule melatonin and trazodone    Bowel program  PRN bowel medications  Last BM 10/21    Bladder program  Urinary retention  Started Hytrin 2 mg  Check PVRs - 37, 95  Bladder scan for no voids  ICP for over 400 cc - last required 10/20  Scheduled toileting    DVT prophylaxis  Started Lovenox 10/20, patient 8 days out from small SDH      Total time:  36 minutes.  I spent greater than 50% of the time for patient care, counseling, and coordination on this date, including patient face-to face time, unit/floor time with review of records/pertinent lab data and studies, as well as discussing diagnostic evaluation/work up, planned therapeutic interventions, and future disposition of care, as per the interval events/subjective and the assessment and plan as noted above.    Reviewed MRI imaging, stroke prognosis for recovery, risk factors, need for cardiac monitoring with patient and wife today.    I have performed a physical exam, reviewed and updated ROS, as well as the assessment and plan today 10/22/2021. In review of note from 10/21/2021 there are no new changes except as documented above.      Ruth Samuels M.D.   Physical Medicine and Rehabilitation

## 2021-10-22 NOTE — CARE PLAN
Problem: Psychosocial  Goal: Patient's level of anxiety will decrease  Outcome: Progressing     Problem: Bowel Elimination  Goal: Patient will participate in bowel management program  Outcome: Progressing     Problem: Skin Integrity  Goal: Patient's skin integrity will be maintained or improve  Outcome: Progressing

## 2021-10-22 NOTE — THERAPY
10/22/21 1459   Precautions   Precautions Fall Risk;Swallow Precautions ( See Comments)   Comments L hemiparesis, modified diet purée with mildly thick liquids   Pain 0 - 10 Group   Therapist Pain Assessment 0   Cognition    Level of Consciousness Alert   Gait Functional Level of Assist    Gait Level Of Assist Contact Guard Assist  (Facilitation at pelvis for wt shift & pelvic rotation)   Assistive Device Front Wheel Walker   Distance (Feet) 55   # of Times Distance was Traveled 3   Deviation Ataxic;Decreased Base Of Support;Bradykinetic;Decreased Heel Strike;Decreased Toe Off;Other (Comment)  (Inconsistent left step length)   Transfer Functional Level of Assist   Bed, Chair, Wheelchair Transfer Minimal Assist   Bed Chair Wheelchair Transfer Description Adaptive equipment;Increased time;Set-up of equipment;Supervision for safety;Verbal cueing;Other (comment)  (FWW)   Bed Mobility    Supine to Sit Minimal Assist   Sit to Stand Contact Guard Assist   Scooting Contact Guard Assist   Neuro-Muscular Treatments   Neuro-Muscular Treatments Facilitation;Joint Approximation;Postural Facilitation;Sequencing;Tactile Cuing;Tapping;Verbal Cuing;Weight Shift Right;Weight Shift Left;Anterior weight shift   Comments Sequencing supine to sit edge of bed, sitting posture edge of bed and sequencing sit to/from stand fww and facilitation of transfer to the wheelchair, sequencing sit to stand from the wheelchair with fww, facilitation of midline standing with a walker and facilitation of gait at the left pelvis for right weight shift, left pelvic anterior rotation, left lower extremity stepping, consistent step length and control    Interdisciplinary Plan of Care Collaboration   Patient Position at End of Therapy Seated;Self Releasing Lap Belt Applied;Family / Friend in Room   PT Total Time Spent   PT Individual Total Time Spent (Mins) 60   PT Charge Group   PT Gait Training 2   PT Neuromuscular Re-Education / Balance 2   Physical  Therapy   Daily Treatment     Patient Name: Fermín Gomez  Age:  66 y.o., Sex:  male  Medical Record #: [id number]  Today's Date: 10/22/2021     Precautions  Precautions: Fall Risk, Swallow Precautions ( See Comments)  Comments: L hemiparesis, modified diet purée with mildly thick liquids    Subjective    The patient was resting in bed.  He agreed to PT.     Objective    Patient participated in sequencing bed mobility for supine to sit with minimal assistance with the LLE, sitting posture edge of bed, sit to stand fww and transfer with a walker to the wheelchair.  The patient also participated in sequencing sit to stand wheelchair/fww, facilitation of midline standing posture with a walker, facilitation of gait weight shifting patterns the left pelvis for right weight shift, initiating left step during gait, facilitation of anterior pelvic rotation for left lower extremity control and stability.  The patient tolerated 55 FT x3 with standing rests in midline during gait.  The patient was able to walk with a walker to the wheelchair turn and position himself to sit down and then sit down with control.    Assessment    The patient is making significant progress in bed mobility, static sitting posture and tolerance, sit to stand, transferring with a walker and gait training.  The patient presents with appropriate understanding of explanations of body mechanics, midline sitting/standing posture, understanding of gait particularly weight shifting and left lower extremity control.  His ability to comprehend these issues enables him to move with improving patterns and stability.  The patient will benefit from continued neuromuscular reeducation during bed mobility, midline sitting and standing, posture control, static and dynamic standing weight shift, gait.  He will also benefit from  LLE.       Plan    Safety education, continue neuromuscular reeducation for midline orientation, static and dynamic sitting and standing  posture, gait training with proper weight shifting, anterior pelvic rotation for left lower extremity control and stability,  LLE  Passport items to be completed:  Passport items to be completed:  Get in/out of bed safely, in/out of a vehicle, safely use mobility device, walk or wheel around home/community, navigate up and down stairs, show how to get up/down from the ground, ensure home is accessible, demonstrate HEP, complete caregiver training    Physical Therapy Problems (Active)     Problem: Balance     Dates: Start: 10/20/21       Goal: STG-Within one week, patient will maintain static standing     Dates: Start: 10/20/21       Goal Note filed on 10/20/21 1514 by JEANIE Kam     1) Individualized goal:   Maintain midline static standing balance, CGA 1 week  2) Interventions:  PT Therapeutic Exercises, PT Neuro Re-Ed/Balance, and PT Therapeutic Activity               Problem: Mobility     Dates: Start: 10/20/21       Goal: STG-Within one week, patient will     Dates: Start: 10/20/21       Goal Note filed on 10/20/21 1514 by JEANIE Kam     1) Individualized goal:   Gait CGA/facilitation, FWW 50 FT 1 week  2) Interventions:  PT Gait Training, PT Therapeutic Exercises, PT Neuro Re-Ed/Balance, and PT Therapeutic Activity               Problem: Mobility Transfers     Dates: Start: 10/20/21       Goal: STG-Within one week, patient will transfer bed to chair     Dates: Start: 10/20/21       Goal Note filed on 10/20/21 1514 by JEANIE Kam     1) Individualized goal:   Transfer bed to/from chair fww CGA, 1 week  2) Interventions:  PT Gait Training, PT Therapeutic Exercises, PT Neuro Re-Ed/Balance, and PT Therapeutic Activity            Goal: STG-Within one week, patient will move supine to sit     Dates: Start: 10/20/21       Goal Note filed on 10/20/21 1514 by JEANIE Kam     1) Individualized goal:   Transfer supine to/from sit CGA 1 week  2) Interventions:  PT Therapeutic  Exercises, PT Neuro Re-Ed/Balance, and PT Therapeutic Activity               Problem: PT-Long Term Goals     Dates: Start: 10/20/21       Goal: LTG-By discharge, patient will ambulate     Dates: Start: 10/20/21       Goal Note filed on 10/20/21 1514 by JEANIE Kam     1) Individualized goal:   Gait FWW/ FT, supervision at discharge  2) Interventions:  PT Gait Training, PT Therapeutic Exercises, PT Neuro Re-Ed/Balance, and PT Therapeutic Activity            Goal: LTG-By discharge, patient will transfer one surface to another     Dates: Start: 10/20/21       Goal Note filed on 10/20/21 1514 by JEANIE Kam     1) Individualized goal:   Transfer 1 surface to another fww/spc supervision at discharge  2) Interventions:  PT Gait Training, PT Therapeutic Exercises, PT Neuro Re-Ed/Balance, and PT Therapeutic Activity            Goal: LTG-By discharge, patient will ambulate up/down 4-6 stairs     Dates: Start: 10/20/21       Goal Note filed on 10/20/21 1514 by JEANIE Kam     1) Individualized goal:   Up/down 4-6 stairs handrails, supervision at discharge  2) Interventions:  PT Gait Training, PT Therapeutic Exercises, PT Neuro Re-Ed/Balance, and PT Therapeutic Activity            Goal: LTG-By discharge, patient will transfer in/out of a car     Dates: Start: 10/20/21       Goal Note filed on 10/20/21 1514 by JEANIE Kam     1) Individualized goal:   Car transfer fww/spc supervision at discharge  2) Interventions:  PT Gait Training, PT Therapeutic Exercises, PT Neuro Re-Ed/Balance, and PT Therapeutic Activity

## 2021-10-22 NOTE — THERAPY
Speech Language Pathology  Daily Treatment     Patient Name: Fermín Gomez  Age:  66 y.o., Sex:  male  Medical Record #: [id number]  Today's Date: 10/22/2021     Precautions  Precautions: Fall Risk, Swallow Precautions ( See Comments)  Comments: L hemiparesis, modified diet purée with mildly thick liquids    Subjective    Pt seen in T-dine for first portion of session. Pt reporting he is a slow eater, likes to take his time and enjoy meals. Pt was pleasant and cooperative throughout the therapy session.     Objective       10/22/21 0833   Cognition   Attention to Task Within Functional Limits (6-7)   Visual Short Term Memory Supervision (5)   Functional Memory Activities Supervision (5)   Safety Awareness Supervision (5)   Insight into Deficits Supervision (5)   Executive Functioning / Organization Within Functional Limits (6-7)   Self Monitoring Supervision (5)   SLP Total Time Spent   SLP Individual Total Time Spent (Mins) 60   Treatment Charges   SLP Swallowing Dysfunction Treatment Swallowing Dysfunction Treatment   SLP Cognitive Skill Development First 15 Minutes 1   SLP Cognitive Skill Development Additional 15 Minutes 1     Assessment    Pt seen in T-dine for swallow. Pt demonstrated with an appropriate slow rate of eating with small bite sizes and sips. Pt independtly remembered 3 sec prep strategy, pt with cough 1X during breakfast following thin liquid intake.   Reviewed MBSS with pt, pt was educated on safe swallow techniques including second swallow and 3 second prep for liquids. Reviewed SCCAN assessment scores with pt, pt aware of short term memory deficits and consented to brief speech therapy intervention for memory strategies before being d/c from speech. Pt introduced to memory log, pt required min A to include details of activities/strategies presented during speech session and activities completed thus far in the day.     Strengths: Able to follow instructions, Adequate strength, Alert and  oriented, Effective communication skills, Good insight into deficits/needs, Independent prior level of function, Pleasant and cooperative, Willingly participates in therapeutic activities, Motivated for self care and independence  Barriers: Impaired balance, Aspiration risk, Impaired functional cognition    Plan    Check memory log for self implementation, functional short term memory activities    Passport items to be completed:      Speech Therapy Problems (Active)     Problem: Problem Solving STGs     Dates: Start: 10/20/21       Description: 1) Individualized goal:  Pt will complete the SCCAN assessment with results and  follow goals as appropriate  2) Interventions: SLP Speech Language Treatment, SLP Self Care / ADL Training , SLP Cognitive Skill Development, and SLP Group Treatment        Goal: STG-Within one week, patient will     Dates: Start: 10/20/21       Description: 1) Individualized goal:  complete the SCCAN assessment with results and additional goals to follow as appropriate.  2) Interventions: SLP Speech Language Treatment, SLP Self Care / ADL Training , SLP Cognitive Skill Development, and SLP Group Treatment              Problem: Speech/Swallowing LTGs     Dates: Start: 10/20/21       Goal: LTG-By discharge, patient will safely swallow     Dates: Start: 10/20/21       Description: 1) Individualized goal:  regular textures with thin liquids with no overt S/SX with 100% accuracy provided Mod I  2) Interventions: SLP Swallowing Dysfunction Treatment, SLP Oral Pharyngeal Evaluation, SLP Video Swallow Evaluation, SLP Self Care / ADL Training , SLP Cognitive Skill Development, and SLP Group Treatment           Goal: LTG-By discharge, patient will     Dates: Start: 10/20/21       Description: 1) Individualized goal: complete complex problem solving and recall information with 80% accuracy provided Mod I  2) Interventions:  SLP Self Care / ADL Training , SLP Cognitive Skill Development, and SLP Group  Treatment              Problem: Swallowing STGs     Dates: Start: 10/20/21       Goal: STG-Within one week, patient will safely swallow     Dates: Start: 10/20/21       Description: 1) Individualized goal:  trials of minced and moist textures and thin liquids with no overt S/SX on 80% of the trials provided min A  2) Interventions:  SLP Swallowing Dysfunction Treatment, SLP Oral Pharyngeal Evaluation, SLP Video Swallow Evaluation, SLP Self Care / ADL Training , SLP Cognitive Skill Development, and SLP Group Treatment           Goal: STG-Within one week, patient will     Dates: Start: 10/20/21       Description: 1) Individualized goal:  participate in MBSS within 1 week   2) Interventions:  SLP Swallowing Dysfunction Treatment, SLP Oral Pharyngeal Evaluation, SLP Video Swallow Evaluation, SLP Self Care / ADL Training , SLP Cognitive Skill Development, and SLP Group Treatment

## 2021-10-22 NOTE — CONSULTS
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  Patient had left-sided deficits upon arrival to [place] patient was deemed to be a candidate for TPA.  A CT angiogram of the head and neck revealed a right M1 occlusion.  An MRI brain showed acute infarcts in the right MCA territory predominantly involving the frontal lobe, scattered foci of acute infarction were also noted in the right pareital lobe, and tiny foci of infarction was also noted in the left frontoparietal region.  Patient was then transferred to [facility] for higher level of care and thrombectomy.  Patient underwent a right MCA thrombectomy, dominant inferior division of right M2 partial occlusion which was unable to be recannulized.  Per documentation, patient had a significant improvement in his symptoms after TPA administration.  Per the neurology consult NIH was 12 which improved to an NIH of 2.  An echo showed an EF of 60%. Pt was placed on ASA and Lipitor.    Because of the patient's weakness and debility, Rehab was consulted, evaluated the patient, and was deemed a good Rehab candidate.  The patient was transferred over to the Rehab facility on 10/19/2021.      The patient denies fever, chills, nausea, vomiting, headaches, blurry vision, or chest pain.    REVIEW OF SYSTEMS: All review of systems are negative pre AMA and CMS criteria except for that stated in the HPI.    PAST MEDICAL HISTORY:  Past Medical History:   Diagnosis Date   • GERD (gastroesophageal reflux disease)    • Spherocytosis (HCC)        PAST SURGICAL HISTORY:  Past Surgical History:   Procedure Laterality Date   • HERNIA REPAIR     • SPLENECTOMY     •  TONSILLECTOMY         Allergies   Allergen Reactions   • Penicillins        CURRENT MEDICATIONS:    Current Facility-Administered Medications:   •  senna-docusate **AND** polyethylene glycol/lytes **AND** magnesium hydroxide **AND** bisacodyl  •  melatonin  •  traZODone  •  omeprazole  •  tamsulosin  •  enoxaparin  •  hydrOXYzine HCl  •  QUEtiapine  •  simethicone  •  Respiratory Therapy Consult  •  Pharmacy Consult Request  •  hydrALAZINE  •  acetaminophen  •  sodium phosphate  •  artificial tears  •  benzocaine-menthol  •  mag hydrox-al hydrox-simeth  •  ondansetron **OR** ondansetron  •  sodium chloride  •  atorvastatin  •  aspirin  •  nystatin    Social History     Socioeconomic History   • Marital status:      Spouse name: Not on file   • Number of children: Not on file   • Years of education: Not on file   • Highest education level: Not on file   Occupational History   • Not on file   Tobacco Use   • Smoking status: Never Smoker   • Smokeless tobacco: Never Used   Substance and Sexual Activity   • Alcohol use: Yes     Comment: last night   • Drug use: Yes     Types: Marijuana   • Sexual activity: Not on file   Other Topics Concern   • Not on file   Social History Narrative   • Not on file     Social Determinants of Health     Financial Resource Strain:    • Difficulty of Paying Living Expenses:    Food Insecurity:    • Worried About Running Out of Food in the Last Year:    • Ran Out of Food in the Last Year:    Transportation Needs:    • Lack of Transportation (Medical):    • Lack of Transportation (Non-Medical):    Physical Activity:    • Days of Exercise per Week:    • Minutes of Exercise per Session:    Stress:    • Feeling of Stress :    Social Connections:    • Frequency of Communication with Friends and Family:    • Frequency of Social Gatherings with Friends and Family:    • Attends [employer] Services:    • Active Member of Clubs or Organizations:    • Attends Club or Organization Meetings:    •  Marital Status:    Intimate Partner Violence:    • Fear of Current or Ex-Partner:    • Emotionally Abused:    • Physically Abused:    • Sexually Abused:        FAMILY HISTORY:  was reviewed and is not pertinent to this consultation.    PHYSICAL EXAMINATION:  VITAL SIGNS:  Temp is 98.3, blood pressure is 112/68, heart rate is 68, respiratory rate is 18.  GENERAL:  Patient was lying in bed in no distress.  HEENT:  Pupils were equal, round and reactive to light and accomodation.  Oral mucosa was pink and moist.  NECK:  Soft.  Supple.  No JVD.  HEART:  Regular rate and rhythm.  Normal S1 and S2.  No murmurs were appreciated.  LUNGS:  Are clear to auscultation bilaterally.  ABDOMEN:  Soft, non tender, non distended.  Bowels sound were positive in all four quadrants.  EXTREMITIES:  No clubbing, cyanosis.  There was noted some LLE edema.  NEUROLOGIC:  Cranial nerves two through twelve were grossly intact.    LABS:  Lab Results   Component Value Date/Time    SODIUM 139 10/20/2021 05:37 AM    POTASSIUM 3.8 10/20/2021 05:37 AM    CHLORIDE 104 10/20/2021 05:37 AM    CO2 26 10/20/2021 05:37 AM    GLUCOSE 97 10/20/2021 05:37 AM    BUN 16 10/20/2021 05:37 AM    CREATININE 0.68 10/20/2021 05:37 AM      Lab Results   Component Value Date/Time    WBC 15.6 (H) 10/22/2021 05:53 AM    RBC 4.76 10/22/2021 05:53 AM    HEMOGLOBIN 15.6 10/22/2021 05:53 AM    HEMATOCRIT 44.4 10/22/2021 05:53 AM    MCV 93.3 10/22/2021 05:53 AM    MCH 32.8 10/22/2021 05:53 AM    MCHC 35.1 10/22/2021 05:53 AM    MPV 10.1 10/22/2021 05:53 AM    NEUTSPOLYS 78.20 (H) 10/22/2021 05:53 AM    LYMPHOCYTES 8.50 (L) 10/22/2021 05:53 AM    MONOCYTES 7.90 10/22/2021 05:53 AM    EOSINOPHILS 3.70 10/22/2021 05:53 AM    BASOPHILS 1.20 10/22/2021 05:53 AM      Lab Results   Component Value Date/Time    PROTHROMBTM 15.3 (H) 10/13/2021 03:20 AM    INR 1.25 (H) 10/13/2021 03:20 AM        Stroke (cerebrum) (HCC)  MRI: showed acute infarcts in the right MCA territory as described  above predominantly involving the frontal lobe;          scattered foci of acute infarction are also noted in the right parietal lobe;          tiny foci of infarction are also noted in the left frontoparietal region.  S/P TPA (at [place])  S/P thrombectomy but was unsuccessful  On Lipitor  On ASA    Leukocytosis  WBC's: 10.7 (10/19) --> 12.5 (10/20) --> 15.6 (10/22)  Has been afebrile  No diarrhea  Has some cough when eating  CXR (10/20): unremarkable  CXR (10/22): unremarkable  U/A (10/20): neg  U/A (10/22): f/u  Note: on Trazadone -- has a post-marking SE of leukocytosis  Note: had recent stroke with dysphagia -- SLP has advanced diet to soft & bite sized with thin liquids  Suspect silent or micro aspiration   Will start Levaquin and Flagyl    Urinary retention  On Flomax    Edema of left lower extremity  Has LLE edema  ? 2nd to left hemiparesis  Will check US to r/o DVT      This case has been discussed with the attending Physiatrist.    Thank you for the consultation.  Will follow the patient with you.

## 2021-10-23 LAB
APPEARANCE UR: CLEAR
BACTERIA #/AREA URNS HPF: NEGATIVE /HPF
BILIRUB UR QL STRIP.AUTO: ABNORMAL
COLOR UR: ABNORMAL
EPI CELLS #/AREA URNS HPF: NEGATIVE /HPF
GLUCOSE UR STRIP.AUTO-MCNC: NEGATIVE MG/DL
HYALINE CASTS #/AREA URNS LPF: ABNORMAL /LPF
KETONES UR STRIP.AUTO-MCNC: ABNORMAL MG/DL
LEUKOCYTE ESTERASE UR QL STRIP.AUTO: ABNORMAL
MICRO URNS: ABNORMAL
NITRITE UR QL STRIP.AUTO: POSITIVE
PH UR STRIP.AUTO: 7 [PH] (ref 5–8)
PROT UR QL STRIP: 30 MG/DL
RBC # URNS HPF: ABNORMAL /HPF
RBC UR QL AUTO: NEGATIVE
SP GR UR STRIP.AUTO: 1.03
UROBILINOGEN UR STRIP.AUTO-MCNC: 4 MG/DL
WBC #/AREA URNS HPF: ABNORMAL /HPF

## 2021-10-23 PROCEDURE — 97130 THER IVNTJ EA ADDL 15 MIN: CPT

## 2021-10-23 PROCEDURE — 97112 NEUROMUSCULAR REEDUCATION: CPT

## 2021-10-23 PROCEDURE — 700111 HCHG RX REV CODE 636 W/ 250 OVERRIDE (IP): Performed by: PHYSICAL MEDICINE & REHABILITATION

## 2021-10-23 PROCEDURE — 92526 ORAL FUNCTION THERAPY: CPT

## 2021-10-23 PROCEDURE — 97535 SELF CARE MNGMENT TRAINING: CPT

## 2021-10-23 PROCEDURE — 93971 EXTREMITY STUDY: CPT | Mod: 26,LT | Performed by: INTERNAL MEDICINE

## 2021-10-23 PROCEDURE — 97110 THERAPEUTIC EXERCISES: CPT

## 2021-10-23 PROCEDURE — A9270 NON-COVERED ITEM OR SERVICE: HCPCS | Performed by: HOSPITALIST

## 2021-10-23 PROCEDURE — 97129 THER IVNTJ 1ST 15 MIN: CPT

## 2021-10-23 PROCEDURE — 700102 HCHG RX REV CODE 250 W/ 637 OVERRIDE(OP): Performed by: HOSPITALIST

## 2021-10-23 PROCEDURE — A9270 NON-COVERED ITEM OR SERVICE: HCPCS | Performed by: PHYSICAL MEDICINE & REHABILITATION

## 2021-10-23 PROCEDURE — 770010 HCHG ROOM/CARE - REHAB SEMI PRIVAT*

## 2021-10-23 PROCEDURE — 700102 HCHG RX REV CODE 250 W/ 637 OVERRIDE(OP): Performed by: PHYSICAL MEDICINE & REHABILITATION

## 2021-10-23 PROCEDURE — 99232 SBSQ HOSP IP/OBS MODERATE 35: CPT | Performed by: HOSPITALIST

## 2021-10-23 RX ADMIN — OMEPRAZOLE 20 MG: 20 CAPSULE, DELAYED RELEASE ORAL at 08:29

## 2021-10-23 RX ADMIN — MELATONIN TAB 3 MG 3 MG: 3 TAB at 21:01

## 2021-10-23 RX ADMIN — LEVOFLOXACIN 500 MG: 250 TABLET, FILM COATED ORAL at 08:29

## 2021-10-23 RX ADMIN — TAMSULOSIN HYDROCHLORIDE 0.4 MG: 0.4 CAPSULE ORAL at 21:00

## 2021-10-23 RX ADMIN — METRONIDAZOLE 500 MG: 500 TABLET ORAL at 05:10

## 2021-10-23 RX ADMIN — ENOXAPARIN SODIUM 40 MG: 40 INJECTION SUBCUTANEOUS at 21:02

## 2021-10-23 RX ADMIN — METRONIDAZOLE 500 MG: 500 TABLET ORAL at 15:03

## 2021-10-23 RX ADMIN — ASPIRIN 81 MG CHEWABLE TABLET 81 MG: 81 TABLET CHEWABLE at 08:29

## 2021-10-23 RX ADMIN — METRONIDAZOLE 500 MG: 500 TABLET ORAL at 21:01

## 2021-10-23 RX ADMIN — TRAZODONE HYDROCHLORIDE 50 MG: 50 TABLET ORAL at 21:01

## 2021-10-23 RX ADMIN — NYSTATIN 500000 UNITS: 100000 SUSPENSION ORAL at 08:29

## 2021-10-23 RX ADMIN — ATORVASTATIN CALCIUM 80 MG: 40 TABLET, FILM COATED ORAL at 20:59

## 2021-10-23 ASSESSMENT — ENCOUNTER SYMPTOMS
HEADACHES: 0
HALLUCINATIONS: 0
NAUSEA: 0
VOMITING: 0
PALPITATIONS: 0
FEVER: 0
SHORTNESS OF BREATH: 0
BLURRED VISION: 0
DIZZINESS: 0

## 2021-10-23 ASSESSMENT — ACTIVITIES OF DAILY LIVING (ADL)
BED_CHAIR_WHEELCHAIR_TRANSFER_DESCRIPTION: INCREASED TIME;INITIAL PREPARATION FOR TASK;VERBAL CUEING;SUPERVISION FOR SAFETY

## 2021-10-23 NOTE — PROGRESS NOTES
[facility] Medicine Daily Progress Note    Date of Service  10/23/2021    Chief Complaint:  Leukocytosis  LLE edema    Interval History:  No complaints.  Doing a jig-saw puzzle in main gym.    Review of Systems  Review of Systems   Constitutional: Negative for fever.   Eyes: Negative for blurred vision.   Respiratory: Negative for shortness of breath.    Cardiovascular: Negative for palpitations.   Gastrointestinal: Negative for nausea and vomiting.   Neurological: Negative for dizziness and headaches.   Psychiatric/Behavioral: Negative for hallucinations.        Physical Exam  Temp:  [36.4 °C (97.5 °F)-36.7 °C (98.1 °F)] 36.7 °C (98.1 °F)  Pulse:  [75-86] 86  Resp:  [16-18] 18  BP: (117-121)/(58-78) 118/69  SpO2:  [90 %-94 %] 90 %    Physical Exam  Vitals and nursing note reviewed.   Constitutional:       General: He is not in acute distress.  HENT:      Mouth/Throat:      Mouth: Mucous membranes are moist.      Pharynx: Oropharynx is clear.   Eyes:      General: No scleral icterus.  Cardiovascular:      Rate and Rhythm: Normal rate and regular rhythm.   Pulmonary:      Effort: Pulmonary effort is normal.      Breath sounds: No wheezing or rales.   Abdominal:      General: Bowel sounds are normal.      Palpations: Abdomen is soft.   Musculoskeletal:      Cervical back: No rigidity.      Right lower leg: No edema.      Left lower leg: Edema present.   Skin:     General: Skin is warm and dry.   Neurological:      Mental Status: He is alert and oriented to person, place, and time.   Psychiatric:         Mood and Affect: Mood normal.         Behavior: Behavior normal.         Fluids    Intake/Output Summary (Last 24 hours) at 10/23/2021 0956  Last data filed at 10/23/2021 0900  Gross per 24 hour   Intake 320 ml   Output --   Net 320 ml       Laboratory  Recent Labs     10/22/21  0553   WBC 15.6*   RBC 4.76   HEMOGLOBIN 15.6   HEMATOCRIT 44.4   MCV 93.3   MCH 32.8   MCHC 35.1   RDW 43.8   PLATELETCT 636*   MPV 10.1                        Imaging    Assessment/Plan  * Stroke (cerebrum) (HCC)- (present on admission)  Assessment & Plan  MRI: showed acute infarcts in the right MCA territory as described above predominantly involving the frontal lobe;          scattered foci of acute infarction are also noted in the right parietal lobe;          tiny foci of infarction are also noted in the left frontoparietal region.  S/P TPA (at [place])  S/P thrombectomy but was unsuccessful  On Lipitor  On ASA    Edema of left lower extremity  Assessment & Plan  Has LLE edema  US LLE: no DVT  ? 2nd to left hemiparesis    Leukocytosis- (present on admission)  Assessment & Plan  WBC's: 10.7 (10/19) --> 12.5 (10/20) --> 15.6 (10/22)  Has been afebrile  No diarrhea  Has some cough when eating  CXR (10/20): unremarkable  CXR (10/22): unremarkable  U/A (10/20): neg  U/A (10/22): f/u  Note: on Trazadone -- has a post-marking SE of leukocytosis  Note: had recent stroke with dysphagia -- SLP has advanced diet to soft & bite sized with thin liquids  Suspect silent or micro aspiration   On Levaquin and Flagyl    Urinary retention- (present on admission)  Assessment & Plan  On Flomax       yes

## 2021-10-23 NOTE — CARE PLAN
Problem: Knowledge Deficit - Standard  Goal: Patient and family/care givers will demonstrate understanding of plan of care, disease process/condition, diagnostic tests and medications  Outcome: Progressing   Instructed on urinalysis collection to r/o bladder infection    Problem: Psychosocial  Goal: Patient's level of anxiety will decrease  Outcome: Progressing  Wife visiting today     Problem: Bowel Elimination  Goal: Patient will participate in bowel management program  Outcome: Progressing     Problem: Skin Integrity  Goal: Patient's skin integrity will be maintained or improve  Outcome: Progressing  Waffle mattress issued per his request

## 2021-10-23 NOTE — THERAPY
"Occupational Therapy  Daily Treatment     Patient Name: Fermín Gomez  Age:  66 y.o., Sex:  male  Medical Record #: [id number]  Today's Date: 10/23/2021     Precautions  Precautions: (P) Fall Risk, Swallow Precautions ( See Comments), Other (See Comments)  Comments: (P) L hemiparesis, modified diet           Subjective    \"I'd rather work on my arm than shower today.\"     Objective       10/23/21 1401   Precautions   Precautions Fall Risk;Swallow Precautions ( See Comments);Other (See Comments)   Comments L hemiparesis, modified diet     Pain 0 - 10 Group   Pain Rating Scale (NPRS) 0   Cognition    Level of Consciousness Alert   ABS (Agitated Behavior Scale)   Agitated Behavior Scale Performed No   Passive ROM Upper Body   Passive ROM Upper Body WDL   Active ROM Upper Body   Lt Shoulder Flexion Degrees 35   Lt Elbow Flexion Degrees   (WFL)   Lt Elbow Extension Degrees   (WFL)   Strength Upper Body   Upper Body Strength  X   Comments decreased strength of LUE    Functional Level of Assist   Grooming Supervision   Grooming Description Seated in wheelchair at sink;Initial preparation for task;Increased time   Toileting Contact Guard Assist  (for urinating while standing)   Toileting Description Increased time;Grab bar;Supervision for safety;Verbal cueing;Assist for standing balance  (patient displayed left lean while standing )   Bed, Chair, Wheelchair Transfer Contact Guard Assist   Bed Chair Wheelchair Transfer Description Increased time;Initial preparation for task;Verbal cueing;Supervision for safety   Comprehension Modified Independent   Comprehension Description Glasses   Expression Supervision   Problem Solving Supervision   Sitting Upper Body Exercises   Chest Press 2 sets of 10;Bilateral;Weight (See Comments for lbs)  (2 lb dowel )   Front Arm Raise 2 sets of 10;Bilateral;Weight (See Comments for lbs)  (2 lb dowel )   Bicep Curls 2 sets of 10;Bilateral;Weight (See Comments for lbs)  (2 lb dowel )   Comments " while sitting unsupported    Neuro-Muscular Treatments   Neuro-Muscular Treatments Tapping;Weight Shift Left;Other (See Comments)  (See below for tx)   Balance   Sitting Balance (Static) Fair -   Sitting Balance (Dynamic) Poor +   Standing Balance (Static) Fair -   Standing Balance (Dynamic) Poor +   Weight Shift Sitting Fair   Weight Shift Standing Poor   Interdisciplinary Plan of Care Collaboration   IDT Collaboration with  Family / Caregiver   Patient Position at End of Therapy Seated;Chair Alarm On;Call Light within Reach;Tray Table within Reach;Phone within Reach;Family / Friend in Room   Collaboration Comments Patient's daughter present for OT tx   OT Total Time Spent   OT Individual Total Time Spent (Mins) 60   OT Charge Group   OT Self Care / ADL 1   OT Neuromuscular Re-education / Balance 2   OT Therapeutic Exercise  1     Patient participated in neuromuscular re-ed exercises while sitting unsupported on mat.  -LUE weightbearing from elbow and hand 2 x 10 each with tricep activation and push up  -BUE ball toss to daughter 2 x 10 (patient require mod A to place L hand on ball, however was able to toss/catch ball independently ~5 ft)  -BUE balloon volleyball to address sitting balance and LUE coordination x 5 mins     Assessment    Patient agreeable to OT visit on this date. Patient displayed improved unsupported sitting balance while participating in LUE activities, however while standing to urinate he did require min A to maintain balance and demonstrated L leaning. Patient additionally displayed increased active left shoulder flexion.   Strengths: Alert and oriented, Independent prior level of function, Motivated for self care and independence, Pleasant and cooperative, Supportive family, Willingly participates in therapeutic activities  Barriers: Hemiparesis, Decreased endurance, Home accessibility, Impaired activity tolerance, Impaired balance, Impaired functional cognition, Limited mobility, Fatigue  (impaired termination and initiation, impaired sequencing)    Plan    Aggressive left UE neuro  Re ed with  Primary focus on shoulder.  Weight bearing,  NMES  , Vibration ,  MAS   SAEBO   consider setup of   For shoulder  Biceps, triceps    Ant. delt , scap mobilizers    ADL , related transfer and mobility      Passport items to be completed:  Perform bathroom transfers, complete dressing, complete feeding, get ready for the day, prepare a simple meal, participate in household tasks, adapt home for safety needs, demonstrate home exercise program, complete caregiver training     Occupational Therapy Goals (Active)     Problem: Dressing     Dates: Start: 10/20/21       Goal: STG-Within one week, patient will dress UB with Mod A and AE as needed     Dates: Start: 10/20/21          Goal: STG-Within one week, patient will dress LB with Mod A and AE as needed     Dates: Start: 10/20/21             Problem: Functional Transfers     Dates: Start: 10/20/21       Goal: STG-Within one week, patient will transfer to toilet with Min A and AE as needed     Dates: Start: 10/20/21             Problem: Grooming     Dates: Start: 10/20/21       Goal: STG-Within one week, patient will complete grooming with SBA     Dates: Start: 10/20/21             Problem: OT Long Term Goals     Dates: Start: 10/20/21       Goal: LTG-By discharge, patient will complete basic self care tasks with CGA- mod I and AE as needed     Dates: Start: 10/20/21          Goal: LTG-By discharge, patient will perform bathroom transfers with CGA- mod I and AE as needed     Dates: Start: 10/20/21

## 2021-10-23 NOTE — THERAPY
Physical Therapy   Daily Treatment     Patient Name: Fermín Gomez  Age:  66 y.o., Sex:  male  Medical Record #: [id number]  Today's Date: 10/23/2021     Precautions  Precautions: Fall Risk, Swallow Precautions ( See Comments)  Comments: L hemiparesis, modified diet purée with mildly thick liquids    Subjective    Patient agrees to session, and to  set up.      Objective       10/23/21 0931   Bed Mobility    Sit to Stand Contact Guard Assist  (lifts buttock for electrode placement in chair)   Neuro-Muscular Treatments   Neuro-Muscular Treatments Electrical Stimulation   Comments  set up for LLE   Interdisciplinary Plan of Care Collaboration   IDT Collaboration with  Family / Caregiver   Patient Position at End of Therapy Seated;Family / Friend in Room;Tray Table within Reach;Call Light within Reach   Collaboration Comments edu provided to pts daughter during session.    PT Total Time Spent   PT Individual Total Time Spent (Mins) 60   PT Charge Group   PT Neuromuscular Re-Education / Balance 4   Edu provided to patient and daughter on e-stim risk/benefits prior to session.  Skin check pre/post session no adverse effects noted.  Pt does have LLE scabbing from knee down, Electrode on L superior anterior tib placed in area without healing present. Reports no adverse symptoms during tx session.     Completes 1.82 miles for average power of 1.6W for total time of 16:07 with 11:44 of it active.      Assessment    Patient receptive to e-stim education and set up, actively involved him in set up/take down of electrodes and clothing management.           Plan  Safety education, continue neuromuscular reeducation for midline orientation, static and dynamic sitting and standing posture, gait training with proper weight shifting, anterior pelvic rotation for left lower extremity control and stability,  LLE  Passport items to be completed:  Passport items to be completed:  Get in/out of bed safely, in/out of a  vehicle, safely use mobility device, walk or wheel around home/community, navigate up and down stairs, show how to get up/down from the ground, ensure home is accessible, demonstrate HEP, complete caregiver training       Physical Therapy Problems (Active)     Problem: Balance     Dates: Start: 10/20/21       Goal: STG-Within one week, patient will maintain static standing     Dates: Start: 10/20/21       Goal Note filed on 10/20/21 1514 by JEANIE Kam     1) Individualized goal:   Maintain midline static standing balance, CGA 1 week  2) Interventions:  PT Therapeutic Exercises, PT Neuro Re-Ed/Balance, and PT Therapeutic Activity               Problem: Mobility     Dates: Start: 10/20/21       Goal: STG-Within one week, patient will     Dates: Start: 10/20/21       Goal Note filed on 10/20/21 1514 by JEANIE Kam     1) Individualized goal:   Gait CGA/facilitation, FWW 50 FT 1 week  2) Interventions:  PT Gait Training, PT Therapeutic Exercises, PT Neuro Re-Ed/Balance, and PT Therapeutic Activity               Problem: Mobility Transfers     Dates: Start: 10/20/21       Goal: STG-Within one week, patient will transfer bed to chair     Dates: Start: 10/20/21       Goal Note filed on 10/20/21 1514 by JEANIE Kam     1) Individualized goal:   Transfer bed to/from chair fww CGA, 1 week  2) Interventions:  PT Gait Training, PT Therapeutic Exercises, PT Neuro Re-Ed/Balance, and PT Therapeutic Activity            Goal: STG-Within one week, patient will move supine to sit     Dates: Start: 10/20/21       Goal Note filed on 10/20/21 1514 by JEANIE Kam     1) Individualized goal:   Transfer supine to/from sit CGA 1 week  2) Interventions:  PT Therapeutic Exercises, PT Neuro Re-Ed/Balance, and PT Therapeutic Activity               Problem: PT-Long Term Goals     Dates: Start: 10/20/21       Goal: LTG-By discharge, patient will ambulate     Dates: Start: 10/20/21       Goal Note filed on  10/20/21 1514 by JEANIE Kam     1) Individualized goal:   Gait FWW/ FT, supervision at discharge  2) Interventions:  PT Gait Training, PT Therapeutic Exercises, PT Neuro Re-Ed/Balance, and PT Therapeutic Activity            Goal: LTG-By discharge, patient will transfer one surface to another     Dates: Start: 10/20/21       Goal Note filed on 10/20/21 1514 by JEANIE Kam     1) Individualized goal:   Transfer 1 surface to another fww/spc supervision at discharge  2) Interventions:  PT Gait Training, PT Therapeutic Exercises, PT Neuro Re-Ed/Balance, and PT Therapeutic Activity            Goal: LTG-By discharge, patient will ambulate up/down 4-6 stairs     Dates: Start: 10/20/21       Goal Note filed on 10/20/21 1514 by JEANIE Kam     1) Individualized goal:   Up/down 4-6 stairs handrails, supervision at discharge  2) Interventions:  PT Gait Training, PT Therapeutic Exercises, PT Neuro Re-Ed/Balance, and PT Therapeutic Activity            Goal: LTG-By discharge, patient will transfer in/out of a car     Dates: Start: 10/20/21       Goal Note filed on 10/20/21 1514 by JEANIE Kam     1) Individualized goal:   Car transfer fww/spc supervision at discharge  2) Interventions:  PT Gait Training, PT Therapeutic Exercises, PT Neuro Re-Ed/Balance, and PT Therapeutic Activity

## 2021-10-23 NOTE — CARE PLAN
"The patient is Stable - Low risk of patient condition declining or worsening    Problem: VTE Prevention  Goal: Patient will remain free from venous thromboembolism (VTE)  Outcome: Progressing     Problem: Fall Risk - Rehab  Goal: Patient will remain free from falls  Outcome: Progressing  Note: Bita Barron Fall risk Assessment Score: 14    Moderate fall risk Interventions  - Bed and strip alarm   - Yellow sign by the door   - Yellow wrist band \"Fall risk\"  - Do not leave patient unattended in the bathroom  - Fall risk education provided     Shift Goals  Clinical Goals: safety  Patient Goals: Sleep well      "

## 2021-10-23 NOTE — THERAPY
Speech Language Pathology  Daily Treatment     Patient Name: Fermín Gomez  Age:  66 y.o., Sex:  male  Medical Record #: [id number]  Today's Date: 10/23/2021     Precautions  Precautions: Fall Risk, Swallow Precautions ( See Comments)  Comments: L hemiparesis, modified diet purée with mildly thick liquids    Subjective    Pt's daughter present for first part of session; pt pleasant and cooperative during ST     Objective       10/23/21 1101   Interdisciplinary Plan of Care Collaboration   IDT Collaboration with  Family / Caregiver   Patient Position at End of Therapy Seated;Chair Alarm On;Self Releasing Lap Belt Applied   Collaboration Comments Pt's daughter present during ST   SLP Total Time Spent   SLP Individual Total Time Spent (Mins) 60   Treatment Charges   SLP Swallowing Dysfunction Treatment Swallowing Dysfunction Treatment   SLP Cognitive Skill Development First 15 Minutes 1   SLP Cognitive Skill Development Additional 15 Minutes 1       Assessment    Reviewed daily memory log. Pt had entered breakfast meal indep and with reminder, pt filled out therapy activities completed in PT prior to ST. Pt's daughter was present and participated in ST. Pt explained the purpose of the daily memory log due to his STM deficits indep to his daughter. Pt required min cues to recall safe swallow strategies and successfully relayed strategies to his daughter. Pt's daughter asked appropriate and relevant questions and reported she will be present for pt's ST Mon-Fri and would like to be incorporated into sessions as appropriate- pt verbalized agreement with request.     Pt sesn in T-Dine to reinforce safe swallow strategies. Pt demonstrated slow intiation and decrease in appetite. With encouragment, pt consumed 40% of meal. Pt indep demonstrated safe swallow strategies with reference to cue card (I.e. slow rate, 3-sec prep, small bites/sips, and clearance of L side) with no overt s/sx of aspiration or difficulty noted.      Strengths: Able to follow instructions, Adequate strength, Alert and oriented, Effective communication skills, Good insight into deficits/needs, Independent prior level of function, Pleasant and cooperative, Willingly participates in therapeutic activities, Motivated for self care and independence  Barriers: Impaired balance, Aspiration risk, Impaired functional cognition    Plan    Reinforce daily memory log,  target functional short term memory activities, continue education with pt and pt's daughter when she is present, reinforce safe swallow strategies.      Speech Therapy Problems (Active)     Problem: Problem Solving STGs     Dates: Start: 10/20/21       Description: 1) Individualized goal:  Pt will complete the SCCAN assessment with results and  follow goals as appropriate  2) Interventions: SLP Speech Language Treatment, SLP Self Care / ADL Training , SLP Cognitive Skill Development, and SLP Group Treatment        Goal: STG-Within one week, patient will     Dates: Start: 10/20/21       Description: 1) Individualized goal:  complete the SCCAN assessment with results and additional goals to follow as appropriate.  2) Interventions: SLP Speech Language Treatment, SLP Self Care / ADL Training , SLP Cognitive Skill Development, and SLP Group Treatment              Problem: Speech/Swallowing LTGs     Dates: Start: 10/20/21       Goal: LTG-By discharge, patient will safely swallow     Dates: Start: 10/20/21       Description: 1) Individualized goal:  regular textures with thin liquids with no overt S/SX with 100% accuracy provided Mod I  2) Interventions: SLP Swallowing Dysfunction Treatment, SLP Oral Pharyngeal Evaluation, SLP Video Swallow Evaluation, SLP Self Care / ADL Training , SLP Cognitive Skill Development, and SLP Group Treatment           Goal: LTG-By discharge, patient will     Dates: Start: 10/20/21       Description: 1) Individualized goal: complete complex problem solving and recall information  with 80% accuracy provided Mod I  2) Interventions:  SLP Self Care / ADL Training , SLP Cognitive Skill Development, and SLP Group Treatment              Problem: Swallowing STGs     Dates: Start: 10/20/21       Goal: STG-Within one week, patient will safely swallow     Dates: Start: 10/20/21       Description: 1) Individualized goal:  trials of minced and moist textures and thin liquids with no overt S/SX on 80% of the trials provided min A  2) Interventions:  SLP Swallowing Dysfunction Treatment, SLP Oral Pharyngeal Evaluation, SLP Video Swallow Evaluation, SLP Self Care / ADL Training , SLP Cognitive Skill Development, and SLP Group Treatment           Goal: STG-Within one week, patient will     Dates: Start: 10/20/21       Description: 1) Individualized goal:  participate in MBSS within 1 week   2) Interventions:  SLP Swallowing Dysfunction Treatment, SLP Oral Pharyngeal Evaluation, SLP Video Swallow Evaluation, SLP Self Care / ADL Training , SLP Cognitive Skill Development, and SLP Group Treatment

## 2021-10-24 PROCEDURE — 700102 HCHG RX REV CODE 250 W/ 637 OVERRIDE(OP): Performed by: HOSPITALIST

## 2021-10-24 PROCEDURE — 770010 HCHG ROOM/CARE - REHAB SEMI PRIVAT*

## 2021-10-24 PROCEDURE — 700111 HCHG RX REV CODE 636 W/ 250 OVERRIDE (IP): Performed by: PHYSICAL MEDICINE & REHABILITATION

## 2021-10-24 PROCEDURE — 99232 SBSQ HOSP IP/OBS MODERATE 35: CPT | Performed by: HOSPITALIST

## 2021-10-24 PROCEDURE — 700102 HCHG RX REV CODE 250 W/ 637 OVERRIDE(OP): Performed by: PHYSICAL MEDICINE & REHABILITATION

## 2021-10-24 PROCEDURE — A9270 NON-COVERED ITEM OR SERVICE: HCPCS | Performed by: PHYSICAL MEDICINE & REHABILITATION

## 2021-10-24 PROCEDURE — A9270 NON-COVERED ITEM OR SERVICE: HCPCS | Performed by: HOSPITALIST

## 2021-10-24 RX ADMIN — MELATONIN TAB 3 MG 3 MG: 3 TAB at 20:21

## 2021-10-24 RX ADMIN — NYSTATIN 500000 UNITS: 100000 SUSPENSION ORAL at 20:22

## 2021-10-24 RX ADMIN — NYSTATIN 500000 UNITS: 100000 SUSPENSION ORAL at 07:43

## 2021-10-24 RX ADMIN — TAMSULOSIN HYDROCHLORIDE 0.4 MG: 0.4 CAPSULE ORAL at 20:21

## 2021-10-24 RX ADMIN — METRONIDAZOLE 500 MG: 500 TABLET ORAL at 05:40

## 2021-10-24 RX ADMIN — OMEPRAZOLE 20 MG: 20 CAPSULE, DELAYED RELEASE ORAL at 07:42

## 2021-10-24 RX ADMIN — ASPIRIN 81 MG CHEWABLE TABLET 81 MG: 81 TABLET CHEWABLE at 07:43

## 2021-10-24 RX ADMIN — TRAZODONE HYDROCHLORIDE 50 MG: 50 TABLET ORAL at 20:21

## 2021-10-24 RX ADMIN — METRONIDAZOLE 500 MG: 500 TABLET ORAL at 14:10

## 2021-10-24 RX ADMIN — NYSTATIN 500000 UNITS: 100000 SUSPENSION ORAL at 18:17

## 2021-10-24 RX ADMIN — ENOXAPARIN SODIUM 40 MG: 40 INJECTION SUBCUTANEOUS at 20:22

## 2021-10-24 RX ADMIN — NYSTATIN 500000 UNITS: 100000 SUSPENSION ORAL at 14:10

## 2021-10-24 RX ADMIN — ATORVASTATIN CALCIUM 80 MG: 40 TABLET, FILM COATED ORAL at 20:21

## 2021-10-24 RX ADMIN — METRONIDAZOLE 500 MG: 500 TABLET ORAL at 20:21

## 2021-10-24 RX ADMIN — LEVOFLOXACIN 500 MG: 250 TABLET, FILM COATED ORAL at 07:43

## 2021-10-24 ASSESSMENT — ENCOUNTER SYMPTOMS
BLURRED VISION: 0
FEVER: 0
DIZZINESS: 0
COUGH: 0
NERVOUS/ANXIOUS: 0
DIARRHEA: 0

## 2021-10-24 ASSESSMENT — FIBROSIS 4 INDEX: FIB4 SCORE: 0.51

## 2021-10-24 ASSESSMENT — PATIENT HEALTH QUESTIONNAIRE - PHQ9
2. FEELING DOWN, DEPRESSED, IRRITABLE, OR HOPELESS: NOT AT ALL
2. FEELING DOWN, DEPRESSED, IRRITABLE, OR HOPELESS: NOT AT ALL
SUM OF ALL RESPONSES TO PHQ9 QUESTIONS 1 AND 2: 0
1. LITTLE INTEREST OR PLEASURE IN DOING THINGS: NOT AT ALL
SUM OF ALL RESPONSES TO PHQ9 QUESTIONS 1 AND 2: 0
1. LITTLE INTEREST OR PLEASURE IN DOING THINGS: NOT AT ALL

## 2021-10-24 NOTE — CARE PLAN
Problem: Fall Risk - Rehab  Goal: Patient will remain free from falls  Outcome: Progressing     Problem: Skin Integrity  Goal: Skin integrity is maintained or improved  Outcome: Progressing

## 2021-10-24 NOTE — PROGRESS NOTES
Received patient during shift change, report rec'd from day shift RN. Resting in bed, VS stable on room air. Per report continent of Bowel w/Bladder incontinence. Min-mod assist for transfers. A&O x 4, able to make needs known. Bed in low position, call light within reach.

## 2021-10-24 NOTE — CARE PLAN
"The patient is Watcher - Medium risk of patient condition declining or worsening    Shift Goals  Clinical Goals: safety  Patient Goals: sleep well    Progress made toward(s) clinical / shift goals:  Resting in bed after hs meds. Using call light for assist as needed.     Bita Barron Fall risk Assessment Score: 14    Moderate fall risk Interventions  - Bed and strip alarm   - Yellow sign by the door   - Yellow wrist band \"Fall risk\"  - Room near to the nurse station  - Do not leave patient unattended in the bathroom  - Fall risk education provided  - Call light within reach   - Yellow  socks   - Belongings within reach   - Bed in the lowest position             "

## 2021-10-24 NOTE — CARE PLAN
The patient is Stable - Low risk of patient condition declining or worsening    Shift Goals  Clinical Goals: safety  Patient Goals: sleep well    Progress made toward(s) clinical / shift goals:      Problem: Knowledge Deficit - Standard  Goal: Patient and family/care givers will demonstrate understanding of plan of care, disease process/condition, diagnostic tests and medications  Outcome: Progressing     Problem: Fall Risk - Rehab  Goal: Patient will remain free from falls  Outcome: Progressing     Problem: Skin Integrity  Goal: Skin integrity is maintained or improved  Outcome: Progressing       Patient is not progressing towards the following goals:

## 2021-10-24 NOTE — PROGRESS NOTES
[facility] Medicine Daily Progress Note    Date of Service  10/24/2021    Chief Complaint:  Leukocytosis  LLE edema    Interval History:  Discussed about abnormal U/A and will be getting cultures before starting abx.    Review of Systems  Review of Systems   Constitutional: Negative for fever.   Eyes: Negative for blurred vision.   Respiratory: Negative for cough.    Cardiovascular: Negative for chest pain.   Gastrointestinal: Negative for diarrhea.   Musculoskeletal: Negative for joint pain.   Neurological: Negative for dizziness.   Psychiatric/Behavioral: The patient is not nervous/anxious.         Physical Exam  Temp:  [36.5 °C (97.7 °F)-37.2 °C (98.9 °F)] 37.2 °C (98.9 °F)  Pulse:  [61-79] 61  Resp:  [15-18] 16  BP: (108-118)/(69-78) 111/69  SpO2:  [91 %-97 %] 91 %    Physical Exam  Vitals and nursing note reviewed.   Constitutional:       Appearance: He is not diaphoretic.   HENT:      Mouth/Throat:      Pharynx: No oropharyngeal exudate or posterior oropharyngeal erythema.   Eyes:      Extraocular Movements: Extraocular movements intact.   Neck:      Vascular: No carotid bruit.   Cardiovascular:      Rate and Rhythm: Normal rate and regular rhythm.   Pulmonary:      Effort: Pulmonary effort is normal.      Breath sounds: No wheezing or rales.   Abdominal:      General: There is no distension.      Palpations: Abdomen is soft.   Musculoskeletal:      Right lower leg: No edema.      Left lower leg: Edema present.   Skin:     General: Skin is warm and dry.   Neurological:      Mental Status: He is alert and oriented to person, place, and time.   Psychiatric:         Mood and Affect: Mood normal.         Behavior: Behavior normal.         Fluids    Intake/Output Summary (Last 24 hours) at 10/24/2021 0948  Last data filed at 10/23/2021 1800  Gross per 24 hour   Intake 50 ml   Output --   Net 50 ml       Laboratory  Recent Labs     10/22/21  0553   WBC 15.6*   RBC 4.76   HEMOGLOBIN 15.6   HEMATOCRIT 44.4   MCV 93.3    MCH 32.8   MCHC 35.1   RDW 43.8   PLATELETCT 636*   MPV 10.1                       Imaging    Assessment/Plan  * Stroke (cerebrum) (HCC)- (present on admission)  Assessment & Plan  MRI: showed acute infarcts in the right MCA territory as described above predominantly involving the frontal lobe;          scattered foci of acute infarction are also noted in the right parietal lobe;          tiny foci of infarction are also noted in the left frontoparietal region.  S/P TPA (at [place])  S/P thrombectomy but was unsuccessful  On Lipitor  On ASA    Edema of left lower extremity  Assessment & Plan  Has LLE edema  US LLE: no DVT  ? 2nd to left hemiparesis    Leukocytosis- (present on admission)  Assessment & Plan  WBC's: 10.7 (10/19) --> 12.5 (10/20) --> 15.6 (10/22)  Has been afebrile  No diarrhea  Has some cough when eating  CXR (10/20): unremarkable  CXR (10/22): unremarkable  U/A (10/20): neg  U/A (10/22): abnormal --> will get C&S  Note: on Trazadone -- has a post-marking SE of leukocytosis  Note: had recent stroke with dysphagia -- SLP has advanced diet to soft & bite sized with thin liquids  Suspect silent or micro aspiration   On Levaquin and Flagyl    Urinary retention- (present on admission)  Assessment & Plan  On Flomax

## 2021-10-25 ENCOUNTER — NON-PROVIDER VISIT (OUTPATIENT)
Dept: CARDIOLOGY | Facility: MEDICAL CENTER | Age: 66
End: 2021-10-25
Payer: COMMERCIAL

## 2021-10-25 ENCOUNTER — TELEPHONE (OUTPATIENT)
Dept: CARDIOLOGY | Facility: MEDICAL CENTER | Age: 66
End: 2021-10-25

## 2021-10-25 DIAGNOSIS — I47.29 NSVT (NONSUSTAINED VENTRICULAR TACHYCARDIA) (HCC): ICD-10-CM

## 2021-10-25 DIAGNOSIS — I63.9 CEREBROVASCULAR ACCIDENT (CVA), UNSPECIFIED MECHANISM (HCC): ICD-10-CM

## 2021-10-25 DIAGNOSIS — R00.2 PALPITATIONS: ICD-10-CM

## 2021-10-25 DIAGNOSIS — I49.3 PVC (PREMATURE VENTRICULAR CONTRACTION): ICD-10-CM

## 2021-10-25 DIAGNOSIS — I49.1 PREMATURE ATRIAL CONTRACTIONS: ICD-10-CM

## 2021-10-25 DIAGNOSIS — I47.10 SVT (SUPRAVENTRICULAR TACHYCARDIA) (HCC): ICD-10-CM

## 2021-10-25 PROCEDURE — 99233 SBSQ HOSP IP/OBS HIGH 50: CPT | Performed by: PHYSICAL MEDICINE & REHABILITATION

## 2021-10-25 PROCEDURE — 99232 SBSQ HOSP IP/OBS MODERATE 35: CPT | Performed by: HOSPITALIST

## 2021-10-25 PROCEDURE — 700102 HCHG RX REV CODE 250 W/ 637 OVERRIDE(OP): Performed by: PHYSICAL MEDICINE & REHABILITATION

## 2021-10-25 PROCEDURE — 92526 ORAL FUNCTION THERAPY: CPT

## 2021-10-25 PROCEDURE — 700111 HCHG RX REV CODE 636 W/ 250 OVERRIDE (IP): Performed by: PHYSICAL MEDICINE & REHABILITATION

## 2021-10-25 PROCEDURE — 700102 HCHG RX REV CODE 250 W/ 637 OVERRIDE(OP): Performed by: HOSPITALIST

## 2021-10-25 PROCEDURE — A9270 NON-COVERED ITEM OR SERVICE: HCPCS | Performed by: PHYSICAL MEDICINE & REHABILITATION

## 2021-10-25 PROCEDURE — 97112 NEUROMUSCULAR REEDUCATION: CPT

## 2021-10-25 PROCEDURE — 97535 SELF CARE MNGMENT TRAINING: CPT | Mod: CO

## 2021-10-25 PROCEDURE — 97116 GAIT TRAINING THERAPY: CPT

## 2021-10-25 PROCEDURE — 770010 HCHG ROOM/CARE - REHAB SEMI PRIVAT*

## 2021-10-25 PROCEDURE — A9270 NON-COVERED ITEM OR SERVICE: HCPCS | Performed by: HOSPITALIST

## 2021-10-25 RX ADMIN — ASPIRIN 81 MG CHEWABLE TABLET 81 MG: 81 TABLET CHEWABLE at 09:25

## 2021-10-25 RX ADMIN — NYSTATIN 500000 UNITS: 100000 SUSPENSION ORAL at 20:25

## 2021-10-25 RX ADMIN — NYSTATIN 500000 UNITS: 100000 SUSPENSION ORAL at 09:25

## 2021-10-25 RX ADMIN — ATORVASTATIN CALCIUM 80 MG: 40 TABLET, FILM COATED ORAL at 20:25

## 2021-10-25 RX ADMIN — NYSTATIN 500000 UNITS: 100000 SUSPENSION ORAL at 16:01

## 2021-10-25 RX ADMIN — MELATONIN TAB 3 MG 3 MG: 3 TAB at 20:25

## 2021-10-25 RX ADMIN — TRAZODONE HYDROCHLORIDE 50 MG: 50 TABLET ORAL at 20:25

## 2021-10-25 RX ADMIN — NYSTATIN 500000 UNITS: 100000 SUSPENSION ORAL at 12:15

## 2021-10-25 RX ADMIN — METRONIDAZOLE 500 MG: 500 TABLET ORAL at 14:42

## 2021-10-25 RX ADMIN — METRONIDAZOLE 500 MG: 500 TABLET ORAL at 05:15

## 2021-10-25 RX ADMIN — METRONIDAZOLE 500 MG: 500 TABLET ORAL at 21:24

## 2021-10-25 RX ADMIN — TAMSULOSIN HYDROCHLORIDE 0.4 MG: 0.4 CAPSULE ORAL at 20:25

## 2021-10-25 RX ADMIN — LEVOFLOXACIN 500 MG: 250 TABLET, FILM COATED ORAL at 09:25

## 2021-10-25 RX ADMIN — OMEPRAZOLE 20 MG: 20 CAPSULE, DELAYED RELEASE ORAL at 09:25

## 2021-10-25 RX ADMIN — ENOXAPARIN SODIUM 40 MG: 40 INJECTION SUBCUTANEOUS at 20:24

## 2021-10-25 ASSESSMENT — ACTIVITIES OF DAILY LIVING (ADL)
BED_CHAIR_WHEELCHAIR_TRANSFER_DESCRIPTION: ADAPTIVE EQUIPMENT;INCREASED TIME;SUPERVISION FOR SAFETY;VERBAL CUEING
TOILET_TRANSFER_DESCRIPTION: GRAB BAR
TUB_SHOWER_TRANSFER_DESCRIPTION: GRAB BAR
TOILET_TRANSFER_DESCRIPTION: ADAPTIVE EQUIPMENT;GRAB BAR;INCREASED TIME

## 2021-10-25 ASSESSMENT — PATIENT HEALTH QUESTIONNAIRE - PHQ9
2. FEELING DOWN, DEPRESSED, IRRITABLE, OR HOPELESS: NOT AT ALL
SUM OF ALL RESPONSES TO PHQ9 QUESTIONS 1 AND 2: 0
SUM OF ALL RESPONSES TO PHQ9 QUESTIONS 1 AND 2: 0
1. LITTLE INTEREST OR PLEASURE IN DOING THINGS: NOT AT ALL
1. LITTLE INTEREST OR PLEASURE IN DOING THINGS: NOT AT ALL
2. FEELING DOWN, DEPRESSED, IRRITABLE, OR HOPELESS: NOT AT ALL

## 2021-10-25 ASSESSMENT — ENCOUNTER SYMPTOMS
VOMITING: 0
DIARRHEA: 0
NAUSEA: 0
FEVER: 0
SHORTNESS OF BREATH: 0
ABDOMINAL PAIN: 0
NERVOUS/ANXIOUS: 0
CHILLS: 0

## 2021-10-25 ASSESSMENT — GAIT ASSESSMENTS
GAIT LEVEL OF ASSIST: CONTACT GUARD ASSIST
DISTANCE (FEET): 60
ASSISTIVE DEVICE: OTHER (COMMENTS)

## 2021-10-25 ASSESSMENT — PAIN DESCRIPTION - PAIN TYPE: TYPE: ACUTE PAIN

## 2021-10-25 NOTE — THERAPY
Speech Language Pathology  Daily Treatment     Patient Name: Fermín Gomez  Age:  66 y.o., Sex:  male  Medical Record #: [id number]  Today's Date: 10/25/2021     Precautions  Precautions: Fall Risk, Swallow Precautions ( See Comments)  Comments: Left hemiparesis    Subjective    Pt was pleasant and cooperative throughout the therapy session. Pt requested special meals for his trial of regular textures w/ thin liquids for breakfast and lunch.     Objective       10/25/21 0803   Cognition   Attention to Task Within Functional Limits (6-7)   Safety Awareness Within Functional Limits (6-7)   Insight into Deficits Within Functional Limits (6-7)   Executive Functioning / Organization Within Functional Limits (6-7)   Self Monitoring Within Functional Limits (6-7)   Dysphagia    Diet / Liquid Recommendation Regular (7);Thin (0)   SLP Total Time Spent   SLP Individual Total Time Spent (Mins) 60   Treatment Charges   SLP Swallowing Dysfunction Treatment Swallowing Dysfunction Treatment       Assessment  Pt seen in T-dine for breakfast and lunch. Pt assessed with trials of regular textures at breakfast. Pt requested cold cereal with milk and bananas. Pt tolerated meal with no over s/sx of aspiration or penetration. Pt assessed for swallowing regular textures during lunch, pt requested tuna sandwich on wheat bead with coke and potatoe chips. Pt independently followed his safe swallow strategies and cleared his L side cheek as needed. No overt s/sx of aspiration noted during meal. SLP spoke with pts wife Aye, and updated her on pt progress regarding meals, CLOF and POC.    Strengths: Able to follow instructions, Adequate strength, Alert and oriented, Effective communication skills, Good carryover of learning, Making steady progress towards goals, Supportive family, Pleasant and cooperative, Independent prior level of function, Motivated for self care and independence, Good insight into deficits/needs, Willingly participates in  therapeutic activities  Barriers: Impaired balance, Aspiration risk, Impaired functional cognition    Plan    Ongoing pt education re stroke education, assess tolerance of advanced diet with dc from further swallow intervention as appropriate.     Passport items to be completed:      Speech Therapy Problems (Active)     Problem: Comprehension STGs     Dates: Start: 10/25/21       Goal: STG-Within one week, patient will     Dates: Start: 10/25/21       Goal Note filed on 10/25/21 1109 by Obdulia Bowling MS,CCC-SLP     1) Individualized goal:   tolerate regular texture trials with thin liquids with no overt s/sx of asp/pen noted across 2/2 meals provided SPV.   2) Interventions:  SLP Swallowing Dysfunction Treatment, SLP Oral Pharyngeal Evaluation, SLP Video Swallow Evaluation, SLP Self Care / ADL Training , SLP Cognitive Skill Development, and SLP Group Treatment                 Problem: Memory STGs     Dates: Start: 10/25/21       Goal: STG-Within one week, patient will     Dates: Start: 10/25/21       Goal Note filed on 10/25/21 1109 by Obdulia Bowling MS,CCC-SLP     1) Individualized goal:   participate in stroke education and demonstrate recall and understanding provided SPV   2) Interventions:  SLP Self Care / ADL Training , SLP Cognitive Skill Development, and SLP Group Treatment                 Problem: Speech/Swallowing LTGs     Dates: Start: 10/20/21       Goal: LTG-By discharge, patient will safely swallow     Dates: Start: 10/20/21       Description: 1) Individualized goal:  regular textures with thin liquids with no overt S/SX with 100% accuracy provided Mod I  2) Interventions: SLP Swallowing Dysfunction Treatment, SLP Oral Pharyngeal Evaluation, SLP Video Swallow Evaluation, SLP Self Care / ADL Training , SLP Cognitive Skill Development, and SLP Group Treatment           Goal: LTG-By discharge, patient will     Dates: Start: 10/20/21       Description: 1) Individualized goal: complete complex  problem solving and recall information with 80% accuracy provided Mod I  2) Interventions:  SLP Self Care / ADL Training , SLP Cognitive Skill Development, and SLP Group Treatment              Problem: Swallowing STGs     Dates: Start: 10/20/21

## 2021-10-25 NOTE — PROGRESS NOTES
[facility] Medicine Daily Progress Note    Date of Service  10/25/2021    Chief Complaint:  Leukocytosis  LLE edema    Interval History:  No significant events over night.    Review of Systems  Review of Systems   Constitutional: Negative for chills and fever.   Respiratory: Negative for shortness of breath.    Cardiovascular: Negative for chest pain.   Gastrointestinal: Negative for abdominal pain, diarrhea, nausea and vomiting.   Psychiatric/Behavioral: The patient is not nervous/anxious.         Physical Exam  Temp:  [36.7 °C (98 °F)-36.8 °C (98.2 °F)] 36.8 °C (98.2 °F)  Pulse:  [58-61] 58  Resp:  [18-20] 18  BP: (110-120)/(65-71) 110/70  SpO2:  [96 %-99 %] 99 %    Physical Exam  Vitals and nursing note reviewed.   Constitutional:       Appearance: Normal appearance.   HENT:      Head: Atraumatic.   Eyes:      Conjunctiva/sclera: Conjunctivae normal.      Pupils: Pupils are equal, round, and reactive to light.   Cardiovascular:      Rate and Rhythm: Normal rate and regular rhythm.      Heart sounds: No murmur heard.     Pulmonary:      Effort: Pulmonary effort is normal.      Breath sounds: No stridor. No wheezing or rales.   Abdominal:      General: There is no distension.      Palpations: Abdomen is soft.      Tenderness: There is no abdominal tenderness.   Musculoskeletal:      Cervical back: Normal range of motion and neck supple.      Right lower leg: No edema.      Left lower leg: Edema present.   Skin:     General: Skin is warm and dry.      Findings: No rash.   Neurological:      Mental Status: He is alert and oriented to person, place, and time.   Psychiatric:         Mood and Affect: Mood normal.         Behavior: Behavior normal.         Fluids    Intake/Output Summary (Last 24 hours) at 10/25/2021 0955  Last data filed at 10/25/2021 0800  Gross per 24 hour   Intake 630 ml   Output 200 ml   Net 430 ml       Laboratory                        Imaging    Assessment/Plan  * Stroke (cerebrum) (HCC)- (present  on admission)  Assessment & Plan  MRI: showed acute infarcts in the right MCA territory as described above predominantly involving the frontal lobe;          scattered foci of acute infarction are also noted in the right parietal lobe;          tiny foci of infarction are also noted in the left frontoparietal region.  S/P TPA (at [place])  S/P thrombectomy but was unsuccessful  On Lipitor  On ASA    Edema of left lower extremity  Assessment & Plan  Has LLE edema  US LLE: no DVT  ? 2nd to left hemiparesis    Leukocytosis- (present on admission)  Assessment & Plan  WBC's: 10.7 (10/19) --> 12.5 (10/20) --> 15.6 (10/22)  Has been afebrile  No diarrhea  Has some cough when eating  CXR (10/20): unremarkable  CXR (10/22): unremarkable  U/A (10/20): neg  U/A (10/22): abnormal --> f/u C&S  Note: on Trazadone -- has a post-marking SE of leukocytosis  Note: had recent stroke with dysphagia -- SLP has advanced diet to soft & bite sized with thin liquids  Suspect silent or micro aspiration   On Levaquin and Flagyl (thru 10/28)    Urinary retention- (present on admission)  Assessment & Plan  On Flomax

## 2021-10-25 NOTE — CARE PLAN
Problem: Grooming  Goal: STG-Within one week, patient will complete grooming with SBA  Outcome: Met  Note:  Completes grooming tasks  with  setup/ supervision      Problem: Dressing  Goal: STG-Within one week, patient will dress UB with Mod A and AE as needed  Outcome: Met  Note:  Completes task with min assist cues for alden techniques     Goal: STG-Within one week, patient will dress LB with Mod A and AE as needed  Outcome: Met  Note:  Completes task with mod assist        Problem: Functional Transfers  Goal: STG-Within one week, patient will transfer to toilet with Min A and AE as needed  Outcome: Met  Note:  Completes bathroom related transfers with  Min assist using AE  / grab bars

## 2021-10-25 NOTE — CARE PLAN
Problem: Balance  Goal: STG-Within one week, patient will maintain static standing  Outcome: Met     Problem: Mobility  Goal: STG-Within one week, patient will  Outcome: Met     Problem: Mobility Transfers  Goal: STG-Within one week, patient will transfer bed to chair  Outcome: Met  Goal: STG-Within one week, patient will move supine to sit  Outcome: Met

## 2021-10-25 NOTE — CARE PLAN
Problem: Swallowing STGs  Goal: STG-Within one week, patient will safely swallow  Description: 1) Individualized goal:  trials of minced and moist textures and thin liquids with no overt S/SX on 80% of the trials provided min A  2) Interventions:  SLP Swallowing Dysfunction Treatment, SLP Oral Pharyngeal Evaluation, SLP Video Swallow Evaluation, SLP Self Care / ADL Training , SLP Cognitive Skill Development, and SLP Group Treatment      Outcome: Met  Goal: STG-Within one week, patient will  Description: 1) Individualized goal:  participate in MBSS within 1 week   2) Interventions:  SLP Swallowing Dysfunction Treatment, SLP Oral Pharyngeal Evaluation, SLP Video Swallow Evaluation, SLP Self Care / ADL Training , SLP Cognitive Skill Development, and SLP Group Treatment      Outcome: Met     Problem: Problem Solving STGs  Goal: STG-Within one week, patient will  Description: 1) Individualized goal:  complete the SCCAN assessment with results and additional goals to follow as appropriate.  2) Interventions: SLP Speech Language Treatment, SLP Self Care / ADL Training , SLP Cognitive Skill Development, and SLP Group Treatment      Outcome: Met

## 2021-10-25 NOTE — THERAPY
"Occupational Therapy  Daily Treatment     Patient Name: Fermín Gomez  Age:  66 y.o., Sex:  male  Medical Record #: [id number]  Today's Date: 10/25/2021     Precautions  Precautions: (P) Fall Risk, Swallow Precautions ( See Comments), Other (See Comments)  Comments: (P) Left Miki          Subjective    \" Ok\"  Agreeable to shower as tx activity      Objective       10/25/21 0701   Precautions   Precautions Fall Risk;Swallow Precautions ( See Comments);Other (See Comments)   Comments Left Miki    Functional Level of Assist   Eating Supervision  (setup )   Bathing Minimal Assist  ( CGA/ min for balancestanding to wash periarea )   Upper Body Dressing Minimal Assist  ( Cues for miki technique )   Lower Body Dressing Moderate Assist  (to Min assist:  pants  Min assist  socks mod assist )   Lower Body Dressing Description   (cues for miki technique )   Bed, Chair, Wheelchair Transfer Contact Guard Assist   Toilet Transfers Minimal Assist   Toilet Transfer Description Grab bar   Tub / Shower Transfers Minimal Assist   Tub Shower Transfer Description Grab bar   Interdisciplinary Plan of Care Collaboration   Patient Position at End of Therapy Seated  (in  T dine for breakfast  )   OT Total Time Spent   OT Individual Total Time Spent (Mins) 60   OT Charge Group   OT Self Care / ADL 4       Assessment    Though  Left hemiparesis remains prior barrier to functional independence Alvarez demonstrates improved ability to perform  ADL tasks  With cues for miki techniques.  Improved  Attempts at left UE functional use was also observed this session      Strengths: Alert and oriented, Independent prior level of function, Motivated for self care and independence, Pleasant and cooperative, Supportive family, Willingly participates in therapeutic activities  Barriers: Hemiparesis, Decreased endurance, Home accessibility, Impaired activity tolerance, Impaired balance, Impaired functional cognition, Limited mobility, Fatigue (impaired " termination and initiation, impaired sequencing)    Plan    Aggressive left UE neuro  Re ed with  Primary focus on shoulder.  Weight bearing,  NMES  , Vibration ,  MAS   SAEBO   consider setup of   for left UE    ADL , related transfer and mobility      Passport items to be completed:  Perform bathroom transfers, complete dressing, complete feeding, get ready for the day, prepare a simple meal, participate in household tasks, adapt home for safety needs, demonstrate home exercise program, complete caregiver training        Occupational Therapy Goals (Active)     Problem: Dressing     Dates: Start: 10/20/21       Goal: STG-Within one week, patient will dress UB with Mod A and AE as needed     Dates: Start: 10/20/21          Goal: STG-Within one week, patient will dress LB with Mod A and AE as needed     Dates: Start: 10/20/21             Problem: Functional Transfers     Dates: Start: 10/20/21       Goal: STG-Within one week, patient will transfer to toilet with Min A and AE as needed     Dates: Start: 10/20/21             Problem: Grooming     Dates: Start: 10/20/21       Goal: STG-Within one week, patient will complete grooming with SBA     Dates: Start: 10/20/21             Problem: OT Long Term Goals     Dates: Start: 10/20/21       Goal: LTG-By discharge, patient will complete basic self care tasks with CGA- mod I and AE as needed     Dates: Start: 10/20/21          Goal: LTG-By discharge, patient will perform bathroom transfers with CGA- mod I and AE as needed     Dates: Start: 10/20/21

## 2021-10-25 NOTE — THERAPY
10/25/21 1029   Precautions   Precautions Fall Risk;Swallow Precautions ( See Comments)   Comments Left hemiparesis   Pain 0 - 10 Group   Therapist Pain Assessment 0   Cognition    Level of Consciousness Alert   Gait Functional Level of Assist    Gait Level Of Assist Contact Guard Assist  (Facilitation)   Assistive Device Other (Comments)  (R handrail)   Distance (Feet) 60  (30 FT forward, 30 FT backward)   # of Times Distance was Traveled 5   Deviation Ataxic;Decreased Base Of Support;Bradykinetic;Other (Comment);Decreased Heel Strike;Decreased Toe Off  (Impaired wt shift, dorsiflexion/heel strike)   Wheelchair Functional Level of Assist   Wheelchair Assist Minimal Assist   Distance Wheelchair (Feet or Distance) 75   Wheelchair Description Assistance with steering;Impaired coordination;Supervision for safety   Stairs Functional Level of Assist   Level of Assist with Stairs Contact Guard Assist   # of Stairs Climbed 15   Stairs Description Extra time;Hand rails;Requires incidental assist;Supervision for safety;Verbal cueing  (Stairs to facilitate weight shifting, step-to pattern)   Transfer Functional Level of Assist   Bed, Chair, Wheelchair Transfer Contact Guard Assist   Bed Chair Wheelchair Transfer Description Adaptive equipment;Increased time;Supervision for safety;Verbal cueing   Toilet Transfers Minimal Assist   Toilet Transfer Description Adaptive equipment;Grab bar;Increased time   Standing Lower Body Exercises   Step Up 2 sets of 15;Right   Step Down 2 sets of 15;Left   Comments Facilitate dynamic standing weight shift   Bed Mobility    Supine to Sit Stand by Assist   Sit to Supine Stand by Assist   Sit to Stand Contact Guard Assist   Scooting Stand by Assist   Rolling Supervised   Neuro-Muscular Treatments   Neuro-Muscular Treatments Facilitation;Joint Approximation;Postural Facilitation;Sequencing;Tactile Cuing;Verbal Cuing;Weight Shift Right;Weight Shift Left;Anterior weight shift   Comments  Facilitated midline standing orientation using the right handrail with prolonged right leaning against the rail and the wall followed by return to midline standing, facilitated sequencing sit to/from stand, facilitated gait using a right handrail for support with facilitation at the pelvis for right weight shift, equal step length, left lower extremity control with reduced external rotation and for left lower extremity stability in mid stance   Interdisciplinary Plan of Care Collaboration   Patient Position at End of Therapy Seated;Self Releasing Lap Belt Applied;Call Light within Reach;Tray Table within Reach;Phone within Reach;Family / Friend in Room   PT Total Time Spent   PT Individual Total Time Spent (Mins) 60   PT Charge Group   PT Gait Training 2   PT Neuromuscular Re-Education / Balance 2   Physical Therapy   Daily Treatment     Patient Name: Fermín Gomez  Age:  66 y.o., Sex:  male  Medical Record #: [id number]  Today's Date: 10/25/2021     Precautions  Precautions: Fall Risk, Swallow Precautions ( See Comments)  Comments: Left hemiparesis    Subjective    The patient was up in his chair ready for PT.  He had no complaints of pain or lightheadedness during the session.     Objective    Patient participated in sequencing seated scooting in the wheelchair, sit to/from stand, midline standing orientation to reduce left leaning by leaning against a right handrail and wall for several minutes x3 with right shoulder tilting, right head tilt and right gaze.  Facilitated gait using the right handrail for support with facilitation at the pelvis for right weight shift to enable consistent left lower extremity movement pattern, equal step length, dorsiflexion and heel strike, left lower extremity control and stability.  The patient tolerated 60 FT using the right handrail walking forward and backward.  The patient also participated in step up/step down at the first step of the stairs to facilitate proper right weight  shift.    Assessment    The patient is making significant progress for mobility, gait with improving weight shifting and improving dorsiflexion and heel strike during gait.  Walking forward the patient is becoming more consistent with equal step lengths.  However stepping backward for gait step length on the left and right are inconsistent.  However, when given verbal and tactile cues the patient is able to walk backward with improved step length and better control.       Plan    Safety education, continue neuromuscular reeducation for midline orientation, proper weight shifting, seated scooting, bed mobility, static and dynamic standing weight shift,  MWF    Passport items to be completed:  Passport items to be completed:  Get in/out of bed safely, in/out of a vehicle, safely use mobility device, walk or wheel around home/community, navigate up and down stairs, show how to get up/down from the ground, ensure home is accessible, demonstrate HEP, complete caregiver training    Physical Therapy Problems (Active)     Problem: Balance     Dates: Start: 10/20/21       Goal: STG-Within one week, patient will maintain static standing     Dates: Start: 10/20/21       Goal Note filed on 10/20/21 1514 by JEANIE Kam     1) Individualized goal:   Maintain midline static standing balance, CGA 1 week  2) Interventions:  PT Therapeutic Exercises, PT Neuro Re-Ed/Balance, and PT Therapeutic Activity               Problem: Mobility     Dates: Start: 10/20/21       Goal: STG-Within one week, patient will     Dates: Start: 10/20/21       Goal Note filed on 10/20/21 1514 by JEANIE Kam     1) Individualized goal:   Gait CGA/facilitation, FWW 50 FT 1 week  2) Interventions:  PT Gait Training, PT Therapeutic Exercises, PT Neuro Re-Ed/Balance, and PT Therapeutic Activity               Problem: Mobility Transfers     Dates: Start: 10/20/21       Goal: STG-Within one week, patient will transfer bed to chair      Dates: Start: 10/20/21       Goal Note filed on 10/20/21 1514 by JEANIE Kam     1) Individualized goal:   Transfer bed to/from chair fww CGA, 1 week  2) Interventions:  PT Gait Training, PT Therapeutic Exercises, PT Neuro Re-Ed/Balance, and PT Therapeutic Activity            Goal: STG-Within one week, patient will move supine to sit     Dates: Start: 10/20/21       Goal Note filed on 10/20/21 1514 by JEANIE Kam     1) Individualized goal:   Transfer supine to/from sit CGA 1 week  2) Interventions:  PT Therapeutic Exercises, PT Neuro Re-Ed/Balance, and PT Therapeutic Activity               Problem: PT-Long Term Goals     Dates: Start: 10/20/21       Goal: LTG-By discharge, patient will ambulate     Dates: Start: 10/20/21       Goal Note filed on 10/20/21 1514 by JEANIE Kam     1) Individualized goal:   Gait FWW/ FT, supervision at discharge  2) Interventions:  PT Gait Training, PT Therapeutic Exercises, PT Neuro Re-Ed/Balance, and PT Therapeutic Activity            Goal: LTG-By discharge, patient will transfer one surface to another     Dates: Start: 10/20/21       Goal Note filed on 10/20/21 1514 by JEANIE Kam     1) Individualized goal:   Transfer 1 surface to another fww/spc supervision at discharge  2) Interventions:  PT Gait Training, PT Therapeutic Exercises, PT Neuro Re-Ed/Balance, and PT Therapeutic Activity            Goal: LTG-By discharge, patient will ambulate up/down 4-6 stairs     Dates: Start: 10/20/21       Goal Note filed on 10/20/21 1514 by JEANIE Kam     1) Individualized goal:   Up/down 4-6 stairs handrails, supervision at discharge  2) Interventions:  PT Gait Training, PT Therapeutic Exercises, PT Neuro Re-Ed/Balance, and PT Therapeutic Activity            Goal: LTG-By discharge, patient will transfer in/out of a car     Dates: Start: 10/20/21       Goal Note filed on 10/20/21 1514 by JEANIE Kam     1) Individualized goal:    Car transfer fww/spc supervision at discharge  2) Interventions:  PT Gait Training, PT Therapeutic Exercises, PT Neuro Re-Ed/Balance, and PT Therapeutic Activity

## 2021-10-25 NOTE — CARE PLAN
"Aspiration precautions observed, head of bed elevated during med pass ,meds given one at a time followed with water  , well tolerated , nos/s of aspiration noted.    Problem: Bladder / Voiding  Goal: Patient will establish and maintain regular urinary output  Outcome: Progressing  Note: Pt incontinent of urine , cleansed, barrier cream applied, diapered , kept dry and clean     Problem: Fall Risk - Rehab  Goal: Patient will remain free from falls  Outcome: Progressing  Note: Bita Barron Fall risk Assessment Score: 14    Moderate fall risk Interventions  - Bed and strip alarm   - Yellow sign by the door   - Yellow wrist band \"Fall risk\"  - Room near to the nurse station  - Do not leave patient unattended in the bathroom  - Fall risk education provided     The patient is Stable - Low risk of patient condition declining or worsening    Shift Goals  Clinical Goals: safety  Patient Goals: sleep well    Progress made toward(s) clinical / shift goals:  Pt able to sleep well.Pt safe , free from fall and injury.    Patient is not progressing towards the following goals:left side weakness.      "

## 2021-10-25 NOTE — TELEPHONE ENCOUNTER
Per Dr. Samuels--enrolled in [facility] 14 day monitor (to be placed by rehab staff)  #[id number]  Will deliver to [facility] today.  [place]

## 2021-10-26 ENCOUNTER — NON-PROVIDER VISIT (OUTPATIENT)
Dept: CARDIOLOGY | Facility: MEDICAL CENTER | Age: 66
End: 2021-10-26
Payer: COMMERCIAL

## 2021-10-26 ENCOUNTER — TELEPHONE (OUTPATIENT)
Dept: CARDIOLOGY | Facility: MEDICAL CENTER | Age: 66
End: 2021-10-26

## 2021-10-26 DIAGNOSIS — I49.3 PVC (PREMATURE VENTRICULAR CONTRACTION): ICD-10-CM

## 2021-10-26 DIAGNOSIS — I47.10 SVT (SUPRAVENTRICULAR TACHYCARDIA) (HCC): ICD-10-CM

## 2021-10-26 DIAGNOSIS — I47.20 VT (VENTRICULAR TACHYCARDIA) (HCC): ICD-10-CM

## 2021-10-26 DIAGNOSIS — I49.1 PREMATURE ATRIAL CONTRACTIONS: ICD-10-CM

## 2021-10-26 LAB
ANION GAP SERPL CALC-SCNC: 7 MMOL/L (ref 7–16)
BACTERIA UR CULT: ABNORMAL
BACTERIA UR CULT: ABNORMAL
BASOPHILS # BLD AUTO: 1.4 % (ref 0–1.8)
BASOPHILS # BLD: 0.15 K/UL (ref 0–0.12)
BUN SERPL-MCNC: 12 MG/DL (ref 8–22)
CALCIUM SERPL-MCNC: 9.3 MG/DL (ref 8.5–10.5)
CHLORIDE SERPL-SCNC: 104 MMOL/L (ref 96–112)
CO2 SERPL-SCNC: 28 MMOL/L (ref 20–33)
CREAT SERPL-MCNC: 0.83 MG/DL (ref 0.5–1.4)
EOSINOPHIL # BLD AUTO: 0.33 K/UL (ref 0–0.51)
EOSINOPHIL NFR BLD: 3.1 % (ref 0–6.9)
ERYTHROCYTE [DISTWIDTH] IN BLOOD BY AUTOMATED COUNT: 44.5 FL (ref 35.9–50)
GLUCOSE SERPL-MCNC: 103 MG/DL (ref 65–99)
HCT VFR BLD AUTO: 45 % (ref 42–52)
HGB BLD-MCNC: 15.7 G/DL (ref 14–18)
IMM GRANULOCYTES # BLD AUTO: 0.08 K/UL (ref 0–0.11)
IMM GRANULOCYTES NFR BLD AUTO: 0.8 % (ref 0–0.9)
LYMPHOCYTES # BLD AUTO: 1.79 K/UL (ref 1–4.8)
LYMPHOCYTES NFR BLD: 17 % (ref 22–41)
MAGNESIUM SERPL-MCNC: 2 MG/DL (ref 1.5–2.5)
MCH RBC QN AUTO: 33.2 PG (ref 27–33)
MCHC RBC AUTO-ENTMCNC: 34.9 G/DL (ref 33.7–35.3)
MCV RBC AUTO: 95.1 FL (ref 81.4–97.8)
MONOCYTES # BLD AUTO: 0.9 K/UL (ref 0–0.85)
MONOCYTES NFR BLD AUTO: 8.6 % (ref 0–13.4)
NEUTROPHILS # BLD AUTO: 7.27 K/UL (ref 1.82–7.42)
NEUTROPHILS NFR BLD: 69.1 % (ref 44–72)
NRBC # BLD AUTO: 0 K/UL
NRBC BLD-RTO: 0 /100 WBC
PHOSPHATE SERPL-MCNC: 3.4 MG/DL (ref 2.5–4.5)
PLATELET # BLD AUTO: 852 K/UL (ref 164–446)
PMV BLD AUTO: 9.7 FL (ref 9–12.9)
POTASSIUM SERPL-SCNC: 4.2 MMOL/L (ref 3.6–5.5)
RBC # BLD AUTO: 4.73 M/UL (ref 4.7–6.1)
SIGNIFICANT IND 70042: ABNORMAL
SITE SITE: ABNORMAL
SODIUM SERPL-SCNC: 139 MMOL/L (ref 135–145)
SOURCE SOURCE: ABNORMAL
WBC # BLD AUTO: 10.5 K/UL (ref 4.8–10.8)

## 2021-10-26 PROCEDURE — 97130 THER IVNTJ EA ADDL 15 MIN: CPT

## 2021-10-26 PROCEDURE — 700102 HCHG RX REV CODE 250 W/ 637 OVERRIDE(OP): Performed by: PHYSICAL MEDICINE & REHABILITATION

## 2021-10-26 PROCEDURE — 99232 SBSQ HOSP IP/OBS MODERATE 35: CPT | Performed by: PHYSICAL MEDICINE & REHABILITATION

## 2021-10-26 PROCEDURE — 84100 ASSAY OF PHOSPHORUS: CPT

## 2021-10-26 PROCEDURE — 85025 COMPLETE CBC W/AUTO DIFF WBC: CPT

## 2021-10-26 PROCEDURE — 80048 BASIC METABOLIC PNL TOTAL CA: CPT

## 2021-10-26 PROCEDURE — A9270 NON-COVERED ITEM OR SERVICE: HCPCS | Performed by: HOSPITALIST

## 2021-10-26 PROCEDURE — 97112 NEUROMUSCULAR REEDUCATION: CPT

## 2021-10-26 PROCEDURE — A9270 NON-COVERED ITEM OR SERVICE: HCPCS | Performed by: PHYSICAL MEDICINE & REHABILITATION

## 2021-10-26 PROCEDURE — 97112 NEUROMUSCULAR REEDUCATION: CPT | Mod: CO

## 2021-10-26 PROCEDURE — 97116 GAIT TRAINING THERAPY: CPT

## 2021-10-26 PROCEDURE — 97129 THER IVNTJ 1ST 15 MIN: CPT

## 2021-10-26 PROCEDURE — 36415 COLL VENOUS BLD VENIPUNCTURE: CPT

## 2021-10-26 PROCEDURE — 99232 SBSQ HOSP IP/OBS MODERATE 35: CPT | Performed by: HOSPITALIST

## 2021-10-26 PROCEDURE — 83735 ASSAY OF MAGNESIUM: CPT

## 2021-10-26 PROCEDURE — 700102 HCHG RX REV CODE 250 W/ 637 OVERRIDE(OP): Performed by: HOSPITALIST

## 2021-10-26 PROCEDURE — 770010 HCHG ROOM/CARE - REHAB SEMI PRIVAT*

## 2021-10-26 PROCEDURE — 700111 HCHG RX REV CODE 636 W/ 250 OVERRIDE (IP): Performed by: PHYSICAL MEDICINE & REHABILITATION

## 2021-10-26 RX ADMIN — TRAZODONE HYDROCHLORIDE 50 MG: 50 TABLET ORAL at 21:18

## 2021-10-26 RX ADMIN — SENNOSIDES AND DOCUSATE SODIUM 2 TABLET: 8.6; 5 TABLET ORAL at 05:19

## 2021-10-26 RX ADMIN — LEVOFLOXACIN 500 MG: 250 TABLET, FILM COATED ORAL at 08:52

## 2021-10-26 RX ADMIN — TAMSULOSIN HYDROCHLORIDE 0.4 MG: 0.4 CAPSULE ORAL at 21:18

## 2021-10-26 RX ADMIN — METRONIDAZOLE 500 MG: 500 TABLET ORAL at 21:21

## 2021-10-26 RX ADMIN — ASPIRIN 81 MG CHEWABLE TABLET 81 MG: 81 TABLET CHEWABLE at 08:53

## 2021-10-26 RX ADMIN — METRONIDAZOLE 500 MG: 500 TABLET ORAL at 05:19

## 2021-10-26 RX ADMIN — METRONIDAZOLE 500 MG: 500 TABLET ORAL at 14:00

## 2021-10-26 RX ADMIN — ENOXAPARIN SODIUM 40 MG: 40 INJECTION SUBCUTANEOUS at 20:00

## 2021-10-26 RX ADMIN — ATORVASTATIN CALCIUM 80 MG: 40 TABLET, FILM COATED ORAL at 21:18

## 2021-10-26 RX ADMIN — OMEPRAZOLE 20 MG: 20 CAPSULE, DELAYED RELEASE ORAL at 08:53

## 2021-10-26 RX ADMIN — MELATONIN TAB 3 MG 3 MG: 3 TAB at 21:18

## 2021-10-26 ASSESSMENT — GAIT ASSESSMENTS
DEVIATION: ATAXIC;DECREASED BASE OF SUPPORT;BRADYKINETIC;DECREASED HEEL STRIKE;DECREASED TOE OFF
DISTANCE (FEET): 60
GAIT LEVEL OF ASSIST: CONTACT GUARD ASSIST
ASSISTIVE DEVICE: OTHER (COMMENTS);NONE
DISTANCE (FEET): 85
GAIT LEVEL OF ASSIST: CONTACT GUARD ASSIST

## 2021-10-26 ASSESSMENT — PATIENT HEALTH QUESTIONNAIRE - PHQ9
1. LITTLE INTEREST OR PLEASURE IN DOING THINGS: NOT AT ALL
SUM OF ALL RESPONSES TO PHQ9 QUESTIONS 1 AND 2: 0
2. FEELING DOWN, DEPRESSED, IRRITABLE, OR HOPELESS: NOT AT ALL

## 2021-10-26 ASSESSMENT — ACTIVITIES OF DAILY LIVING (ADL): BED_CHAIR_WHEELCHAIR_TRANSFER_DESCRIPTION: ADAPTIVE EQUIPMENT;INCREASED TIME;SUPERVISION FOR SAFETY;VERBAL CUEING

## 2021-10-26 NOTE — PROGRESS NOTES
Zio patch applied to left chest on 10/25/21 at 1830. Patient instructed not to get it wet for 24 hrs. Patient verbalized understanding

## 2021-10-26 NOTE — THERAPY
10/26/21 1129   Precautions   Precautions Fall Risk;Swallow Precautions ( See Comments)   Comments Left hemiparesis   Gait Functional Level of Assist    Gait Level Of Assist Contact Guard Assist   Assistive Device Other (Comments);None  (Right hallway railing 60 FT x2, no device 60 FT x1)   Distance (Feet) 60   # of Times Distance was Traveled 3   Stairs Functional Level of Assist   Level of Assist with Stairs Contact Guard Assist   # of Stairs Climbed 15  (15x2)   Stairs Description Extra time;Hand rails;Supervision for safety;Verbal cueing  (15x2 step up/step down, step-to pattern for weight shifting)   Standing Lower Body Exercises   Step Up 2 sets of 15;Right   Step Down 2 sets of 15;Left   Interdisciplinary Plan of Care Collaboration   Patient Position at End of Therapy Seated;Self Releasing Lap Belt Applied;Other (Comments)  (Dining room for T dine)   PT Total Time Spent   PT Individual Total Time Spent (Mins) 30   PT Charge Group   PT Gait Training 1   PT Neuromuscular Re-Education / Balance 1   Physical Therapy   Daily Treatment     Patient Name: Fermín Gomez  Age:  66 y.o., Sex:  male  Medical Record #: [id number]  Today's Date: 10/26/2021     Precautions  Precautions: Fall Risk, Swallow Precautions ( See Comments)  Comments: Left hemiparesis    Subjective    The patient was up in his chair and he agreed to PT.  His daughter, Brown, was visiting and she observed therapy.     Objective    Patient participated in sequencing sit to/from stand which included seated scooting in the wheelchair and correct body mechanics to stand up.  Using the 1st step of the stairs the patient participated in step up/step down with a step-to pattern to facilitate dynamic standing weight shift.  He did 2 sets of 15, step up right and step down left.  Following step up/step down the patient participated in gait training using a right hallway railing forward and backward 60 FT x2.  He also participated in gait without an  assistive device 60 FT x1.    Assessment    The patient continues to demonstrate improvement on a consistent basis with improving dynamic standing weight shift, standing balance, gait with more consistent step lengths bilaterally, left dorsiflexion and heel strike, backward gait.  He continues to have a narrow base of support and improving external rotation of the left lower extremity when standing or walking.  The patient has excellent potential to continue to improve and be an independent ambulator.       Plan    Safety education, neuromuscular reeducation for bed mobility, transfers, static and dynamic standing balance, dynamic standing weight shift, gait with/without a device possibly hiking poles dependent on left upper extremity function    Passport items to be completed:  Passport items to be completed:  Get in/out of bed safely, in/out of a vehicle, safely use mobility device, walk or wheel around home/community, navigate up and down stairs, show how to get up/down from the ground, ensure home is accessible, demonstrate HEP, complete caregiver training    Physical Therapy Problems (Active)     Problem: Mobility     Dates: Start: 10/25/21       Goal: STG-Within one week, patient will ascend and descend four to six stairs     Dates: Start: 10/25/21       Goal Note filed on 10/25/21 1756 by JEANIE Kam     1) Individualized goal:   Up/down 4-6 stairs, bilateral handrails, CGA for facilitation, 1 week  2) Interventions:  PT E Stim Attended, PT Gait Training, PT Therapeutic Exercises, PT Neuro Re-Ed/Balance, and PT Therapeutic Activity            Goal: STG-Within one week, patient will     Dates: Start: 10/25/21       Goal Note filed on 10/25/21 1756 by JEANIE Kam     1) Individualized goal:   Gait FWW/SPC, CGA for facilitation 100 FT, 1 week  2) Interventions:  PT E Stim Attended, PT Gait Training, PT Therapeutic Exercises, PT Neuro Re-Ed/Balance, and PT Therapeutic Activity                Problem: Mobility Transfers     Dates: Start: 10/25/21       Goal: STG-Within one week, patient will transfer bed to chair     Dates: Start: 10/25/21       Goal Note filed on 10/25/21 1756 by JEANIE Kam     1) Individualized goal:   Transfer bed to/from chair FWW/SPC, SBA 1 week  2) Interventions:  PT E Stim Attended, PT Gait Training, PT Therapeutic Exercises, PT Neuro Re-Ed/Balance, and PT Therapeutic Activity               Problem: PT-Long Term Goals     Dates: Start: 10/20/21       Goal: LTG-By discharge, patient will ambulate     Dates: Start: 10/20/21       Goal Note filed on 10/20/21 1514 by JEANIE Kam     1) Individualized goal:   Gait FWW/ FT, supervision at discharge  2) Interventions:  PT Gait Training, PT Therapeutic Exercises, PT Neuro Re-Ed/Balance, and PT Therapeutic Activity            Goal: LTG-By discharge, patient will transfer one surface to another     Dates: Start: 10/20/21       Goal Note filed on 10/20/21 1514 by JEANIE Kam     1) Individualized goal:   Transfer 1 surface to another fww/spc supervision at discharge  2) Interventions:  PT Gait Training, PT Therapeutic Exercises, PT Neuro Re-Ed/Balance, and PT Therapeutic Activity            Goal: LTG-By discharge, patient will ambulate up/down 4-6 stairs     Dates: Start: 10/20/21       Goal Note filed on 10/20/21 1514 by JEANIE Kam     1) Individualized goal:   Up/down 4-6 stairs handrails, supervision at discharge  2) Interventions:  PT Gait Training, PT Therapeutic Exercises, PT Neuro Re-Ed/Balance, and PT Therapeutic Activity            Goal: LTG-By discharge, patient will transfer in/out of a car     Dates: Start: 10/20/21       Goal Note filed on 10/20/21 1514 by JEANIE Kam     1) Individualized goal:   Car transfer fww/spc supervision at discharge  2) Interventions:  PT Gait Training, PT Therapeutic Exercises, PT Neuro Re-Ed/Balance, and PT Therapeutic Activity

## 2021-10-26 NOTE — PROGRESS NOTES
"Rehab Progress Note     Date of Service: 10/26/2021  Chief Complaint: follow up stroke    Interval Events (Subjective)    Patient seen and examined today in his room.   His daughter Brown is present.  A Ziopatch XT was placed yesterday, but patient needs a Ziopatch AT with real time recording and gateway box - to be delivered this afternoon for replacement.   Patient denies any pain.  He has no complaints.   We discussed his estimated discharge date.       ROS: No changes to bowel, bladder, pain, mood, or sleep.                 Objective:  VITAL SIGNS: /62   Pulse 62   Temp 36.6 °C (97.9 °F) (Temporal)   Resp 16   Ht 1.558 m (5' 1.32\")   Wt 83 kg (182 lb 15.7 oz)   SpO2 97%   BMI 34.21 kg/m²   Gen: alert, no apparent distress  Neuro: notable for dysarthria, left facial droop, left hemiparesis            Recent Results (from the past 72 hour(s))   Basic Metabolic Panel    Collection Time: 10/26/21  5:43 AM   Result Value Ref Range    Sodium 139 135 - 145 mmol/L    Potassium 4.2 3.6 - 5.5 mmol/L    Chloride 104 96 - 112 mmol/L    Co2 28 20 - 33 mmol/L    Glucose 103 (H) 65 - 99 mg/dL    Bun 12 8 - 22 mg/dL    Creatinine 0.83 0.50 - 1.40 mg/dL    Calcium 9.3 8.5 - 10.5 mg/dL    Anion Gap 7.0 7.0 - 16.0   CBC WITH DIFFERENTIAL    Collection Time: 10/26/21  5:43 AM   Result Value Ref Range    WBC 10.5 4.8 - 10.8 K/uL    RBC 4.73 4.70 - 6.10 M/uL    Hemoglobin 15.7 14.0 - 18.0 g/dL    Hematocrit 45.0 42.0 - 52.0 %    MCV 95.1 81.4 - 97.8 fL    MCH 33.2 (H) 27.0 - 33.0 pg    MCHC 34.9 33.7 - 35.3 g/dL    RDW 44.5 35.9 - 50.0 fL    Platelet Count 852 (H) 164 - 446 K/uL    MPV 9.7 9.0 - 12.9 fL    Neutrophils-Polys 69.10 44.00 - 72.00 %    Lymphocytes 17.00 (L) 22.00 - 41.00 %    Monocytes 8.60 0.00 - 13.40 %    Eosinophils 3.10 0.00 - 6.90 %    Basophils 1.40 0.00 - 1.80 %    Immature Granulocytes 0.80 0.00 - 0.90 %    Nucleated RBC 0.00 /100 WBC    Neutrophils (Absolute) 7.27 1.82 - 7.42 K/uL    Lymphs " (Absolute) 1.79 1.00 - 4.80 K/uL    Monos (Absolute) 0.90 (H) 0.00 - 0.85 K/uL    Eos (Absolute) 0.33 0.00 - 0.51 K/uL    Baso (Absolute) 0.15 (H) 0.00 - 0.12 K/uL    Immature Granulocytes (abs) 0.08 0.00 - 0.11 K/uL    NRBC (Absolute) 0.00 K/uL   MAGNESIUM    Collection Time: 10/26/21  5:43 AM   Result Value Ref Range    Magnesium 2.0 1.5 - 2.5 mg/dL   PHOSPHORUS    Collection Time: 10/26/21  5:43 AM   Result Value Ref Range    Phosphorus 3.4 2.5 - 4.5 mg/dL   ESTIMATED GFR    Collection Time: 10/26/21  5:43 AM   Result Value Ref Range    GFR If African American >60 >60 mL/min/1.73 m 2    GFR If Non African American >60 >60 mL/min/1.73 m 2       Current Facility-Administered Medications   Medication Frequency   • levoFLOXacin (LEVAQUIN) tablet 500 mg DAILY   • metroNIDAZOLE (FLAGYL) tablet 500 mg Q8HRS   • senna-docusate (PERICOLACE or SENOKOT S) 8.6-50 MG per tablet 2 Tablet BID PRN    And   • polyethylene glycol/lytes (MIRALAX) PACKET 1 Packet QDAY PRN    And   • magnesium hydroxide (MILK OF MAGNESIA) suspension 30 mL QDAY PRN    And   • bisacodyl (DULCOLAX) suppository 10 mg QDAY PRN   • melatonin tablet 3 mg QHS   • traZODone (DESYREL) tablet 50 mg QHS   • omeprazole (PRILOSEC) capsule 20 mg QAM AC   • tamsulosin (FLOMAX) capsule 0.4 mg Q EVENING   • enoxaparin (LOVENOX) inj 40 mg DAILY   • hydrOXYzine HCl (ATARAX) tablet 50 mg Q6HRS PRN   • QUEtiapine (Seroquel) tablet 25 mg TID PRN   • simethicone (MYLICON) chewable tab 80 mg TID PRN   • Respiratory Therapy Consult Continuous RT   • Pharmacy Consult Request ...Pain Management Review 1 Each PHARMACY TO DOSE   • hydrALAZINE (APRESOLINE) tablet 10 mg Q8HRS PRN   • acetaminophen (Tylenol) tablet 650 mg Q4HRS PRN   • sodium phosphate (Fleet) enema 133 mL QDAY PRN   • artificial tears ophthalmic solution 1 Drop PRN   • benzocaine-menthol (CEPACOL) lozenge 1 Lozenge Q2HRS PRN   • mag hydrox-al hydrox-simeth (MAALOX PLUS ES or MYLANTA DS) suspension 20 mL Q2HRS PRN    • ondansetron (ZOFRAN ODT) dispertab 4 mg 4X/DAY PRN    Or   • ondansetron (ZOFRAN) syringe/vial injection 4 mg 4X/DAY PRN   • sodium chloride (OCEAN) 0.65 % nasal spray 2 Spray PRN   • atorvastatin (LIPITOR) tablet 80 mg Q EVENING   • aspirin (ASA) chewable tab 81 mg DAILY   • nystatin (MYCOSTATIN) 115026 UNIT/ML suspension 500,000 Units 4X/DAY       Orders Placed This Encounter   Procedures   • Diet Order Diet: Regular (meds whole 1:1 with thins)     Standing Status:   Standing     Number of Occurrences:   1     Order Specific Question:   Diet:     Answer:   Regular [1]     Comments:   meds whole 1:1 with thins       Assessment:  Active Hospital Problems    Diagnosis    • *Stroke (cerebrum) (HCC)    • Urinary retention    • Leukocytosis    • Impaired mobility and ADLs    • Other insomnia    • Gastroesophageal reflux disease with esophagitis without hemorrhage    • Oral thrush    • Dysphagia    • Left hemiparesis (HCC)    • Subdural hematoma (HCC)    • H/O splenectomy      This patient is a 66 y.o. male admitted for acute inpatient rehabilitation with Stroke (cerebrum) (HCC).    I led and attended the weekly conference, and agree with the IDT conference documentation and plan of care as noted below.    Date of conference: 10/25/2021    Goals and barriers: See IDT note.    Biggest barriers: left hemiparesis    CM/social support: wife supportive    Anticipated DC date: 11/11    Home health: PT/OT/RN    Equip: TBD    Follow up: PCP, stroke bridge, Dr. Cifuentes, cardiology        Medical Decision Making and Plan:    Right MCA stroke  S/p tPA  S/p thrombectomy  Left hemiparesis, improved  Dysphagia, improved  Continue full rehab program  PT/OT/SLP, 1 hr each discipline, 5 days per week    Aspirin  Statin    Reviewed imaging today with patient and wife  Ziopatch cardiac monitor placed 10/25 - this was a XT, patient needs a AT - with real time recording and gateway device, to be delivered this afternoon    Outpatient  follow up with Dr. Cifuentes, stroke bridge clinic, referrals made  Outpatient follow up with cardiology, referral made    Subdural hematoma, small  Likely due to fall     Oral thrush, resolved  s/p Nystatin     Thrombocytosis  Stress reaction  Monitor with weekly labs    Leukocytosis, increased  Checked UA, negative, now positive on 10/22  Culture with pansensitive E Coli  Checked CXR - negative x 2  Appreciate hospitalist assistance    Suspected aspiration pneumonia  Continue antibiotics  Appreciate hospitalist assistance     Hyperglycemia  Checked HgbA1c - 4.6  Likely stress response     GERD  Omeprazole    Insomnia, improved  Schedule melatonin and trazodone    Bowel program  PRN bowel medications  Last BM 10/26    Bladder program  Urinary retention  Started Hytrin 2 mg  Check PVRs - 37, 95, 87  No longer retaining  Bladder scan for no voids  ICP for over 400 cc - last required 10/20  Scheduled toileting    DVT prophylaxis  Started Lovenox 10/20, patient 8 days out from small SDH    Total time:  26 minutes.  I spent greater than 50% of the time for patient care, counseling, and coordination on this date, including patient face-to face time, unit/floor time with review of records/pertinent lab data and studies, as well as discussing diagnostic evaluation/work up, planned therapeutic interventions, and future disposition of care, as per the interval events/subjective and the assessment and plan as noted above.    I have performed a physical exam, reviewed and updated ROS, as well as the assessment and plan today 10/26/2021. In review of note from 10/25/2021 there are no new changes except as documented above.          Ruth Samuels M.D.   Physical Medicine and Rehabilitation

## 2021-10-26 NOTE — DISCHARGE PLANNING
CM spoke with patients wife to update on IDT and DC date of 11/9/21. Answered questions.  She has resources for grab bar instillation.  She and her daughter have been alternating visiting patient and feel he's making very good progress.  [facility] will continue to monitor for DC needs.

## 2021-10-26 NOTE — CARE PLAN
Problem: Fall Risk - Rehab  Goal: Patient will remain free from falls  Note: Pt uses call light consistently and appropriately. Waits for assistance does not attempt self transfer this shift. Able to verbalize needs.      Problem: Skin Integrity  Goal: Skin integrity is maintained or improved  Note: Patient's skin remains intact and free from new or accidental injury this shift.  Will continue to monitor.    The patient is Stable - Low risk of patient condition declining or worsening    Shift Goals  Clinical Goals: safety   Patient Goals: sleep well

## 2021-10-26 NOTE — CARE PLAN
"  Problem: Bladder / Voiding  Goal: Patient will establish and maintain regular urinary output  Outcome: Progressing  Note: Pt voiding to a dark rachelle blood tinged urine, md to be notified in am.Cont monitored.     Problem: Fall Risk - Rehab  Goal: Patient will remain free from falls  Outcome: Progressing  Note: Bita Barron Fall risk Assessment Score: 14    Moderate fall risk Interventions  - Bed and strip alarm   - Yellow sign by the door   - Yellow wrist band \"Fall risk\"  - Room near to the nurse station  - Do not leave patient unattended in the bathroom  - Fall risk education provided       The patient is Stable    Shift Goals  Clinical Goals: safety   Patient Goals: sleep well     Progress made toward(s) clinical / shift goals: Able to sleep well, free from fall and injury.          "

## 2021-10-26 NOTE — TELEPHONE ENCOUNTER
Patient enrolled in the 14 day [facility] Heart monitoring program per Ruth Samuels MD.  >In-Clinic hookup (at the rehab hospital) with Baseline transmitted  >Pending EOS.

## 2021-10-27 PROBLEM — D75.839 THROMBOCYTOSIS: Status: ACTIVE | Noted: 2021-10-27

## 2021-10-27 PROCEDURE — 770010 HCHG ROOM/CARE - REHAB SEMI PRIVAT*

## 2021-10-27 PROCEDURE — 97032 APPL MODALITY 1+ESTIM EA 15: CPT

## 2021-10-27 PROCEDURE — 700111 HCHG RX REV CODE 636 W/ 250 OVERRIDE (IP): Performed by: PHYSICAL MEDICINE & REHABILITATION

## 2021-10-27 PROCEDURE — 97530 THERAPEUTIC ACTIVITIES: CPT

## 2021-10-27 PROCEDURE — 97130 THER IVNTJ EA ADDL 15 MIN: CPT

## 2021-10-27 PROCEDURE — 97112 NEUROMUSCULAR REEDUCATION: CPT

## 2021-10-27 PROCEDURE — A9270 NON-COVERED ITEM OR SERVICE: HCPCS | Performed by: PHYSICAL MEDICINE & REHABILITATION

## 2021-10-27 PROCEDURE — 97129 THER IVNTJ 1ST 15 MIN: CPT

## 2021-10-27 PROCEDURE — 700102 HCHG RX REV CODE 250 W/ 637 OVERRIDE(OP): Performed by: PHYSICAL MEDICINE & REHABILITATION

## 2021-10-27 PROCEDURE — 99231 SBSQ HOSP IP/OBS SF/LOW 25: CPT | Performed by: HOSPITALIST

## 2021-10-27 PROCEDURE — 99232 SBSQ HOSP IP/OBS MODERATE 35: CPT | Performed by: PHYSICAL MEDICINE & REHABILITATION

## 2021-10-27 PROCEDURE — A9270 NON-COVERED ITEM OR SERVICE: HCPCS | Performed by: HOSPITALIST

## 2021-10-27 PROCEDURE — 700102 HCHG RX REV CODE 250 W/ 637 OVERRIDE(OP): Performed by: HOSPITALIST

## 2021-10-27 RX ADMIN — MELATONIN TAB 3 MG 3 MG: 3 TAB at 21:34

## 2021-10-27 RX ADMIN — METRONIDAZOLE 500 MG: 500 TABLET ORAL at 14:00

## 2021-10-27 RX ADMIN — METRONIDAZOLE 500 MG: 500 TABLET ORAL at 05:44

## 2021-10-27 RX ADMIN — ASPIRIN 81 MG CHEWABLE TABLET 81 MG: 81 TABLET CHEWABLE at 09:12

## 2021-10-27 RX ADMIN — ENOXAPARIN SODIUM 40 MG: 40 INJECTION SUBCUTANEOUS at 21:33

## 2021-10-27 RX ADMIN — TAMSULOSIN HYDROCHLORIDE 0.4 MG: 0.4 CAPSULE ORAL at 21:34

## 2021-10-27 RX ADMIN — LEVOFLOXACIN 500 MG: 250 TABLET, FILM COATED ORAL at 09:12

## 2021-10-27 RX ADMIN — TRAZODONE HYDROCHLORIDE 50 MG: 50 TABLET ORAL at 21:34

## 2021-10-27 RX ADMIN — METRONIDAZOLE 500 MG: 500 TABLET ORAL at 21:34

## 2021-10-27 RX ADMIN — ATORVASTATIN CALCIUM 80 MG: 40 TABLET, FILM COATED ORAL at 21:34

## 2021-10-27 RX ADMIN — OMEPRAZOLE 20 MG: 20 CAPSULE, DELAYED RELEASE ORAL at 09:12

## 2021-10-27 ASSESSMENT — ENCOUNTER SYMPTOMS
MUSCULOSKELETAL NEGATIVE: 1
COUGH: 0
FEVER: 0
EYES NEGATIVE: 1
VOMITING: 0
SHORTNESS OF BREATH: 0
BRUISES/BLEEDS EASILY: 0
CHILLS: 0
NAUSEA: 0
PALPITATIONS: 0
POLYDIPSIA: 0
ABDOMINAL PAIN: 0

## 2021-10-27 ASSESSMENT — ACTIVITIES OF DAILY LIVING (ADL)
TUB_SHOWER_TRANSFER_DESCRIPTION: GRAB BAR;SHOWER BENCH;INCREASED TIME;SET-UP OF EQUIPMENT;SUPERVISION FOR SAFETY;VERBAL CUEING

## 2021-10-27 ASSESSMENT — GAIT ASSESSMENTS
DISTANCE (FEET): 125
GAIT LEVEL OF ASSIST: CONTACT GUARD ASSIST
ASSISTIVE DEVICE: FRONT WHEEL WALKER

## 2021-10-27 NOTE — CARE PLAN
Problem: Psychosocial  Goal: Patient's level of anxiety will decrease  Outcome: Progressing     Problem: Bowel Elimination  Goal: Patient will participate in bowel management program  Outcome: Progressing     Problem: Knowledge Deficit - Standard  Goal: Patient and family/care givers will demonstrate understanding of plan of care, disease process/condition, diagnostic tests and medications  Outcome: Progressing

## 2021-10-27 NOTE — ASSESSMENT & PLAN NOTE
Pt reports chronic personal and family history of Hereditary Spherocytosis  He is S/P splenectomy  Suspect Platelet counts are reactive but query etiology of pt's recent stroke  Pt already on a baby ASA, consider increase to full dose ASA  Elevated Platelet counts improving  Consider Hematology evaluation

## 2021-10-27 NOTE — PROGRESS NOTES
Hospital Medicine Daily Progress Note      Chief Complaint:  Leukocytosis    Interval History:  No overnight problems.  Pt denies new complaints.  Labs reviewed.    Review of Systems  Review of Systems   Constitutional: Negative for chills and fever.   HENT: Negative.    Eyes: Negative.    Respiratory: Negative for cough and shortness of breath.    Cardiovascular: Negative for chest pain and palpitations.   Gastrointestinal: Negative for abdominal pain, nausea and vomiting.   Musculoskeletal: Negative.    Skin: Negative for itching and rash.   Endo/Heme/Allergies: Negative for polydipsia. Does not bruise/bleed easily.        Physical Exam  Temp:  [36.4 °C (97.6 °F)-36.6 °C (97.9 °F)] 36.6 °C (97.9 °F)  Pulse:  [61-64] 61  Resp:  [16] 16  BP: (104-127)/(55-65) 110/55  SpO2:  [90 %-97 %] 90 %    Physical Exam  Vitals reviewed.   Constitutional:       General: He is not in acute distress.     Appearance: Normal appearance. He is not ill-appearing.   HENT:      Head: Normocephalic and atraumatic.      Right Ear: External ear normal.      Left Ear: External ear normal.      Nose: Nose normal.      Mouth/Throat:      Pharynx: Oropharynx is clear.   Eyes:      General:         Right eye: No discharge.         Left eye: No discharge.      Extraocular Movements: Extraocular movements intact.      Conjunctiva/sclera: Conjunctivae normal.   Cardiovascular:      Rate and Rhythm: Normal rate and regular rhythm.   Pulmonary:      Effort: No respiratory distress.      Breath sounds: No wheezing.      Comments: Decreased BS   Abdominal:      General: Bowel sounds are normal. There is no distension.      Palpations: Abdomen is soft.      Tenderness: There is no abdominal tenderness.   Musculoskeletal:      Cervical back: Normal range of motion and neck supple.      Right lower leg: No edema.      Left lower leg: Edema present.   Skin:     General: Skin is warm and dry.   Neurological:      Mental Status: He is alert and oriented to  person, place, and time.         Fluids    Intake/Output Summary (Last 24 hours) at 10/27/2021 1303  Last data filed at 10/27/2021 0935  Gross per 24 hour   Intake 220 ml   Output --   Net 220 ml       Laboratory  Recent Labs     10/26/21  0543   WBC 10.5   RBC 4.73   HEMOGLOBIN 15.7   HEMATOCRIT 45.0   MCV 95.1   MCH 33.2*   MCHC 34.9   RDW 44.5   PLATELETCT 852*   MPV 9.7     Recent Labs     10/26/21  0543   SODIUM 139   POTASSIUM 4.2   CHLORIDE 104   CO2 28   GLUCOSE 103*   BUN 12   CREATININE 0.83   CALCIUM 9.3               Assessment/Plan  * Stroke (cerebrum) (HCC)- (present on admission)  Assessment & Plan  S/P TPA at [facility]  S/P unsuccessful thrombectomy  On ASA and Lipitor    Thrombocytosis  Assessment & Plan  Likely reactive  Follow Platelet counts    Leukocytosis- (present on admission)  Assessment & Plan  UCx 10-50,000 E Coli, pansensitive  CXR negative acute  On Levaquin and Flagyl per Dr. Graham  WBC resolving    Urinary retention- (present on admission)  Assessment & Plan  Monitor for orthostatic hypotension on Flomax    Full Code

## 2021-10-27 NOTE — PROGRESS NOTES
Hospital Medicine Daily Progress Note      Chief Complaint:  Leukocytosis    Interval History:  Doing well.    Review of Systems  Review of Systems   Constitutional: Negative for chills and fever.   HENT: Negative.    Eyes: Negative.    Respiratory: Negative for cough and shortness of breath.    Cardiovascular: Negative for chest pain and palpitations.   Gastrointestinal: Negative for abdominal pain, nausea and vomiting.   Musculoskeletal: Negative.    Skin: Negative for itching and rash.   Endo/Heme/Allergies: Negative for polydipsia. Does not bruise/bleed easily.        Physical Exam  Temp:  [36.4 °C (97.6 °F)-36.6 °C (97.9 °F)] 36.6 °C (97.9 °F)  Pulse:  [61-64] 61  Resp:  [16] 16  BP: (104-127)/(55-65) 110/55  SpO2:  [90 %-97 %] 90 %    Physical Exam  Vitals reviewed.   Constitutional:       General: He is not in acute distress.     Appearance: Normal appearance. He is not ill-appearing.   HENT:      Head: Normocephalic and atraumatic.      Right Ear: External ear normal.      Left Ear: External ear normal.      Nose: Nose normal.      Mouth/Throat:      Pharynx: Oropharynx is clear.   Eyes:      General:         Right eye: No discharge.         Left eye: No discharge.      Extraocular Movements: Extraocular movements intact.      Conjunctiva/sclera: Conjunctivae normal.   Cardiovascular:      Rate and Rhythm: Normal rate and regular rhythm.   Pulmonary:      Effort: No respiratory distress.      Breath sounds: No wheezing.      Comments: Decreased BS   Abdominal:      General: Bowel sounds are normal. There is no distension.      Palpations: Abdomen is soft.      Tenderness: There is no abdominal tenderness.   Musculoskeletal:      Cervical back: Normal range of motion and neck supple.      Right lower leg: No edema.      Left lower leg: Edema present.   Skin:     General: Skin is warm and dry.   Neurological:      Mental Status: He is alert and oriented to person, place, and time.          Fluids    Intake/Output Summary (Last 24 hours) at 10/27/2021 1305  Last data filed at 10/27/2021 0935  Gross per 24 hour   Intake 220 ml   Output --   Net 220 ml       Laboratory  Recent Labs     10/26/21  0543   WBC 10.5   RBC 4.73   HEMOGLOBIN 15.7   HEMATOCRIT 45.0   MCV 95.1   MCH 33.2*   MCHC 34.9   RDW 44.5   PLATELETCT 852*   MPV 9.7     Recent Labs     10/26/21  0543   SODIUM 139   POTASSIUM 4.2   CHLORIDE 104   CO2 28   GLUCOSE 103*   BUN 12   CREATININE 0.83   CALCIUM 9.3               Assessment/Plan  * Stroke (cerebrum) (HCC)- (present on admission)  Assessment & Plan  S/P TPA at [facility]  S/P unsuccessful thrombectomy  On ASA and Lipitor    Thrombocytosis  Assessment & Plan  Likely reactive  Follow Platelet counts    Leukocytosis- (present on admission)  Assessment & Plan  UCx 10-50,000 E Coli, pansensitive  CXR negative acute  On Levaquin and Flagyl per Dr. Graham  WBC resolving    Urinary retention- (present on admission)  Assessment & Plan  Monitor for orthostatic hypotension on Flomax    Full Code    Patient seen and examined.  Chart reviewed.  Assessment and Plan as above.  No changes today from yesterday's medical decision making.

## 2021-10-27 NOTE — THERAPY
Physical Therapy   Daily Treatment     Patient Name: Fermín Gomez  Age:  66 y.o., Sex:  male  Medical Record #: [id number]  Today's Date: 10/27/2021     Precautions  Precautions: (P) Fall Risk, Swallow Precautions ( See Comments)  Comments: (P) Left hemiparesis, t-dine    Subjective    I want to eat a little more. I am trying to pay attention to my left side.     Objective       10/27/21 0831   Precautions   Precautions Fall Risk;Swallow Precautions ( See Comments)   Comments Left hemiparesis, t-dine   Pain   Intervention Declines   Cognition    Level of Consciousness Alert   Sleep/Wake Cycle   Sleep & Rest Awake  (greeted in T-dine)   Gait Functional Level of Assist    Gait Level Of Assist Contact Guard Assist   Assistive Device Front Wheel Walker   Distance (Feet) 125   # of Times Distance was Traveled 3   Deviation Decreased Heel Strike;Decreased Toe Off;Ataxic;Decreased Base Of Support  (increased L foot drop with distance)   Standing Lower Body Exercises   Comments stepping over obstacles forward and laterally in // bars with focus on lifting and leading with L LE over obstacle.; 14 total laps.   Neuro-Muscular Treatments   Neuro-Muscular Treatments Facilitation;Sequencing   Interdisciplinary Plan of Care Collaboration   IDT Collaboration with  Therapy [facility]   Patient Position at End of Therapy Seated;Chair Alarm On;Call Light within Reach;Tray Table within Reach;Phone within Reach   Collaboration Comments assist during T-Dine   Strengths & Barriers   Strengths Pleasant and cooperative;Willingly participates in therapeutic activities;Manages pain appropriately;Able to follow instructions   Barriers Fatigue;Hemiparesis;Impaired balance   PT Total Time Spent   PT Individual Total Time Spent (Mins) 30   PT Charge Group   PT Neuromuscular Re-Education / Balance 2       Assessment    Patient able to complete stable heel-toe gait with consistent VC and focus on LLE. Impaired balance with pivoting. Unsafe with use of  FWW independently as FWW drifts to L-side and may cause fall risk. VC for finding visual cue to assist with maintaining midline while walking.     Strengths: (P) Pleasant and cooperative, Willingly participates in therapeutic activities, Manages pain appropriately, Able to follow instructions  Barriers: (P) Fatigue, Hemiparesis, Impaired balance    Plan    Safety education, neuromuscular reeducation for bed mobility, transfers, static and dynamic standing balance, dynamic standing weight shift, gait with/without a device possibly hiking poles dependent on left upper extremity function    Passport items to be completed:  Get in/out of bed safely, in/out of a vehicle, safely use mobility device, walk or wheel around home/community, navigate up and down stairs, show how to get up/down from the ground, ensure home is accessible, demonstrate HEP, complete caregiver training    Physical Therapy Problems (Active)     Problem: Mobility     Dates: Start: 10/25/21       Goal: STG-Within one week, patient will ascend and descend four to six stairs     Dates: Start: 10/25/21       Goal Note filed on 10/25/21 1756 by JEANIE Kam     1) Individualized goal:   Up/down 4-6 stairs, bilateral handrails, CGA for facilitation, 1 week  2) Interventions:  PT E Stim Attended, PT Gait Training, PT Therapeutic Exercises, PT Neuro Re-Ed/Balance, and PT Therapeutic Activity            Goal: STG-Within one week, patient will     Dates: Start: 10/25/21       Goal Note filed on 10/25/21 1756 by JEANIE Kam     1) Individualized goal:   Gait FWW/SPC, CGA for facilitation 100 FT, 1 week  2) Interventions:  PT E Stim Attended, PT Gait Training, PT Therapeutic Exercises, PT Neuro Re-Ed/Balance, and PT Therapeutic Activity               Problem: Mobility Transfers     Dates: Start: 10/25/21       Goal: STG-Within one week, patient will transfer bed to chair     Dates: Start: 10/25/21       Goal Note filed on 10/25/21 1756 by  JEANIE Kam     1) Individualized goal:   Transfer bed to/from chair FWW/SPC, SBA 1 week  2) Interventions:  PT E Stim Attended, PT Gait Training, PT Therapeutic Exercises, PT Neuro Re-Ed/Balance, and PT Therapeutic Activity               Problem: PT-Long Term Goals     Dates: Start: 10/20/21       Goal: LTG-By discharge, patient will ambulate     Dates: Start: 10/20/21       Goal Note filed on 10/20/21 1514 by JEANIE Kam     1) Individualized goal:   Gait FWW/ FT, supervision at discharge  2) Interventions:  PT Gait Training, PT Therapeutic Exercises, PT Neuro Re-Ed/Balance, and PT Therapeutic Activity            Goal: LTG-By discharge, patient will transfer one surface to another     Dates: Start: 10/20/21       Goal Note filed on 10/20/21 1514 by JEANIE Kma     1) Individualized goal:   Transfer 1 surface to another fww/spc supervision at discharge  2) Interventions:  PT Gait Training, PT Therapeutic Exercises, PT Neuro Re-Ed/Balance, and PT Therapeutic Activity            Goal: LTG-By discharge, patient will ambulate up/down 4-6 stairs     Dates: Start: 10/20/21       Goal Note filed on 10/20/21 1514 by JEANIE Kam     1) Individualized goal:   Up/down 4-6 stairs handrails, supervision at discharge  2) Interventions:  PT Gait Training, PT Therapeutic Exercises, PT Neuro Re-Ed/Balance, and PT Therapeutic Activity            Goal: LTG-By discharge, patient will transfer in/out of a car     Dates: Start: 10/20/21       Goal Note filed on 10/20/21 1514 by JEANIE Kam     1) Individualized goal:   Car transfer fww/spc supervision at discharge  2) Interventions:  PT Gait Training, PT Therapeutic Exercises, PT Neuro Re-Ed/Balance, and PT Therapeutic Activity

## 2021-10-27 NOTE — THERAPY
Speech Language Pathology  Daily Treatment     Patient Name: Fermín Gomez  Age:  66 y.o., Sex:  male  Medical Record #: [id number]  Today's Date: 10/27/2021     Precautions  Precautions: Fall Risk, Swallow Precautions ( See Comments)  Comments: Left hemiparesis    Subjective    Pt pleasant and cooperative, daughter present and supportive.      Objective       10/26/21 1303   SLP Total Time Spent   SLP Individual Total Time Spent (Mins) 30   Treatment Charges   SLP Cognitive Skill Development First 15 Minutes 1   SLP Cognitive Skill Development Additional 15 Minutes 1       Assessment    Stroke education initiated at this time with use of written aids presented to both pt and pts daughter. Pt educated re: types of strokes, risk factors (controllable and non controllable), stroke prevention and warning signs. Pt and pts daughter receptive to information provided, asked appropriate questions and demonstrated good understanding of the information given.    Strengths: Able to follow instructions, Adequate strength, Alert and oriented, Effective communication skills, Good carryover of learning, Making steady progress towards goals, Supportive family, Pleasant and cooperative, Independent prior level of function, Motivated for self care and independence, Good insight into deficits/needs, Willingly participates in therapeutic activities  Barriers: Impaired balance, Aspiration risk, Impaired functional cognition    Plan    Assess pts recall of stroke education, answer pt and SO questions and consider dc from  services     Speech Therapy Problems (Active)     Problem: Comprehension STGs     Dates: Start: 10/25/21       Goal: STG-Within one week, patient will     Dates: Start: 10/25/21       Goal Note filed on 10/25/21 1109 by Obdulia Bowling MS,CCC-SLP     1) Individualized goal:   tolerate regular texture trials with thin liquids with no overt s/sx of asp/pen noted across 2/2 meals provided SPV.   2) Interventions:   SLP Swallowing Dysfunction Treatment, SLP Oral Pharyngeal Evaluation, SLP Video Swallow Evaluation, SLP Self Care / ADL Training , SLP Cognitive Skill Development, and SLP Group Treatment                 Problem: Memory STGs     Dates: Start: 10/25/21       Goal: STG-Within one week, patient will     Dates: Start: 10/25/21       Goal Note filed on 10/25/21 1109 by Obdulia Bowling MS,CCC-SLP     1) Individualized goal:   participate in stroke education and demonstrate recall and understanding provided SPV   2) Interventions:  SLP Self Care / ADL Training , SLP Cognitive Skill Development, and SLP Group Treatment                 Problem: Speech/Swallowing LTGs     Dates: Start: 10/20/21       Goal: LTG-By discharge, patient will safely swallow     Dates: Start: 10/20/21       Description: 1) Individualized goal:  regular textures with thin liquids with no overt S/SX with 100% accuracy provided Mod I  2) Interventions: SLP Swallowing Dysfunction Treatment, SLP Oral Pharyngeal Evaluation, SLP Video Swallow Evaluation, SLP Self Care / ADL Training , SLP Cognitive Skill Development, and SLP Group Treatment           Goal: LTG-By discharge, patient will     Dates: Start: 10/20/21       Description: 1) Individualized goal: complete complex problem solving and recall information with 80% accuracy provided Mod I  2) Interventions:  SLP Self Care / ADL Training , SLP Cognitive Skill Development, and SLP Group Treatment              Problem: Swallowing STGs     Dates: Start: 10/20/21

## 2021-10-27 NOTE — CARE PLAN
"  Problem: Psychosocial  Goal: Patient's level of anxiety will decrease  Outcome: Progressing  Note: Pt calm an cooperative with treatment .     Problem: Bladder / Voiding  Goal: Patient will establish and maintain regular urinary output  Outcome: Progressing     Problem: Skin Integrity  Goal: Patient's skin integrity will be maintained or improve  Outcome: Progressing  Note: Skin intact, no open sore noted.     Problem: Fall Risk - Rehab  Goal: Patient will remain free from falls  Note: Bita Barron Fall risk Assessment Score: 14      Moderate fall risk Interventions  - Bed  alarm   - Yellow sign by the door   - Yellow wrist band \"Fall risk\"  - Room near to the nurse station  - Do not leave patient unattended in the bathroom  - Fall risk education provided                                               The patient is Stable - Low risk of patient condition declining or worsening    Shift Goals  Clinical Goals: safety   Patient Goals: sleep well     Progress made toward(s) clinical / shift goals:  Pt able to sleep well and free from fall and injury.        "

## 2021-10-27 NOTE — THERAPY
Speech Language Pathology  Daily Treatment     Patient Name: Fermín Gomez  Age:  66 y.o., Sex:  male  Medical Record #: [id number]  Today's Date: 10/27/2021     Precautions  Precautions: Fall Risk  Comments: Left hemiparesis    Subjective    Pt pleasant and cooperative. SO present and supportive.      Objective       10/27/21 1303   SLP Total Time Spent   SLP Individual Total Time Spent (Mins) 30   Treatment Charges   SLP Cognitive Skill Development First 15 Minutes 1   SLP Cognitive Skill Development Additional 15 Minutes 1       Assessment    Pt indep recalled information related to stroke education given day prior. Pt with appropriate questions as well as SO, all questions answered at this time. Discussed all goals met for both swallow and cognitive function, recommend d/c from further intervention by SLP    Strengths: Able to follow instructions, Adequate strength, Alert and oriented, Effective communication skills, Good carryover of learning, Making steady progress towards goals, Supportive family, Pleasant and cooperative, Independent prior level of function, Motivated for self care and independence, Good insight into deficits/needs, Willingly participates in therapeutic activities  Barriers: Impaired balance, Aspiration risk, Impaired functional cognition    Plan    DC ST    Speech Therapy Problems (Active)     Problem: Comprehension STGs     Dates: Start: 10/25/21       Goal: STG-Within one week, patient will     Dates: Start: 10/25/21       Goal Note filed on 10/25/21 1109 by Obdulia Bowling MS,CCC-SLP     1) Individualized goal:   tolerate regular texture trials with thin liquids with no overt s/sx of asp/pen noted across 2/2 meals provided SPV.   2) Interventions:  SLP Swallowing Dysfunction Treatment, SLP Oral Pharyngeal Evaluation, SLP Video Swallow Evaluation, SLP Self Care / ADL Training , SLP Cognitive Skill Development, and SLP Group Treatment                 Problem: Memory STGs     Dates:  Start: 10/25/21       Goal: STG-Within one week, patient will     Dates: Start: 10/25/21       Goal Note filed on 10/25/21 1109 by Obdulia Bowling MS,CCC-SLP     1) Individualized goal:   participate in stroke education and demonstrate recall and understanding provided SPV   2) Interventions:  SLP Self Care / ADL Training , SLP Cognitive Skill Development, and SLP Group Treatment                 Problem: Speech/Swallowing LTGs     Dates: Start: 10/20/21       Goal: LTG-By discharge, patient will safely swallow     Dates: Start: 10/20/21       Description: 1) Individualized goal:  regular textures with thin liquids with no overt S/SX with 100% accuracy provided Mod I  2) Interventions: SLP Swallowing Dysfunction Treatment, SLP Oral Pharyngeal Evaluation, SLP Video Swallow Evaluation, SLP Self Care / ADL Training , SLP Cognitive Skill Development, and SLP Group Treatment           Goal: LTG-By discharge, patient will     Dates: Start: 10/20/21       Description: 1) Individualized goal: complete complex problem solving and recall information with 80% accuracy provided Mod I  2) Interventions:  SLP Self Care / ADL Training , SLP Cognitive Skill Development, and SLP Group Treatment              Problem: Swallowing STGs     Dates: Start: 10/20/21

## 2021-10-27 NOTE — THERAPY
10/27/21 1129   Precautions   Precautions Fall Risk   Comments Left hemiparesis   Pain 0 - 10 Group   Therapist Pain Assessment 0   Cognition    Level of Consciousness Alert   Neuro-Muscular Treatments   Neuro-Muscular Treatments Electrical Stimulation   Comments  session summary: Time 20 minutes, distance 2.50 miles, energy expended 0.7 kcal, energy expended/hour 2.3 kcal/hour, power 2.7 W, asymmetry left 8%.  Provided education to the patient and his spouse regarding the use of the  and its benefit to treat hemiparesis   PT Total Time Spent   PT Individual Total Time Spent (Mins) 60   PT Charge Group   PT Electrical Stimulation Attended 3   PT Therapeutic Activities 1   Physical Therapy   Daily Treatment     Patient Name: Fermín Gomez  Age:  66 y.o., Sex:  male  Medical Record #: [id number]  Today's Date: 10/27/2021     Precautions  Precautions: Fall Risk  Comments: Left hemiparesis    Subjective    The patient agreed to PT and using the  left lower extremity.     Objective    PT provided education to the patient and his wife regarding the use of the  for benefits in treating hemiparesis and with the equipment is designed to do.  The session summary as indicated above under neuromuscular treatments.    Assessment    The patient was able to remain within this speed parameter set for the  and PT provided verbal input to the patient while he was actively participating making sure that he was tolerating the stimulation and continuously feeling it by remaining within the RPM set up.  The patient was observed by PT to have visible muscle activity especially anterior tibialis, quadriceps and gastrocnemius.  Strengths: Pleasant and cooperative, Willingly participates in therapeutic activities, Manages pain appropriately, Able to follow instructions  Barriers: Fatigue, Hemiparesis, Impaired balance    Plan    Safety education, neuromuscular education for functional activities such as bed  mobility and transfers, sequencing sit to/from stand, midline standing orientation, static and dynamic standing weight shift, step up/step down on the stairs to facilitate proper weight shifting, gait training with attention to right weight shift and left dorsiflexion and heel strike and avoiding inappropriate movement patterns during gait    Passport items to be completed:  Passport items to be completed:  Get in/out of bed safely, in/out of a vehicle, safely use mobility device, walk or wheel around home/community, navigate up and down stairs, show how to get up/down from the ground, ensure home is accessible, demonstrate HEP, complete caregiver training    Physical Therapy Problems (Active)     Problem: Mobility     Dates: Start: 10/25/21       Goal: STG-Within one week, patient will ascend and descend four to six stairs     Dates: Start: 10/25/21       Goal Note filed on 10/25/21 1756 by JEANIE Kam     1) Individualized goal:   Up/down 4-6 stairs, bilateral handrails, CGA for facilitation, 1 week  2) Interventions:  PT E Stim Attended, PT Gait Training, PT Therapeutic Exercises, PT Neuro Re-Ed/Balance, and PT Therapeutic Activity            Goal: STG-Within one week, patient will     Dates: Start: 10/25/21       Goal Note filed on 10/25/21 1756 by JEANIE Kam     1) Individualized goal:   Gait FWW/SPC, CGA for facilitation 100 FT, 1 week  2) Interventions:  PT E Stim Attended, PT Gait Training, PT Therapeutic Exercises, PT Neuro Re-Ed/Balance, and PT Therapeutic Activity               Problem: Mobility Transfers     Dates: Start: 10/25/21       Goal: STG-Within one week, patient will transfer bed to chair     Dates: Start: 10/25/21       Goal Note filed on 10/25/21 1756 by EJANIE Kam     1) Individualized goal:   Transfer bed to/from chair FWW/SPC, SBA 1 week  2) Interventions:  PT E Stim Attended, PT Gait Training, PT Therapeutic Exercises, PT Neuro Re-Ed/Balance, and PT  Therapeutic Activity               Problem: PT-Long Term Goals     Dates: Start: 10/20/21       Goal: LTG-By discharge, patient will ambulate     Dates: Start: 10/20/21       Goal Note filed on 10/20/21 1514 by JEANIE Kam     1) Individualized goal:   Gait FWW/ FT, supervision at discharge  2) Interventions:  PT Gait Training, PT Therapeutic Exercises, PT Neuro Re-Ed/Balance, and PT Therapeutic Activity            Goal: LTG-By discharge, patient will transfer one surface to another     Dates: Start: 10/20/21       Goal Note filed on 10/20/21 1514 by JEANIE Kam     1) Individualized goal:   Transfer 1 surface to another fww/spc supervision at discharge  2) Interventions:  PT Gait Training, PT Therapeutic Exercises, PT Neuro Re-Ed/Balance, and PT Therapeutic Activity            Goal: LTG-By discharge, patient will ambulate up/down 4-6 stairs     Dates: Start: 10/20/21       Goal Note filed on 10/20/21 1514 by JEANIE Kam     1) Individualized goal:   Up/down 4-6 stairs handrails, supervision at discharge  2) Interventions:  PT Gait Training, PT Therapeutic Exercises, PT Neuro Re-Ed/Balance, and PT Therapeutic Activity            Goal: LTG-By discharge, patient will transfer in/out of a car     Dates: Start: 10/20/21       Goal Note filed on 10/20/21 1514 by JEANIE Kam     1) Individualized goal:   Car transfer fww/spc supervision at discharge  2) Interventions:  PT Gait Training, PT Therapeutic Exercises, PT Neuro Re-Ed/Balance, and PT Therapeutic Activity

## 2021-10-27 NOTE — CARE PLAN
Problem: Speech/Swallowing LTGs  Goal: LTG-By discharge, patient will safely swallow  Description: 1) Individualized goal:  regular textures with thin liquids with no overt S/SX with 100% accuracy provided Mod I  2) Interventions: SLP Swallowing Dysfunction Treatment, SLP Oral Pharyngeal Evaluation, SLP Video Swallow Evaluation, SLP Self Care / ADL Training , SLP Cognitive Skill Development, and SLP Group Treatment      Outcome: Met  Goal: LTG-By discharge, patient will  Description: 1) Individualized goal: complete complex problem solving and recall information with 80% accuracy provided Mod I  2) Interventions:  SLP Self Care / ADL Training , SLP Cognitive Skill Development, and SLP Group Treatment      Outcome: Met     Problem: Comprehension STGs  Goal: STG-Within one week, patient will  Outcome: Met     Problem: Memory STGs  Goal: STG-Within one week, patient will  Outcome: Met

## 2021-10-27 NOTE — THERAPY
10/26/21 1559   Precautions   Precautions Fall Risk;Swallow Precautions ( See Comments)   Comments Left hemiparesis   Pain 0 - 10 Group   Therapist Pain Assessment 0   Cognition    Level of Consciousness Alert   Gait Functional Level of Assist    Gait Level Of Assist Contact Guard Assist   Assistive Device None   Distance (Feet) 85  (135 FT x1, 160 FT x1, 85 FT x2)   # of Times Distance was Traveled 4   Deviation Ataxic;Decreased Base Of Support;Bradykinetic;Decreased Heel Strike;Decreased Toe Off  (L shuffled gait with fatigue)   Transfer Functional Level of Assist   Bed, Chair, Wheelchair Transfer Stand by Assist   Bed Chair Wheelchair Transfer Description Adaptive equipment;Increased time;Supervision for safety;Verbal cueing  (FWW)   Standing Lower Body Exercises   Mini Squat 2 sets of 15;Partial  (Attention to midline squats)   Bed Mobility    Supine to Sit Stand by Assist   Sit to Stand Stand by Assist   Scooting Stand by Assist   Rolling Supervised   Neuro-Muscular Treatments   Neuro-Muscular Treatments Facilitation;Postural Facilitation;Sequencing;Tactile Cuing;Verbal Cuing;Weight Shift Right;Weight Shift Left   Comments Sequencing bed mobility supine to sit to stand and transfer bed to wheelchair.  Sequencing dynamic standing weight shift with sidestepping in the parallel bars left and right, facilitated midline orientation with correction of minimal left leaning during static standing by having the patient  the parallel bars and lean his right hip onto the right bar tip his head and shoulders to the right and gaze to the right.  He participated in this prolonged right leaning 3 times to regain midline orientation.  The patient also participated in gait training without an assistive device with facilitation at the left pelvis for right weight shift.   Interdisciplinary Plan of Care Collaboration   Patient Position at End of Therapy Seated;Call Light within Reach;Tray Table within Reach;Phone  within Reach;Family / Friend in Room   PT Total Time Spent   PT Individual Total Time Spent (Mins) 60   PT Charge Group   PT Gait Training 2   PT Neuromuscular Re-Education / Balance 2        10/26/21 1559   Precautions   Precautions Fall Risk;Swallow Precautions ( See Comments)   Comments Left hemiparesis   Pain 0 - 10 Group   Therapist Pain Assessment 0   Cognition    Level of Consciousness Alert   Gait Functional Level of Assist    Gait Level Of Assist Contact Guard Assist   Assistive Device None   Distance (Feet) 85  (135 FT x1, 160 FT x1, 85 FT x2)   # of Times Distance was Traveled 4   Deviation Ataxic;Decreased Base Of Support;Bradykinetic;Decreased Heel Strike;Decreased Toe Off  (L shuffled gait with fatigue)   Transfer Functional Level of Assist   Bed, Chair, Wheelchair Transfer Stand by Assist   Bed Chair Wheelchair Transfer Description Adaptive equipment;Increased time;Supervision for safety;Verbal cueing  (FWW)   Standing Lower Body Exercises   Mini Squat 2 sets of 15;Partial  (Attention to midline squats)   Bed Mobility    Supine to Sit Stand by Assist   Sit to Stand Stand by Assist   Scooting Stand by Assist   Rolling Supervised   Neuro-Muscular Treatments   Neuro-Muscular Treatments Facilitation;Postural Facilitation;Sequencing;Tactile Cuing;Verbal Cuing;Weight Shift Right;Weight Shift Left   Comments Sequencing bed mobility supine to sit to stand and transfer bed to wheelchair.  Sequencing dynamic standing weight shift with sidestepping in the parallel bars left and right, facilitated midline orientation with correction of minimal left leaning during static standing by having the patient  the parallel bars and lean his right hip onto the right bar tip his head and shoulders to the right and gaze to the right.  He participated in this prolonged right leaning 3 times to regain midline orientation.  The patient also participated in gait training without an assistive device with facilitation at  the left pelvis for right weight shift.   Interdisciplinary Plan of Care Collaboration   Patient Position at End of Therapy Seated;Call Light within Reach;Tray Table within Reach;Phone within Reach;Family / Friend in Room   PT Total Time Spent   PT Individual Total Time Spent (Mins) 60   PT Charge Group   PT Gait Training 2   PT Neuromuscular Re-Education / Balance 2   Physical Therapy   Daily Treatment     Patient Name: Fermín Gomez  Age:  66 y.o., Sex:  male  Medical Record #: [id number]  Today's Date: 10/26/2021     Precautions  Precautions: Fall Risk, Swallow Precautions ( See Comments)  Comments: Left hemiparesis    Subjective    The patient was resting in bed and he agreed to PT.     Objective    The patient participated in sequencing bed mobility supine to sit, sit to stand and transfer to the wheelchair.  In the gym the patient stood up in the parallel bars where he participated in gait training without upper extremity support.  He also participated in sidestepping in the parallel bars to facilitate dynamic standing weight shift and left lower extremity control.  When static standing the patient has minimal left weight shift away from midline so he also participated in static standing in the parallel bars with his right hip against the right bar, torso forward, tipping the shoulders to the right, right head turn and gaze down and to the right.  The patient stood in this right leaning posture approximately 3 minutes x 3 to correct left leaning.  Midline standing orientation improved after each session of prolonged right leaning.  The patient participated in gait training without an assistive device and tolerated 135 FT, 160 FT and 85 FT x2.      Assessment    Today the patient has been participating in gait training without a device with CGA for facilitation and verbal cues for attention to left lower extremity control.  He tolerated gait distance from  FT.  When he was limited to 85 FT weight shifting  and correct movement patterns of the left lower extremity occurred.  Longer distances such as 135 to 160 FT generated left lower extremity fatigue and declining movement patterns with left lower extremity shuffled gait, lack of dorsiflexion and heel strike and left lower extremity external rotation.  He will benefit from shorter gait distances of 60-85 FT in order to maintain proper movement patterns.       Plan    Safety education, neuromuscular reeducation for midline orientation, static and dynamic standing weight shift, gait training with attention to weight shift, left lower extremity control with dorsiflexion-heel strike, left lower extremity neutral rotation and limited to 60-85 FT at a time for correct movement patterns, step up/step down, lateral stepping, stepping backward during gait training and change of direction gait,  LLE MWF    Passport items to be completed:  Passport items to be completed:  Get in/out of bed safely, in/out of a vehicle, safely use mobility device, walk or wheel around home/community, navigate up and down stairs, show how to get up/down from the ground, ensure home is accessible, demonstrate HEP, complete caregiver training    Physical Therapy Problems (Active)     Problem: Mobility     Dates: Start: 10/25/21       Goal: STG-Within one week, patient will ascend and descend four to six stairs     Dates: Start: 10/25/21       Goal Note filed on 10/25/21 1756 by JEANIE Kam     1) Individualized goal:   Up/down 4-6 stairs, bilateral handrails, CGA for facilitation, 1 week  2) Interventions:  PT E Stim Attended, PT Gait Training, PT Therapeutic Exercises, PT Neuro Re-Ed/Balance, and PT Therapeutic Activity            Goal: STG-Within one week, patient will     Dates: Start: 10/25/21       Goal Note filed on 10/25/21 1756 by JEANIE Kam     1) Individualized goal:   Gait FWW/SPC, CGA for facilitation 100 FT, 1 week  2) Interventions:  PT E Stim Attended, PT  Gait Training, PT Therapeutic Exercises, PT Neuro Re-Ed/Balance, and PT Therapeutic Activity               Problem: Mobility Transfers     Dates: Start: 10/25/21       Goal: STG-Within one week, patient will transfer bed to chair     Dates: Start: 10/25/21       Goal Note filed on 10/25/21 1756 by JEANIE Kam     1) Individualized goal:   Transfer bed to/from chair FWW/SPC, SBA 1 week  2) Interventions:  PT E Stim Attended, PT Gait Training, PT Therapeutic Exercises, PT Neuro Re-Ed/Balance, and PT Therapeutic Activity               Problem: PT-Long Term Goals     Dates: Start: 10/20/21       Goal: LTG-By discharge, patient will ambulate     Dates: Start: 10/20/21       Goal Note filed on 10/20/21 1514 by JEANIE Kam     1) Individualized goal:   Gait FWW/ FT, supervision at discharge  2) Interventions:  PT Gait Training, PT Therapeutic Exercises, PT Neuro Re-Ed/Balance, and PT Therapeutic Activity            Goal: LTG-By discharge, patient will transfer one surface to another     Dates: Start: 10/20/21       Goal Note filed on 10/20/21 1514 by JEANIE Kam     1) Individualized goal:   Transfer 1 surface to another fww/spc supervision at discharge  2) Interventions:  PT Gait Training, PT Therapeutic Exercises, PT Neuro Re-Ed/Balance, and PT Therapeutic Activity            Goal: LTG-By discharge, patient will ambulate up/down 4-6 stairs     Dates: Start: 10/20/21       Goal Note filed on 10/20/21 1514 by JEANIE Kam     1) Individualized goal:   Up/down 4-6 stairs handrails, supervision at discharge  2) Interventions:  PT Gait Training, PT Therapeutic Exercises, PT Neuro Re-Ed/Balance, and PT Therapeutic Activity            Goal: LTG-By discharge, patient will transfer in/out of a car     Dates: Start: 10/20/21       Goal Note filed on 10/20/21 1514 by JEANIE Kam     1) Individualized goal:   Car transfer fww/spc supervision at discharge  2) Interventions:  PT  Gait Training, PT Therapeutic Exercises, PT Neuro Re-Ed/Balance, and PT Therapeutic Activity

## 2021-10-27 NOTE — DISCHARGE PLANNING
Recommendation:  Update IPOC to address therapy service delivery:  PT__90___ min/day, OT __90___ min/day, ST _0____ min/day on 5/7 days per week for at least 15 hours per week.

## 2021-10-27 NOTE — PROGRESS NOTES
"Rehab Progress Note     Chief Complaint: Follow-up stroke    Interval Events (Subjective)  Patient is doing well today.  He is seen in his room.  No complaints.  Patient endorses sleeping well, good appetite, denies abdominal pain, no new symptoms today.  Labs reviewed, last set stable.    Objective:  VITAL SIGNS: /55   Pulse 61   Temp 36.6 °C (97.9 °F) (Temporal)   Resp 16   Ht 1.558 m (5' 1.32\")   Wt 83 kg (182 lb 15.7 oz)   SpO2 90%   BMI 34.21 kg/m²     Physical Exam:  Vitals: /55   Pulse 61   Temp 36.6 °C (97.9 °F) (Temporal)   Resp 16   Ht 1.558 m (5' 1.32\")   Wt 83 kg (182 lb 15.7 oz)   SpO2 90%   Gen: NAD  Head: NC/AT  Eyes/ Nose/ Mouth: moist mucous membranes  Cardio: RRR, good distal perfusion, warm extremities  Pulm: normal respiratory effort, no cyanosis   Abd: Soft NTND, negative borborygmi   Ext: No peripheral edema. No calf tenderness. No clubbing.    Recent Results (from the past 72 hour(s))   Basic Metabolic Panel    Collection Time: 10/26/21  5:43 AM   Result Value Ref Range    Sodium 139 135 - 145 mmol/L    Potassium 4.2 3.6 - 5.5 mmol/L    Chloride 104 96 - 112 mmol/L    Co2 28 20 - 33 mmol/L    Glucose 103 (H) 65 - 99 mg/dL    Bun 12 8 - 22 mg/dL    Creatinine 0.83 0.50 - 1.40 mg/dL    Calcium 9.3 8.5 - 10.5 mg/dL    Anion Gap 7.0 7.0 - 16.0   CBC WITH DIFFERENTIAL    Collection Time: 10/26/21  5:43 AM   Result Value Ref Range    WBC 10.5 4.8 - 10.8 K/uL    RBC 4.73 4.70 - 6.10 M/uL    Hemoglobin 15.7 14.0 - 18.0 g/dL    Hematocrit 45.0 42.0 - 52.0 %    MCV 95.1 81.4 - 97.8 fL    MCH 33.2 (H) 27.0 - 33.0 pg    MCHC 34.9 33.7 - 35.3 g/dL    RDW 44.5 35.9 - 50.0 fL    Platelet Count 852 (H) 164 - 446 K/uL    MPV 9.7 9.0 - 12.9 fL    Neutrophils-Polys 69.10 44.00 - 72.00 %    Lymphocytes 17.00 (L) 22.00 - 41.00 %    Monocytes 8.60 0.00 - 13.40 %    Eosinophils 3.10 0.00 - 6.90 %    Basophils 1.40 0.00 - 1.80 %    Immature Granulocytes 0.80 0.00 - 0.90 %    Nucleated RBC " 0.00 /100 WBC    Neutrophils (Absolute) 7.27 1.82 - 7.42 K/uL    Lymphs (Absolute) 1.79 1.00 - 4.80 K/uL    Monos (Absolute) 0.90 (H) 0.00 - 0.85 K/uL    Eos (Absolute) 0.33 0.00 - 0.51 K/uL    Baso (Absolute) 0.15 (H) 0.00 - 0.12 K/uL    Immature Granulocytes (abs) 0.08 0.00 - 0.11 K/uL    NRBC (Absolute) 0.00 K/uL   MAGNESIUM    Collection Time: 10/26/21  5:43 AM   Result Value Ref Range    Magnesium 2.0 1.5 - 2.5 mg/dL   PHOSPHORUS    Collection Time: 10/26/21  5:43 AM   Result Value Ref Range    Phosphorus 3.4 2.5 - 4.5 mg/dL   ESTIMATED GFR    Collection Time: 10/26/21  5:43 AM   Result Value Ref Range    GFR If African American >60 >60 mL/min/1.73 m 2    GFR If Non African American >60 >60 mL/min/1.73 m 2       Current Facility-Administered Medications   Medication Frequency   • levoFLOXacin (LEVAQUIN) tablet 500 mg DAILY   • metroNIDAZOLE (FLAGYL) tablet 500 mg Q8HRS   • senna-docusate (PERICOLACE or SENOKOT S) 8.6-50 MG per tablet 2 Tablet BID PRN    And   • polyethylene glycol/lytes (MIRALAX) PACKET 1 Packet QDAY PRN    And   • magnesium hydroxide (MILK OF MAGNESIA) suspension 30 mL QDAY PRN    And   • bisacodyl (DULCOLAX) suppository 10 mg QDAY PRN   • melatonin tablet 3 mg QHS   • traZODone (DESYREL) tablet 50 mg QHS   • omeprazole (PRILOSEC) capsule 20 mg QAM AC   • tamsulosin (FLOMAX) capsule 0.4 mg Q EVENING   • enoxaparin (LOVENOX) inj 40 mg DAILY   • hydrOXYzine HCl (ATARAX) tablet 50 mg Q6HRS PRN   • QUEtiapine (Seroquel) tablet 25 mg TID PRN   • simethicone (MYLICON) chewable tab 80 mg TID PRN   • Respiratory Therapy Consult Continuous RT   • Pharmacy Consult Request ...Pain Management Review 1 Each PHARMACY TO DOSE   • hydrALAZINE (APRESOLINE) tablet 10 mg Q8HRS PRN   • acetaminophen (Tylenol) tablet 650 mg Q4HRS PRN   • sodium phosphate (Fleet) enema 133 mL QDAY PRN   • artificial tears ophthalmic solution 1 Drop PRN   • benzocaine-menthol (CEPACOL) lozenge 1 Lozenge Q2HRS PRN   • mag hydrox-al  hydrox-simeth (MAALOX PLUS ES or MYLANTA DS) suspension 20 mL Q2HRS PRN   • ondansetron (ZOFRAN ODT) dispertab 4 mg 4X/DAY PRN    Or   • ondansetron (ZOFRAN) syringe/vial injection 4 mg 4X/DAY PRN   • sodium chloride (OCEAN) 0.65 % nasal spray 2 Spray PRN   • atorvastatin (LIPITOR) tablet 80 mg Q EVENING   • aspirin (ASA) chewable tab 81 mg DAILY       Orders Placed This Encounter   Procedures   • Diet Order Diet: Regular (meds whole 1:1 with thins)     Standing Status:   Standing     Number of Occurrences:   1     Order Specific Question:   Diet:     Answer:   Regular [1]     Comments:   meds whole 1:1 with thins       Assessment:  Active Hospital Problems    Diagnosis    • *Stroke (cerebrum) (HCC)    • Edema of left lower extremity    • Urinary retention    • Leukocytosis    • Impaired mobility and ADLs    • Dysphagia    • Left hemiparesis (HCC)    • Subdural hematoma (HCC)    • H/O splenectomy        Medical Decision Making and Plan:  Right MCA stroke  S/p tPA  S/p thrombectomy  Left hemiparesis, improved  Dysphagia, improved  Continue full rehab program  PT/OT/SLP, 1 hr each discipline, 5 days per week     Aspirin  Statin     Reviewed imaging today with patient and wife  Ziopatch cardiac monitor placed 10/25 - this was a XT, patient needs a AT - with real time recording and gateway device, to be delivered this afternoon     Outpatient follow up with Dr. Cifuentes, stroke bridge clinic, referrals made  Outpatient follow up with cardiology, referral made     Subdural hematoma, small  Likely due to fall     Oral thrush, resolved  s/p Nystatin     Thrombocytosis  Stress reaction  Monitor with weekly labs     Leukocytosis, increased  Checked UA, negative, now positive on 10/22  Culture with pansensitive E Coli  Checked CXR - negative x 2  Appreciate hospitalist assistance     Suspected aspiration pneumonia  Continue antibiotics  Appreciate hospitalist assistance     Hyperglycemia  Checked HgbA1c - 4.6  Likely stress  response     GERD  Omeprazole     Insomnia, improved  Schedule melatonin and trazodone     Bowel program  PRN bowel medications  Last BM 10/26     Bladder program  Urinary retention  Started Hytrin 2 mg  Check PVRs - 37, 95, 87  No longer retaining  Bladder scan for no voids  ICP for over 400 cc - last required 10/20, 10/25  Scheduled toileting     DVT prophylaxis  Started Lovenox 10/20, patient 8 days out from small SDH    Total time:  28 minutes.  I spent greater than 50% of the time for patient care and coordination on this date, including unit/floor time, and face-to-face time with the patient as per assessment and plan above.    Yosi Cotter, DO   Physical Medicine and Rehabilitation

## 2021-10-28 PROCEDURE — 97535 SELF CARE MNGMENT TRAINING: CPT | Mod: CO

## 2021-10-28 PROCEDURE — 700102 HCHG RX REV CODE 250 W/ 637 OVERRIDE(OP): Performed by: PHYSICAL MEDICINE & REHABILITATION

## 2021-10-28 PROCEDURE — 97116 GAIT TRAINING THERAPY: CPT

## 2021-10-28 PROCEDURE — A9270 NON-COVERED ITEM OR SERVICE: HCPCS | Performed by: PHYSICAL MEDICINE & REHABILITATION

## 2021-10-28 PROCEDURE — A9270 NON-COVERED ITEM OR SERVICE: HCPCS | Performed by: HOSPITALIST

## 2021-10-28 PROCEDURE — 700111 HCHG RX REV CODE 636 W/ 250 OVERRIDE (IP): Performed by: PHYSICAL MEDICINE & REHABILITATION

## 2021-10-28 PROCEDURE — 99232 SBSQ HOSP IP/OBS MODERATE 35: CPT | Performed by: HOSPITALIST

## 2021-10-28 PROCEDURE — 97112 NEUROMUSCULAR REEDUCATION: CPT

## 2021-10-28 PROCEDURE — 700102 HCHG RX REV CODE 250 W/ 637 OVERRIDE(OP): Performed by: HOSPITALIST

## 2021-10-28 PROCEDURE — 97110 THERAPEUTIC EXERCISES: CPT | Mod: CO

## 2021-10-28 PROCEDURE — 97112 NEUROMUSCULAR REEDUCATION: CPT | Mod: CO

## 2021-10-28 PROCEDURE — 99232 SBSQ HOSP IP/OBS MODERATE 35: CPT | Performed by: PHYSICAL MEDICINE & REHABILITATION

## 2021-10-28 PROCEDURE — 770010 HCHG ROOM/CARE - REHAB SEMI PRIVAT*

## 2021-10-28 RX ADMIN — TRAZODONE HYDROCHLORIDE 50 MG: 50 TABLET ORAL at 20:13

## 2021-10-28 RX ADMIN — ASPIRIN 81 MG CHEWABLE TABLET 81 MG: 81 TABLET CHEWABLE at 08:18

## 2021-10-28 RX ADMIN — OMEPRAZOLE 20 MG: 20 CAPSULE, DELAYED RELEASE ORAL at 08:19

## 2021-10-28 RX ADMIN — METRONIDAZOLE 500 MG: 500 TABLET ORAL at 05:29

## 2021-10-28 RX ADMIN — TAMSULOSIN HYDROCHLORIDE 0.4 MG: 0.4 CAPSULE ORAL at 20:13

## 2021-10-28 RX ADMIN — MELATONIN TAB 3 MG 3 MG: 3 TAB at 20:14

## 2021-10-28 RX ADMIN — LEVOFLOXACIN 500 MG: 250 TABLET, FILM COATED ORAL at 08:19

## 2021-10-28 RX ADMIN — METRONIDAZOLE 500 MG: 500 TABLET ORAL at 15:07

## 2021-10-28 RX ADMIN — ATORVASTATIN CALCIUM 80 MG: 40 TABLET, FILM COATED ORAL at 20:13

## 2021-10-28 RX ADMIN — ENOXAPARIN SODIUM 40 MG: 40 INJECTION SUBCUTANEOUS at 20:14

## 2021-10-28 RX ADMIN — METRONIDAZOLE 500 MG: 500 TABLET ORAL at 20:14

## 2021-10-28 ASSESSMENT — ENCOUNTER SYMPTOMS
EYES NEGATIVE: 1
FEVER: 0
NAUSEA: 0
SHORTNESS OF BREATH: 0
PALPITATIONS: 0
CHILLS: 0
POLYDIPSIA: 0
VOMITING: 0
ABDOMINAL PAIN: 0
COUGH: 0
MUSCULOSKELETAL NEGATIVE: 1
BRUISES/BLEEDS EASILY: 0

## 2021-10-28 ASSESSMENT — GAIT ASSESSMENTS
GAIT LEVEL OF ASSIST: CONTACT GUARD ASSIST
GAIT LEVEL OF ASSIST: CONTACT GUARD ASSIST
DISTANCE (FEET): 190
DISTANCE (FEET): 200
ASSISTIVE DEVICE: OTHER (COMMENTS)
DEVIATION: ATAXIC;DECREASED BASE OF SUPPORT;BRADYKINETIC;DECREASED HEEL STRIKE;DECREASED TOE OFF
ASSISTIVE DEVICE: OTHER (COMMENTS);FRONT WHEEL WALKER

## 2021-10-28 NOTE — PROGRESS NOTES
"Rehab Progress Note     Chief Complaint: Follow-up stroke    Interval Events (Subjective)  Patient reports feeling good today, he is looking forward to his daughters visiting.  He has no new pains or concerns for me today.  Leukocytosis much improved on last lab check, hospitalist following    Objective:  VITAL SIGNS: /59   Pulse (!) 54   Temp 36.4 °C (97.6 °F) (Oral)   Resp 16   Ht 1.558 m (5' 1.32\")   Wt 83 kg (182 lb 15.7 oz)   SpO2 90%   BMI 34.21 kg/m²     Physical Exam:  Vitals: /59   Pulse (!) 54   Temp 36.4 °C (97.6 °F) (Oral)   Resp 16   Ht 1.558 m (5' 1.32\")   Wt 83 kg (182 lb 15.7 oz)   SpO2 90%   Gen: NAD  Head: NC/AT  Eyes/ Nose/ Mouth: moist mucous membranes  Cardio: RRR, good distal perfusion, warm extremities  Pulm: normal respiratory effort, no cyanosis   Abd: Soft NTND, negative borborygmi   Ext: No peripheral edema. No calf tenderness. No clubbing.    Recent Results (from the past 72 hour(s))   Basic Metabolic Panel    Collection Time: 10/26/21  5:43 AM   Result Value Ref Range    Sodium 139 135 - 145 mmol/L    Potassium 4.2 3.6 - 5.5 mmol/L    Chloride 104 96 - 112 mmol/L    Co2 28 20 - 33 mmol/L    Glucose 103 (H) 65 - 99 mg/dL    Bun 12 8 - 22 mg/dL    Creatinine 0.83 0.50 - 1.40 mg/dL    Calcium 9.3 8.5 - 10.5 mg/dL    Anion Gap 7.0 7.0 - 16.0   CBC WITH DIFFERENTIAL    Collection Time: 10/26/21  5:43 AM   Result Value Ref Range    WBC 10.5 4.8 - 10.8 K/uL    RBC 4.73 4.70 - 6.10 M/uL    Hemoglobin 15.7 14.0 - 18.0 g/dL    Hematocrit 45.0 42.0 - 52.0 %    MCV 95.1 81.4 - 97.8 fL    MCH 33.2 (H) 27.0 - 33.0 pg    MCHC 34.9 33.7 - 35.3 g/dL    RDW 44.5 35.9 - 50.0 fL    Platelet Count 852 (H) 164 - 446 K/uL    MPV 9.7 9.0 - 12.9 fL    Neutrophils-Polys 69.10 44.00 - 72.00 %    Lymphocytes 17.00 (L) 22.00 - 41.00 %    Monocytes 8.60 0.00 - 13.40 %    Eosinophils 3.10 0.00 - 6.90 %    Basophils 1.40 0.00 - 1.80 %    Immature Granulocytes 0.80 0.00 - 0.90 %    Nucleated RBC " 0.00 /100 WBC    Neutrophils (Absolute) 7.27 1.82 - 7.42 K/uL    Lymphs (Absolute) 1.79 1.00 - 4.80 K/uL    Monos (Absolute) 0.90 (H) 0.00 - 0.85 K/uL    Eos (Absolute) 0.33 0.00 - 0.51 K/uL    Baso (Absolute) 0.15 (H) 0.00 - 0.12 K/uL    Immature Granulocytes (abs) 0.08 0.00 - 0.11 K/uL    NRBC (Absolute) 0.00 K/uL   MAGNESIUM    Collection Time: 10/26/21  5:43 AM   Result Value Ref Range    Magnesium 2.0 1.5 - 2.5 mg/dL   PHOSPHORUS    Collection Time: 10/26/21  5:43 AM   Result Value Ref Range    Phosphorus 3.4 2.5 - 4.5 mg/dL   ESTIMATED GFR    Collection Time: 10/26/21  5:43 AM   Result Value Ref Range    GFR If African American >60 >60 mL/min/1.73 m 2    GFR If Non African American >60 >60 mL/min/1.73 m 2       Current Facility-Administered Medications   Medication Frequency   • metroNIDAZOLE (FLAGYL) tablet 500 mg Q8HRS   • senna-docusate (PERICOLACE or SENOKOT S) 8.6-50 MG per tablet 2 Tablet BID PRN    And   • polyethylene glycol/lytes (MIRALAX) PACKET 1 Packet QDAY PRN    And   • magnesium hydroxide (MILK OF MAGNESIA) suspension 30 mL QDAY PRN    And   • bisacodyl (DULCOLAX) suppository 10 mg QDAY PRN   • melatonin tablet 3 mg QHS   • traZODone (DESYREL) tablet 50 mg QHS   • omeprazole (PRILOSEC) capsule 20 mg QAM AC   • tamsulosin (FLOMAX) capsule 0.4 mg Q EVENING   • enoxaparin (LOVENOX) inj 40 mg DAILY   • hydrOXYzine HCl (ATARAX) tablet 50 mg Q6HRS PRN   • QUEtiapine (Seroquel) tablet 25 mg TID PRN   • simethicone (MYLICON) chewable tab 80 mg TID PRN   • Respiratory Therapy Consult Continuous RT   • Pharmacy Consult Request ...Pain Management Review 1 Each PHARMACY TO DOSE   • hydrALAZINE (APRESOLINE) tablet 10 mg Q8HRS PRN   • acetaminophen (Tylenol) tablet 650 mg Q4HRS PRN   • sodium phosphate (Fleet) enema 133 mL QDAY PRN   • artificial tears ophthalmic solution 1 Drop PRN   • benzocaine-menthol (CEPACOL) lozenge 1 Lozenge Q2HRS PRN   • mag hydrox-al hydrox-simeth (MAALOX PLUS ES or MYLANTA DS)  suspension 20 mL Q2HRS PRN   • ondansetron (ZOFRAN ODT) dispertab 4 mg 4X/DAY PRN    Or   • ondansetron (ZOFRAN) syringe/vial injection 4 mg 4X/DAY PRN   • sodium chloride (OCEAN) 0.65 % nasal spray 2 Spray PRN   • atorvastatin (LIPITOR) tablet 80 mg Q EVENING   • aspirin (ASA) chewable tab 81 mg DAILY       Orders Placed This Encounter   Procedures   • Diet Order Diet: Regular (meds whole 1:1 with thins)     Standing Status:   Standing     Number of Occurrences:   1     Order Specific Question:   Diet:     Answer:   Regular [1]     Comments:   meds whole 1:1 with thins       Assessment:  Active Hospital Problems    Diagnosis    • *Stroke (cerebrum) (HCC)    • Edema of left lower extremity    • Urinary retention    • Leukocytosis    • Impaired mobility and ADLs    • Dysphagia    • Left hemiparesis (HCC)    • Subdural hematoma (HCC)    • H/O splenectomy        Medical Decision Making and Plan:  Right MCA stroke  S/p tPA  S/p thrombectomy  Left hemiparesis, improved  Dysphagia, improved  Continue full rehab program  PT/OT/SLP, 1 hr each discipline, 5 days per week  Aspirin, Statin  Imaging  Reviewed with patient and wife  Ziopatch cardiac monitor placed 10/25 - this was a XT, patient needs a AT - with real time recording and gateway device, to be delivered this afternoon  Outpatient follow up with Dr. Cifuentes, stroke bridge clinic, referrals made  Outpatient follow up with cardiology, referral made     Subdural hematoma, small  Likely due to fall     Oral thrush, resolved  s/p Nystatin     Thrombocytosis  Stress reaction  Monitor with weekly labs     Leukocytosis, increased  Checked UA, negative, then positive on 10/22  Culture with pansensitive E Coli  Checked CXR - negative x 2  Appreciate hospitalist assistance     Suspected aspiration pneumonia  Continue antibiotics  Appreciate hospitalist assistance     Hyperglycemia  Checked HgbA1c - 4.6  Likely stress response     GERD  Omeprazole     Insomnia, improved  Schedule  melatonin and trazodone     Bowel program  PRN bowel medications  Last BM 10/26     Bladder program  Urinary retention  Started Hytrin 2 mg  Check PVRs - 212 (10/28)  No longer retaining  Bladder scan for no voids  ICP for over 400 cc - last required 10/20, 10/25  Scheduled toileting     DVT prophylaxis  Started Lovenox 10/20, patient 8 days out from small SDH    Total time:  27 minutes.  I spent greater than 50% of the time for patient care and coordination on this date, including unit/floor time, and face-to-face time with the patient as per assessment and plan above.    Yosi Cotter, DO   Physical Medicine and Rehabilitation

## 2021-10-28 NOTE — THERAPY
10/28/21 1129   Precautions   Precautions Fall Risk   Comments Left hemiparesis, left neglect, zio patch with  in w/c pouch   Pain 0 - 10 Group   Therapist Pain Assessment 0   Cognition    Level of Consciousness New agitation   Gait Functional Level of Assist    Gait Level Of Assist Contact Guard Assist  (Tactile and verbal cues)   Assistive Device Other (Comments);Front Wheel Walker  (Hiking poles)   Distance (Feet) 200  (200 fww, 200 hiking poles)   # of Times Distance was Traveled 2   Deviation Ataxic;Decreased Base Of Support;Bradykinetic;Decreased Heel Strike;Decreased Toe Off;Shuffled Gait   Standing Lower Body Exercises   Hip Extension 2 sets of 15;Bilateral   (Alternating hip extension kicks)   Bed Mobility    Supine to Sit Stand by Assist   Sit to Supine Stand by Assist   Sit to Stand Stand by Assist   Scooting Stand by Assist   Rolling Supervised   Neuro-Muscular Treatments   Neuro-Muscular Treatments Facilitation;Sequencing;Tactile Cuing;Verbal Cuing;Weight Shift Right;Weight Shift Left   Comments Parallel bars: Sequencing forward and backward gait, right and left sidestepping for weight shifting and left lower extremity control, alternating bilateral lower extremity hip flexion diagonals, alternating bilateral lower extremity hip extension kicks, sequencing gait fww and with hiking poles   PT Total Time Spent   PT Individual Total Time Spent (Mins) 60   PT Charge Group   PT Gait Training 1   PT Neuromuscular Re-Education / Balance 3   Physical Therapy   Daily Treatment     Patient Name: Fermín Gomez  Age:  66 y.o., Sex:  male  Medical Record #: [id number]  Today's Date: 10/28/2021     Precautions  Precautions: Fall Risk  Comments: Left hemiparesis, left neglect, zio patch with  in w/c pouch    Subjective    The patient agreed to PT.  He had no complaints of pain or lightheadedness during the session.     Objective    Patient participated first in the parallel bars for forward and  backward gait, left and right sidestepping, alternating hip flexion diagonals, alternating hip extension kicks 2 sets of 15.  The patient also participated in gait training first using a fww and tolerated 200 FT x1 with standing rests followed by gait using trekking poles 200 FT x1 with standing rests.    Assessment    The patient continues to improve in mobility, standing balance, gait, he has left neglect which creates difficulty for him using a fww by standing too close to the left side rather than remaining centered.  Hiking poles were used for gait training which may be a reasonable alternative to a walker as he pays more attention to the left upper extremity and the left side in general.  When he was verbally cued to use the left hiking pole and to keep it away from his left foot he was more successful than when using the walker.  Strengths: Pleasant and cooperative, Willingly participates in therapeutic activities, Manages pain appropriately, Able to follow instructions  Barriers: Fatigue, Hemiparesis, Impaired balance    Plan    Safety education, neuromuscular reeducation for bed mobility and transfers, sequencing sit to/from stand, static and dynamic standing weight shift, left attention, left lower extremity control for step length, dorsiflexion and heel strike, gait training with hiking poles    Passport items to be completed:  Passport items to be completed:  Get in/out of bed safely, in/out of a vehicle, safely use mobility device, walk or wheel around home/community, navigate up and down stairs, show how to get up/down from the ground, ensure home is accessible, demonstrate HEP, complete caregiver training    Physical Therapy Problems (Active)     Problem: Mobility     Dates: Start: 10/25/21       Goal: STG-Within one week, patient will ascend and descend four to six stairs     Dates: Start: 10/25/21       Goal Note filed on 10/25/21 0661 by JEANIE Kam     1) Individualized goal:   Up/down  4-6 stairs, bilateral handrails, CGA for facilitation, 1 week  2) Interventions:  PT E Stim Attended, PT Gait Training, PT Therapeutic Exercises, PT Neuro Re-Ed/Balance, and PT Therapeutic Activity            Goal: STG-Within one week, patient will     Dates: Start: 10/25/21       Goal Note filed on 10/25/21 1756 by JEANIE Kam     1) Individualized goal:   Gait FWW/SPC, CGA for facilitation 100 FT, 1 week  2) Interventions:  PT E Stim Attended, PT Gait Training, PT Therapeutic Exercises, PT Neuro Re-Ed/Balance, and PT Therapeutic Activity               Problem: Mobility Transfers     Dates: Start: 10/25/21       Goal: STG-Within one week, patient will transfer bed to chair     Dates: Start: 10/25/21       Goal Note filed on 10/25/21 1756 by JEANIE Kam     1) Individualized goal:   Transfer bed to/from chair FWW/SPC, SBA 1 week  2) Interventions:  PT E Stim Attended, PT Gait Training, PT Therapeutic Exercises, PT Neuro Re-Ed/Balance, and PT Therapeutic Activity               Problem: PT-Long Term Goals     Dates: Start: 10/20/21       Goal: LTG-By discharge, patient will ambulate     Dates: Start: 10/20/21       Goal Note filed on 10/20/21 1514 by JEANIE Kam     1) Individualized goal:   Gait FWW/ FT, supervision at discharge  2) Interventions:  PT Gait Training, PT Therapeutic Exercises, PT Neuro Re-Ed/Balance, and PT Therapeutic Activity            Goal: LTG-By discharge, patient will transfer one surface to another     Dates: Start: 10/20/21       Goal Note filed on 10/20/21 1514 by JEANIE Kam     1) Individualized goal:   Transfer 1 surface to another fww/spc supervision at discharge  2) Interventions:  PT Gait Training, PT Therapeutic Exercises, PT Neuro Re-Ed/Balance, and PT Therapeutic Activity            Goal: LTG-By discharge, patient will ambulate up/down 4-6 stairs     Dates: Start: 10/20/21       Goal Note filed on 10/20/21 1514 by JEANIE Kam      1) Individualized goal:   Up/down 4-6 stairs handrails, supervision at discharge  2) Interventions:  PT Gait Training, PT Therapeutic Exercises, PT Neuro Re-Ed/Balance, and PT Therapeutic Activity            Goal: LTG-By discharge, patient will transfer in/out of a car     Dates: Start: 10/20/21       Goal Note filed on 10/20/21 1514 by JEANIE Kam     1) Individualized goal:   Car transfer fww/spc supervision at discharge  2) Interventions:  PT Gait Training, PT Therapeutic Exercises, PT Neuro Re-Ed/Balance, and PT Therapeutic Activity

## 2021-10-28 NOTE — PROGRESS NOTES
Hospital Medicine Daily Progress Note      Chief Complaint:  Leukocytosis    Interval History:  Pt sitting up in WC watching TV.  No new concerns.    Review of Systems  Review of Systems   Constitutional: Negative for chills and fever.   HENT: Negative.    Eyes: Negative.    Respiratory: Negative for cough and shortness of breath.    Cardiovascular: Negative for chest pain and palpitations.   Gastrointestinal: Negative for abdominal pain, nausea and vomiting.   Musculoskeletal: Negative.    Skin: Negative for itching and rash.   Endo/Heme/Allergies: Negative for polydipsia. Does not bruise/bleed easily.        Physical Exam  Temp:  [36.4 °C (97.6 °F)-36.7 °C (98.1 °F)] 36.4 °C (97.6 °F)  Pulse:  [54-65] 54  Resp:  [16-19] 16  BP: (116-131)/(59-70) 116/59  SpO2:  [90 %-95 %] 90 %    Physical Exam  Vitals reviewed.   Constitutional:       General: He is not in acute distress.     Appearance: Normal appearance. He is not ill-appearing.   HENT:      Head: Normocephalic and atraumatic.      Right Ear: External ear normal.      Left Ear: External ear normal.      Nose: Nose normal.      Mouth/Throat:      Pharynx: Oropharynx is clear.   Eyes:      General:         Right eye: No discharge.         Left eye: No discharge.      Extraocular Movements: Extraocular movements intact.      Conjunctiva/sclera: Conjunctivae normal.   Cardiovascular:      Rate and Rhythm: Normal rate and regular rhythm.   Pulmonary:      Effort: No respiratory distress.      Breath sounds: No wheezing.      Comments: Decreased BS   Abdominal:      General: Bowel sounds are normal. There is no distension.      Palpations: Abdomen is soft.      Tenderness: There is no abdominal tenderness.   Musculoskeletal:      Cervical back: Normal range of motion and neck supple.      Right lower leg: No edema.      Left lower leg: No edema.   Skin:     General: Skin is warm and dry.   Neurological:      Mental Status: He is alert and oriented to person, place,  and time.         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 Graham  WBC resolved    Urinary retention- (present on admission)  Assessment & Plan  Monitor for orthostatic hypotension on Flomax    Full Code

## 2021-10-28 NOTE — THERAPY
Occupational Therapy  Daily Treatment     Patient Name: Fermín Gomez  Age:  66 y.o., Sex:  male  Medical Record #: [id number]  Today's Date: 10/28/2021     Precautions  Precautions: Fall Risk  Comments:  (Left hemiparesis; zio patch with  in w/c pouch)         Subjective    Pt and wife inquired about shower transfer and grab bar recommendations at start of session; see below.      Objective     10/27/21 1401   Functional Level of Assist   Tub / Shower Transfers Contact Guard Assist  (dry WIS txfer in ADL bathroom )   Tub Shower Transfer Description Grab bar;Shower bench;Increased time;Set-up of equipment;Supervision for safety;Verbal cueing   Neuro-Muscular Treatments   Neuro-Muscular Treatments Facilitation;Verbal Cuing   Comments LUE GM and FM coordination in [place] set at resistance 5. Pt completed pipe tree design and reaching for cones in different planes and heights.    Interdisciplinary Plan of Care Collaboration   IDT Collaboration with  Family / Caregiver   Patient Position at End of Therapy Seated;Call Light within Reach;Tray Table within Reach;Phone within Reach;Family / Friend in Room   Collaboration Comments Pt's wife present and active participant throughout session    OT Total Time Spent   OT Individual Total Time Spent (Mins) 60   OT Charge Group   OT Neuromuscular Re-education / Balance 2   OT Therapy Activity 2     Provided in depth pt and family education regarding recommended bathroom DME, grab bar placement, and shower setup. Pt has a WIS with a door but they are considering removing the door and hanging a curtain so pt can use grab bars once installed.     Assessment    Pt tolerated OT session well. Requires increased time for LUE GM/FM coordination but was able to complete pipe tree design with minimal assistance primarily using the LUE.   Strengths: Alert and oriented, Independent prior level of function, Motivated for self care and independence, Pleasant and cooperative, Supportive  family, Willingly participates in therapeutic activities  Barriers: Hemiparesis, Decreased endurance, Home accessibility, Impaired activity tolerance, Impaired balance, Impaired functional cognition, Limited mobility, Fatigue (impaired termination and initiation, impaired sequencing)    Plan    Refer to primary OT POC    Passport items to be completed:  Perform bathroom transfers, complete dressing, complete feeding, get ready for the day, prepare a simple meal, participate in household tasks, adapt home for safety needs, demonstrate home exercise program, complete caregiver training     Occupational Therapy Goals (Active)     Problem: OT Long Term Goals     Dates: Start: 10/20/21       Goal: LTG-By discharge, patient will complete basic self care tasks with CGA- mod I and AE as needed     Dates: Start: 10/20/21          Goal: LTG-By discharge, patient will perform bathroom transfers with CGA- mod I and AE as needed     Dates: Start: 10/20/21

## 2021-10-28 NOTE — CARE PLAN
The patient is Stable - Low risk of patient condition declining or worsening    Shift Goals  Clinical Goals: safety   Patient Goals: sleep well     Progress made toward(s) clinical / shift goals:    Problem: Infection  Goal: Patient will remain free from infection  Note: Patient on PO ABT Levaquin and Flagyl for + UA and suspected Aspiration Pneumonia with no S/E noted.  Afebrile. VSS. Slept well during the night.

## 2021-10-28 NOTE — THERAPY
10/28/21 1329   Precautions   Precautions Fall Risk   Comments Left hemiparesis, left neglect, ZIO patch with  in wc pouch 10 FT limit   Pain 0 - 10 Group   Therapist Pain Assessment 0   Cognition    Level of Consciousness Alert   Gait Functional Level of Assist    Gait Level Of Assist Contact Guard Assist  (Different surfaces outside)   Assistive Device Other (Comments)  (Hiking poles)   Distance (Feet) 190  (190 FT x3 outside, 160 FT on the lawn)   # of Times Distance was Traveled 3   Deviation Ataxic;Decreased Base Of Support;Bradykinetic;Decreased Heel Strike;Decreased Toe Off   Neuro-Muscular Treatments   Neuro-Muscular Treatments Facilitation;Sequencing;Tactile Cuing;Verbal Cuing;Weight Shift Right;Weight Shift Left   Comments Sequencing sit to/from stand, sequencing gait using trekking poles outside on different surfaces including cobblestones, bridge, lawn, sidewalk   PT Total Time Spent   PT Individual Total Time Spent (Mins) 30   PT Charge Group   PT Gait Training 2   Physical Therapy   Daily Treatment     Patient Name: Fermín Gomez  Age:  66 y.o., Sex:  male  Medical Record #: [id number]  Today's Date: 10/28/2021     Precautions  Precautions: Fall Risk  Comments: Left hemiparesis, left neglect, ZIO patch with  in wc pouch 10 FT limit    Subjective    The patient agreed to PT and gait training outside with hiking poles.     Objective    The patient participated in gait training outside using hiking poles.  He was on the sidewalk, cobblestones, over the bridge, down and up curb cut out ramps, lawn.  He tolerated 190 FT x3 on variable but firm surfaces and 160 FT x1 on the lawn.    Assessment    The patient is a lifelong hiking enthusiast so he was particularly appreciative of walking outside using hiking poles.  He tolerated all of the different surfaces without any loss of balance.  His biggest challenge is the left neglect particularly using the left hiking pole.  He was able to use both  poles effectively when he was reminded to pay attention to the left and to make sure he use the pole with good timing.  His use of the left pole was inconsistent, however, but he was able to correct when given verbal cues.  Strengths: Pleasant and cooperative, Willingly participates in therapeutic activities, Manages pain appropriately, Able to follow instructions  Barriers: Fatigue, Hemiparesis, Impaired balance    Plan    Safety education, neuromuscular reeducation for left attention, gait training with hiking poles, dynamic standing weight shift activities such as sidestepping, step up/step down, alternating lower extremity hip flexion diagonals, standing alternating hip extension kicks, gait with frequent change of direction    Passport items to be completed:  Passport items to be completed:  Get in/out of bed safely, in/out of a vehicle, safely use mobility device, walk or wheel around home/community, navigate up and down stairs, show how to get up/down from the ground, ensure home is accessible, demonstrate HEP, complete caregiver training    Physical Therapy Problems (Active)     Problem: Mobility     Dates: Start: 10/25/21       Goal: STG-Within one week, patient will ascend and descend four to six stairs     Dates: Start: 10/25/21       Goal Note filed on 10/25/21 1756 by JEANIE Kam     1) Individualized goal:   Up/down 4-6 stairs, bilateral handrails, CGA for facilitation, 1 week  2) Interventions:  PT E Stim Attended, PT Gait Training, PT Therapeutic Exercises, PT Neuro Re-Ed/Balance, and PT Therapeutic Activity            Goal: STG-Within one week, patient will     Dates: Start: 10/25/21       Goal Note filed on 10/25/21 1756 by JEANIE Kam     1) Individualized goal:   Gait FWW/SPC, CGA for facilitation 100 FT, 1 week  2) Interventions:  PT E Stim Attended, PT Gait Training, PT Therapeutic Exercises, PT Neuro Re-Ed/Balance, and PT Therapeutic Activity               Problem: Mobility  Transfers     Dates: Start: 10/25/21       Goal: STG-Within one week, patient will transfer bed to chair     Dates: Start: 10/25/21       Goal Note filed on 10/25/21 1756 by JEANIE Kam     1) Individualized goal:   Transfer bed to/from chair FWW/SPC, SBA 1 week  2) Interventions:  PT E Stim Attended, PT Gait Training, PT Therapeutic Exercises, PT Neuro Re-Ed/Balance, and PT Therapeutic Activity               Problem: PT-Long Term Goals     Dates: Start: 10/20/21       Goal: LTG-By discharge, patient will ambulate     Dates: Start: 10/20/21       Goal Note filed on 10/20/21 1514 by JEANIE Kam     1) Individualized goal:   Gait FWW/ FT, supervision at discharge  2) Interventions:  PT Gait Training, PT Therapeutic Exercises, PT Neuro Re-Ed/Balance, and PT Therapeutic Activity            Goal: LTG-By discharge, patient will transfer one surface to another     Dates: Start: 10/20/21       Goal Note filed on 10/20/21 1514 by JEANIE Kam     1) Individualized goal:   Transfer 1 surface to another fww/spc supervision at discharge  2) Interventions:  PT Gait Training, PT Therapeutic Exercises, PT Neuro Re-Ed/Balance, and PT Therapeutic Activity            Goal: LTG-By discharge, patient will ambulate up/down 4-6 stairs     Dates: Start: 10/20/21       Goal Note filed on 10/20/21 1514 by JEANIE Kam     1) Individualized goal:   Up/down 4-6 stairs handrails, supervision at discharge  2) Interventions:  PT Gait Training, PT Therapeutic Exercises, PT Neuro Re-Ed/Balance, and PT Therapeutic Activity            Goal: LTG-By discharge, patient will transfer in/out of a car     Dates: Start: 10/20/21       Goal Note filed on 10/20/21 1514 by JEANIE Kam     1) Individualized goal:   Car transfer fww/spc supervision at discharge  2) Interventions:  PT Gait Training, PT Therapeutic Exercises, PT Neuro Re-Ed/Balance, and PT Therapeutic Activity

## 2021-10-28 NOTE — THERAPY
"Occupational Therapy  Daily Treatment     Patient Name: Fermín Gomez  Age:  66 y.o., Sex:  male  Medical Record #: [id number]  Today's Date: 10/28/2021     Precautions  Precautions: Fall Risk  Comments: Left hemiparesis         Subjective    \"I have gotten a lot better since I saw you last. I've been working hard.\"    \"FMC is the most frustrating thing for me.\"     Objective      Zio heart monitor now has device that needs to be close to him at all times.       10/28/21 0901   Vitals   Vitals Comments no s/s of distress   Cognition    Level of Consciousness Alert   Active ROM Upper Body   Lt Shoulder Flexion Degrees 160  (almost full ROM but gets fatigued)   Functional Level of Assist   Upper Body Dressing Contact Guard Assist  (SBA for most of task; CGA when pt stood to position LS shirt)   Bed, Chair, Wheelchair Transfer Contact Guard Assist   Balance   Comments Pt stood in // bars for 15 minutes RUE for support. L UE working on neuro task of getting 15 pegs from bin on L and placing in to horizontal pegboard located at about chest level for him. Pt instructed to alternate red/yellow for first 10 and then green only for remaining 5. Pt dropped 3 pegs once fatigued.  Pt favored bottom L corner of board. Pt compensating with shoulder elevation and hand was in supination for retrieval and insertion of peg. Cues to trial pronation which was easier for him. Pt then cued to remove pegs one color at a time without knocking out other pegs.   Interdisciplinary Plan of Care Collaboration   IDT Collaboration with  Certified O.T. Assistant  (GOMEZ)   Patient Position at End of Therapy Seated;Self Releasing Lap Belt Applied;Call Light within Reach;Tray Table within Reach;Phone within Reach   Collaboration Comments discussed performance and POC with pt's primary GOMEZ   OT Total Time Spent   OT Individual Total Time Spent (Mins) 30   OT Charge Group   OT Neuromuscular Re-education / Balance 2       Assessment    Pt has had " significant improvements since this writer saw him last on evaluation. Completed neuro standing activity including Gross and Fine motor. Pt compensating with shoulder elevations (vs. Flexion) and favored supination (vs. Pronation). Pronation was easier for pt after prompts.    Strengths: Alert and oriented, Independent prior level of function, Motivated for self care and independence, Pleasant and cooperative, Supportive family, Willingly participates in therapeutic activities  Barriers: Hemiparesis, Decreased endurance, Home accessibility, Impaired activity tolerance, Impaired balance, Impaired functional cognition, Limited mobility, Fatigue (impaired termination and initiation, impaired sequencing)    Plan    Aggressive left UE neuro  Re ed with  Primary focus on shoulder.  Weight bearing,  NMES  , Vibration ,  MAS   SAEBO    for left UE not to include wrist flexors / extensors   Plan for  as adjunct tx 2 to 3 times weekly  pending  Therapy tech availability    ADL , related transfer and mobility      Passport items to be completed:  Perform bathroom transfers, complete dressing, complete feeding, get ready for the day, prepare a simple meal, participate in household tasks, adapt home for safety needs, demonstrate home exercise program, complete caregiver training        Occupational Therapy Goals (Active)     Problem: OT Long Term Goals     Dates: Start: 10/20/21       Goal: LTG-By discharge, patient will complete basic self care tasks with CGA- mod I and AE as needed     Dates: Start: 10/20/21          Goal: LTG-By discharge, patient will perform bathroom transfers with CGA- mod I and AE as needed     Dates: Start: 10/20/21

## 2021-10-28 NOTE — THERAPY
Occupational Therapy  Daily Treatment     Patient Name: Fermín Gomez  Age:  66 y.o., Sex:  male  Medical Record #: [id number]  Today's Date: 10/28/2021     Precautions  Precautions: (P) Fall Risk  Comments: (P) Left hemiparesis, left neglect, ZIO patch with  in wc pouch 10 FT limit         Subjective  Agreeable to all tx activity presented this session.     Objective       10/28/21 1331   Precautions   Precautions Fall Risk   Comments Left hemiparesis, left neglect, ZIO patch with  in wc pouch 10 FT limit   Functional Level of Assist   Lower Body Dressing Stand by Assist  (to doff shoes )   Toileting Contact Guard Assist  (to SBA )   Bed, Chair, Wheelchair Transfer Stand by Assist   Toilet Transfers Contact Guard Assist  (to SBA  standing at toilet to urinate )   Neuro-Muscular Treatments   Neuro-Muscular Treatments Tapping;Vibration   Comments  seated at table pushing raj box up graded incline   ( easel at level 9 and table raised as high as it would go)   vibration to triceps to achieve full elbow extension     exercise performed  x 5 reps  x 5    unweighted.  mod / max cues to attend to left to maintain  grasp mod cues with  poor to fair carryover  to not compensate with shoulder abduction with shoulder flexion. .     supine on mat worked on shoulder stabilization   chest presses  x 5 reps x 4   with smal  PVC pipe frame   ( left grasp  maintained with  ace wrap) muscle belly tapping  to achive full elbow extension   unable to maintain elbow extension with  supine shoulder flexion   Seated at table worked on horizontal ab and adduction      vibration utlized to assist with pectoral activation for adduction.   max difficulty not compensating with  elbow flexion    Interdisciplinary Plan of Care Collaboration   IDT Collaboration with  Family / Caregiver   Patient Position at End of Therapy In Bed;Call Light within Reach;Tray Table within Reach   Collaboration Comments daughter present and  observing tx session    OT Total Time Spent   OT Individual Total Time Spent (Mins) 60   OT Charge Group   OT Self Care / ADL 1   OT Neuromuscular Re-education / Balance 2   OT Therapeutic Exercise  1       Assessment     participates to the best of his ability   limited by left UE hemiparesis  More proximal than distal     Strengths: Alert and oriented, Independent prior level of function, Motivated for self care and independence, Pleasant and cooperative, Supportive family, Willingly participates in therapeutic activities  Barriers: Hemiparesis, Decreased endurance, Home accessibility, Impaired activity tolerance, Impaired balance, Impaired functional cognition, Limited mobility, Fatigue (impaired termination and initiation, impaired sequencing)    Plan  Aggressive left UE neuro  Re ed with  Primary focus on shoulder.  Weight bearing,  NMES  , Vibration ,  MAS   SAEBO    for left UE not to include wrist flexors / extensors   Plan for  as adjunct tx 2 to 3 times weekly  pending  Therapy tech availability    ADL , related transfer and mobility      Passport items to be completed:  Perform bathroom transfers, complete dressing, complete feeding, get ready for the day, prepare a simple meal, participate in household tasks, adapt home for safety needs, demonstrate home exercise program, complete caregiver training        Occupational Therapy Goals (Active)     Problem: OT Long Term Goals     Dates: Start: 10/20/21       Goal: LTG-By discharge, patient will complete basic self care tasks with CGA- mod I and AE as needed     Dates: Start: 10/20/21          Goal: LTG-By discharge, patient will perform bathroom transfers with CGA- mod I and AE as needed     Dates: Start: 10/20/21

## 2021-10-29 LAB
ALBUMIN SERPL BCP-MCNC: 3.5 G/DL (ref 3.2–4.9)
ALBUMIN/GLOB SERPL: 1.5 G/DL
ALP SERPL-CCNC: 75 U/L (ref 30–99)
ALT SERPL-CCNC: 45 U/L (ref 2–50)
ANION GAP SERPL CALC-SCNC: 9 MMOL/L (ref 7–16)
AST SERPL-CCNC: 62 U/L (ref 12–45)
BILIRUB SERPL-MCNC: 0.7 MG/DL (ref 0.1–1.5)
BUN SERPL-MCNC: 14 MG/DL (ref 8–22)
CALCIUM SERPL-MCNC: 9.2 MG/DL (ref 8.5–10.5)
CHLORIDE SERPL-SCNC: 105 MMOL/L (ref 96–112)
CO2 SERPL-SCNC: 27 MMOL/L (ref 20–33)
CREAT SERPL-MCNC: 0.84 MG/DL (ref 0.5–1.4)
ERYTHROCYTE [DISTWIDTH] IN BLOOD BY AUTOMATED COUNT: 44.1 FL (ref 35.9–50)
GLOBULIN SER CALC-MCNC: 2.4 G/DL (ref 1.9–3.5)
GLUCOSE SERPL-MCNC: 91 MG/DL (ref 65–99)
HCT VFR BLD AUTO: 43.9 % (ref 42–52)
HGB BLD-MCNC: 15.3 G/DL (ref 14–18)
MCH RBC QN AUTO: 33 PG (ref 27–33)
MCHC RBC AUTO-ENTMCNC: 34.9 G/DL (ref 33.7–35.3)
MCV RBC AUTO: 94.6 FL (ref 81.4–97.8)
PLATELET # BLD AUTO: 980 K/UL (ref 164–446)
PMV BLD AUTO: 9.6 FL (ref 9–12.9)
POTASSIUM SERPL-SCNC: 4.1 MMOL/L (ref 3.6–5.5)
PROT SERPL-MCNC: 5.9 G/DL (ref 6–8.2)
RBC # BLD AUTO: 4.64 M/UL (ref 4.7–6.1)
SODIUM SERPL-SCNC: 141 MMOL/L (ref 135–145)
WBC # BLD AUTO: 7.7 K/UL (ref 4.8–10.8)

## 2021-10-29 PROCEDURE — 770010 HCHG ROOM/CARE - REHAB SEMI PRIVAT*

## 2021-10-29 PROCEDURE — 85027 COMPLETE CBC AUTOMATED: CPT

## 2021-10-29 PROCEDURE — 99232 SBSQ HOSP IP/OBS MODERATE 35: CPT | Performed by: PHYSICAL MEDICINE & REHABILITATION

## 2021-10-29 PROCEDURE — 700102 HCHG RX REV CODE 250 W/ 637 OVERRIDE(OP): Performed by: HOSPITALIST

## 2021-10-29 PROCEDURE — 36415 COLL VENOUS BLD VENIPUNCTURE: CPT

## 2021-10-29 PROCEDURE — 700102 HCHG RX REV CODE 250 W/ 637 OVERRIDE(OP): Performed by: PHYSICAL MEDICINE & REHABILITATION

## 2021-10-29 PROCEDURE — 80053 COMPREHEN METABOLIC PANEL: CPT

## 2021-10-29 PROCEDURE — 97116 GAIT TRAINING THERAPY: CPT

## 2021-10-29 PROCEDURE — 99232 SBSQ HOSP IP/OBS MODERATE 35: CPT | Performed by: HOSPITALIST

## 2021-10-29 PROCEDURE — 97032 APPL MODALITY 1+ESTIM EA 15: CPT

## 2021-10-29 PROCEDURE — 97535 SELF CARE MNGMENT TRAINING: CPT | Mod: CO

## 2021-10-29 PROCEDURE — A9270 NON-COVERED ITEM OR SERVICE: HCPCS | Performed by: HOSPITALIST

## 2021-10-29 PROCEDURE — 700111 HCHG RX REV CODE 636 W/ 250 OVERRIDE (IP): Performed by: PHYSICAL MEDICINE & REHABILITATION

## 2021-10-29 PROCEDURE — 97530 THERAPEUTIC ACTIVITIES: CPT | Mod: CO

## 2021-10-29 PROCEDURE — A9270 NON-COVERED ITEM OR SERVICE: HCPCS | Performed by: PHYSICAL MEDICINE & REHABILITATION

## 2021-10-29 PROCEDURE — 97530 THERAPEUTIC ACTIVITIES: CPT

## 2021-10-29 PROCEDURE — 97110 THERAPEUTIC EXERCISES: CPT | Mod: CO

## 2021-10-29 RX ADMIN — MELATONIN TAB 3 MG 3 MG: 3 TAB at 20:43

## 2021-10-29 RX ADMIN — ATORVASTATIN CALCIUM 80 MG: 40 TABLET, FILM COATED ORAL at 20:43

## 2021-10-29 RX ADMIN — ASPIRIN 81 MG CHEWABLE TABLET 81 MG: 81 TABLET CHEWABLE at 07:57

## 2021-10-29 RX ADMIN — TAMSULOSIN HYDROCHLORIDE 0.4 MG: 0.4 CAPSULE ORAL at 20:43

## 2021-10-29 RX ADMIN — OMEPRAZOLE 20 MG: 20 CAPSULE, DELAYED RELEASE ORAL at 07:57

## 2021-10-29 RX ADMIN — ENOXAPARIN SODIUM 40 MG: 40 INJECTION SUBCUTANEOUS at 20:44

## 2021-10-29 RX ADMIN — TRAZODONE HYDROCHLORIDE 50 MG: 50 TABLET ORAL at 20:44

## 2021-10-29 RX ADMIN — METRONIDAZOLE 500 MG: 500 TABLET ORAL at 05:12

## 2021-10-29 ASSESSMENT — ENCOUNTER SYMPTOMS
CHILLS: 0
ABDOMINAL PAIN: 0
FEVER: 0
COUGH: 0
EYES NEGATIVE: 1
MUSCULOSKELETAL NEGATIVE: 1
PALPITATIONS: 0
NAUSEA: 0
BRUISES/BLEEDS EASILY: 0
VOMITING: 0
POLYDIPSIA: 0
SHORTNESS OF BREATH: 0

## 2021-10-29 ASSESSMENT — PATIENT HEALTH QUESTIONNAIRE - PHQ9
SUM OF ALL RESPONSES TO PHQ9 QUESTIONS 1 AND 2: 0
2. FEELING DOWN, DEPRESSED, IRRITABLE, OR HOPELESS: NOT AT ALL
1. LITTLE INTEREST OR PLEASURE IN DOING THINGS: NOT AT ALL

## 2021-10-29 ASSESSMENT — PAIN DESCRIPTION - PAIN TYPE: TYPE: ACUTE PAIN

## 2021-10-29 ASSESSMENT — GAIT ASSESSMENTS
GAIT LEVEL OF ASSIST: CONTACT GUARD ASSIST
ASSISTIVE DEVICE: OTHER (COMMENTS)
DEVIATION: ATAXIC;DECREASED BASE OF SUPPORT;BRADYKINETIC;DECREASED HEEL STRIKE;DECREASED TOE OFF
DISTANCE (FEET): 200

## 2021-10-29 ASSESSMENT — ACTIVITIES OF DAILY LIVING (ADL): BED_CHAIR_WHEELCHAIR_TRANSFER_DESCRIPTION: INCREASED TIME;SUPERVISION FOR SAFETY;VERBAL CUEING

## 2021-10-29 NOTE — PROGRESS NOTES
Hospital Medicine Daily Progress Note      Chief Complaint:  Leukocytosis    Interval History:  Pt visiting with wife at bedside.  Doing well.  Labs reviewed.     Review of Systems  Review of Systems   Constitutional: Negative for chills and fever.   HENT: Negative.    Eyes: Negative.    Respiratory: Negative for cough and shortness of breath.    Cardiovascular: Negative for chest pain and palpitations.   Gastrointestinal: Negative for abdominal pain, nausea and vomiting.   Musculoskeletal: Negative.    Skin: Negative for itching and rash.   Endo/Heme/Allergies: Negative for polydipsia. Does not bruise/bleed easily.        Physical Exam  Temp:  [36.4 °C (97.5 °F)-37 °C (98.6 °F)] 36.6 °C (97.9 °F)  Pulse:  [60-77] 77  Resp:  [16-17] 17  BP: (116-124)/(64-74) 116/74    Physical Exam  Vitals reviewed.   Constitutional:       General: He is not in acute distress.     Appearance: Normal appearance. He is not ill-appearing.   HENT:      Head: Normocephalic and atraumatic.      Right Ear: External ear normal.      Left Ear: External ear normal.      Nose: Nose normal.      Mouth/Throat:      Pharynx: Oropharynx is clear.   Eyes:      General:         Right eye: No discharge.         Left eye: No discharge.      Extraocular Movements: Extraocular movements intact.      Conjunctiva/sclera: Conjunctivae normal.   Cardiovascular:      Rate and Rhythm: Normal rate and regular rhythm.   Pulmonary:      Effort: No respiratory distress.      Breath sounds: No wheezing.      Comments: Decreased BS   Abdominal:      General: Bowel sounds are normal. There is no distension.      Palpations: Abdomen is soft.      Tenderness: There is no abdominal tenderness.   Musculoskeletal:      Cervical back: Normal range of motion and neck supple.      Right lower leg: No edema.      Left lower leg: No edema.   Skin:     General: Skin is warm and dry.   Neurological:      Mental Status: He is alert and oriented to person, place, and time.          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

## 2021-10-29 NOTE — THERAPY
"Occupational Therapy  Daily Treatment     Patient Name: Fermín Gomez  Age:  66 y.o., Sex:  male  Medical Record #: [id number]  Today's Date: 10/29/2021     Precautions  Precautions: (P) Fall Risk  Comments: (P) Left hemiparesis          Subjective    \" I'll put on new pants and a shirt.\"  Objective       10/29/21 0745   Precautions   Precautions Fall Risk   Comments Left hemiparesis    Functional Level of Assist   Eating Supervision  (setup assit of some food tray items)   Upper Body Dressing Supervision  (setup  cues for alden technique  pull over shirt)   Lower Body Dressing Minimal Assist  (pants SBA cues for alden technique  shoes  setup)   Lower Body Dressing Description   (therapist donned RONNI hose )   Interdisciplinary Plan of Care Collaboration   Patient Position at End of Therapy Seated;Call Light within Reach;Tray Table within Reach  (eating breakfast )   OT Total Time Spent   OT Individual Total Time Spent (Mins) 15   OT Charge Group   OT Self Care / ADL 1       Assessment    Improved independence with ADL tasks noted this session   Strengths: Alert and oriented, Independent prior level of function, Motivated for self care and independence, Pleasant and cooperative, Supportive family, Willingly participates in therapeutic activities  Barriers: Hemiparesis, Decreased endurance, Home accessibility, Impaired activity tolerance, Impaired balance, Impaired functional cognition, Limited mobility, Fatigue (impaired termination and initiation, impaired sequencing)    Plan    Aggressive left UE neuro  Re ed with  Primary focus on shoulder.  Weight bearing,  NMES  , Vibration ,  MAS   SAEBO    for left UE not to include wrist flexors / extensors   Plan for  as adjunct tx 2 to 3 times weekly  pending  Therapy tech availability    ADL , related transfer and mobility      Passport items to be completed:  Perform bathroom transfers, complete dressing, complete feeding, get ready for the day, prepare a simple " meal, participate in household tasks, adapt home for safety needs, demonstrate home exercise program, complete caregiver training        Occupational Therapy Goals (Active)     Problem: OT Long Term Goals     Dates: Start: 10/20/21       Goal: LTG-By discharge, patient will complete basic self care tasks with CGA- mod I and AE as needed     Dates: Start: 10/20/21          Goal: LTG-By discharge, patient will perform bathroom transfers with CGA- mod I and AE as needed     Dates: Start: 10/20/21

## 2021-10-29 NOTE — CARE PLAN
Problem: Mobility  Goal: Patient's capacity to carry out activities will improve  Outcome: Progressing  Note: Patient able to perform regular activities this shift.  Pain controlled well this shift.  Pain management includes PRN pain meds as well as non-pharmacological measures such as emotional support, rest, and repositioning.  Will continue to monitor.    The patient is Stable - Low risk of patient condition declining or worsening    Shift Goals  Clinical Goals: safety  Patient Goals: sleep well     Progress made toward(s) clinical / shift goals:  wnl    Patient is not progressing towards the following goals:

## 2021-10-29 NOTE — THERAPY
10/29/21 1129   Precautions   Precautions Fall Risk   Comments Left hemiparesis, left neglect, ZIO patch with  in wheelchair pouch 10 FT limit   Pain 0 - 10 Group   Therapist Pain Assessment 0   Cognition    Level of Consciousness Alert   Neuro-Muscular Treatments   Neuro-Muscular Treatments Electrical Stimulation   Comments  LLE session summary.:  Time 28 minutes, distance 3.11 miles, energy expended 0.6 kcal, energy expended/hour 1.7 kcal/hour, power 1.9 W, asymmetry 0%   PT Total Time Spent   PT Individual Total Time Spent (Mins) 60   PT Charge Group   PT Electrical Stimulation Attended 3   PT Therapeutic Activities 1   Physical Therapy   Daily Treatment     Patient Name: Fermín Gomez  Age:  66 y.o., Sex:  male  Medical Record #: [id number]  Today's Date: 10/29/2021     Precautions  Precautions: Fall Risk  Comments: Left hemiparesis, left neglect, ZIO patch with  in wheelchair pouch 10 FT limit    Subjective    The patient agreed to using the  on his left lower extremity.  He had no complaints of pain or lightheadedness.     Objective    The patient was set up in the usual manner on the  left lower extremity.  The session summary is indicated above under neuromuscular treatments.    Assessment    The session with the  was productive with visible active muscle contractions in the quadriceps, hamstrings, tibialis anterior and gastrocnemius.  The patient reported muscle activity of the buttocks as well.    Strengths: Pleasant and cooperative, Willingly participates in therapeutic activities, Manages pain appropriately, Able to follow instructions  Barriers: Fatigue, Hemiparesis, Impaired balance    Plan    Safety education, neuromuscular reeducation for functional mobility, midline orientation, left attention, gait with hiking poles, dynamic standing weight shift, left lower extremity control and stability,  MWF    Passport items to be completed:  Passport items to be  completed:  Get in/out of bed safely, in/out of a vehicle, safely use mobility device, walk or wheel around home/community, navigate up and down stairs, show how to get up/down from the ground, ensure home is accessible, demonstrate HEP, complete caregiver training    Physical Therapy Problems (Active)     Problem: Mobility     Dates: Start: 10/25/21       Goal: STG-Within one week, patient will ascend and descend four to six stairs     Dates: Start: 10/25/21       Goal Note filed on 10/25/21 1756 by JEANIE Kam     1) Individualized goal:   Up/down 4-6 stairs, bilateral handrails, CGA for facilitation, 1 week  2) Interventions:  PT E Stim Attended, PT Gait Training, PT Therapeutic Exercises, PT Neuro Re-Ed/Balance, and PT Therapeutic Activity            Goal: STG-Within one week, patient will     Dates: Start: 10/25/21       Goal Note filed on 10/25/21 1756 by JEANIE Kam     1) Individualized goal:   Gait FWW/SPC, CGA for facilitation 100 FT, 1 week  2) Interventions:  PT E Stim Attended, PT Gait Training, PT Therapeutic Exercises, PT Neuro Re-Ed/Balance, and PT Therapeutic Activity               Problem: Mobility Transfers     Dates: Start: 10/25/21       Goal: STG-Within one week, patient will transfer bed to chair     Dates: Start: 10/25/21       Goal Note filed on 10/25/21 1756 by JEANIE Kam     1) Individualized goal:   Transfer bed to/from chair FWW/SPC, SBA 1 week  2) Interventions:  PT E Stim Attended, PT Gait Training, PT Therapeutic Exercises, PT Neuro Re-Ed/Balance, and PT Therapeutic Activity               Problem: PT-Long Term Goals     Dates: Start: 10/20/21       Goal: LTG-By discharge, patient will ambulate     Dates: Start: 10/20/21       Goal Note filed on 10/20/21 8024 by JEANIE Kam     1) Individualized goal:   Gait FWW/ FT, supervision at discharge  2) Interventions:  PT Gait Training, PT Therapeutic Exercises, PT Neuro Re-Ed/Balance, and PT  Therapeutic Activity            Goal: LTG-By discharge, patient will transfer one surface to another     Dates: Start: 10/20/21       Goal Note filed on 10/20/21 1514 by JEANIE Kam     1) Individualized goal:   Transfer 1 surface to another fww/spc supervision at discharge  2) Interventions:  PT Gait Training, PT Therapeutic Exercises, PT Neuro Re-Ed/Balance, and PT Therapeutic Activity            Goal: LTG-By discharge, patient will ambulate up/down 4-6 stairs     Dates: Start: 10/20/21       Goal Note filed on 10/20/21 1514 by JEANIE Kam     1) Individualized goal:   Up/down 4-6 stairs handrails, supervision at discharge  2) Interventions:  PT Gait Training, PT Therapeutic Exercises, PT Neuro Re-Ed/Balance, and PT Therapeutic Activity            Goal: LTG-By discharge, patient will transfer in/out of a car     Dates: Start: 10/20/21       Goal Note filed on 10/20/21 1514 by JEANIE Kam     1) Individualized goal:   Car transfer fww/spc supervision at discharge  2) Interventions:  PT Gait Training, PT Therapeutic Exercises, PT Neuro Re-Ed/Balance, and PT Therapeutic Activity

## 2021-10-29 NOTE — CARE PLAN
The patient is Stable - Low risk of patient condition declining or worsening    Shift Goals  Clinical Goals: safety  Patient Goals: sleep well     Progress made toward(s) clinical / shift goals:    Problem: VTE Prevention  Goal: Patient will remain free from venous thromboembolism (VTE)  Outcome: Progressing  Note: Patient on Lovenox SQ injection, no S/S bleeding.      Problem: Fall Risk - Rehab  Goal: Patient will remain free from falls  Note: Pt uses call light consistently and appropriately. Waits for assistance does not attempt self transfer this shift. Able to verbalize needs.      Problem: Skin Integrity  Goal: Skin integrity is maintained or improved  Note: Patient's skin remains intact and free from new or accidental injury this shift.  Will continue to monitor.      VSS. Sleeping soundly during rounds. No C/O pain or discomfort. No significant events on NOC shift.

## 2021-10-29 NOTE — DOCUMENTATION QUERY
DOCUMENTATION QUERY    PROVIDERS: Please select “Cosign w/ note”to reply to query.    To better represent the severity of illness of your patient, please review the following information and exercise your independent professional judgment in responding to this query.     The patient is being treated for suspected aspiration pneumonia during the rehab hospital stay. Please confirm if the aspiration pneumonia has been ruled in or out.     • Aspiration pneumonia has been ruled in  • Aspiration pneumonia has been ruled out            The medical record reflects the following:   Clinical Findings  Suspected aspiration pneumonia  Continue antibiotics  Appreciate hospitalist assistance   Treatment  Antibiotics   Risk Factors Deficits from stroke and traumatic SDH   Location within medical record Progress notes 10/25- 10/28     Thank You,   [email or web address]

## 2021-10-29 NOTE — THERAPY
"Occupational Therapy  Daily Treatment     Patient Name: Fermín Gomez  Age:  66 y.o., Sex:  male  Medical Record #: [id number]  Today's Date: 10/29/2021     Precautions  Precautions: (P) Fall Risk  Comments: (P) Left hemiparesis, left neglect, ZIO patch with  in wheelchair pouch 10 FT limit         Subjective    \" Thank you \"  Stated at end of session        Objective       10/29/21 1231   Precautions   Precautions Fall Risk   Comments Left hemiparesis, left neglect, ZIO patch with  in wheelchair pouch 10 FT limit   Interdisciplinary Plan of Care Collaboration   IDT Collaboration with  Family / Caregiver   Patient Position at End of Therapy Seated;Self Releasing Lap Belt Applied;Call Light within Reach;Tray Table within Reach;Family / Friend in Room   Collaboration Comments spouse Aye present and observing tx . discussed setting up famiily training the week of d/c     OT Total Time Spent   OT Individual Total Time Spent (Mins) 30   OT Charge Group   OT Therapy Activity 1   OT Therapeutic Exercise  1      seated at table used SAEBO MAS with attempted focus on Left shoulder flexion with out  Compensatory abduction.   This was unsuccessful .   Focused on  Grasp release  Shoulder flexion to 90 degrees and horizontal abduction  Picking up bean bags from table top  And dropping into container placed on floor on left hand side.     bilateral UE  Shoulder flexion  To approximately  105 degrees pushing raj box up graded incline  Utilized vibration of triceps to assist in achieving full elbow extension.      Collaborated with spouse Aye regarding  Functional ability with  Dressing tasks this AM       Assessment     continues to present with left shoulder abduction when attempting shoulder flexion .   Improved ability to achieve left triceps extension  With raj box pushing   As compared to day prior.  Mod cues to attend to the left during tx activity     Strengths: Alert and oriented, Independent prior " level of function, Motivated for self care and independence, Pleasant and cooperative, Supportive family, Willingly participates in therapeutic activities  Barriers: Hemiparesis, Decreased endurance, Home accessibility, Impaired activity tolerance, Impaired balance, Impaired functional cognition, Limited mobility, Fatigue (impaired termination and initiation, impaired sequencing)    Plan    Aggressive left UE neuro  Re ed with  Primary focus on shoulder.  Weight bearing,  NMES  , Vibration ,  MAS   SAEBO    for left UE not to include wrist flexors / extensors   Plan for  as adjunct tx 2 to 3 times weekly  pending  Therapy tech availability    ADL , related transfer and mobility      Passport items to be completed:  Perform bathroom transfers, complete dressing, complete feeding, get ready for the day, prepare a simple meal, participate in household tasks, adapt home for safety needs, demonstrate home exercise program, complete caregiver training        Occupational Therapy Goals (Active)     Problem: OT Long Term Goals     Dates: Start: 10/20/21       Goal: LTG-By discharge, patient will complete basic self care tasks with CGA- mod I and AE as needed     Dates: Start: 10/20/21          Goal: LTG-By discharge, patient will perform bathroom transfers with CGA- mod I and AE as needed     Dates: Start: 10/20/21

## 2021-10-29 NOTE — THERAPY
"Occupational Therapy  Daily Treatment     Patient Name: Fermín Gomez  Age:  66 y.o., Sex:  male  Medical Record #: [id number]  Today's Date: 10/29/2021     Precautions  Precautions: (P) Fall Risk  Comments: (P) Left hemiparesis          Subjective    \"  It feels  Ok.\" when asked how the stim felt on his left UE      Objective       10/29/21 0846   Precautions   Precautions Fall Risk   Comments Left hemiparesis    Neuro-Muscular Treatments   Neuro-Muscular Treatments Electrical Stimulation   Comments     x 23 minutes   distance 3.15 miles  energy per hour 1.9 w,  symmetry  1 % right,  time active 11.5 minutes and time  passive  11.5 minutes     Interdisciplinary Plan of Care Collaboration   Patient Position at End of Therapy Seated;Call Light within Reach;Tray Table within Reach   OT Total Time Spent   OT Individual Total Time Spent (Mins) 45   OT Charge Group   OT Self Care / ADL 3       Assessment     completed  with no complaints   Strengths: Alert and oriented, Independent prior level of function, Motivated for self care and independence, Pleasant and cooperative, Supportive family, Willingly participates in therapeutic activities  Barriers: Hemiparesis, Decreased endurance, Home accessibility, Impaired activity tolerance, Impaired balance, Impaired functional cognition, Limited mobility, Fatigue (impaired termination and initiation, impaired sequencing)    Plan    Aggressive left UE neuro  Re ed with  Primary focus on shoulder.  Weight bearing,  NMES  , Vibration ,  MAS   SAEBO    for left UE not to include wrist flexors / extensors   Plan for  as adjunct tx 2 to 3 times weekly  pending  Therapy tech availability    ADL , related transfer and mobility      Passport items to be completed:  Perform bathroom transfers, complete dressing, complete feeding, get ready for the day, prepare a simple meal, participate in household tasks, adapt home for safety needs, demonstrate home exercise program, " complete caregiver training        Occupational Therapy Goals (Active)     Problem: OT Long Term Goals     Dates: Start: 10/20/21       Goal: LTG-By discharge, patient will complete basic self care tasks with CGA- mod I and AE as needed     Dates: Start: 10/20/21          Goal: LTG-By discharge, patient will perform bathroom transfers with CGA- mod I and AE as needed     Dates: Start: 10/20/21

## 2021-10-29 NOTE — CARE PLAN
"The patient is Stable - Low risk of patient condition declining or worsening    Shift Goals  Clinical Goals: safety  Patient Goals: sleep well     Problem: Bowel Elimination  Goal: Patient will participate in bowel management program  Note: Pt's last BM 10/28, denies any abdomen discomfort, denies N/V.     Problem: Fall Risk - Rehab  Goal: Patient will remain free from falls  Note: Bita Barron Fall risk Assessment Score: 14    Moderate fall risk Interventions  - Bed and strip alarm   - Yellow sign by the door   - Yellow wrist band \"Fall risk\"  - Do not leave patient unattended in the bathroom  - Fall risk education provided           "

## 2021-10-29 NOTE — PROGRESS NOTES
"Rehab Progress Note     Chief Complaint: Follow-up stroke    Interval Events (Subjective)  Patient is doing well. No complaints. Patient reports pain is controlled, sleeping well, and overall feeling ok. Repeat labs reviewed and are non-concerning.     Objective:  VITAL SIGNS: /64   Pulse 60   Temp 36.4 °C (97.5 °F) (Oral)   Resp 16   Ht 1.558 m (5' 1.32\")   Wt 83 kg (182 lb 15.7 oz)   SpO2 90%   BMI 34.21 kg/m²     Physical Exam:  Vitals: /64   Pulse 60   Temp 36.4 °C (97.5 °F) (Oral)   Resp 16   Ht 1.558 m (5' 1.32\")   Wt 83 kg (182 lb 15.7 oz)   SpO2 90%   Gen: NAD  Head: NC/AT  Eyes/ Nose/ Mouth: moist mucous membranes  Cardio: RRR, good distal perfusion, warm extremities  Pulm: normal respiratory effort, no cyanosis   Abd: Soft NTND, negative borborygmi   Ext: No peripheral edema. No calf tenderness. No clubbing.    Recent Results (from the past 72 hour(s))   CBC WITHOUT DIFFERENTIAL    Collection Time: 10/29/21  5:25 AM   Result Value Ref Range    WBC 7.7 4.8 - 10.8 K/uL    RBC 4.64 (L) 4.70 - 6.10 M/uL    Hemoglobin 15.3 14.0 - 18.0 g/dL    Hematocrit 43.9 42.0 - 52.0 %    MCV 94.6 81.4 - 97.8 fL    MCH 33.0 27.0 - 33.0 pg    MCHC 34.9 33.7 - 35.3 g/dL    RDW 44.1 35.9 - 50.0 fL    Platelet Count 980 (H) 164 - 446 K/uL    MPV 9.6 9.0 - 12.9 fL   Comp Metabolic Panel    Collection Time: 10/29/21  5:25 AM   Result Value Ref Range    Sodium 141 135 - 145 mmol/L    Potassium 4.1 3.6 - 5.5 mmol/L    Chloride 105 96 - 112 mmol/L    Co2 27 20 - 33 mmol/L    Anion Gap 9.0 7.0 - 16.0    Glucose 91 65 - 99 mg/dL    Bun 14 8 - 22 mg/dL    Creatinine 0.84 0.50 - 1.40 mg/dL    Calcium 9.2 8.5 - 10.5 mg/dL    AST(SGOT) 62 (H) 12 - 45 U/L    ALT(SGPT) 45 2 - 50 U/L    Alkaline Phosphatase 75 30 - 99 U/L    Total Bilirubin 0.7 0.1 - 1.5 mg/dL    Albumin 3.5 3.2 - 4.9 g/dL    Total Protein 5.9 (L) 6.0 - 8.2 g/dL    Globulin 2.4 1.9 - 3.5 g/dL    A-G Ratio 1.5 g/dL   ESTIMATED GFR    Collection Time: " 10/29/21  5:25 AM   Result Value Ref Range    GFR If African American >60 >60 mL/min/1.73 m 2    GFR If Non African American >60 >60 mL/min/1.73 m 2       Current Facility-Administered Medications   Medication Frequency   • senna-docusate (PERICOLACE or SENOKOT S) 8.6-50 MG per tablet 2 Tablet BID PRN    And   • polyethylene glycol/lytes (MIRALAX) PACKET 1 Packet QDAY PRN    And   • magnesium hydroxide (MILK OF MAGNESIA) suspension 30 mL QDAY PRN    And   • bisacodyl (DULCOLAX) suppository 10 mg QDAY PRN   • melatonin tablet 3 mg QHS   • traZODone (DESYREL) tablet 50 mg QHS   • omeprazole (PRILOSEC) capsule 20 mg QAM AC   • tamsulosin (FLOMAX) capsule 0.4 mg Q EVENING   • enoxaparin (LOVENOX) inj 40 mg DAILY   • hydrOXYzine HCl (ATARAX) tablet 50 mg Q6HRS PRN   • QUEtiapine (Seroquel) tablet 25 mg TID PRN   • simethicone (MYLICON) chewable tab 80 mg TID PRN   • Respiratory Therapy Consult Continuous RT   • Pharmacy Consult Request ...Pain Management Review 1 Each PHARMACY TO DOSE   • hydrALAZINE (APRESOLINE) tablet 10 mg Q8HRS PRN   • acetaminophen (Tylenol) tablet 650 mg Q4HRS PRN   • sodium phosphate (Fleet) enema 133 mL QDAY PRN   • artificial tears ophthalmic solution 1 Drop PRN   • benzocaine-menthol (CEPACOL) lozenge 1 Lozenge Q2HRS PRN   • mag hydrox-al hydrox-simeth (MAALOX PLUS ES or MYLANTA DS) suspension 20 mL Q2HRS PRN   • ondansetron (ZOFRAN ODT) dispertab 4 mg 4X/DAY PRN    Or   • ondansetron (ZOFRAN) syringe/vial injection 4 mg 4X/DAY PRN   • sodium chloride (OCEAN) 0.65 % nasal spray 2 Spray PRN   • atorvastatin (LIPITOR) tablet 80 mg Q EVENING   • aspirin (ASA) chewable tab 81 mg DAILY       Orders Placed This Encounter   Procedures   • Diet Order Diet: Regular (meds whole 1:1 with thins)     Standing Status:   Standing     Number of Occurrences:   1     Order Specific Question:   Diet:     Answer:   Regular [1]     Comments:   meds whole 1:1 with thins       Assessment:  Active Hospital Problems     Diagnosis    • *Stroke (cerebrum) (HCC)    • Edema of left lower extremity    • Urinary retention    • Leukocytosis    • Impaired mobility and ADLs    • Dysphagia    • Left hemiparesis (HCC)    • Subdural hematoma (HCC)    • H/O splenectomy        Medical Decision Making and Plan:  Right MCA stroke  S/p tPA  S/p thrombectomy  Left hemiparesis, improved  Dysphagia, improved  Continue full rehab program  PT/OT/SLP, 1 hr each discipline, 5 days per week  Aspirin, Statin  Imaging  Reviewed with patient and wife  Ziopatch cardiac monitor placed 10/25 - this was a XT, patient needs a AT - with real time recording and gateway device, to be delivered this afternoon  Outpatient follow up with Dr. Cifuentes, stroke bridge clinic, referrals made  Outpatient follow up with cardiology, referral made     Subdural hematoma, small  Likely due to fall     Oral thrush, resolved  s/p Nystatin     Thrombocytosis  Stress reaction  Monitor with weekly labs     Leukocytosis, increased  Checked UA, negative, then positive on 10/22  Culture with pansensitive E Coli  Checked CXR - negative x 2  Appreciate hospitalist assistance     Suspected aspiration pneumonia  Continue antibiotics  Appreciate hospitalist assistance     Hyperglycemia  Checked HgbA1c - 4.6  Likely stress response     GERD  Omeprazole     Insomnia, improved  Schedule melatonin and trazodone     Bowel program  PRN bowel medications  Last BM 10/26     Bladder program  Urinary retention  Started Hytrin 2 mg  Check PVRs - 212 (10/28)  No longer retaining  Bladder scan for no voids  ICP for over 400 cc - last required 10/20, 10/25  Scheduled toileting     DVT prophylaxis  Started Lovenox 10/20, patient 8 days out from small SDH    Total time:  27 minutes.  I spent greater than 50% of the time for patient care and coordination on this date, including unit/floor time, and face-to-face time with the patient as per assessment and plan above.    Yosi Cotter, DO   Physical Medicine and  "Rehabilitation     Rehab Progress Note     Chief Complaint: Follow-up stroke    Interval Events (Subjective)  Patient reports feeling good today, he is looking forward to his daughters visiting.  He has no new pains or concerns for me today.  Leukocytosis much improved on last lab check, hospitalist following    Objective:  VITAL SIGNS: /64   Pulse 60   Temp 36.4 °C (97.5 °F) (Oral)   Resp 16   Ht 1.558 m (5' 1.32\")   Wt 83 kg (182 lb 15.7 oz)   SpO2 90%   BMI 34.21 kg/m²     Physical Exam:  Vitals: /64   Pulse 60   Temp 36.4 °C (97.5 °F) (Oral)   Resp 16   Ht 1.558 m (5' 1.32\")   Wt 83 kg (182 lb 15.7 oz)   SpO2 90%   Gen: NAD  Head: NC/AT  Eyes/ Nose/ Mouth: moist mucous membranes  Cardio: RRR, good distal perfusion, warm extremities  Pulm: normal respiratory effort, no cyanosis   Abd: Soft NTND, negative borborygmi   Ext: No peripheral edema. No calf tenderness. No clubbing.    Recent Results (from the past 72 hour(s))   CBC WITHOUT DIFFERENTIAL    Collection Time: 10/29/21  5:25 AM   Result Value Ref Range    WBC 7.7 4.8 - 10.8 K/uL    RBC 4.64 (L) 4.70 - 6.10 M/uL    Hemoglobin 15.3 14.0 - 18.0 g/dL    Hematocrit 43.9 42.0 - 52.0 %    MCV 94.6 81.4 - 97.8 fL    MCH 33.0 27.0 - 33.0 pg    MCHC 34.9 33.7 - 35.3 g/dL    RDW 44.1 35.9 - 50.0 fL    Platelet Count 980 (H) 164 - 446 K/uL    MPV 9.6 9.0 - 12.9 fL   Comp Metabolic Panel    Collection Time: 10/29/21  5:25 AM   Result Value Ref Range    Sodium 141 135 - 145 mmol/L    Potassium 4.1 3.6 - 5.5 mmol/L    Chloride 105 96 - 112 mmol/L    Co2 27 20 - 33 mmol/L    Anion Gap 9.0 7.0 - 16.0    Glucose 91 65 - 99 mg/dL    Bun 14 8 - 22 mg/dL    Creatinine 0.84 0.50 - 1.40 mg/dL    Calcium 9.2 8.5 - 10.5 mg/dL    AST(SGOT) 62 (H) 12 - 45 U/L    ALT(SGPT) 45 2 - 50 U/L    Alkaline Phosphatase 75 30 - 99 U/L    Total Bilirubin 0.7 0.1 - 1.5 mg/dL    Albumin 3.5 3.2 - 4.9 g/dL    Total Protein 5.9 (L) 6.0 - 8.2 g/dL    Globulin 2.4 1.9 - 3.5 " g/dL    A-G Ratio 1.5 g/dL   ESTIMATED GFR    Collection Time: 10/29/21  5:25 AM   Result Value Ref Range    GFR If African American >60 >60 mL/min/1.73 m 2    GFR If Non African American >60 >60 mL/min/1.73 m 2       Current Facility-Administered Medications   Medication Frequency   • senna-docusate (PERICOLACE or SENOKOT S) 8.6-50 MG per tablet 2 Tablet BID PRN    And   • polyethylene glycol/lytes (MIRALAX) PACKET 1 Packet QDAY PRN    And   • magnesium hydroxide (MILK OF MAGNESIA) suspension 30 mL QDAY PRN    And   • bisacodyl (DULCOLAX) suppository 10 mg QDAY PRN   • melatonin tablet 3 mg QHS   • traZODone (DESYREL) tablet 50 mg QHS   • omeprazole (PRILOSEC) capsule 20 mg QAM AC   • tamsulosin (FLOMAX) capsule 0.4 mg Q EVENING   • enoxaparin (LOVENOX) inj 40 mg DAILY   • hydrOXYzine HCl (ATARAX) tablet 50 mg Q6HRS PRN   • QUEtiapine (Seroquel) tablet 25 mg TID PRN   • simethicone (MYLICON) chewable tab 80 mg TID PRN   • Respiratory Therapy Consult Continuous RT   • Pharmacy Consult Request ...Pain Management Review 1 Each PHARMACY TO DOSE   • hydrALAZINE (APRESOLINE) tablet 10 mg Q8HRS PRN   • acetaminophen (Tylenol) tablet 650 mg Q4HRS PRN   • sodium phosphate (Fleet) enema 133 mL QDAY PRN   • artificial tears ophthalmic solution 1 Drop PRN   • benzocaine-menthol (CEPACOL) lozenge 1 Lozenge Q2HRS PRN   • mag hydrox-al hydrox-simeth (MAALOX PLUS ES or MYLANTA DS) suspension 20 mL Q2HRS PRN   • ondansetron (ZOFRAN ODT) dispertab 4 mg 4X/DAY PRN    Or   • ondansetron (ZOFRAN) syringe/vial injection 4 mg 4X/DAY PRN   • sodium chloride (OCEAN) 0.65 % nasal spray 2 Spray PRN   • atorvastatin (LIPITOR) tablet 80 mg Q EVENING   • aspirin (ASA) chewable tab 81 mg DAILY       Orders Placed This Encounter   Procedures   • Diet Order Diet: Regular (meds whole 1:1 with thins)     Standing Status:   Standing     Number of Occurrences:   1     Order Specific Question:   Diet:     Answer:   Regular [1]     Comments:   meds  whole 1:1 with thins       Assessment:  Active Hospital Problems    Diagnosis    • *Stroke (cerebrum) (HCC)    • Edema of left lower extremity    • Urinary retention    • Leukocytosis    • Impaired mobility and ADLs    • Dysphagia    • Left hemiparesis (HCC)    • Subdural hematoma (HCC)    • H/O splenectomy        Medical Decision Making and Plan:  Right MCA stroke  S/p tPA  S/p thrombectomy  Left hemiparesis, improved  Dysphagia, improved  Continue full rehab program  PT/OT/SLP, 1 hr each discipline, 5 days per week  Aspirin, Statin  Imaging  Reviewed with patient and wife  Ziopatch cardiac monitor placed 10/25 - this was a XT, patient needs a AT - with real time recording and gateway device, to be delivered this afternoon  Outpatient follow up with Dr. Cifuentes, stroke bridge clinic, referrals made  Outpatient follow up with cardiology, referral made     Subdural hematoma, small  Likely due to fall     Oral thrush, resolved  s/p Nystatin     Thrombocytosis  Stress reaction  Monitor with weekly labs     Leukocytosis, resolved  Checked UA, negative, then positive on 10/22  Culture with pansensitive E Coli  Checked CXR - negative x 2  Appreciate hospitalist assistance     Suspected aspiration pneumonia  Completed abx  Appreciate hospitalist assistance     Hyperglycemia  Checked HgbA1c - 4.6  Likely stress response     GERD  Omeprazole     Insomnia, improved  Schedule melatonin and trazodone     Bowel program  PRN bowel medications     Bladder program  Urinary retention  Started Hytrin 2 mg  Check PVRs - 212 (10/28)  No longer retaining  Bladder scan for no voids  ICP for over 400 cc - last required 10/20, 10/25  Scheduled toileting     DVT prophylaxis  Started Lovenox 10/20, patient 8 days out from small SDH    Total time:  26 minutes.  I spent greater than 50% of the time for patient care and coordination on this date, including unit/floor time, and face-to-face time with the patient as per assessment and plan  above.    Yosi Cotter, DO   Physical Medicine and Rehabilitation

## 2021-10-29 NOTE — THERAPY
10/29/21 1359   Precautions   Precautions Fall Risk   Comments Left hemiparesis, left neglect, ZIO patch with  in wheelchair pouch 10 FT limit   Pain 0 - 10 Group   Therapist Pain Assessment 0   ABS (Agitated Behavior Scale)   Agitated Behavior Scale Performed No   Gait Functional Level of Assist    Gait Level Of Assist Contact Guard Assist  (CGA on uneven surfaces outside)   Assistive Device Other (Comments)  (Bilateral hiking poles)   Distance (Feet) 200  (200 FT outside sidewalk, ramps, cobblestones, bridge)   # of Times Distance was Traveled 3   Deviation Ataxic;Decreased Base Of Support;Bradykinetic;Decreased Heel Strike;Decreased Toe Off   Transfer Functional Level of Assist   Bed, Chair, Wheelchair Transfer Stand by Assist   Bed Chair Wheelchair Transfer Description Increased time;Supervision for safety;Verbal cueing   Bed Mobility    Supine to Sit Supervised   Sit to Supine Supervised   Sit to Stand Stand by Assist   Scooting Stand by Assist   Rolling Supervised   Neuro-Muscular Treatments   Neuro-Muscular Treatments Sequencing;Tactile Cuing;Verbal Cuing;Weight Shift Right;Weight Shift Left   Comments Sequencing sit to/from stand, sequencing gait using bilateral hiking poles outside on the sidewalk, ramps, bridge, cobblestones, forced use LUE with hiking pole   Interdisciplinary Plan of Care Collaboration   Patient Position at End of Therapy Seated;Self Releasing Lap Belt Applied;Call Light within Reach;Tray Table within Reach;Phone within Reach;Family / Friend in Room   PT Total Time Spent   PT Individual Total Time Spent (Mins) 30   PT Charge Group   PT Gait Training 2   Physical Therapy   Daily Treatment     Patient Name: Fermín Gomez  Age:  66 y.o., Sex:  male  Medical Record #: [id number]  Today's Date: 10/29/2021     Precautions  Precautions: Fall Risk  Comments: Left hemiparesis, left neglect, ZIO patch with  in wheelchair pouch 10 FT limit    Subjective    The patient agreed to PT  and gait training outside using bilateral hiking poles.     Objective    Patient participated in gait training using bilateral hiking poles outside.  He needed incidental CGA when walking on uneven surfaces such as the cobblestones and on the bridge.  He tolerated 200 FT x3.  He needed minimal verbal cues to attend to the left hiking pole.  He showed improvement in using the pole today with more consistency in placing the pole away from his left lower extremity and moving the pole reciprocally.    Assessment    The patient participated in gait training outside using bilateral hiking poles.  He showed improvement in using the left pole reciprocally and appropriately keeping it away from his left foot.  He was approximately 60% accurate in using the left pole.  It is of benefit for left attention, forced use of the left upper extremity, coordination and balance.  The patient has been an active hiking enthusiast for many years.  Strengths: Pleasant and cooperative, Willingly participates in therapeutic activities, Manages pain appropriately, Able to follow instructions  Barriers: Fatigue, Hemiparesis, Impaired balance    Plan    Continue  MWF, gait training with bilateral hiking poles, neuromuscular reeducation for dynamic standing weight shift, consistent dorsiflexion and heel strike, left lower extremity control, activation of the left pelvis during gait, left attention    Passport items to be completed:  Passport items to be completed:  Get in/out of bed safely, in/out of a vehicle, safely use mobility device, walk or wheel around home/community, navigate up and down stairs, show how to get up/down from the ground, ensure home is accessible, demonstrate HEP, complete caregiver training    Physical Therapy Problems (Active)     Problem: Mobility     Dates: Start: 10/25/21       Goal: STG-Within one week, patient will ascend and descend four to six stairs     Dates: Start: 10/25/21       Goal Note filed on  10/25/21 1756 by JEANIE Kam     1) Individualized goal:   Up/down 4-6 stairs, bilateral handrails, CGA for facilitation, 1 week  2) Interventions:  PT E Stim Attended, PT Gait Training, PT Therapeutic Exercises, PT Neuro Re-Ed/Balance, and PT Therapeutic Activity            Goal: STG-Within one week, patient will     Dates: Start: 10/25/21       Goal Note filed on 10/25/21 1756 by JEANIE Kam     1) Individualized goal:   Gait FWW/SPC, CGA for facilitation 100 FT, 1 week  2) Interventions:  PT E Stim Attended, PT Gait Training, PT Therapeutic Exercises, PT Neuro Re-Ed/Balance, and PT Therapeutic Activity               Problem: Mobility Transfers     Dates: Start: 10/25/21       Goal: STG-Within one week, patient will transfer bed to chair     Dates: Start: 10/25/21       Goal Note filed on 10/25/21 1756 by JEANIE Kam     1) Individualized goal:   Transfer bed to/from chair FWW/SPC, SBA 1 week  2) Interventions:  PT E Stim Attended, PT Gait Training, PT Therapeutic Exercises, PT Neuro Re-Ed/Balance, and PT Therapeutic Activity               Problem: PT-Long Term Goals     Dates: Start: 10/20/21       Goal: LTG-By discharge, patient will ambulate     Dates: Start: 10/20/21       Goal Note filed on 10/20/21 1514 by JEANIE Kam     1) Individualized goal:   Gait FWW/ FT, supervision at discharge  2) Interventions:  PT Gait Training, PT Therapeutic Exercises, PT Neuro Re-Ed/Balance, and PT Therapeutic Activity            Goal: LTG-By discharge, patient will transfer one surface to another     Dates: Start: 10/20/21       Goal Note filed on 10/20/21 1514 by JEANIE Kam     1) Individualized goal:   Transfer 1 surface to another fww/spc supervision at discharge  2) Interventions:  PT Gait Training, PT Therapeutic Exercises, PT Neuro Re-Ed/Balance, and PT Therapeutic Activity            Goal: LTG-By discharge, patient will ambulate up/down 4-6 stairs     Dates:  Start: 10/20/21       Goal Note filed on 10/20/21 1514 by JEANIE Kam     1) Individualized goal:   Up/down 4-6 stairs handrails, supervision at discharge  2) Interventions:  PT Gait Training, PT Therapeutic Exercises, PT Neuro Re-Ed/Balance, and PT Therapeutic Activity            Goal: LTG-By discharge, patient will transfer in/out of a car     Dates: Start: 10/20/21       Goal Note filed on 10/20/21 1514 by JEANIE Kam     1) Individualized goal:   Car transfer fww/spc supervision at discharge  2) Interventions:  PT Gait Training, PT Therapeutic Exercises, PT Neuro Re-Ed/Balance, and PT Therapeutic Activity

## 2021-10-30 PROCEDURE — 99231 SBSQ HOSP IP/OBS SF/LOW 25: CPT | Performed by: HOSPITALIST

## 2021-10-30 PROCEDURE — 770010 HCHG ROOM/CARE - REHAB SEMI PRIVAT*

## 2021-10-30 PROCEDURE — 700111 HCHG RX REV CODE 636 W/ 250 OVERRIDE (IP): Performed by: PHYSICAL MEDICINE & REHABILITATION

## 2021-10-30 PROCEDURE — A9270 NON-COVERED ITEM OR SERVICE: HCPCS | Performed by: PHYSICAL MEDICINE & REHABILITATION

## 2021-10-30 PROCEDURE — 700102 HCHG RX REV CODE 250 W/ 637 OVERRIDE(OP): Performed by: PHYSICAL MEDICINE & REHABILITATION

## 2021-10-30 RX ADMIN — ATORVASTATIN CALCIUM 80 MG: 40 TABLET, FILM COATED ORAL at 21:31

## 2021-10-30 RX ADMIN — TAMSULOSIN HYDROCHLORIDE 0.4 MG: 0.4 CAPSULE ORAL at 21:31

## 2021-10-30 RX ADMIN — ASPIRIN 81 MG CHEWABLE TABLET 81 MG: 81 TABLET CHEWABLE at 09:08

## 2021-10-30 RX ADMIN — OMEPRAZOLE 20 MG: 20 CAPSULE, DELAYED RELEASE ORAL at 08:02

## 2021-10-30 RX ADMIN — ENOXAPARIN SODIUM 40 MG: 40 INJECTION SUBCUTANEOUS at 21:32

## 2021-10-30 RX ADMIN — TRAZODONE HYDROCHLORIDE 50 MG: 50 TABLET ORAL at 21:31

## 2021-10-30 RX ADMIN — MELATONIN TAB 3 MG 3 MG: 3 TAB at 21:31

## 2021-10-30 ASSESSMENT — ENCOUNTER SYMPTOMS
ABDOMINAL PAIN: 0
MUSCULOSKELETAL NEGATIVE: 1
PALPITATIONS: 0
SHORTNESS OF BREATH: 0
EYES NEGATIVE: 1
CHILLS: 0
POLYDIPSIA: 0
BRUISES/BLEEDS EASILY: 0
FEVER: 0
NAUSEA: 0
COUGH: 0
VOMITING: 0

## 2021-10-30 ASSESSMENT — PAIN DESCRIPTION - PAIN TYPE
TYPE: ACUTE PAIN
TYPE: ACUTE PAIN

## 2021-10-30 NOTE — CARE PLAN
The patient is Stable - Low risk of patient condition declining or worsening    Shift Goals  Clinical Goals: safety  Patient Goals: rest    Progress made toward(s) clinical / shift goals:  patient is up in chair resting with daughter a bedside. Educated patient to use call light for assistance.   Problem: Knowledge Deficit - Standard  Goal: Patient and family/care givers will demonstrate understanding of plan of care, disease process/condition, diagnostic tests and medications  Outcome: Progressing     Problem: Bowel Elimination  Goal: Patient will participate in bowel management program  Outcome: Progressing     Problem: Skin Integrity  Goal: Patient's skin integrity will be maintained or improve  Outcome: Progressing     Problem: Mobility  Goal: Patient's capacity to carry out activities will improve  Outcome: Progressing

## 2021-10-30 NOTE — PROGRESS NOTES
Hospital Medicine Daily Progress Note      Chief Complaint:  Leukocytosis    Interval History:  No 24 hour clinical changes.    Review of Systems  Review of Systems   Constitutional: Negative for chills and fever.   HENT: Negative.    Eyes: Negative.    Respiratory: Negative for cough and shortness of breath.    Cardiovascular: Negative for chest pain and palpitations.   Gastrointestinal: Negative for abdominal pain, nausea and vomiting.   Musculoskeletal: Negative.    Skin: Negative for itching and rash.   Endo/Heme/Allergies: Negative for polydipsia. Does not bruise/bleed easily.        Physical Exam  Temp:  [36.6 °C (97.8 °F)] 36.6 °C (97.8 °F)  Pulse:  [62] 62  Resp:  [17] 17  BP: (113)/(63) 113/63    Physical Exam  Vitals reviewed.   Constitutional:       General: He is not in acute distress.     Appearance: Normal appearance. He is not ill-appearing.   HENT:      Head: Normocephalic and atraumatic.      Right Ear: External ear normal.      Left Ear: External ear normal.      Nose: Nose normal.      Mouth/Throat:      Pharynx: Oropharynx is clear.   Eyes:      General:         Right eye: No discharge.         Left eye: No discharge.      Extraocular Movements: Extraocular movements intact.      Conjunctiva/sclera: Conjunctivae normal.   Cardiovascular:      Rate and Rhythm: Normal rate and regular rhythm.   Pulmonary:      Effort: No respiratory distress.      Breath sounds: No wheezing.      Comments: Decreased BS   Abdominal:      General: Bowel sounds are normal. There is no distension.      Palpations: Abdomen is soft.      Tenderness: There is no abdominal tenderness.   Musculoskeletal:      Cervical back: Normal range of motion and neck supple.      Right lower leg: No edema.      Left lower leg: No edema.   Skin:     General: Skin is warm and dry.   Neurological:      Mental Status: He is alert and oriented to person, place, and time.         Fluids    Intake/Output Summary (Last 24 hours) at 10/30/2021  1407  Last data filed at 10/30/2021 0900  Gross per 24 hour   Intake 480 ml   Output --   Net 480 ml       Laboratory  Recent Labs     10/29/21  0525   WBC 7.7   RBC 4.64*   HEMOGLOBIN 15.3   HEMATOCRIT 43.9   MCV 94.6   MCH 33.0   MCHC 34.9   RDW 44.1   PLATELETCT 980*   MPV 9.6     Recent Labs     10/29/21  0525   SODIUM 141   POTASSIUM 4.1   CHLORIDE 105   CO2 27   GLUCOSE 91   BUN 14   CREATININE 0.84   CALCIUM 9.2               Assessment/Plan  * Stroke (cerebrum) (HCC)- (present on admission)  Assessment & Plan  S/P TPA at [facility]  S/P unsuccessful thrombectomy  On ASA and Lipitor    Thrombocytosis  Assessment & Plan  Likely reactive  Follow Platelet counts    Leukocytosis- (present on admission)  Assessment & Plan  UCx 10-50,000 E Coli, pansensitive  CXR negative acute  Completed abx  WBC resolved    Urinary retention- (present on admission)  Assessment & Plan  Monitor for orthostatic hypotension on Flomax    Full Code    Patient seen and examined.  Chart reviewed.  Assessment and Plan as above.  No changes today from yesterday's medical decision making.

## 2021-10-30 NOTE — CARE PLAN
"The patient is Stable - Low risk of patient condition declining or worsening    Shift Goals  Clinical Goals: safety  Patient Goals: sleep well     Problem: Skin Integrity  Goal: Patient's skin integrity will be maintained or improve  Outcome: Progressing  Note:   Darius Score: 18    Patient's skin remains intact and free from new or accidental injury this shift; no s/s of infection. RN wound protocol checked.  Encouraged hydration and educated about the importance of nutrition to keep skin integrity. Will continue to monitor.       Problem: Fall Risk - Rehab  Goal: Patient will remain free from falls  Outcome: Progressing  Note: Bita Barron Fall risk Assessment Score: 17    High fall risk Interventions   - Bed and strip alarm   - Yellow sign by the door   - Yellow wrist band \"Fall risk\"  - Room near to the nurse station  - Do not leave patient unattended in the bathroom  - Fall risk education provided     "

## 2021-10-31 PROCEDURE — 770010 HCHG ROOM/CARE - REHAB SEMI PRIVAT*

## 2021-10-31 PROCEDURE — 99232 SBSQ HOSP IP/OBS MODERATE 35: CPT | Performed by: HOSPITALIST

## 2021-10-31 PROCEDURE — A9270 NON-COVERED ITEM OR SERVICE: HCPCS | Performed by: PHYSICAL MEDICINE & REHABILITATION

## 2021-10-31 PROCEDURE — 700102 HCHG RX REV CODE 250 W/ 637 OVERRIDE(OP): Performed by: PHYSICAL MEDICINE & REHABILITATION

## 2021-10-31 PROCEDURE — 700111 HCHG RX REV CODE 636 W/ 250 OVERRIDE (IP): Performed by: PHYSICAL MEDICINE & REHABILITATION

## 2021-10-31 RX ADMIN — ENOXAPARIN SODIUM 40 MG: 40 INJECTION SUBCUTANEOUS at 20:32

## 2021-10-31 RX ADMIN — TRAZODONE HYDROCHLORIDE 50 MG: 50 TABLET ORAL at 20:32

## 2021-10-31 RX ADMIN — TAMSULOSIN HYDROCHLORIDE 0.4 MG: 0.4 CAPSULE ORAL at 20:32

## 2021-10-31 RX ADMIN — MELATONIN TAB 3 MG 3 MG: 3 TAB at 20:32

## 2021-10-31 RX ADMIN — ATORVASTATIN CALCIUM 80 MG: 40 TABLET, FILM COATED ORAL at 20:32

## 2021-10-31 RX ADMIN — OMEPRAZOLE 20 MG: 20 CAPSULE, DELAYED RELEASE ORAL at 08:37

## 2021-10-31 RX ADMIN — ASPIRIN 81 MG CHEWABLE TABLET 81 MG: 81 TABLET CHEWABLE at 08:37

## 2021-10-31 ASSESSMENT — ENCOUNTER SYMPTOMS
EYES NEGATIVE: 1
BRUISES/BLEEDS EASILY: 0
CHILLS: 0
VOMITING: 0
FEVER: 0
POLYDIPSIA: 0
ABDOMINAL PAIN: 0
COUGH: 0
MUSCULOSKELETAL NEGATIVE: 1
SHORTNESS OF BREATH: 0
NAUSEA: 0
PALPITATIONS: 0

## 2021-10-31 ASSESSMENT — PAIN DESCRIPTION - PAIN TYPE: TYPE: ACUTE PAIN

## 2021-10-31 ASSESSMENT — PATIENT HEALTH QUESTIONNAIRE - PHQ9
2. FEELING DOWN, DEPRESSED, IRRITABLE, OR HOPELESS: NOT AT ALL
SUM OF ALL RESPONSES TO PHQ9 QUESTIONS 1 AND 2: 0
1. LITTLE INTEREST OR PLEASURE IN DOING THINGS: NOT AT ALL

## 2021-10-31 NOTE — CARE PLAN
"The patient is Stable - Low risk of patient condition declining or worsening    Shift Goals  Clinical Goals: safety  Patient Goals: rest    Problem: Skin Integrity  Goal: Patient's skin integrity will be maintained or improve  Outcome: Progressing  Note:   Darius Score: 18    Patient's skin remains intact and free from new or accidental injury this shift; no s/s of infection. RN wound protocol checked.  Encouraged hydration and educated about the importance of nutrition to keep skin integrity. Will continue to monitor.       Problem: Fall Risk - Rehab  Goal: Patient will remain free from falls  Outcome: Progressing  Note: Bita Barron Fall risk Assessment Score: 17    High fall risk Interventions   - Bed and strip alarm   - Yellow sign by the door   - Yellow wrist band \"Fall risk\"  - Room near to the nurse station  - Do not leave patient unattended in the bathroom  - Fall risk education provided     "

## 2021-10-31 NOTE — PROGRESS NOTES
Hospital Medicine Daily Progress Note      Chief Complaint:  Leukocytosis    Interval History:  No new problems.  Discussed pt's elevated Platelet counts.    Review of Systems  Review of Systems   Constitutional: Negative for chills and fever.   HENT: Negative.    Eyes: Negative.    Respiratory: Negative for cough and shortness of breath.    Cardiovascular: Negative for chest pain and palpitations.   Gastrointestinal: Negative for abdominal pain, nausea and vomiting.   Musculoskeletal: Negative.    Skin: Negative for itching and rash.   Endo/Heme/Allergies: Negative for polydipsia. Does not bruise/bleed easily.        Physical Exam  Temp:  [36.5 °C (97.7 °F)-37.2 °C (98.9 °F)] 36.5 °C (97.7 °F)  Pulse:  [65-89] 65  Resp:  [15-17] 17  BP: (111-129)/(55-67) 119/64  SpO2:  [90 %] 90 %    Physical Exam  Vitals reviewed.   Constitutional:       General: He is not in acute distress.     Appearance: Normal appearance. He is not ill-appearing.   HENT:      Head: Normocephalic and atraumatic.      Right Ear: External ear normal.      Left Ear: External ear normal.      Nose: Nose normal.      Mouth/Throat:      Pharynx: Oropharynx is clear.   Eyes:      General:         Right eye: No discharge.         Left eye: No discharge.      Extraocular Movements: Extraocular movements intact.      Conjunctiva/sclera: Conjunctivae normal.   Cardiovascular:      Rate and Rhythm: Normal rate and regular rhythm.   Pulmonary:      Effort: No respiratory distress.      Breath sounds: No wheezing.      Comments: Decreased BS   Abdominal:      General: Bowel sounds are normal. There is no distension.      Palpations: Abdomen is soft.      Tenderness: There is no abdominal tenderness.   Musculoskeletal:      Cervical back: Normal range of motion and neck supple.      Right lower leg: No edema.      Left lower leg: No edema.   Skin:     General: Skin is warm and dry.   Neurological:      Mental Status: He is alert and oriented to person, place,  "and time.         Fluids    Intake/Output Summary (Last 24 hours) at 10/31/2021 1218  Last data filed at 10/31/2021 0900  Gross per 24 hour   Intake 700 ml   Output --   Net 700 ml       Laboratory  Recent Labs     10/29/21  0525   WBC 7.7   RBC 4.64*   HEMOGLOBIN 15.3   HEMATOCRIT 43.9   MCV 94.6   MCH 33.0   MCHC 34.9   RDW 44.1   PLATELETCT 980*   MPV 9.6     Recent Labs     10/29/21  0525   SODIUM 141   POTASSIUM 4.1   CHLORIDE 105   CO2 27   GLUCOSE 91   BUN 14   CREATININE 0.84   CALCIUM 9.2               Assessment/Plan  * Stroke (cerebrum) (HCC)- (present on admission)  Assessment & Plan  S/P TPA at [facility]  S/P unsuccessful thrombectomy  On ASA and Lipitor    Thrombocytosis  Assessment & Plan  Pt reports chronic personal and family history of high Platelet counts \"because of Hereditary Spherocytosis\"  However, HS affects predominately RBC and causes hemolysis  Pt does not have anemia  Consider further work up and Hematology evaluation  Query etiology of pt's recent stroke  Check F/U labs in AM     Leukocytosis- (present on admission)  Assessment & Plan  UCx 10-50,000 E Coli, pansensitive  CXR negative acute  Completed abx  WBC resolved    Urinary retention- (present on admission)  Assessment & Plan  Monitor for orthostatic hypotension on Flomax    Full Code      "

## 2021-11-01 LAB
ERYTHROCYTE [DISTWIDTH] IN BLOOD BY AUTOMATED COUNT: 44 FL (ref 35.9–50)
HCT VFR BLD AUTO: 46.8 % (ref 42–52)
HGB BLD-MCNC: 16.2 G/DL (ref 14–18)
MCH RBC QN AUTO: 32.7 PG (ref 27–33)
MCHC RBC AUTO-ENTMCNC: 34.6 G/DL (ref 33.7–35.3)
MCV RBC AUTO: 94.4 FL (ref 81.4–97.8)
PLATELET # BLD AUTO: 1033 K/UL (ref 164–446)
PMV BLD AUTO: 9.5 FL (ref 9–12.9)
RBC # BLD AUTO: 4.96 M/UL (ref 4.7–6.1)
WBC # BLD AUTO: 7.9 K/UL (ref 4.8–10.8)

## 2021-11-01 PROCEDURE — 97116 GAIT TRAINING THERAPY: CPT

## 2021-11-01 PROCEDURE — 85027 COMPLETE CBC AUTOMATED: CPT

## 2021-11-01 PROCEDURE — 700102 HCHG RX REV CODE 250 W/ 637 OVERRIDE(OP): Performed by: PHYSICAL MEDICINE & REHABILITATION

## 2021-11-01 PROCEDURE — A9270 NON-COVERED ITEM OR SERVICE: HCPCS | Performed by: PHYSICAL MEDICINE & REHABILITATION

## 2021-11-01 PROCEDURE — 97535 SELF CARE MNGMENT TRAINING: CPT

## 2021-11-01 PROCEDURE — 700111 HCHG RX REV CODE 636 W/ 250 OVERRIDE (IP): Performed by: PHYSICAL MEDICINE & REHABILITATION

## 2021-11-01 PROCEDURE — 97112 NEUROMUSCULAR REEDUCATION: CPT

## 2021-11-01 PROCEDURE — 99232 SBSQ HOSP IP/OBS MODERATE 35: CPT | Performed by: HOSPITALIST

## 2021-11-01 PROCEDURE — 36415 COLL VENOUS BLD VENIPUNCTURE: CPT

## 2021-11-01 PROCEDURE — 770010 HCHG ROOM/CARE - REHAB SEMI PRIVAT*

## 2021-11-01 PROCEDURE — 99233 SBSQ HOSP IP/OBS HIGH 50: CPT | Performed by: PHYSICAL MEDICINE & REHABILITATION

## 2021-11-01 RX ADMIN — TRAZODONE HYDROCHLORIDE 50 MG: 50 TABLET ORAL at 20:07

## 2021-11-01 RX ADMIN — ENOXAPARIN SODIUM 40 MG: 40 INJECTION SUBCUTANEOUS at 20:07

## 2021-11-01 RX ADMIN — TAMSULOSIN HYDROCHLORIDE 0.4 MG: 0.4 CAPSULE ORAL at 20:07

## 2021-11-01 RX ADMIN — MELATONIN TAB 3 MG 3 MG: 3 TAB at 20:07

## 2021-11-01 RX ADMIN — ATORVASTATIN CALCIUM 80 MG: 40 TABLET, FILM COATED ORAL at 20:07

## 2021-11-01 RX ADMIN — OMEPRAZOLE 20 MG: 20 CAPSULE, DELAYED RELEASE ORAL at 09:17

## 2021-11-01 RX ADMIN — ASPIRIN 81 MG CHEWABLE TABLET 81 MG: 81 TABLET CHEWABLE at 09:17

## 2021-11-01 ASSESSMENT — 10 METER WALK TEST (10METWT)
TRIAL 2: TIME TO WALK 10 METERS: 6.4
TRIAL 1: TIME TO WALK 10 METERS: 5.95
TRIAL 2: TIME TO WALK 10 METERS: 8.1
TRIAL 3: TIME TO WALK 10 METERS: 5.88
TRIAL 1: TIME TO WALK 10 METERS: 7.8
AVERAGE VELOCITY - METERS PER SECOND: .77
TRIAL 3: TIME TO WALK 10 METERS: 7.4
AVERAGE TIME - SECONDS: 7.77
AVERAGE VELOCITY - METERS PER SECOND: .99

## 2021-11-01 ASSESSMENT — ENCOUNTER SYMPTOMS
PALPITATIONS: 0
COUGH: 0
MUSCULOSKELETAL NEGATIVE: 1
BRUISES/BLEEDS EASILY: 0
ABDOMINAL PAIN: 0
POLYDIPSIA: 0
FEVER: 0
SHORTNESS OF BREATH: 0
VOMITING: 0
CHILLS: 0
NAUSEA: 0
EYES NEGATIVE: 1

## 2021-11-01 ASSESSMENT — BALANCE ASSESSMENTS
TRANSFERS: 4
STANDING TO SITTING: 4
PICK UP OBJECT FROM THE FLOOR FROM A STANDING POSITION: 4
LONG VERSION TOTAL SCORE (MAX 56): 40
LONG VERSION TOTAL SCORE (MAX 56): 40
PLACE ALTERNATE FOOT ON STEP OR STOOL WHILE STANDING UNSUPPORTED: 0
STANDING UNSUPPORTED WITH EYES CLOSED: 4
STANDING UNSUPPORTED ONE FOOT IN FRONT: 2
SITTING TO STANDING: 4
REACHING FORWARD WITH OUTSTRETCHED ARM WHILE STANDING: 4
TURN 360 DEGREES: 1
LOOK OVER LEFT AND RIGHT SHOULDERS WHILE STANDING: 0
SITTING UNSUPPORTED: 4
STANDING ON ONE LEG: 1
STANDING UNSUPPORTED WITH FEET TOGETHER: 4
STANDING UNSUPPORTED: 4

## 2021-11-01 ASSESSMENT — GAIT ASSESSMENTS
ASSISTIVE DEVICE: OTHER (COMMENTS)
ASSISTIVE DEVICE: OTHER (COMMENTS)
GAIT LEVEL OF ASSIST: CONTACT GUARD ASSIST
GAIT LEVEL OF ASSIST: CONTACT GUARD ASSIST
DEVIATION: DECREASED BASE OF SUPPORT;BRADYKINETIC;OTHER (COMMENT)
DEVIATION: DECREASED BASE OF SUPPORT;BRADYKINETIC;DECREASED HEEL STRIKE;DECREASED TOE OFF
DISTANCE (FEET): 240

## 2021-11-01 ASSESSMENT — ACTIVITIES OF DAILY LIVING (ADL)
TOILETING_LEVEL_OF_ASSIST_DESCRIPTION: GRAB BAR;INCREASED TIME
TOILETING_LEVEL_OF_ASSIST_DESCRIPTION: GRAB BAR;INCREASED TIME;SUPERVISION FOR SAFETY;VERBAL CUEING
TOILET_TRANSFER_DESCRIPTION: GRAB BAR;INCREASED TIME;SET-UP OF EQUIPMENT;SUPERVISION FOR SAFETY;VERBAL CUEING
TUB_SHOWER_TRANSFER_DESCRIPTION: GRAB BAR;SHOWER BENCH;INCREASED TIME;SET-UP OF EQUIPMENT;SUPERVISION FOR SAFETY;VERBAL CUEING
TOILET_TRANSFER_DESCRIPTION: GRAB BAR;INCREASED TIME;SUPERVISION FOR SAFETY

## 2021-11-01 ASSESSMENT — PAIN DESCRIPTION - PAIN TYPE: TYPE: ACUTE PAIN

## 2021-11-01 NOTE — CARE PLAN
Problem: Bathing  Goal: STG-Within one week, patient will bathe with SBA using alden technique and AE.  Outcome: Met     Problem: Dressing  Goal: STG-Within one week, patient will dress UB with supervision using alden technique.  Outcome: Met  Goal: STG-Within one week, patient will dress LB with min A using alden technique and AE prn.  Outcome: Met

## 2021-11-01 NOTE — PROGRESS NOTES
"Rehab Progress Note     Chief Complaint: Follow-up stroke    Interval Events (Subjective)  Patient seen and examined at bedside. Patient's family at bedside.  Patient reports he slept well, denies constipation. Is happy with the progress he has made with therapy. Family is asking about his plt levels, discussed need to continue to trend plt levels. Reviewed with Hospitalist that plts are likely reactive.     Objective:  VITAL SIGNS: /61   Pulse 61   Temp 36.7 °C (98.1 °F) (Oral)   Resp 18   Ht 1.558 m (5' 1.32\")   Wt 83 kg (182 lb 15.7 oz)   SpO2 93%   BMI 34.21 kg/m²     Physical Exam:  Vitals: /61   Pulse 61   Temp 36.7 °C (98.1 °F) (Oral)   Resp 18   Ht 1.558 m (5' 1.32\")   Wt 83 kg (182 lb 15.7 oz)   SpO2 93%   Gen: NAD, seated comfortably in MWC.   Head: NC/AT  Eyes/ Nose/ Mouth: moist mucous membranes   Cardio: RRR, good distal perfusion, warm extremities  Pulm: normal respiratory effort, no cyanosis   Abd: Soft NTND, negative borborygmi   Ext: No peripheral edema. No calf tenderness. No clubbing.  MSK: RUE: 4/5  strength, EE 3/5 EF, SAB     Recent Results (from the past 72 hour(s))   CBC WITHOUT DIFFERENTIAL    Collection Time: 11/01/21  5:48 AM   Result Value Ref Range    WBC 7.9 4.8 - 10.8 K/uL    RBC 4.96 4.70 - 6.10 M/uL    Hemoglobin 16.2 14.0 - 18.0 g/dL    Hematocrit 46.8 42.0 - 52.0 %    MCV 94.4 81.4 - 97.8 fL    MCH 32.7 27.0 - 33.0 pg    MCHC 34.6 33.7 - 35.3 g/dL    RDW 44.0 35.9 - 50.0 fL    Platelet Count 1033 (HH) 164 - 446 K/uL    MPV 9.5 9.0 - 12.9 fL       Current Facility-Administered Medications   Medication Frequency   • senna-docusate (PERICOLACE or SENOKOT S) 8.6-50 MG per tablet 2 Tablet BID PRN    And   • polyethylene glycol/lytes (MIRALAX) PACKET 1 Packet QDAY PRN    And   • magnesium hydroxide (MILK OF MAGNESIA) suspension 30 mL QDAY PRN    And   • bisacodyl (DULCOLAX) suppository 10 mg QDAY PRN   • melatonin tablet 3 mg QHS   • traZODone (DESYREL) " tablet 50 mg QHS   • omeprazole (PRILOSEC) capsule 20 mg QAM AC   • tamsulosin (FLOMAX) capsule 0.4 mg Q EVENING   • enoxaparin (LOVENOX) inj 40 mg DAILY   • hydrOXYzine HCl (ATARAX) tablet 50 mg Q6HRS PRN   • QUEtiapine (Seroquel) tablet 25 mg TID PRN   • simethicone (MYLICON) chewable tab 80 mg TID PRN   • Respiratory Therapy Consult Continuous RT   • Pharmacy Consult Request ...Pain Management Review 1 Each PHARMACY TO DOSE   • hydrALAZINE (APRESOLINE) tablet 10 mg Q8HRS PRN   • acetaminophen (Tylenol) tablet 650 mg Q4HRS PRN   • sodium phosphate (Fleet) enema 133 mL QDAY PRN   • artificial tears ophthalmic solution 1 Drop PRN   • benzocaine-menthol (CEPACOL) lozenge 1 Lozenge Q2HRS PRN   • mag hydrox-al hydrox-simeth (MAALOX PLUS ES or MYLANTA DS) suspension 20 mL Q2HRS PRN   • ondansetron (ZOFRAN ODT) dispertab 4 mg 4X/DAY PRN    Or   • ondansetron (ZOFRAN) syringe/vial injection 4 mg 4X/DAY PRN   • sodium chloride (OCEAN) 0.65 % nasal spray 2 Spray PRN   • atorvastatin (LIPITOR) tablet 80 mg Q EVENING   • aspirin (ASA) chewable tab 81 mg DAILY       Orders Placed This Encounter   Procedures   • Diet Order Diet: Regular (meds whole 1:1 with thins)     Standing Status:   Standing     Number of Occurrences:   1     Order Specific Question:   Diet:     Answer:   Regular [1]     Comments:   meds whole 1:1 with thins   IDT Team Meeting 11/1/2021    IJanet D.O., was present and led the interdisciplinary team conference on 11/1/2021.  I led the IDT conference and agree with the IDT conference documentation and plan of care as noted below.     RN:  Diet    % Meal     Pain    Sleep    Bowel    Bladder Continent    In's & Out's      PT:  Bed Mobility    Transfers    Mobility SBA - CGA with trekking poles   Stairs      OT:  Eating    Grooming    Bathing    UB Dressing    LB Dressing    Toileting SBA    Shower & Transfer        Barriers:   - monitoring PLT levels   - balance reactions  - left side inattention    -grab bars installation     CM:  Continues to work on disposition and DME needs.      DC/Disposition:  To home on 11/8      Assessment:  Active Hospital Problems    Diagnosis    • *Stroke (cerebrum) (HCC)    • Edema of left lower extremity    • Urinary retention    • Leukocytosis    • Impaired mobility and ADLs    • Dysphagia    • Left hemiparesis (HCC)    • Subdural hematoma (HCC)    • H/O splenectomy        Medical Decision Making and Plan:  Right MCA stroke  S/p tPA  S/p thrombectomy  Left hemiparesis, improved  Dysphagia, improved  Continue full rehab program  PT/OT/SLP, 1 hr each discipline, 5 days per week  Aspirin, Statin  Imaging  Reviewed with patient and wife  Ziopatch cardiac monitor placed 10/25 - this was a XT, patient needs a AT - with real time recording and gateway device, to be delivered this afternoon  Outpatient follow up with Dr. Cifuentes, stroke bridge clinic, referrals made  Outpatient follow up with cardiology, referral made     Subdural hematoma, small  Likely due to fall     Oral thrush, resolved  s/p Nystatin     Thrombocytosis  Stress reaction  Monitor with weekly labs  Plt with slight continued uptrend, will discuss with Hospitalist.      Leukocytosis, resolved   Checked UA, negative, then positive on 10/22  Culture with pansensitive E Coli  Checked CXR - negative x 2  Appreciate hospitalist assistance  WBC 7.9 as of 11/1      Suspected aspiration pneumonia  Continue antibiotics, improved WBC, afebrile   Appreciate hospitalist assistance     Hyperglycemia  Checked HgbA1c - 4.6  Likely stress response     GERD  Omeprazole     Insomnia, improved  Schedule melatonin and trazodone     Bowel program  PRN bowel medications  Last BM 10/26     Bladder program  Urinary retention  Started Hytrin 2 mg  Check PVRs - 212 (10/28)  No longer retaining  Bladder scan for no voids  ICP for over 400 cc - last required 10/20, 10/25  Scheduled toileting     DVT prophylaxis  Started Lovenox 10/20, patient 8  days out from small SDH    Total time:  36 minutes.  I spent greater than 50% of the time for patient care and coordination on this date, including unit/floor time, and face-to-face time with the patient as per assessment and plan above.    Janet Herrera D.O.  Physical Medicine and Rehabilitation

## 2021-11-01 NOTE — THERAPY
Occupational Therapy  Daily Treatment     Patient Name: Fermín Gomez  Age:  66 y.o., Sex:  male  Medical Record #: [id number]  Today's Date: 11/1/2021     Precautions  Precautions: (P) Fall Risk  Comments: (P) Left hemiparesis, left neglect, ZIO patch with  in wheelchair pouch 10 FT limit    Safety   ADL Safety : (P) Requires Supervision for Safety, Requires Cueing for Safety  Bathroom Safety: (P) Requires Supervision for Safety, Requires Cuing for Safety  Comments: (P) see below functional levels for ADL performance details.    Subjective    Pt agreeable to OT session.     Objective       11/01/21 0701   Precautions   Precautions Fall Risk   Comments Left hemiparesis, left neglect, ZIO patch with  in wheelchair pouch 10 FT limit   Safety    ADL Safety  Requires Supervision for Safety;Requires Cueing for Safety   Bathroom Safety Requires Supervision for Safety;Requires Cuing for Safety   Comments see below functional levels for ADL performance details.   Cognition    Level of Consciousness Alert   Functional Level of Assist   Eating Supervision   Eating Description Set-up of equipment or meal/tube feeding  (set up of items on tray)   Grooming Modified Independent;Seated   Grooming Description Increased time   Bathing Supervision   Bathing Description Grab bar;Hand held shower;Tub bench;Increased time;Supervision for safety;Verbal cueing  (distant supv while seated, close SBA while standing)   Upper Body Dressing Modified Independent   Upper Body Dressing Description Increased time  (don/doff pullover shirt)   Lower Body Dressing Stand by Assist  (A to don abner hose)   Lower Body Dressing Description Increased time;Set-up of equipment;Supervision for safety;Verbal cueing  (cues for alden tech to don brief/pants, set up to don shoes)   Toileting Stand by Assist   Toileting Description Grab bar;Increased time;Supervision for safety;Verbal cueing   Toilet Transfers Stand by Assist   Toilet Transfer  Description Grab bar;Increased time;Set-up of equipment;Supervision for safety;Verbal cueing  (w/c<>toilet stand step via GB)   Tub / Shower Transfers Stand by Assist   Tub Shower Transfer Description Grab bar;Shower bench;Increased time;Set-up of equipment;Supervision for safety;Verbal cueing  (w/c<>shower bench stand step via GB)   Bed Mobility    Supine to Sit Supervised   Sit to Supine Supervised   Scooting Supervised   Rolling Supervised   Interdisciplinary Plan of Care Collaboration   Patient Position at End of Therapy Seated;Chair Alarm On;Self Releasing Lap Belt Applied;Call Light within Reach;Tray Table within Reach;Phone within Reach   OT Total Time Spent   OT Individual Total Time Spent (Mins) 60   OT Charge Group   OT Self Care / ADL 4       Assessment    Pt tolerated OT session well and con't to make functional gains with ADLs and bathroom transfers. He con't to require intermittent cues for carryover of alden dressing technique and to incorporate LUE into ADL tasks.   Strengths: Alert and oriented, Independent prior level of function, Motivated for self care and independence, Pleasant and cooperative, Supportive family, Willingly participates in therapeutic activities  Barriers: Hemiparesis, Decreased endurance, Home accessibility, Impaired activity tolerance, Impaired balance, Impaired functional cognition, Limited mobility, Fatigue (impaired termination and initiation, impaired sequencing)    Plan    LUE neuro re-ed (focus on proximal strengthening, weightbearing, NMES, vibration, Saebo MAS), ADLs, functional mobility/transfers, balance.    for left UE not to include wrist flexors / extensors-Plan for  as adjunct tx 2 to 3 times weekly  pending Therapy tech availability       Occupational Therapy Goals (Active)     Problem: OT Long Term Goals     Dates: Start: 10/20/21       Goal: LTG-By discharge, patient will complete basic self care tasks with CGA- mod I and AE as needed     Dates: Start:  10/20/21          Goal: LTG-By discharge, patient will perform bathroom transfers with CGA- mod I and AE as needed     Dates: Start: 10/20/21

## 2021-11-01 NOTE — THERAPY
Physical Therapy   Daily Treatment     Patient Name: Fermín Gomez  Age:  66 y.o., Sex:  male  Medical Record #: [id number]  Today's Date: 11/1/2021     Precautions  Precautions: Fall Risk  Comments: Left hemiparesis, left neglect, ZIO patch with  in wheelchair pouch 10 FT limit    Subjective    Pt found seated in w/c, ready for therapy.     Objective       11/01/21 0831   Gait Functional Level of Assist    Gait Level Of Assist Contact Guard Assist   Assistive Device Other (Comments)   Distance (Feet) 240   # of Times Distance was Traveled 2   Deviation Decreased Base Of Support;Bradykinetic;Decreased Heel Strike;Decreased Toe Off   Neuro-Muscular Treatments   Neuro-Muscular Treatments Tactile Cuing;Verbal Cuing;Weight Shift Right;Weight Shift Left   Interdisciplinary Plan of Care Collaboration   Patient Position at End of Therapy Seated;Self Releasing Lap Belt Applied;Tray Table within Reach;Phone within Reach   PT Total Time Spent   PT Individual Total Time Spent (Mins) 30   PT Charge Group   PT Gait Training 2     Gait training w/ trekking poles x240' SBA/CGA with cues for L side attention for objects, L UE coordination with placement.  Demo narrow MARTÍN and decreased gait speed with B poles.    Trialed 1 R UE trekking pole x 240' SBA with inc gait speed, difficulty with coordinating smooth turns and limited lateral stability. Vc's for gradual turning, upright head and eye gaze.      Trialed SPC x 240' SBA on level surfaces, decr gait speed from trekking pole.   Lateral side stepping around mat table no AD x 30' ea direction, CGA/Thelma for 2 LOB when stepping to L side.    Assessment    Demo improved quality and gait speed with R trekking pole only.     Strengths: Pleasant and cooperative, Willingly participates in therapeutic activities, Manages pain appropriately, Able to follow instructions  Barriers: Fatigue, Hemiparesis, Impaired balance    Plan    10MWT, [place], Continue  MWF, gait training with  bilateral hiking poles, neuromuscular reeducation for dynamic standing weight shift, consistent dorsiflexion and heel strike, left lower extremity control, activation of the left pelvis during gait, left attention     Passport items to be completed:  Passport items to be completed:  Get in/out of bed safely, in/out of a vehicle, safely use mobility device, walk or wheel around home/community, navigate up and down stairs, show how to get up/down from the ground, ensure home is accessible, demonstrate HEP, complete caregiver training    Physical Therapy Problems (Active)     Problem: Mobility     Dates: Start: 10/25/21       Goal: STG-Within one week, patient will ascend and descend four to six stairs     Dates: Start: 10/25/21       Goal Note filed on 10/25/21 1756 by JEANIE Kam     1) Individualized goal:   Up/down 4-6 stairs, bilateral handrails, CGA for facilitation, 1 week  2) Interventions:  PT E Stim Attended, PT Gait Training, PT Therapeutic Exercises, PT Neuro Re-Ed/Balance, and PT Therapeutic Activity            Goal: STG-Within one week, patient will     Dates: Start: 10/25/21       Goal Note filed on 10/25/21 1756 by JEANIE Kam     1) Individualized goal:   Gait FWW/SPC, CGA for facilitation 100 FT, 1 week  2) Interventions:  PT E Stim Attended, PT Gait Training, PT Therapeutic Exercises, PT Neuro Re-Ed/Balance, and PT Therapeutic Activity               Problem: Mobility Transfers     Dates: Start: 10/25/21       Goal: STG-Within one week, patient will transfer bed to chair     Dates: Start: 10/25/21       Goal Note filed on 10/25/21 1756 by JEANIE Kam     1) Individualized goal:   Transfer bed to/from chair FWW/SPC, SBA 1 week  2) Interventions:  PT E Stim Attended, PT Gait Training, PT Therapeutic Exercises, PT Neuro Re-Ed/Balance, and PT Therapeutic Activity               Problem: PT-Long Term Goals     Dates: Start: 10/20/21       Goal: LTG-By discharge, patient will  ambulate     Dates: Start: 10/20/21       Goal Note filed on 10/20/21 1514 by JEANIE Kam     1) Individualized goal:   Gait FWW/ FT, supervision at discharge  2) Interventions:  PT Gait Training, PT Therapeutic Exercises, PT Neuro Re-Ed/Balance, and PT Therapeutic Activity            Goal: LTG-By discharge, patient will transfer one surface to another     Dates: Start: 10/20/21       Goal Note filed on 10/20/21 1514 by JEANIE Kam     1) Individualized goal:   Transfer 1 surface to another fww/spc supervision at discharge  2) Interventions:  PT Gait Training, PT Therapeutic Exercises, PT Neuro Re-Ed/Balance, and PT Therapeutic Activity            Goal: LTG-By discharge, patient will ambulate up/down 4-6 stairs     Dates: Start: 10/20/21       Goal Note filed on 10/20/21 1514 by JEANIE Kam     1) Individualized goal:   Up/down 4-6 stairs handrails, supervision at discharge  2) Interventions:  PT Gait Training, PT Therapeutic Exercises, PT Neuro Re-Ed/Balance, and PT Therapeutic Activity            Goal: LTG-By discharge, patient will transfer in/out of a car     Dates: Start: 10/20/21       Goal Note filed on 10/20/21 1514 by JEANIE Kam     1) Individualized goal:   Car transfer fww/spc supervision at discharge  2) Interventions:  PT Gait Training, PT Therapeutic Exercises, PT Neuro Re-Ed/Balance, and PT Therapeutic Activity

## 2021-11-01 NOTE — THERAPY
Occupational Therapy  Daily Treatment     Patient Name: Fermín Gomez  Age:  66 y.o., Sex:  male  Medical Record #: [id number]  Today's Date: 11/1/2021     Precautions  Precautions: (P) Fall Risk  Comments: (P) Left hemiparesis, left neglect, ZIO patch with  in wheelchair pouch 10 FT limit    Safety   ADL Safety : Requires Supervision for Safety, Requires Cueing for Safety  Bathroom Safety: Requires Supervision for Safety, Requires Cuing for Safety  Comments: see below functional levels for ADL performance details.    Subjective    Pt agreeable to OT session; spouse present and supportive.      Objective       11/01/21 1001   Precautions   Precautions Fall Risk   Comments Left hemiparesis, left neglect, ZIO patch with  in wheelchair pouch 10 FT limit   Functional Level of Assist   Grooming Modified Independent;Seated   Toileting Stand by Assist   Toileting Description Grab bar;Increased time  (standing at toilet to urinate)   Toilet Transfers Stand by Assist   Toilet Transfer Description Grab bar;Increased time;Supervision for safety  (standing at toilet via GB)   Neuro-Muscular Treatments   Neuro-Muscular Treatments Facilitation;Joint Approximation;Sequencing;Tactile Cuing;Verbal Cuing   Comments see note for tx details.   Interdisciplinary Plan of Care Collaboration   IDT Collaboration with  Family / Caregiver   Patient Position at End of Therapy Seated;Chair Alarm On;Self Releasing Lap Belt Applied;Family / Friend in Room   Collaboration Comments spouse present for session.   OT Total Time Spent   OT Individual Total Time Spent (Mins) 30   OT Charge Group   OT Self Care / ADL 1   OT Neuromuscular Re-education / Balance 1     Activities standing at raised mat to address LUE neuro re-ed, weightbearing, proximal strengthening and ROM:  -Powderboard exercises x20 reps each: flex/ext, horizontal abduction/adduction, clockwise/counterclockwise circles, up/down wedge.  -Modified pushups 3x10 reps with  support provided at L elbow/shoulder.    Assessment    Pt tolerated OT session well and making progress with LUE strength. He con't to present with increased proximal weakness and requires cues/facilitation to decrease compensatory shoulder abduction with shoulder flexion.  Strengths: Alert and oriented, Independent prior level of function, Motivated for self care and independence, Pleasant and cooperative, Supportive family, Willingly participates in therapeutic activities  Barriers: Hemiparesis, Decreased endurance, Home accessibility, Impaired activity tolerance, Impaired balance, Impaired functional cognition, Limited mobility, Fatigue (impaired termination and initiation, impaired sequencing)    Plan    LUE neuro re-ed (focus on proximal strengthening, weightbearing, NMES, vibration, Saebo MAS), ADLs, functional mobility/transfers, balance.    for left UE not to include wrist flexors / extensors-Plan for  as adjunct tx 2 to 3 times weekly  pending Therapy tech availability     Occupational Therapy Goals (Active)     Problem: Functional Transfers     Dates: Start: 11/01/21       Goal: STG-Within one week, patient will transfer to step in shower with supervision using AE/DME as needed.     Dates: Start: 11/01/21             Problem: IADL's     Dates: Start: 11/01/21       Goal: STG-Within one week, patient will access kitchen area with CGA-SBA using LRAD.     Dates: Start: 11/01/21             Problem: OT Long Term Goals     Dates: Start: 10/20/21       Goal: LTG-By discharge, patient will complete basic self care tasks with CGA- mod I and AE as needed     Dates: Start: 10/20/21          Goal: LTG-By discharge, patient will perform bathroom transfers with CGA- mod I and AE as needed     Dates: Start: 10/20/21

## 2021-11-01 NOTE — CARE PLAN
"  Problem: Knowledge Deficit - Standard  Goal: Patient and family/care givers will demonstrate understanding of plan of care, disease process/condition, diagnostic tests and medications  Outcome: Progressing  Patient verbalized understanding plan of care.       Problem: Fall Risk - Rehab  Goal: Patient will remain free from falls  Outcome: Progressing   Bita Barron Fall risk Assessment Score: 17    High fall risk Interventions   - Alarming seatbelt  - Wander guard  - Bed and strip alarm   - Yellow sign by the door   - Yellow wrist band \"Fall risk\"  - Room near to the nurse station  - Do not leave patient unattended in the bathroom  - Fall risk education provided               "

## 2021-11-01 NOTE — THERAPY
Physical Therapy   Daily Treatment     Patient Name: Fermín Gomez  Age:  66 y.o., Sex:  male  Medical Record #: [id number]  Today's Date: 11/1/2021     Precautions  Precautions: Fall Risk  Comments: Left hemiparesis, left neglect, ZIO patch with  in wheelchair pouch 10 FT limit    Subjective    Pt found in room, spouse at bedside, ready for PT.     Objective       11/01/21 1501   Gait Functional Level of Assist    Gait Level Of Assist Contact Guard Assist   Assistive Device Other (Comments)  (R trekking pole)   Distance (Feet)   (150'x2, x100')   # of Times Distance was Traveled 3   Deviation Decreased Base Of Support;Bradykinetic;Other (Comment)  (L inattention, decr balance reactions)   Standing Lower Body Exercises   Step Up 1 set of 15;Bilateral  (wtih 1 HR)   Other Exercises Lateral step up/down x 15 with 1 HR. then x 8 step up/down on L LE without UE support CGa/Thelma for 1 post LOB   10 Meter Walk Test   Normal - Trial 1 7.8 seconds   Normal - Trial 2 8.1 seconds   Normal - Trial 3 7.4 seconds   Fast - Trial 1 5.95 seconds   Fast - Trial 2 6.4 seconds   Fast - Trial 3 5.88 seconds   Normal Average Time 7.77 seconds   Normal Average Velocity (m/s) 0.77   Fast Average Time 6.08 seconds   Fast Average Velocity (m/s) 0.99   Salgado Balance Scale   Sitting Unsupported (Score 0-4) 4   Change Of Positon: Sitting To Standing (Score 0-4) 4   Change Of Positon: Standing To Sitting (Score 0-4) 4   Transfers (Score 0-4) 4   Standing Unsupported (Score 0-4) 4   Standing With Eyes Closed (Score 0-4) 4   Standing With Feet Together (Score 0-4) 4   Tandem Standing (Score 0-4) 2   Standing On One Leg (Score 0-4) 1   Turning Trunk (Feet Fixed) (Score 0-4) 0   Retrieving Objects From Floor (Score 0-4) 4   Turning 360 Degrees (Score 0-4) 1   Stool Stepping (Score 0-4) 0   Reaching Forward While Standing (Score 0-4) 4   Salgado Balance Total Score (0-56) 40   Interdisciplinary Plan of Care Collaboration   IDT Collaboration with   "Family / Caregiver   Collaboration Comments re: spouse present at beginning, updated on am session   PT Total Time Spent   PT Individual Total Time Spent (Mins) 60   PT Charge Group   PT Neuromuscular Re-Education / Balance 4      Consistently Thelma and max cues for sequencing STS from chair while performing Salgado.    Assessment    Pt had 1 LOB when attempting to stand from standard chair 17\"H without arm rests, required totalA x1 to recover balance and remain standing.  This occurred prior to balance assessments, reinforced throughout session, pt remains high fall risk as indicated by scores and needs to demo improved sequencing with safety for STS.     Strengths: Pleasant and cooperative, Willingly participates in therapeutic activities, Manages pain appropriately, Able to follow instructions  Barriers: Fatigue, Hemiparesis, Impaired balance    Plan    Continue  MWF, gait training with bilateral hiking poles, neuromuscular reeducation for dynamic standing weight shift, consistent dorsiflexion and heel strike, left lower extremity control, activation of the left pelvis during gait, left attention     Passport items to be completed:  Passport items to be completed:  Get in/out of bed safely, in/out of a vehicle, safely use mobility device, walk or wheel around home/community, navigate up and down stairs, show how to get up/down from the ground, ensure home is accessible, demonstrate HEP, complete caregiver training    Physical Therapy Problems (Active)     Problem: PT-Long Term Goals     Dates: Start: 10/20/21       Goal: LTG-By discharge, patient will ambulate     Dates: Start: 10/20/21       Goal Note filed on 10/20/21 9678 by JEANIE Kam     1) Individualized goal:   Gait FWW/ FT, supervision at discharge  2) Interventions:  PT Gait Training, PT Therapeutic Exercises, PT Neuro Re-Ed/Balance, and PT Therapeutic Activity            Goal: LTG-By discharge, patient will transfer one surface to " another     Dates: Start: 10/20/21       Goal Note filed on 10/20/21 1514 by JEANIE Kam     1) Individualized goal:   Transfer 1 surface to another fww/spc supervision at discharge  2) Interventions:  PT Gait Training, PT Therapeutic Exercises, PT Neuro Re-Ed/Balance, and PT Therapeutic Activity            Goal: LTG-By discharge, patient will ambulate up/down 4-6 stairs     Dates: Start: 10/20/21       Goal Note filed on 10/20/21 1514 by JEANIE Kam     1) Individualized goal:   Up/down 4-6 stairs handrails, supervision at discharge  2) Interventions:  PT Gait Training, PT Therapeutic Exercises, PT Neuro Re-Ed/Balance, and PT Therapeutic Activity            Goal: LTG-By discharge, patient will transfer in/out of a car     Dates: Start: 10/20/21       Goal Note filed on 10/20/21 1514 by JEANIE Kam     1) Individualized goal:   Car transfer fww/spc supervision at discharge  2) Interventions:  PT Gait Training, PT Therapeutic Exercises, PT Neuro Re-Ed/Balance, and PT Therapeutic Activity

## 2021-11-01 NOTE — PROGRESS NOTES
[facility] Medicine Daily Progress Note      Chief Complaint:  Leukocytosis    Interval History:  Pt reports h/o splenectomy for HS.  Denies new complaints today.  Labs reviewed.     Review of Systems  Review of Systems   Constitutional: Negative for chills and fever.   HENT: Negative.    Eyes: Negative.    Respiratory: Negative for cough and shortness of breath.    Cardiovascular: Negative for chest pain and palpitations.   Gastrointestinal: Negative for abdominal pain, nausea and vomiting.   Musculoskeletal: Negative.    Skin: Negative for itching and rash.   Endo/Heme/Allergies: Negative for polydipsia. Does not bruise/bleed easily.        Physical Exam  Temp:  [36.2 °C (97.2 °F)-36.7 °C (98.1 °F)] 36.7 °C (98.1 °F)  Pulse:  [61-70] 61  Resp:  [17-18] 18  BP: (116-123)/(61-69) 122/61  SpO2:  [93 %-95 %] 93 %    Physical Exam  Vitals reviewed.   Constitutional:       General: He is not in acute distress.     Appearance: Normal appearance. He is not ill-appearing.   HENT:      Head: Normocephalic and atraumatic.      Right Ear: External ear normal.      Left Ear: External ear normal.      Nose: Nose normal.      Mouth/Throat:      Pharynx: Oropharynx is clear.   Eyes:      General:         Right eye: No discharge.         Left eye: No discharge.      Extraocular Movements: Extraocular movements intact.      Conjunctiva/sclera: Conjunctivae normal.   Cardiovascular:      Rate and Rhythm: Normal rate and regular rhythm.   Pulmonary:      Effort: No respiratory distress.      Breath sounds: No wheezing.      Comments: Decreased BS   Abdominal:      General: Bowel sounds are normal. There is no distension.      Palpations: Abdomen is soft.      Tenderness: There is no abdominal tenderness.   Musculoskeletal:      Cervical back: Normal range of motion and neck supple.      Right lower leg: No edema.      Left lower leg: No edema.   Skin:     General: Skin is warm and dry.   Neurological:      Mental Status: He is alert  and oriented to person, place, and time.         Fluids    Intake/Output Summary (Last 24 hours) at 11/1/2021 1059  Last data filed at 10/31/2021 2032  Gross per 24 hour   Intake 100 ml   Output --   Net 100 ml       Laboratory  Recent Labs     11/01/21  0548   WBC 7.9   RBC 4.96   HEMOGLOBIN 16.2   HEMATOCRIT 46.8   MCV 94.4   MCH 32.7   MCHC 34.6   RDW 44.0   PLATELETCT 1033*   MPV 9.5                   Assessment/Plan  * Stroke (cerebrum) (HCC)- (present on admission)  Assessment & Plan  S/P [facility]PA at [facility]  S/P unsuccessful thrombectomy  On ASA and Lipitor    Thrombocytosis  Assessment & Plan  Pt reports chronic personal and family history of Hereditary Spherocytosis  He is S/P splenectomy  Suspect Platelet counts are reactive  But query etiology of pt's recent stroke  Pt already on ASA and proph LMWH  Consider increase to full dose ASA  Closely monitor Platelet counts for now  May need platelet reduction therapy  Consider Hematology evaluation    Leukocytosis- (present on admission)  Assessment & Plan  UCx 10-50,000 E Coli, pansensitive  CXR negative acute  Completed abx  WBC resolved    Urinary retention- (present on admission)  Assessment & Plan  Monitor for orthostatic hypotension on Flomax    Full Code    Discussed w/ pt and Dr. Herrera

## 2021-11-01 NOTE — CARE PLAN
Problem: Knowledge Deficit - Standard  Goal: Patient and family/care givers will demonstrate understanding of plan of care, disease process/condition, diagnostic tests and medications  Outcome: Progressing     Problem: Psychosocial  Goal: Patient's level of anxiety will decrease  Outcome: Progressing

## 2021-11-02 LAB
ANION GAP SERPL CALC-SCNC: 8 MMOL/L (ref 7–16)
BUN SERPL-MCNC: 10 MG/DL (ref 8–22)
CALCIUM SERPL-MCNC: 8.9 MG/DL (ref 8.5–10.5)
CHLORIDE SERPL-SCNC: 106 MMOL/L (ref 96–112)
CO2 SERPL-SCNC: 28 MMOL/L (ref 20–33)
CREAT SERPL-MCNC: 0.81 MG/DL (ref 0.5–1.4)
ERYTHROCYTE [DISTWIDTH] IN BLOOD BY AUTOMATED COUNT: 45.4 FL (ref 35.9–50)
GLUCOSE SERPL-MCNC: 90 MG/DL (ref 65–99)
HCT VFR BLD AUTO: 42.4 % (ref 42–52)
HGB BLD-MCNC: 14.7 G/DL (ref 14–18)
MCH RBC QN AUTO: 33.2 PG (ref 27–33)
MCHC RBC AUTO-ENTMCNC: 34.7 G/DL (ref 33.7–35.3)
MCV RBC AUTO: 95.7 FL (ref 81.4–97.8)
PLATELET # BLD AUTO: 921 K/UL (ref 164–446)
PMV BLD AUTO: 9.3 FL (ref 9–12.9)
POTASSIUM SERPL-SCNC: 4.2 MMOL/L (ref 3.6–5.5)
RBC # BLD AUTO: 4.43 M/UL (ref 4.7–6.1)
SODIUM SERPL-SCNC: 142 MMOL/L (ref 135–145)
WBC # BLD AUTO: 6.6 K/UL (ref 4.8–10.8)

## 2021-11-02 PROCEDURE — 36415 COLL VENOUS BLD VENIPUNCTURE: CPT

## 2021-11-02 PROCEDURE — 80048 BASIC METABOLIC PNL TOTAL CA: CPT

## 2021-11-02 PROCEDURE — A9270 NON-COVERED ITEM OR SERVICE: HCPCS | Performed by: PHYSICAL MEDICINE & REHABILITATION

## 2021-11-02 PROCEDURE — 97112 NEUROMUSCULAR REEDUCATION: CPT

## 2021-11-02 PROCEDURE — 99231 SBSQ HOSP IP/OBS SF/LOW 25: CPT | Performed by: HOSPITALIST

## 2021-11-02 PROCEDURE — 770010 HCHG ROOM/CARE - REHAB SEMI PRIVAT*

## 2021-11-02 PROCEDURE — 97116 GAIT TRAINING THERAPY: CPT

## 2021-11-02 PROCEDURE — 97535 SELF CARE MNGMENT TRAINING: CPT

## 2021-11-02 PROCEDURE — 97032 APPL MODALITY 1+ESTIM EA 15: CPT

## 2021-11-02 PROCEDURE — 700102 HCHG RX REV CODE 250 W/ 637 OVERRIDE(OP): Performed by: PHYSICAL MEDICINE & REHABILITATION

## 2021-11-02 PROCEDURE — 85027 COMPLETE CBC AUTOMATED: CPT

## 2021-11-02 PROCEDURE — 99232 SBSQ HOSP IP/OBS MODERATE 35: CPT | Performed by: PHYSICAL MEDICINE & REHABILITATION

## 2021-11-02 RX ADMIN — MELATONIN TAB 3 MG 3 MG: 3 TAB at 20:52

## 2021-11-02 RX ADMIN — ASPIRIN 81 MG CHEWABLE TABLET 81 MG: 81 TABLET CHEWABLE at 08:35

## 2021-11-02 RX ADMIN — ATORVASTATIN CALCIUM 80 MG: 40 TABLET, FILM COATED ORAL at 20:52

## 2021-11-02 RX ADMIN — TAMSULOSIN HYDROCHLORIDE 0.4 MG: 0.4 CAPSULE ORAL at 20:52

## 2021-11-02 RX ADMIN — OMEPRAZOLE 20 MG: 20 CAPSULE, DELAYED RELEASE ORAL at 08:35

## 2021-11-02 RX ADMIN — TRAZODONE HYDROCHLORIDE 50 MG: 50 TABLET ORAL at 20:52

## 2021-11-02 ASSESSMENT — ENCOUNTER SYMPTOMS
NAUSEA: 0
DIARRHEA: 0
ABDOMINAL PAIN: 0
CHILLS: 0
NERVOUS/ANXIOUS: 0
FEVER: 0
VOMITING: 0
SHORTNESS OF BREATH: 0

## 2021-11-02 ASSESSMENT — GAIT ASSESSMENTS
ASSISTIVE DEVICE: OTHER (COMMENTS)
GAIT LEVEL OF ASSIST: CONTACT GUARD ASSIST
DEVIATION: DECREASED BASE OF SUPPORT;BRADYKINETIC;OTHER (COMMENT)

## 2021-11-02 ASSESSMENT — ACTIVITIES OF DAILY LIVING (ADL)
TOILET_TRANSFER_DESCRIPTION: GRAB BAR;INCREASED TIME;SUPERVISION FOR SAFETY;VERBAL CUEING
TOILETING_LEVEL_OF_ASSIST_DESCRIPTION: GRAB BAR;INCREASED TIME;SUPERVISION FOR SAFETY

## 2021-11-02 NOTE — PROGRESS NOTES
[facility] Medicine Daily Progress Note    Date of Service  11/2/2021    Chief Complaint:  LLE edema  Thrombocytosis    Interval History:  Talked about the platelets being lower today.    Review of Systems  Review of Systems   Constitutional: Negative for chills and fever.   Respiratory: Negative for shortness of breath.    Cardiovascular: Negative for chest pain.   Gastrointestinal: Negative for abdominal pain, diarrhea, nausea and vomiting.   Psychiatric/Behavioral: The patient is not nervous/anxious.         Physical Exam  Temp:  [36.5 °C (97.7 °F)-37 °C (98.6 °F)] 36.5 °C (97.7 °F)  Pulse:  [68-89] 68  Resp:  [18] 18  BP: (106-125)/(56-75) 106/56  SpO2:  [91 %-96 %] 91 %    Physical Exam  Vitals and nursing note reviewed.   Constitutional:       Appearance: Normal appearance.   HENT:      Head: Atraumatic.   Eyes:      Conjunctiva/sclera: Conjunctivae normal.      Pupils: Pupils are equal, round, and reactive to light.   Cardiovascular:      Rate and Rhythm: Normal rate and regular rhythm.      Heart sounds: No murmur heard.     Pulmonary:      Effort: Pulmonary effort is normal.      Breath sounds: No stridor. No wheezing or rales.   Abdominal:      General: There is no distension.      Palpations: Abdomen is soft.      Tenderness: There is no abdominal tenderness.   Musculoskeletal:      Cervical back: Normal range of motion and neck supple.      Right lower leg: No edema.      Left lower leg: Edema present.   Skin:     General: Skin is warm and dry.      Findings: No rash.   Neurological:      Mental Status: He is alert and oriented to person, place, and time.   Psychiatric:         Mood and Affect: Mood normal.         Behavior: Behavior normal.         Fluids    Intake/Output Summary (Last 24 hours) at 11/2/2021 1121  Last data filed at 11/2/2021 0830  Gross per 24 hour   Intake 940 ml   Output --   Net 940 ml       Laboratory  Recent Labs     11/01/21  0548 11/02/21  0548   WBC 7.9 6.6   RBC 4.96 4.43*    HEMOGLOBIN 16.2 14.7   HEMATOCRIT 46.8 42.4   MCV 94.4 95.7   MCH 32.7 33.2*   MCHC 34.6 34.7   RDW 44.0 45.4   PLATELETCT 1033* 921*   MPV 9.5 9.3     Recent Labs     11/02/21  0548   SODIUM 142   POTASSIUM 4.2   CHLORIDE 106   CO2 28   GLUCOSE 90   BUN 10   CREATININE 0.81   CALCIUM 8.9                   Imaging    Assessment/Plan  * Stroke (cerebrum) (HCC)- (present on admission)  Assessment & Plan  MRI: showed acute infarcts in the right MCA territory as described above predominantly involving the frontal lobe;          scattered foci of acute infarction are also noted in the right parietal lobe;          tiny foci of infarction are also noted in the left frontoparietal region.  S/P TPA (at [place])  S/P thrombectomy but was unsuccessful  On Lipitor  On ASA    Thrombocytosis  Assessment & Plan  Pt reports chronic personal and family history of Hereditary Spherocytosis  He has had a splenectomy  Platelets likely reactive  Note: on ASA and proph LMWH  Monitor    Urinary retention- (present on admission)  Assessment & Plan  On Flomax

## 2021-11-02 NOTE — CARE PLAN
"The patient is Stable - Low risk of patient condition declining or worsening    Shift Goals  Clinical Goals: Safety  Patient Goals: Safety    Problem: Skin Integrity  Goal: Patient's skin integrity will be maintained or improve  Outcome: Progressing  Note:   Darius Score: 18    Patient's skin remains intact and free from new or accidental injury this shift; no s/s of infection. RN wound protocol checked.  Encouraged hydration and educated about the importance of nutrition to keep skin integrity. Will continue to monitor.       Problem: Fall Risk - Rehab  Goal: Patient will remain free from falls  Outcome: Progressing  Note: Bita Barron Fall risk Assessment Score: 17    High fall risk Interventions   - Bed and strip alarm   - Yellow sign by the door   - Yellow wrist band \"Fall risk\"  - Room near to the nurse station  - Do not leave patient unattended in the bathroom  - Fall risk education provided       "

## 2021-11-02 NOTE — THERAPY
Physical Therapy   Daily Treatment     Patient Name: Fermín Gomez  Age:  66 y.o., Sex:  male  Medical Record #: [id number]  Today's Date: 11/2/2021     Precautions  Precautions: Fall Risk  Comments: Left hemiparesis, left neglect, ZIO patch with  in wheelchair pouch 10 FT limit    Subjective    Pt found in room with dtr, ready for therapy.       Objective       11/02/21 1431   Vitals   Pulse 74   Pulse Oximetry 94 %   O2 (LPM) 0   Vitals Comments standing during walking break   Neuro-Muscular Treatments   Neuro-Muscular Treatments Verbal Cuing;Other (See Comments)   Comments BWSTT x 5min x2, total 827ft, .8-1MPH modA for L LE management, max cues for step through pattern, upright posture, upright head position.  2nd 5mins with 1 UE support    Interdisciplinary Plan of Care Collaboration   Patient Position at End of Therapy Seated;Self Releasing Lap Belt Applied;Call Light within Reach;Tray Table within Reach;Phone within Reach   PT Total Time Spent   PT Individual Total Time Spent (Mins) 30   PT Charge Group   PT Neuromuscular Re-Education / Balance 2     Pt amb room<>gym x 150' x2 SBA min cues for L side attention.    Assessment    Pt demo improving endurance, limited L LE coordination when on treadmill vs without visualization of L LE impaired coordination.    Strengths: Pleasant and cooperative, Willingly participates in therapeutic activities, Manages pain appropriately, Able to follow instructions  Barriers: Fatigue, Hemiparesis, Impaired balance    Plan    Continue  MWF, gait training with bilateral hiking poles, neuromuscular reeducation for dynamic standing weight shift, consistent dorsiflexion and heel strike, left lower extremity control, activation of the left pelvis during gait, left attention     Passport items to be completed:  Get in/out of bed safely, in/out of a vehicle, safely use mobility device, walk or wheel around home/community, navigate up and down stairs, show how to get up/down  from the ground, ensure home is accessible, demonstrate HEP, complete caregiver training    Physical Therapy Problems (Active)     Problem: PT-Long Term Goals     Dates: Start: 10/20/21       Goal: LTG-By discharge, patient will ambulate     Dates: Start: 10/20/21       Goal Note filed on 10/20/21 1514 by JEANIE Kam     1) Individualized goal:   Gait FWW/ FT, supervision at discharge  2) Interventions:  PT Gait Training, PT Therapeutic Exercises, PT Neuro Re-Ed/Balance, and PT Therapeutic Activity            Goal: LTG-By discharge, patient will transfer one surface to another     Dates: Start: 10/20/21       Goal Note filed on 10/20/21 1514 by JEANIE Kam     1) Individualized goal:   Transfer 1 surface to another fww/spc supervision at discharge  2) Interventions:  PT Gait Training, PT Therapeutic Exercises, PT Neuro Re-Ed/Balance, and PT Therapeutic Activity            Goal: LTG-By discharge, patient will ambulate up/down 4-6 stairs     Dates: Start: 10/20/21       Goal Note filed on 10/20/21 1514 by JEANIE Kam     1) Individualized goal:   Up/down 4-6 stairs handrails, supervision at discharge  2) Interventions:  PT Gait Training, PT Therapeutic Exercises, PT Neuro Re-Ed/Balance, and PT Therapeutic Activity            Goal: LTG-By discharge, patient will transfer in/out of a car     Dates: Start: 10/20/21       Goal Note filed on 10/20/21 1514 by JEANIE Kam     1) Individualized goal:   Car transfer fww/spc supervision at discharge  2) Interventions:  PT Gait Training, PT Therapeutic Exercises, PT Neuro Re-Ed/Balance, and PT Therapeutic Activity

## 2021-11-02 NOTE — PROGRESS NOTES
"Rehab Progress Note     Chief Complaint: Follow-up stroke    Interval Events (Subjective)  Patient seen and examined patient seen and examined at bedside, daughter visiting at bedside. Patient reports that he feels well today, just returned from walking around outside with therapy with use of trekking poles. Reports he slept well, had a BM this morning and had had no difficulty urinating. Patient reports that the he is feeling stronger daily. Denies CP, SOB, abdo pain, or worsening weakness, numbness or tingling.     Objective:  VITAL SIGNS: /56   Pulse 68   Temp 36.5 °C (97.7 °F) (Temporal)   Resp 18   Ht 1.558 m (5' 1.32\")   Wt 83 kg (182 lb 15.7 oz)   SpO2 91%   BMI 34.21 kg/m²     Physical Exam:  Vitals: /56   Pulse 68   Temp 36.5 °C (97.7 °F) (Temporal)   Resp 18   Ht 1.558 m (5' 1.32\")   Wt 83 kg (182 lb 15.7 oz)   SpO2 91%   Gen: NAD, seated comfortably in MWC, daughter at side   Head: NC/AT  Eyes/ Nose/ Mouth: moist mucous membranes   Cardio: RRR, good distal perfusion, warm extremities  Pulm: normal respiratory effort, no cyanosis   Abd: Soft NTND, negative borborygmi   Ext: No peripheral edema. No calf tenderness. No clubbing.  MSK: RUE: 4/5  strength, EE 3/5 EF, SAB     Recent Results (from the past 72 hour(s))   CBC WITHOUT DIFFERENTIAL    Collection Time: 11/01/21  5:48 AM   Result Value Ref Range    WBC 7.9 4.8 - 10.8 K/uL    RBC 4.96 4.70 - 6.10 M/uL    Hemoglobin 16.2 14.0 - 18.0 g/dL    Hematocrit 46.8 42.0 - 52.0 %    MCV 94.4 81.4 - 97.8 fL    MCH 32.7 27.0 - 33.0 pg    MCHC 34.6 33.7 - 35.3 g/dL    RDW 44.0 35.9 - 50.0 fL    Platelet Count 1033 (HH) 164 - 446 K/uL    MPV 9.5 9.0 - 12.9 fL   CBC WITHOUT DIFFERENTIAL    Collection Time: 11/02/21  5:48 AM   Result Value Ref Range    WBC 6.6 4.8 - 10.8 K/uL    RBC 4.43 (L) 4.70 - 6.10 M/uL    Hemoglobin 14.7 14.0 - 18.0 g/dL    Hematocrit 42.4 42.0 - 52.0 %    MCV 95.7 81.4 - 97.8 fL    MCH 33.2 (H) 27.0 - 33.0 pg    MCHC " 34.7 33.7 - 35.3 g/dL    RDW 45.4 35.9 - 50.0 fL    Platelet Count 921 (H) 164 - 446 K/uL    MPV 9.3 9.0 - 12.9 fL   Basic Metabolic Panel    Collection Time: 11/02/21  5:48 AM   Result Value Ref Range    Sodium 142 135 - 145 mmol/L    Potassium 4.2 3.6 - 5.5 mmol/L    Chloride 106 96 - 112 mmol/L    Co2 28 20 - 33 mmol/L    Glucose 90 65 - 99 mg/dL    Bun 10 8 - 22 mg/dL    Creatinine 0.81 0.50 - 1.40 mg/dL    Calcium 8.9 8.5 - 10.5 mg/dL    Anion Gap 8.0 7.0 - 16.0   ESTIMATED GFR    Collection Time: 11/02/21  5:48 AM   Result Value Ref Range    GFR If African American >60 >60 mL/min/1.73 m 2    GFR If Non African American >60 >60 mL/min/1.73 m 2       Current Facility-Administered Medications   Medication Frequency   • senna-docusate (PERICOLACE or SENOKOT S) 8.6-50 MG per tablet 2 Tablet BID PRN    And   • polyethylene glycol/lytes (MIRALAX) PACKET 1 Packet QDAY PRN    And   • magnesium hydroxide (MILK OF MAGNESIA) suspension 30 mL QDAY PRN    And   • bisacodyl (DULCOLAX) suppository 10 mg QDAY PRN   • melatonin tablet 3 mg QHS   • traZODone (DESYREL) tablet 50 mg QHS   • omeprazole (PRILOSEC) capsule 20 mg QAM AC   • tamsulosin (FLOMAX) capsule 0.4 mg Q EVENING   • enoxaparin (LOVENOX) inj 40 mg DAILY   • hydrOXYzine HCl (ATARAX) tablet 50 mg Q6HRS PRN   • QUEtiapine (Seroquel) tablet 25 mg TID PRN   • simethicone (MYLICON) chewable tab 80 mg TID PRN   • Respiratory Therapy Consult Continuous RT   • Pharmacy Consult Request ...Pain Management Review 1 Each PHARMACY TO DOSE   • hydrALAZINE (APRESOLINE) tablet 10 mg Q8HRS PRN   • acetaminophen (Tylenol) tablet 650 mg Q4HRS PRN   • sodium phosphate (Fleet) enema 133 mL QDAY PRN   • artificial tears ophthalmic solution 1 Drop PRN   • benzocaine-menthol (CEPACOL) lozenge 1 Lozenge Q2HRS PRN   • mag hydrox-al hydrox-simeth (MAALOX PLUS ES or MYLANTA DS) suspension 20 mL Q2HRS PRN   • ondansetron (ZOFRAN ODT) dispertab 4 mg 4X/DAY PRN    Or   • ondansetron (ZOFRAN)  syringe/vial injection 4 mg 4X/DAY PRN   • sodium chloride (OCEAN) 0.65 % nasal spray 2 Spray PRN   • atorvastatin (LIPITOR) tablet 80 mg Q EVENING   • aspirin (ASA) chewable tab 81 mg DAILY       Orders Placed This Encounter   Procedures   • Diet Order Diet: Regular (meds whole 1:1 with thins)     Standing Status:   Standing     Number of Occurrences:   1     Order Specific Question:   Diet:     Answer:   Regular [1]     Comments:   meds whole 1:1 with thins   IDT Team Meeting 11/1/2021    IJanet D.O., was present and led the interdisciplinary team conference on 11/1/2021.  I led the IDT conference and agree with the IDT conference documentation and plan of care as noted below.     Barriers:   - monitoring PLT levels   - balance reactions  - left side inattention   -grab bars installation      DC/Disposition:  To home on 11/8 with assistance from family and  services.       Assessment:  Active Hospital Problems    Diagnosis    • *Stroke (cerebrum) (HCC)    • Edema of left lower extremity    • Urinary retention    • Leukocytosis    • Impaired mobility and ADLs    • Dysphagia    • Left hemiparesis (HCC)    • Subdural hematoma (HCC)    • H/O splenectomy        Medical Decision Making and Plan:  Right MCA stroke  S/p tPA  S/p thrombectomy  Left hemiparesis, improved  Dysphagia, improved  Continue full rehab program  PT/OT/SLP, 1 hr each discipline, 5 days per week  Aspirin, Statin  Imaging  Reviewed with patient and wife  Ziopatch cardiac monitor placed 10/25 - this was a XT, patient needs a AT - with real time recording and gateway device, to be delivered this afternoon  Outpatient follow up with Dr. Cifuentes, stroke bridge clinic, referrals made  Outpatient follow up with cardiology, referral made     Subdural hematoma, small  Likely due to fall     Oral thrush, resolved  s/p Nystatin     Thrombocytosis  Stress reaction  Monitor with weekly labs  Plt with slight continued uptrend, will discuss with  Hospitalist.   11/2: Plt improved from 1033-> 921      Leukocytosis, resolved   Checked UA, negative, then positive on 10/22  Culture with pansensitive E Coli  Checked CXR - negative x 2  Appreciate hospitalist assistance  WBC 7.9 as of 11/1      Suspected aspiration pneumonia  Continue antibiotics, improved WBC, afebrile   Appreciate hospitalist assistance     Hyperglycemia  Checked HgbA1c - 4.6  Likely stress response     GERD  Omeprazole     Insomnia, improved  Schedule melatonin and trazodone     Bowel program  PRN bowel medications  Last BM 10/26     Bladder program  Urinary retention  Started Hytrin 2 mg  Check PVRs - 212 (10/28)  No longer retaining  Bladder scan for no voids  ICP for over 400 cc - last required 10/20, 10/25  Scheduled toileting     DVT prophylaxis  Started Lovenox 10/20, patient 8 days out from small SDH  - will stop lovenox, patient is ambulating > 400 feet in therapy     Total time:  26 minutes.  I spent greater than 50% of the time for patient care and coordination on this date, including unit/floor time, and face-to-face time with the patient as per assessment and plan above.    Janet Herrera D.O.  Physical Medicine and Rehabilitation

## 2021-11-02 NOTE — THERAPY
Physical Therapy   Daily Treatment     Patient Name: Fermín Gomez  Age:  66 y.o., Sex:  male  Medical Record #: [id number]  Today's Date: 11/2/2021     Precautions  Precautions: Fall Risk  Comments: Left hemiparesis, left neglect, ZIO patch with  in wheelchair pouch 10 FT limit    Subjective    Pt found in gym with dtr, denies new pain or issues.  Ready for therapy.     Objective       11/02/21 1031   Gait Functional Level of Assist    Gait Level Of Assist Contact Guard Assist   Assistive Device Other (Comments)  (R trekking pole)   Distance (Feet)   (1490)   # of Times Distance was Traveled   (1 seated rest break, 1 standing rest break)   Deviation Decreased Base Of Support;Bradykinetic;Other (Comment)  (decreased L side attention, decr balance reactions)   Neuro-Muscular Treatments   Neuro-Muscular Treatments Tactile Cuing;Verbal Cuing;Weight Shift Right;Weight Shift Left;Anterior weight shift   Comments Quadraped <> tall kneeling focus on ant weight shifting, UE coordination x 10 with tc's for decr hip flex to substitute for HS/glut ecc activation.  Crawling fwd/bck x10', x20' CGA for safety, RLE drifting to midline, decr L UE/LE wbing.  Quadraped: tc's & min facilitation for decreased scpular uprward rotation, vc's for scap depression/retraction.  Unable to follow commands or tc's.  Seated trunk balance on grey physio ball, CGA for STS next to mat table.  Dane for stability and vc's to find midline , demo L lateral weight shift.  ant/post weight shifting x 10, lateral weight shifting x 5 ea, hip circles clockwise/counter clockwise x5 ea direction.  Consistent cues for self awareness to correct and position to midline.  Edu pt prior to physio ball purpose of proximal stability due to pt's decreased balance reactions, L side attention and coordination.    Interdisciplinary Plan of Care Collaboration   IDT Collaboration with  Family / Caregiver   Patient Position at End of Therapy Seated;Family / Friend in  Room;Call Light within Reach;Tray Table within Reach   Collaboration Comments re: pt's dtr Brown present and engaged in session   PT Total Time Spent   PT Individual Total Time Spent (Mins) 60   PT Charge Group   PT Gait Training 3   PT Neuromuscular Re-Education / Balance 1     Gait training following Neuro re-ed on mat and physio ball, vc's for inc L side attention, upright posture & balance reactions.   Gait training outdoors over uneven surfaces: uneven sidewalks, ramps, cobblestone, small wooden bridge CGA/SBA.  Dual tasking: hz head turns, talking while walking.  Requires mod/max cues for L LE dissociation/ heel strike and control.     Assessment  Pt limited balance reactions on physioball, required cues to self correct and Thelma to maintain stability on ball. Demo difficulty dissociating lower trunk from upper trunk and L lateral midline shift.  Required inc rest breaks and vc's for postural awareness/self monitoring while dual tasking.    Strengths: Pleasant and cooperative, Willingly participates in therapeutic activities, Manages pain appropriately, Able to follow instructions  Barriers: Fatigue, Hemiparesis, Impaired balance    Plan    Continue  MWF, gait training with bilateral hiking poles, neuromuscular reeducation for dynamic standing weight shift, consistent dorsiflexion and heel strike, left lower extremity control, activation of the left pelvis during gait, left attention     Passport items to be completed:  Get in/out of bed safely, in/out of a vehicle, safely use mobility device, walk or wheel around home/community, navigate up and down stairs, show how to get up/down from the ground, ensure home is accessible, demonstrate HEP, complete caregiver training    Physical Therapy Problems (Active)     Problem: PT-Long Term Goals     Dates: Start: 10/20/21       Goal: LTG-By discharge, patient will ambulate     Dates: Start: 10/20/21       Goal Note filed on 10/20/21 [id number] by JEANIE Kam      1) Individualized goal:   Gait FWW/ FT, supervision at discharge  2) Interventions:  PT Gait Training, PT Therapeutic Exercises, PT Neuro Re-Ed/Balance, and PT Therapeutic Activity            Goal: LTG-By discharge, patient will transfer one surface to another     Dates: Start: 10/20/21       Goal Note filed on 10/20/21 1514 by JEANIE Kam     1) Individualized goal:   Transfer 1 surface to another fww/spc supervision at discharge  2) Interventions:  PT Gait Training, PT Therapeutic Exercises, PT Neuro Re-Ed/Balance, and PT Therapeutic Activity            Goal: LTG-By discharge, patient will ambulate up/down 4-6 stairs     Dates: Start: 10/20/21       Goal Note filed on 10/20/21 1514 by JEANIE Kam     1) Individualized goal:   Up/down 4-6 stairs handrails, supervision at discharge  2) Interventions:  PT Gait Training, PT Therapeutic Exercises, PT Neuro Re-Ed/Balance, and PT Therapeutic Activity            Goal: LTG-By discharge, patient will transfer in/out of a car     Dates: Start: 10/20/21       Goal Note filed on 10/20/21 1514 by JEANIE Kam     1) Individualized goal:   Car transfer fww/spc supervision at discharge  2) Interventions:  PT Gait Training, PT Therapeutic Exercises, PT Neuro Re-Ed/Balance, and PT Therapeutic Activity

## 2021-11-02 NOTE — THERAPY
Occupational Therapy  Daily Treatment     Patient Name: Fermín Gomez  Age:  66 y.o., Sex:  male  Medical Record #: [id number]  Today's Date: 11/2/2021     Precautions  Precautions: (P) Fall Risk  Comments: (P) Left hemiparesis, left neglect, ZIO patch with  in wheelchair pouch 10 FT limit    Safety   ADL Safety : (P) Requires Supervision for Safety, Requires Cueing for Safety  Bathroom Safety: (P) Requires Supervision for Safety, Requires Cuing for Safety  Comments: (P) see below functional levels for ADL performance details.    Subjective    Pt up in w/c, agreeable to OT session.    Pt seen for OT from 8:30-9:30 and 10-10:30.     Objective       11/02/21 0831   Precautions   Precautions Fall Risk   Comments Left hemiparesis, left neglect, ZIO patch with  in wheelchair pouch 10 FT limit   Safety    ADL Safety  Requires Supervision for Safety;Requires Cueing for Safety   Bathroom Safety Requires Supervision for Safety;Requires Cuing for Safety   Comments see below functional levels for ADL performance details.   Cognition    Level of Consciousness Alert   Functional Level of Assist   Grooming Modified Independent;Seated   Grooming Description Increased time  (oral care and hand hygiene)   Toileting Stand by Assist   Toileting Description Grab bar;Increased time;Supervision for safety  (standing at toilet to urinate)   Toilet Transfers Stand by Assist   Toilet Transfer Description Grab bar;Increased time;Supervision for safety;Verbal cueing  (standing at toilet via GB)   Neuro-Muscular Treatments   Neuro-Muscular Treatments Biofeedback;Electrical Stimulation;Sequencing;Tactile Cuing;Verbal Cuing   Comments see note for tx details.   Bed Mobility    Supine to Sit Supervised   Sit to Supine Supervised   Scooting Supervised   Rolling Supervised   Interdisciplinary Plan of Care Collaboration   IDT Collaboration with  Family / Caregiver   Patient Position at End of Therapy Seated;Chair Alarm On;Self  Releasing Lap Belt Applied;Family / Friend in Room   Collaboration Comments pt's daughter present    OT Total Time Spent   OT Individual Total Time Spent (Mins) 90   OT Charge Group   OT Electrical Stimulation Attended 1   OT Self Care / ADL 2   OT Neuromuscular Re-education / Balance 3     RT-300 FES for LUE neuro re-ed, ROM and sensory input.  Electrodes applied to L scap stabilizers, shoulder, biceps, triceps. Electrodes also added to anterior and posterior deltoid for increased proximal input. LUE placed in arm trough with ace wrap applied at L wrist for increased stability.  Pt tolerated 21 min 42 sec of cycling, including warm up and cool down periods with results as follows: distance travelled: 2.92 miles, energy expended: 0.4 kCal, energy per hour: 1.4 kCal/hr, avg power: 1.7 W, avg stimulation: 6.0 uC, avg asymmetry: Right 1%.     Supine on mat, pt completed exercises for shoulder stabilization using PVC pipe frame (did not require acewrap to maintain L grasp), 3x10 each: chest press and front arm raise.    Assessment    Pt tolerated OT session well, highly motivated and participatory. He con't to make progress with LUE neuromuscular return and LUE proximal strength. Competed RT-300 for part of session d/t limited tech availability and recommend that pt complete tx 3-5x/week. Electrodes also added for anterior and posterior deltoid for increased proximal input.   Strengths: Alert and oriented, Independent prior level of function, Motivated for self care and independence, Pleasant and cooperative, Supportive family, Willingly participates in therapeutic activities  Barriers: Hemiparesis, Decreased endurance, Home accessibility, Impaired activity tolerance, Impaired balance, Impaired functional cognition, Limited mobility, Fatigue (impaired termination and initiation, impaired sequencing)    Plan    LUE neuro re-ed (focus on proximal strengthening, weightbearing, NMES, vibration, Saebo MAS), ADLs, functional  mobility/transfers, balance.    for left UE not to include wrist flexors / extensors-Plan for  as adjunct tx 2 to 3 times weekly  pending Therapy tech availability        Occupational Therapy Goals (Active)     Problem: Functional Transfers     Dates: Start: 11/01/21       Goal: STG-Within one week, patient will transfer to step in shower with supervision using AE/DME as needed.     Dates: Start: 11/01/21             Problem: IADL's     Dates: Start: 11/01/21       Goal: STG-Within one week, patient will access kitchen area with CGA-SBA using LRAD.     Dates: Start: 11/01/21             Problem: OT Long Term Goals     Dates: Start: 10/20/21       Goal: LTG-By discharge, patient will complete basic self care tasks with CGA- mod I and AE as needed     Dates: Start: 10/20/21          Goal: LTG-By discharge, patient will perform bathroom transfers with CGA- mod I and AE as needed     Dates: Start: 10/20/21

## 2021-11-03 PROCEDURE — 97530 THERAPEUTIC ACTIVITIES: CPT | Mod: CO

## 2021-11-03 PROCEDURE — 770010 HCHG ROOM/CARE - REHAB SEMI PRIVAT*

## 2021-11-03 PROCEDURE — 700102 HCHG RX REV CODE 250 W/ 637 OVERRIDE(OP): Performed by: PHYSICAL MEDICINE & REHABILITATION

## 2021-11-03 PROCEDURE — 97112 NEUROMUSCULAR REEDUCATION: CPT

## 2021-11-03 PROCEDURE — 97110 THERAPEUTIC EXERCISES: CPT

## 2021-11-03 PROCEDURE — 97535 SELF CARE MNGMENT TRAINING: CPT

## 2021-11-03 PROCEDURE — 97110 THERAPEUTIC EXERCISES: CPT | Mod: CO

## 2021-11-03 PROCEDURE — 99231 SBSQ HOSP IP/OBS SF/LOW 25: CPT | Performed by: HOSPITALIST

## 2021-11-03 PROCEDURE — 97116 GAIT TRAINING THERAPY: CPT

## 2021-11-03 PROCEDURE — 99232 SBSQ HOSP IP/OBS MODERATE 35: CPT | Performed by: PHYSICAL MEDICINE & REHABILITATION

## 2021-11-03 PROCEDURE — A9270 NON-COVERED ITEM OR SERVICE: HCPCS | Performed by: PHYSICAL MEDICINE & REHABILITATION

## 2021-11-03 PROCEDURE — 97530 THERAPEUTIC ACTIVITIES: CPT

## 2021-11-03 RX ADMIN — ASPIRIN 81 MG CHEWABLE TABLET 81 MG: 81 TABLET CHEWABLE at 08:33

## 2021-11-03 RX ADMIN — MELATONIN TAB 3 MG 3 MG: 3 TAB at 22:01

## 2021-11-03 RX ADMIN — ATORVASTATIN CALCIUM 80 MG: 40 TABLET, FILM COATED ORAL at 22:00

## 2021-11-03 RX ADMIN — TAMSULOSIN HYDROCHLORIDE 0.4 MG: 0.4 CAPSULE ORAL at 22:02

## 2021-11-03 RX ADMIN — OMEPRAZOLE 20 MG: 20 CAPSULE, DELAYED RELEASE ORAL at 08:33

## 2021-11-03 ASSESSMENT — ENCOUNTER SYMPTOMS
VOMITING: 0
FEVER: 0
NAUSEA: 0
BLURRED VISION: 0
HEADACHES: 0
PALPITATIONS: 0
HALLUCINATIONS: 0
SHORTNESS OF BREATH: 0
DIZZINESS: 0

## 2021-11-03 ASSESSMENT — GAIT ASSESSMENTS
DEVIATION: BRADYKINETIC;DECREASED HEEL STRIKE;OTHER (COMMENT)
GAIT LEVEL OF ASSIST: CONTACT GUARD ASSIST
DEVIATION: BRADYKINETIC;DECREASED HEEL STRIKE;OTHER (COMMENT)
ASSISTIVE DEVICE: OTHER (COMMENTS)
DISTANCE (FEET): 150
GAIT LEVEL OF ASSIST: CONTACT GUARD ASSIST

## 2021-11-03 ASSESSMENT — ACTIVITIES OF DAILY LIVING (ADL)
TOILETING_LEVEL_OF_ASSIST_DESCRIPTION: VERBAL CUEING
TOILET_TRANSFER_DESCRIPTION: GRAB BAR;SUPERVISION FOR SAFETY;VERBAL CUEING
BED_CHAIR_WHEELCHAIR_TRANSFER_DESCRIPTION: INCREASED TIME;SET-UP OF EQUIPMENT;SUPERVISION FOR SAFETY

## 2021-11-03 NOTE — THERAPY
Physical Therapy   Daily Treatment     Patient Name: Fermín Gomez  Age:  66 y.o., Sex:  male  Medical Record #: [id number]  Today's Date: 11/3/2021     Precautions  Precautions: Fall Risk  Comments: Left hemiparesis, left neglect, ZIO patch with  in wheelchair pouch 10 FT limit    Subjective    Patient received sitting in w/c at bedside with SO and RN in room; agreeable to PT.     Objective       11/03/21 1501   Vitals   O2 (LPM) 0   O2 Delivery Device None - Room Air   Gait Functional Level of Assist    Gait Level Of Assist Contact Guard Assist  (SBA-CGA)   Assistive Device   (Gait belt with one trekking pole)   Distance (Feet)   (1100 feet and 250 feet; outdoors)   # of Times Distance was Traveled 1  (no LOB noted)   Deviation Bradykinetic;Decreased Heel Strike;Other (Comment)  (cueing prn for L sided attention and environmental clearance)   Wheelchair Functional Level of Assist   Wheelchair Assist   (n/a)   Distance Wheelchair (Feet or Distance)   (n/a)   Wheelchair Description   (n/a)   Transfer Functional Level of Assist   Bed, Chair, Wheelchair Transfer Contact Guard Assist   Bed Chair Wheelchair Transfer Description Increased time;Set-up of equipment;Supervision for safety   Bed Mobility    Sit to Supine Supervised   Sit to Stand Contact Guard Assist   Scooting Supervised   Rolling Supervised   Interdisciplinary Plan of Care Collaboration   IDT Collaboration with  Family / Caregiver   Patient Position at End of Therapy In Bed;Call Light within Reach;Tray Table within Reach;Family / Friend in Room   Collaboration Comments Pt's SO present throughout; receptive to verbal education on CLOF, progress, and POC.   PT Total Time Spent   PT Individual Total Time Spent (Mins) 30   PT Charge Group   PT Gait Training 2       Assessment    Patient has consistent endurance and no LOB this session; good use of trekking pole; limited L heel first contact about 70-80% of ambulation; good motivation and social  support.  Strengths: Pleasant and cooperative, Willingly participates in therapeutic activities, Manages pain appropriately, Able to follow instructions  Barriers: Fatigue, Hemiparesis, Impaired balance    Plan    Lite gait: focus on L LE clearance/control, Continue  MWF, gait training  W/ R trekking pole vs FWW,Neuro re-ed: coordination/balance reactions: quadraped/tall kneeling/knee walking/crawling.    Passport items to be completed:  Passport items to be completed:  Get in/out of bed safely, in/out of a vehicle, safely use mobility device, walk or wheel around home/community, navigate up and down stairs, show how to get up/down from the ground, ensure home is accessible, demonstrate HEP, complete caregiver training    Physical Therapy Problems (Active)     Problem: PT-Long Term Goals     Dates: Start: 10/20/21       Goal: LTG-By discharge, patient will ambulate     Dates: Start: 10/20/21       Goal Note filed on 10/20/21 1514 by JEANIE Kam     1) Individualized goal:   Gait FWW/ FT, supervision at discharge  2) Interventions:  PT Gait Training, PT Therapeutic Exercises, PT Neuro Re-Ed/Balance, and PT Therapeutic Activity            Goal: LTG-By discharge, patient will transfer one surface to another     Dates: Start: 10/20/21       Goal Note filed on 10/20/21 1514 by JEANIE Kam     1) Individualized goal:   Transfer 1 surface to another fww/spc supervision at discharge  2) Interventions:  PT Gait Training, PT Therapeutic Exercises, PT Neuro Re-Ed/Balance, and PT Therapeutic Activity            Goal: LTG-By discharge, patient will ambulate up/down 4-6 stairs     Dates: Start: 10/20/21       Goal Note filed on 10/20/21 1514 by JEANIE Kam     1) Individualized goal:   Up/down 4-6 stairs handrails, supervision at discharge  2) Interventions:  PT Gait Training, PT Therapeutic Exercises, PT Neuro Re-Ed/Balance, and PT Therapeutic Activity            Goal: LTG-By discharge,  patient will transfer in/out of a car     Dates: Start: 10/20/21       Goal Note filed on 10/20/21 1514 by JEANIE Kam     1) Individualized goal:   Car transfer fww/spc supervision at discharge  2) Interventions:  PT Gait Training, PT Therapeutic Exercises, PT Neuro Re-Ed/Balance, and PT Therapeutic Activity

## 2021-11-03 NOTE — CARE PLAN
"  Problem: Knowledge Deficit - Standard  Goal: Patient and family/care givers will demonstrate understanding of plan of care, disease process/condition, diagnostic tests and medications  Outcome: Progressing   Patient verbalized understanding plan of care.        Problem: Fall Risk - Rehab  Goal: Patient will remain free from falls  Outcome: Progressing   Bita Barron Fall risk Assessment Score: 17    High fall risk Interventions   - Bed and strip alarm   - Yellow sign by the door   - Yellow wrist band \"Fall risk\"  - Room near to the nurse station  - Do not leave patient unattended in the bathroom  - Fall risk education provided                "

## 2021-11-03 NOTE — THERAPY
"Occupational Therapy  Daily Treatment     Patient Name: Fermín Gomez  Age:  66 y.o., Sex:  male  Medical Record #: [id number]  Today's Date: 11/3/2021     Precautions  Precautions: (P) Fall Risk  Comments: (P) Left hemiparesis, left neglect, ZIO patch with  in wheelchair pouch 10 FT limit    Safety   ADL Safety : Requires Supervision for Safety, Requires Cueing for Safety  Bathroom Safety: Requires Supervision for Safety, Requires Cuing for Safety  Comments: see below functional levels for ADL performance details.    Subjective    \" Thank you.\" stated at end of session        Objective       11/03/21 1231   Precautions   Precautions Fall Risk   Comments Left hemiparesis, left neglect, ZIO patch with  in wheelchair pouch 10 FT limit   Interdisciplinary Plan of Care Collaboration   IDT Collaboration with  Family / Caregiver   Patient Position at End of Therapy Seated;Family / Friend in Room  (participating in leisure activity with spouse)   Collaboration Comments  Aye present and observing through out tx session    OT Total Time Spent   OT Individual Total Time Spent (Mins) 60   OT Charge Group   OT Therapy Activity 2   OT Therapeutic Exercise  2        Supine on mat  Worked on left shoulder abduction with therapist applied resistance followed by  Left shoulder flexion with out compensatory abduction.   unweighted  X 3 reps x 5  Min/ mod verbal cues to attend to the left , not abduct  And  Min tactile input to no abduct at the shoulder .    Performed with 1lb weight x 3 reps x 5    Again required   Min/ mod verbal cues to attend to the left, not abduct   And  Min tactile input to no abduct at the shoulder      In sitting worked on shoulder flexion pushing   Bean bags up graded incline   X   5 reps  X 3   ,  3 reps  X 2  Pushing beanbags  Bilaterally  (unable to maintain symmetry  With this task)  And max difficulty not abducting right shoulder.      Bilateral hand task with PVC pipe pull apart and put " back together    Reaching for pipes with left UE in various locations on left side .        Assessment     participates to the best of his ability with tasks provided.   Making slow gains with improving left UE functional strength and /AROM     Strengths: Alert and oriented, Independent prior level of function, Motivated for self care and independence, Pleasant and cooperative, Supportive family, Willingly participates in therapeutic activities  Barriers: Hemiparesis, Decreased endurance, Home accessibility, Impaired activity tolerance, Impaired balance, Impaired functional cognition, Limited mobility, Fatigue (impaired termination and initiation, impaired sequencing)    Plan    LUE neuro re-ed (focus on proximal strengthening, weightbearing, NMES, vibration, Saebo MAS), ADLs, functional mobility/transfers, balance.    for left UE not to include wrist flexors / extensors-Plan for  as adjunct tx 2 to 3 times weekly  pending Therapy tech availability        Occupational Therapy Goals (Active)     Problem: Functional Transfers     Dates: Start: 11/01/21       Goal: STG-Within one week, patient will transfer to step in shower with supervision using AE/DME as needed.     Dates: Start: 11/01/21             Problem: IADL's     Dates: Start: 11/01/21       Goal: STG-Within one week, patient will access kitchen area with CGA-SBA using LRAD.     Dates: Start: 11/01/21             Problem: OT Long Term Goals     Dates: Start: 10/20/21       Goal: LTG-By discharge, patient will complete basic self care tasks with CGA- mod I and AE as needed     Dates: Start: 10/20/21          Goal: LTG-By discharge, patient will perform bathroom transfers with CGA- mod I and AE as needed     Dates: Start: 10/20/21

## 2021-11-03 NOTE — PROGRESS NOTES
[facility] Medicine Daily Progress Note    Date of Service  11/3/2021    Chief Complaint:  LLE edema  Thrombocytosis    Interval History:  No complaints.  Doing ok.    Review of Systems  Review of Systems   Constitutional: Negative for fever.   Eyes: Negative for blurred vision.   Respiratory: Negative for shortness of breath.    Cardiovascular: Negative for palpitations.   Gastrointestinal: Negative for nausea and vomiting.   Neurological: Negative for dizziness and headaches.   Psychiatric/Behavioral: Negative for hallucinations.        Physical Exam  Temp:  [36.4 °C (97.6 °F)-36.5 °C (97.7 °F)] 36.4 °C (97.6 °F)  Pulse:  [56-74] 56  Resp:  [16-18] 18  BP: (107-128)/(51-70) 114/51  SpO2:  [91 %-94 %] 91 %    Physical Exam  Vitals and nursing note reviewed.   Constitutional:       General: He is not in acute distress.  HENT:      Mouth/Throat:      Mouth: Mucous membranes are moist.      Pharynx: Oropharynx is clear.   Eyes:      General: No scleral icterus.  Cardiovascular:      Rate and Rhythm: Normal rate and regular rhythm.      Heart sounds: No murmur heard.     Pulmonary:      Effort: Pulmonary effort is normal.      Breath sounds: Normal breath sounds. No stridor.   Abdominal:      General: There is no distension.      Palpations: Abdomen is soft.      Tenderness: There is no abdominal tenderness.   Musculoskeletal:      Cervical back: No rigidity.      Right lower leg: No edema.      Left lower leg: Edema present.   Skin:     General: Skin is warm and dry.      Findings: No rash.   Neurological:      Mental Status: He is alert and oriented to person, place, and time.   Psychiatric:         Mood and Affect: Mood normal.         Behavior: Behavior normal.         Fluids    Intake/Output Summary (Last 24 hours) at 11/3/2021 0947  Last data filed at 11/3/2021 0830  Gross per 24 hour   Intake 360 ml   Output --   Net 360 ml       Laboratory  Recent Labs     11/01/21  0548 11/02/21  0548   WBC 7.9 6.6   RBC 4.96  4.43*   HEMOGLOBIN 16.2 14.7   HEMATOCRIT 46.8 42.4   MCV 94.4 95.7   MCH 32.7 33.2*   MCHC 34.6 34.7   RDW 44.0 45.4   PLATELETCT 1033* 921*   MPV 9.5 9.3     Recent Labs     11/02/21  0548   SODIUM 142   POTASSIUM 4.2   CHLORIDE 106   CO2 28   GLUCOSE 90   BUN 10   CREATININE 0.81   CALCIUM 8.9                   Imaging    Assessment/Plan  * Stroke (cerebrum) (HCC)- (present on admission)  Assessment & Plan  MRI: showed acute infarcts in the right MCA territory as described above predominantly involving the frontal lobe;          scattered foci of acute infarction are also noted in the right parietal lobe;          tiny foci of infarction are also noted in the left frontoparietal region.  S/P TPA (at [place])  S/P thrombectomy but was unsuccessful  On Lipitor  On ASA    Thrombocytosis  Assessment & Plan  Pt reports chronic personal and family history of Hereditary Spherocytosis  He has had a splenectomy  Note: on ASA and proph LMWH  Monitor    Urinary retention- (present on admission)  Assessment & Plan  On Flomax

## 2021-11-03 NOTE — THERAPY
Physical Therapy   Daily Treatment     Patient Name: Fermín Gomez  Age:  66 y.o., Sex:  male  Medical Record #: [id number]  Today's Date: 11/3/2021     Precautions  Precautions: Fall Risk  Comments: Left hemiparesis, left neglect, ZIO patch with  in wheelchair pouch 10 FT limit    Subjective    Pt found in room, ready for therapy, denies new issues.     Objective       11/03/21 0901   Gait Functional Level of Assist    Gait Level Of Assist Contact Guard Assist   Assistive Device Other (Comments)  (R trekking pole)   Distance (Feet) 150   # of Times Distance was Traveled 2   Deviation Bradykinetic;Decreased Heel Strike;Other (Comment)  (decreased L side attention, decr balance rctns, down eye gaz)   Neuro-Muscular Treatments   Neuro-Muscular Treatments Tactile Cuing;Verbal Cuing;Electrical Stimulation   Comments  LE cycling for increased L LE strengthening, inc sensory input, and increasing cardiovascular endurance.  Edu on benefits, edu pt on assisting with set up: x 25min active cycling, x10min resistance 1.0, x15min 1.31 resistance, total distance: 3.67miles, energy expended: 1.6kcal, energy per hr: 4.1kcal.hr, av power: 4.7W, av asymmetry: L 2%.  Pt's goal for RPM: 45, motor set to 30RPM   Interdisciplinary Plan of Care Collaboration   IDT Collaboration with  Family / Caregiver   Patient Position at End of Therapy Seated;Family / Friend in Room   Collaboration Comments re: spouse present during session, updated on progress, barriers, SBA/CGA for all mobility, set up family training   PT Total Time Spent   PT Individual Total Time Spent (Mins) 60   PT Charge Group   PT Gait Training 1   PT Therapeutic Exercise 2   PT Therapeutic Activities 1     Gait training pre/post  cycling: focus on L LE dissociation, focus on hip flex with DF for improved L LE clearance, L head turns with maintaining upright posture and midline gait.     Discussed FT and d/c date with spouse at end of session.  Spouse  agreeable to Friday at 10am-11:30 with PT 60/OT 30min.  Reviewed pt's progress, barries, I.e. L inattention, dec balance reactions, LOB with STS on Monday, plan for FT: floor transfers, gait with AD, stairs/curb training for in/out of home, review of HEP: balance/strength/gait    Assessment    Pt demo improving L LE strength & coordination with  cycling symmetry.  Cont to be limited by attention to L side with consistent midfoot strike/decr L LE clearance w/o vc's, which was amplified when on TM yesterday.    Strengths: Pleasant and cooperative, Willingly participates in therapeutic activities, Manages pain appropriately, Able to follow instructions  Barriers: Fatigue, Hemiparesis, Impaired balance    Plan    Lite gait: focus on L LE clearance/control, Continue  MWF, gait training  W/ R trekking pole vs FWW,Neuro re-ed: coordination/balance reactions: quadraped/tall kneeling/knee walking/crawling     Passport items to be completed:  Get in/out of bed safely, in/out of a vehicle, safely use mobility device, walk or wheel around home/community, navigate up and down stairs, show how to get up/down from the ground, ensure home is accessible, demonstrate HEP, complete caregiver training       Physical Therapy Problems (Active)     Problem: PT-Long Term Goals     Dates: Start: 10/20/21       Goal: LTG-By discharge, patient will ambulate     Dates: Start: 10/20/21       Goal Note filed on 10/20/21 1514 by JEANIE Kam     1) Individualized goal:   Gait FWW/ FT, supervision at discharge  2) Interventions:  PT Gait Training, PT Therapeutic Exercises, PT Neuro Re-Ed/Balance, and PT Therapeutic Activity            Goal: LTG-By discharge, patient will transfer one surface to another     Dates: Start: 10/20/21       Goal Note filed on 10/20/21 1514 by JEANIE Kam     1) Individualized goal:   Transfer 1 surface to another fww/spc supervision at discharge  2) Interventions:  PT Gait Training,  PT Therapeutic Exercises, PT Neuro Re-Ed/Balance, and PT Therapeutic Activity            Goal: LTG-By discharge, patient will ambulate up/down 4-6 stairs     Dates: Start: 10/20/21       Goal Note filed on 10/20/21 1514 by JEANIE Kam     1) Individualized goal:   Up/down 4-6 stairs handrails, supervision at discharge  2) Interventions:  PT Gait Training, PT Therapeutic Exercises, PT Neuro Re-Ed/Balance, and PT Therapeutic Activity            Goal: LTG-By discharge, patient will transfer in/out of a car     Dates: Start: 10/20/21       Goal Note filed on 10/20/21 1514 by JEANIE Kam     1) Individualized goal:   Car transfer fww/spc supervision at discharge  2) Interventions:  PT Gait Training, PT Therapeutic Exercises, PT Neuro Re-Ed/Balance, and PT Therapeutic Activity

## 2021-11-03 NOTE — CARE PLAN
"With ZIO  Patch on his left upper chest .    Problem: Bladder / Voiding  Goal: Patient will establish and maintain regular urinary output  Outcome: Progressing  Note: Pt assisted OOB to bathroom . Voiding freely.     Problem: Fall Risk - Rehab  Goal: Patient will remain free from falls  Outcome: Progressing  Note: Bita Barron Fall risk Assessment Score: 17    High fall risk Interventions   - Alarming seatbelt  - Bed and strip alarm   - Yellow sign by the door   - Yellow wrist band \"Fall risk\"  - Room near to the nurse station  - Do not leave patient unattended in the bathroom  - Fall risk education provided       The patient is Stable - Low risk of patient condition declining or worsening    Shift Goals  Clinical Goals: Safety  Patient Goals: Safety    Progress made toward(s) clinical / shift goals:  Pt safe and free from fall and injury.        "

## 2021-11-03 NOTE — PROGRESS NOTES
"Rehab Progress Note     Chief Complaint: Follow-up stroke    Interval Events (Subjective)  Patient seen and examined at bedside, he is expecting to discharge home on Monday.  He continues to work on balance and strength exercises.  Patient is feeling well today, no new complaints.    Objective:  VITAL SIGNS: /51   Pulse (!) 56   Temp 36.4 °C (97.6 °F) (Temporal)   Resp 18   Ht 1.558 m (5' 1.32\")   Wt 83 kg (182 lb 15.7 oz)   SpO2 91%   BMI 34.21 kg/m²     Physical Exam:  Vitals: /51   Pulse (!) 56   Temp 36.4 °C (97.6 °F) (Temporal)   Resp 18   Ht 1.558 m (5' 1.32\")   Wt 83 kg (182 lb 15.7 oz)   SpO2 91%   Gen: NAD, seated comfortably in MWC, daughter at side   Head: NC/AT  Eyes/ Nose/ Mouth: moist mcous membranes   Cardio: RRR, good distal perfusion, warm extremities  Pulm: normal respiratory effort, no cyanosis   Abd: Soft NTND, negative borborygmi   Ext: No peripheral edema. No calf tenderness. No clubbing.      Recent Results (from the past 72 hour(s))   CBC WITHOUT DIFFERENTIAL    Collection Time: 11/01/21  5:48 AM   Result Value Ref Range    WBC 7.9 4.8 - 10.8 K/uL    RBC 4.96 4.70 - 6.10 M/uL    Hemoglobin 16.2 14.0 - 18.0 g/dL    Hematocrit 46.8 42.0 - 52.0 %    MCV 94.4 81.4 - 97.8 fL    MCH 32.7 27.0 - 33.0 pg    MCHC 34.6 33.7 - 35.3 g/dL    RDW 44.0 35.9 - 50.0 fL    Platelet Count 1033 (HH) 164 - 446 K/uL    MPV 9.5 9.0 - 12.9 fL   CBC WITHOUT DIFFERENTIAL    Collection Time: 11/02/21  5:48 AM   Result Value Ref Range    WBC 6.6 4.8 - 10.8 K/uL    RBC 4.43 (L) 4.70 - 6.10 M/uL    Hemoglobin 14.7 14.0 - 18.0 g/dL    Hematocrit 42.4 42.0 - 52.0 %    MCV 95.7 81.4 - 97.8 fL    MCH 33.2 (H) 27.0 - 33.0 pg    MCHC 34.7 33.7 - 35.3 g/dL    RDW 45.4 35.9 - 50.0 fL    Platelet Count 921 (H) 164 - 446 K/uL    MPV 9.3 9.0 - 12.9 fL   Basic Metabolic Panel    Collection Time: 11/02/21  5:48 AM   Result Value Ref Range    Sodium 142 135 - 145 mmol/L    Potassium 4.2 3.6 - 5.5 mmol/L    " Chloride 106 96 - 112 mmol/L    Co2 28 20 - 33 mmol/L    Glucose 90 65 - 99 mg/dL    Bun 10 8 - 22 mg/dL    Creatinine 0.81 0.50 - 1.40 mg/dL    Calcium 8.9 8.5 - 10.5 mg/dL    Anion Gap 8.0 7.0 - 16.0   ESTIMATED GFR    Collection Time: 11/02/21  5:48 AM   Result Value Ref Range    GFR If African American >60 >60 mL/min/1.73 m 2    GFR If Non African American >60 >60 mL/min/1.73 m 2       Current Facility-Administered Medications   Medication Frequency   • senna-docusate (PERICOLACE or SENOKOT S) 8.6-50 MG per tablet 2 Tablet BID PRN    And   • polyethylene glycol/lytes (MIRALAX) PACKET 1 Packet QDAY PRN    And   • magnesium hydroxide (MILK OF MAGNESIA) suspension 30 mL QDAY PRN    And   • bisacodyl (DULCOLAX) suppository 10 mg QDAY PRN   • melatonin tablet 3 mg QHS   • traZODone (DESYREL) tablet 50 mg QHS   • omeprazole (PRILOSEC) capsule 20 mg QAM AC   • tamsulosin (FLOMAX) capsule 0.4 mg Q EVENING   • hydrOXYzine HCl (ATARAX) tablet 50 mg Q6HRS PRN   • QUEtiapine (Seroquel) tablet 25 mg TID PRN   • simethicone (MYLICON) chewable tab 80 mg TID PRN   • Respiratory Therapy Consult Continuous RT   • Pharmacy Consult Request ...Pain Management Review 1 Each PHARMACY TO DOSE   • hydrALAZINE (APRESOLINE) tablet 10 mg Q8HRS PRN   • acetaminophen (Tylenol) tablet 650 mg Q4HRS PRN   • sodium phosphate (Fleet) enema 133 mL QDAY PRN   • artificial tears ophthalmic solution 1 Drop PRN   • benzocaine-menthol (CEPACOL) lozenge 1 Lozenge Q2HRS PRN   • mag hydrox-al hydrox-simeth (MAALOX PLUS ES or MYLANTA DS) suspension 20 mL Q2HRS PRN   • ondansetron (ZOFRAN ODT) dispertab 4 mg 4X/DAY PRN    Or   • ondansetron (ZOFRAN) syringe/vial injection 4 mg 4X/DAY PRN   • sodium chloride (OCEAN) 0.65 % nasal spray 2 Spray PRN   • atorvastatin (LIPITOR) tablet 80 mg Q EVENING   • aspirin (ASA) chewable tab 81 mg DAILY       Orders Placed This Encounter   Procedures   • Diet Order Diet: Regular (meds whole 1:1 with thins)     Standing  Status:   Standing     Number of Occurrences:   1     Order Specific Question:   Diet:     Answer:   Regular [1]     Comments:   meds whole 1:1 with thins     IDT Team Meeting 11/1/2021    Barriers:   - monitoring PLT levels   - balance reactions  - left side inattention   -grab bars installation      DC/Disposition:  To home on 11/8 with assistance from family and HH services.       Assessment:  Active Hospital Problems    Diagnosis    • *Stroke (cerebrum) (HCC)    • Edema of left lower extremity    • Urinary retention    • Leukocytosis    • Impaired mobility and ADLs    • Dysphagia    • Left hemiparesis (HCC)    • Subdural hematoma (HCC)    • H/O splenectomy        Medical Decision Making and Plan:  Right MCA stroke  S/p tPA  S/p thrombectomy  Left hemiparesis, improved  Dysphagia, improved  Continue full rehab program  PT/OT/SLP, 1 hr each discipline, 5 days per week  Aspirin, Statin  Imaging  Reviewed with patient and wife  Ziopatch cardiac monitor placed 10/25 - this was a XT, patient needs a AT - with real time recording and gateway device, to be delivered this afternoon  Outpatient follow up with Dr. Cifuentes, stroke bridge clinic, referrals made  Outpatient follow up with cardiology, referral made     Subdural hematoma, small  Likely due to fall     Oral thrush, resolved  s/p Nystatin     Thrombocytosis  Stress reaction  Monitor with weekly labs  Plt with slight continued uptrend, will discuss with Hospitalist.   11/2: Plt improved from 1033-> 921      Leukocytosis, resolved   Checked UA, negative, then positive on 10/22  Culture with pansensitive E Coli  Checked CXR - negative x 2  Appreciate hospitalist assistance  WBC 7.9 as of 11/1      Suspected aspiration pneumonia  Continue antibiotics, improved WBC, afebrile   Appreciate hospitalist assistance     Hyperglycemia  Checked HgbA1c - 4.6  Likely stress response     GERD  Omeprazole     Insomnia, improved  Schedule melatonin and trazodone     Bowel  program  PRN bowel medications  Last BM 10/26     Bladder program  Urinary retention  Started Hytrin 2 mg  Check PVRs - 212 (10/28)  No longer retaining  Bladder scan for no voids  ICP for over 400 cc - last required 10/20, 10/25  Scheduled toileting     DVT prophylaxis  Started Lovenox 10/20, patient 8 days out from small SDH  - will stop lovenox, patient is ambulating > 400 feet in therapy     Total time:  28 minutes.  I spent greater than 50% of the time for patient care and coordination on this date, including unit/floor time, and face-to-face time with the patient as per assessment and plan above.    Yosi Cotter, DO   Physical Medicine and Rehabilitation

## 2021-11-04 PROCEDURE — 99232 SBSQ HOSP IP/OBS MODERATE 35: CPT | Performed by: PHYSICAL MEDICINE & REHABILITATION

## 2021-11-04 PROCEDURE — 97112 NEUROMUSCULAR REEDUCATION: CPT

## 2021-11-04 PROCEDURE — 770010 HCHG ROOM/CARE - REHAB SEMI PRIVAT*

## 2021-11-04 PROCEDURE — 99232 SBSQ HOSP IP/OBS MODERATE 35: CPT | Performed by: HOSPITALIST

## 2021-11-04 PROCEDURE — 97110 THERAPEUTIC EXERCISES: CPT

## 2021-11-04 PROCEDURE — 700102 HCHG RX REV CODE 250 W/ 637 OVERRIDE(OP): Performed by: PHYSICAL MEDICINE & REHABILITATION

## 2021-11-04 PROCEDURE — A9270 NON-COVERED ITEM OR SERVICE: HCPCS | Performed by: PHYSICAL MEDICINE & REHABILITATION

## 2021-11-04 PROCEDURE — 97116 GAIT TRAINING THERAPY: CPT

## 2021-11-04 PROCEDURE — 97535 SELF CARE MNGMENT TRAINING: CPT

## 2021-11-04 PROCEDURE — 97530 THERAPEUTIC ACTIVITIES: CPT

## 2021-11-04 RX ORDER — TRAZODONE HYDROCHLORIDE 50 MG/1
50 TABLET ORAL NIGHTLY PRN
Status: DISCONTINUED | OUTPATIENT
Start: 2021-11-04 | End: 2021-11-08 | Stop reason: HOSPADM

## 2021-11-04 RX ADMIN — TAMSULOSIN HYDROCHLORIDE 0.4 MG: 0.4 CAPSULE ORAL at 20:14

## 2021-11-04 RX ADMIN — OMEPRAZOLE 20 MG: 20 CAPSULE, DELAYED RELEASE ORAL at 08:45

## 2021-11-04 RX ADMIN — ASPIRIN 81 MG CHEWABLE TABLET 81 MG: 81 TABLET CHEWABLE at 08:45

## 2021-11-04 RX ADMIN — MELATONIN TAB 3 MG 3 MG: 3 TAB at 20:14

## 2021-11-04 RX ADMIN — ATORVASTATIN CALCIUM 80 MG: 40 TABLET, FILM COATED ORAL at 20:14

## 2021-11-04 ASSESSMENT — ENCOUNTER SYMPTOMS
NERVOUS/ANXIOUS: 0
DIARRHEA: 0
BLURRED VISION: 0
COUGH: 0
FEVER: 0
DIZZINESS: 0

## 2021-11-04 ASSESSMENT — GAIT ASSESSMENTS
GAIT LEVEL OF ASSIST: CONTACT GUARD ASSIST
ASSISTIVE DEVICE: FRONT WHEEL WALKER
GAIT LEVEL OF ASSIST: CONTACT GUARD ASSIST
DISTANCE (FEET): 125
DEVIATION: DECREASED BASE OF SUPPORT;BRADYKINETIC;DECREASED HEEL STRIKE;DECREASED TOE OFF
DEVIATION: DECREASED BASE OF SUPPORT;BRADYKINETIC;DECREASED HEEL STRIKE
ASSISTIVE DEVICE: FRONT WHEEL WALKER
DISTANCE (FEET): 150

## 2021-11-04 ASSESSMENT — ACTIVITIES OF DAILY LIVING (ADL)
TOILETING_LEVEL_OF_ASSIST_DESCRIPTION: GRAB BAR;INCREASED TIME;SUPERVISION FOR SAFETY
BED_CHAIR_WHEELCHAIR_TRANSFER_DESCRIPTION: INCREASED TIME;VERBAL CUEING;SUPERVISION FOR SAFETY
TOILET_TRANSFER_DESCRIPTION: GRAB BAR;INCREASED TIME;SET-UP OF EQUIPMENT;SUPERVISION FOR SAFETY;VERBAL CUEING

## 2021-11-04 NOTE — PROGRESS NOTES
"Rehab Progress Note     Chief Complaint: Follow-up stroke    Interval Events (Subjective)  Patient seen and examined in gym while patient practicing balance tasks. Patient reports he feels well, denies pain discomfort, CP, SOB, or abdominal pain. Patient reports he slept well, reports difficulty with balance tasks. Reports fatigue during balance tasks but no dizziness, lightheadedness, or vertigo type sensations. No reported events over night. Patient is asking to try to sleep without taking trazadone.     Objective:  VITAL SIGNS: /55   Pulse 61   Temp 36.4 °C (97.5 °F) (Temporal)   Resp 17   Ht 1.558 m (5' 1.32\")   Wt 83 kg (182 lb 15.7 oz)   SpO2 90%   BMI 34.21 kg/m²     Gen: NAD, standing in parallel bars, family and therapist at side   Head: NC/AT  Eyes/ Nose/ Mouth: moist mcous membranes, wearing mask in gym   Cardio: RRR, good distal perfusion, warm extremities  Pulm: normal respiratory effort, no cyanosis, no witnessed wheezes or coughing   Abd: Soft NTND, negative borborygmi   Ext: No peripheral edema. No calf tenderness. No clubbing.  Gait: adequate balance in parallel bars, needs CGA on balance board       Recent Results (from the past 72 hour(s))   CBC WITHOUT DIFFERENTIAL    Collection Time: 11/02/21  5:48 AM   Result Value Ref Range    WBC 6.6 4.8 - 10.8 K/uL    RBC 4.43 (L) 4.70 - 6.10 M/uL    Hemoglobin 14.7 14.0 - 18.0 g/dL    Hematocrit 42.4 42.0 - 52.0 %    MCV 95.7 81.4 - 97.8 fL    MCH 33.2 (H) 27.0 - 33.0 pg    MCHC 34.7 33.7 - 35.3 g/dL    RDW 45.4 35.9 - 50.0 fL    Platelet Count 921 (H) 164 - 446 K/uL    MPV 9.3 9.0 - 12.9 fL   Basic Metabolic Panel    Collection Time: 11/02/21  5:48 AM   Result Value Ref Range    Sodium 142 135 - 145 mmol/L    Potassium 4.2 3.6 - 5.5 mmol/L    Chloride 106 96 - 112 mmol/L    Co2 28 20 - 33 mmol/L    Glucose 90 65 - 99 mg/dL    Bun 10 8 - 22 mg/dL    Creatinine 0.81 0.50 - 1.40 mg/dL    Calcium 8.9 8.5 - 10.5 mg/dL    Anion Gap 8.0 7.0 - 16.0 "   ESTIMATED GFR    Collection Time: 11/02/21  5:48 AM   Result Value Ref Range    GFR If African American >60 >60 mL/min/1.73 m 2    GFR If Non African American >60 >60 mL/min/1.73 m 2       Current Facility-Administered Medications   Medication Frequency   • senna-docusate (PERICOLACE or SENOKOT S) 8.6-50 MG per tablet 2 Tablet BID PRN    And   • polyethylene glycol/lytes (MIRALAX) PACKET 1 Packet QDAY PRN    And   • magnesium hydroxide (MILK OF MAGNESIA) suspension 30 mL QDAY PRN    And   • bisacodyl (DULCOLAX) suppository 10 mg QDAY PRN   • melatonin tablet 3 mg QHS   • traZODone (DESYREL) tablet 50 mg QHS   • omeprazole (PRILOSEC) capsule 20 mg QAM AC   • tamsulosin (FLOMAX) capsule 0.4 mg Q EVENING   • hydrOXYzine HCl (ATARAX) tablet 50 mg Q6HRS PRN   • QUEtiapine (Seroquel) tablet 25 mg TID PRN   • simethicone (MYLICON) chewable tab 80 mg TID PRN   • Respiratory Therapy Consult Continuous RT   • Pharmacy Consult Request ...Pain Management Review 1 Each PHARMACY TO DOSE   • hydrALAZINE (APRESOLINE) tablet 10 mg Q8HRS PRN   • acetaminophen (Tylenol) tablet 650 mg Q4HRS PRN   • sodium phosphate (Fleet) enema 133 mL QDAY PRN   • artificial tears ophthalmic solution 1 Drop PRN   • benzocaine-menthol (CEPACOL) lozenge 1 Lozenge Q2HRS PRN   • mag hydrox-al hydrox-simeth (MAALOX PLUS ES or MYLANTA DS) suspension 20 mL Q2HRS PRN   • ondansetron (ZOFRAN ODT) dispertab 4 mg 4X/DAY PRN    Or   • ondansetron (ZOFRAN) syringe/vial injection 4 mg 4X/DAY PRN   • sodium chloride (OCEAN) 0.65 % nasal spray 2 Spray PRN   • atorvastatin (LIPITOR) tablet 80 mg Q EVENING   • aspirin (ASA) chewable tab 81 mg DAILY       Orders Placed This Encounter   Procedures   • Diet Order Diet: Regular (meds whole 1:1 with thins)     Standing Status:   Standing     Number of Occurrences:   1     Order Specific Question:   Diet:     Answer:   Regular [1]     Comments:   meds whole 1:1 with thins     IDT Team Meeting 11/1/2021    Janet BRUNO  ANNE MARIE Herrera, was present and led the interdisciplinary team conference on 11/1/2021.  I led the IDT conference and agree with the IDT conference documentation and plan of care as noted below.     Barriers:   - monitoring PLT levels   - balance reactions  - left side inattention   -grab bars installation      DC/Disposition:  To home on 11/8 with assistance from family and  services.       Assessment:  Active Hospital Problems    Diagnosis    • *Stroke (cerebrum) (HCC)    • Edema of left lower extremity    • Urinary retention    • Leukocytosis    • Impaired mobility and ADLs    • Dysphagia    • Left hemiparesis (HCC)    • Subdural hematoma (HCC)    • H/O splenectomy        Medical Decision Making and Plan:  Right MCA stroke  S/p tPA  S/p thrombectomy  Left hemiparesis, improved  Dysphagia, improved  Continue full rehab program  PT/OT/SLP, 1 hr each discipline, 5 days per week  Aspirin, Statin  Imaging  Reviewed with patient and wife  Ziopatch cardiac monitor placed 10/25 - this was a XT, patient needs a AT - with real time recording and gateway device, to be delivered this afternoon  Outpatient follow up with Dr. Cifuentes, stroke bridge clinic, referrals made  Outpatient follow up with cardiology, referral made     Subdural hematoma, small  Likely due to fall     Oral thrush, resolved  s/p Nystatin     Thrombocytosis  Stress reaction  Monitor with weekly labs  Plt with slight continued uptrend, will discuss with Hospitalist.   11/2: Plt improved from 1033-> 921   Recheck labs on 11/5      Leukocytosis, resolved   Checked UA, negative, then positive on 10/22  Culture with pansensitive E Coli  Checked CXR - negative x 2  Appreciate hospitalist assistance  WBC 7.9 as of 11/1      Suspected aspiration pneumonia  Continue antibiotics, improved WBC, afebrile   Appreciate hospitalist assistance     Hyperglycemia, resolved   Checked HgbA1c - 4.6  Likely stress response     GERD  Omeprazole     Insomnia, improved  Schedule  melatonin and trazodone  - will change trazodone to PRN per patient's request      Bowel program  PRN bowel medications  Last BM 10/26     Bladder program  Urinary retention  Started Hytrin 2 mg  Check PVRs - 212 (10/28)  No longer retaining  Bladder scan for no voids  ICP for over 400 cc - last required 10/20, 10/25  Scheduled toileting     DVT prophylaxis  Started Lovenox 10/20, patient 8 days out from small SDH  - will stop lovenox, patient is ambulating > 400 feet in therapy     Total time:  26  minutes.  I spent greater than 50% of the time for patient care and coordination on this date, including unit/floor time, and face-to-face time with the patient as per assessment and plan above.    Janet Herrera, DO   Physical Medicine and Rehabilitation

## 2021-11-04 NOTE — CARE PLAN
"  Problem: Psychosocial  Goal: Patient's level of anxiety will decrease  Outcome: Progressing     Problem: Bladder / Voiding  Goal: Patient will establish and maintain regular urinary output  Outcome: Progressing  Note: Oob to bathroom with out difficulty.     Problem: Mobility  Goal: Patient's capacity to carry out activities will improve  Outcome: Progressing     Problem: Fall Risk - Rehab  Goal: Patient will remain free from falls  Outcome: Progressing  Note: Bita Barron Fall risk Assessment Score: 17    High fall risk Interventions   - Bed and strip alarm   - Yellow sign by the door   - Yellow wrist band \"Fall risk\"  - Room near to the nurse station  - Do not leave patient unattended in the bathroom  - Fall risk education provided     The patient is Stable - Low risk of patient condition declining or worsening    Shift Goals  Clinical Goals: Safety  Patient Goals: Safety    Progress made toward(s) clinical / shift goals:  Pt free from fall and injury.      "

## 2021-11-04 NOTE — CARE PLAN
Problem: Bladder / Voiding  Goal: Patient will establish and maintain regular urinary output  Outcome: Not Progressing  Having bladder incontinence first thing in am with soaked bed.

## 2021-11-04 NOTE — THERAPY
Physical Therapy   Daily Treatment     Patient Name: Fermín Gomez  Age:  66 y.o., Sex:  male  Medical Record #: [id number]  Today's Date: 11/4/2021     Precautions  Precautions: Fall Risk  Comments: Left hemiparesis, left neglect, ZIO patch with  in wheelchair pouch 10 FT limit    Subjective    Pt up in w/c with spouse at bedside.  Denies new issues, reporting sleeping ok without medication.     Objective       11/04/21 0931   Gait Functional Level of Assist    Gait Level Of Assist Contact Guard Assist   Assistive Device Front Wheel Walker   Distance (Feet) 125   # of Times Distance was Traveled 2   Deviation Decreased Base Of Support;Bradykinetic;Decreased Heel Strike;Decreased Toe Off   Neuro-Muscular Treatments   Neuro-Muscular Treatments Tactile Cuing;Verbal Cuing;Weight Shift Right;Weight Shift Left;Anterior weight shift   Comments Standing balance performed in // bars: ant/post with ankle strategy using tilt board 2 x 5, inc time and vc's for isolating at ankles and counter balance with hips vs substitution with knee flex. Dynamic stepping: fwd/lateral/post x2 reps ea direction alt LE's, vc's for coordination.  between sets, side sitting on R elbow x 30 sec x 2 to inc midline orientation.     Interdisciplinary Plan of Care Collaboration   IDT Collaboration with  Family / Caregiver   Collaboration Comments re: spouse present   PT Total Time Spent   PT Individual Total Time Spent (Mins) 30   PT Charge Group   PT Neuromuscular Re-Education / Balance 2     Consistent vc's for L LE awareness, heel strike and clearance with gait 75% of time.    Assessment    Improved midline orientation following R side sitting on elbow.  Difficulty with coordination of tasks.    Strengths: Pleasant and cooperative, Willingly participates in therapeutic activities, Manages pain appropriately, Able to follow instructions  Barriers: Fatigue, Hemiparesis, Impaired balance    Plan  Lite gait: focus on L LE  clearance/control, Continue  MWF, gait training  W/ R trekking pole vs FWW,Neuro re-ed: coordination/balance reactions: quadraped/tall kneeling/knee walking/crawling.     Passport items to be completed:  Passport items to be completed:  Get in/out of bed safely, in/out of a vehicle, safely use mobility device, walk or wheel around home/community, navigate up and down stairs, show how to get up/down from the ground, ensure home is accessible, demonstrate HEP, complete caregiver training    Physical Therapy Problems (Active)     Problem: PT-Long Term Goals     Dates: Start: 10/20/21       Goal: LTG-By discharge, patient will ambulate     Dates: Start: 10/20/21       Goal Note filed on 10/20/21 1514 by JEANIE Kam     1) Individualized goal:   Gait FWW/ FT, supervision at discharge  2) Interventions:  PT Gait Training, PT Therapeutic Exercises, PT Neuro Re-Ed/Balance, and PT Therapeutic Activity            Goal: LTG-By discharge, patient will transfer one surface to another     Dates: Start: 10/20/21       Goal Note filed on 10/20/21 1514 by JEANIE Kam     1) Individualized goal:   Transfer 1 surface to another fww/spc supervision at discharge  2) Interventions:  PT Gait Training, PT Therapeutic Exercises, PT Neuro Re-Ed/Balance, and PT Therapeutic Activity            Goal: LTG-By discharge, patient will ambulate up/down 4-6 stairs     Dates: Start: 10/20/21       Goal Note filed on 10/20/21 1514 by JEANIE Kam     1) Individualized goal:   Up/down 4-6 stairs handrails, supervision at discharge  2) Interventions:  PT Gait Training, PT Therapeutic Exercises, PT Neuro Re-Ed/Balance, and PT Therapeutic Activity            Goal: LTG-By discharge, patient will transfer in/out of a car     Dates: Start: 10/20/21       Goal Note filed on 10/20/21 1514 by JEANIE Kam     1) Individualized goal:   Car transfer fww/spc supervision at discharge  2) Interventions:  PT Gait  Training, PT Therapeutic Exercises, PT Neuro Re-Ed/Balance, and PT Therapeutic Activity

## 2021-11-04 NOTE — PROGRESS NOTES
[facility] Daily Progress Note    Date of Service  11/4/2021    Chief Complaint:  LLE edema  Thrombocytosis    Interval History:  No significant events over night.    Review of Systems  Review of Systems   Constitutional: Negative for fever.   Eyes: Negative for blurred vision.   Respiratory: Negative for cough.    Cardiovascular: Negative for chest pain.   Gastrointestinal: Negative for diarrhea.   Musculoskeletal: Negative for joint pain.   Neurological: Negative for dizziness.   Psychiatric/Behavioral: The patient is not nervous/anxious.         Physical Exam  Temp:  [36.4 °C (97.5 °F)-36.5 °C (97.7 °F)] 36.4 °C (97.5 °F)  Pulse:  [61-69] 61  Resp:  [16-18] 17  BP: (115-121)/(55-61) 121/55  SpO2:  [90 %-95 %] 90 %    Physical Exam  Vitals and nursing note reviewed.   Constitutional:       Appearance: He is not diaphoretic.   HENT:      Mouth/Throat:      Pharynx: No oropharyngeal exudate or posterior oropharyngeal erythema.   Eyes:      Extraocular Movements: Extraocular movements intact.   Neck:      Vascular: No carotid bruit.   Cardiovascular:      Rate and Rhythm: Normal rate and regular rhythm.      Heart sounds: No murmur heard.     Pulmonary:      Effort: Pulmonary effort is normal.      Breath sounds: Normal breath sounds. No stridor.   Abdominal:      General: Bowel sounds are normal.      Palpations: Abdomen is soft.   Musculoskeletal:      Right lower leg: No edema.      Left lower leg: Edema present.   Skin:     General: Skin is warm and dry.      Findings: No rash.   Neurological:      Mental Status: He is alert and oriented to person, place, and time.   Psychiatric:         Mood and Affect: Mood normal.         Behavior: Behavior normal.         Fluids    Intake/Output Summary (Last 24 hours) at 11/4/2021 0958  Last data filed at 11/4/2021 0830  Gross per 24 hour   Intake 360 ml   Output --   Net 360 ml       Laboratory  Recent Labs     11/02/21  0548   WBC 6.6   RBC 4.43*   HEMOGLOBIN 14.7    HEMATOCRIT 42.4   MCV 95.7   MCH 33.2*   MCHC 34.7   RDW 45.4   PLATELETCT 921*   MPV 9.3     Recent Labs     11/02/21  0548   SODIUM 142   POTASSIUM 4.2   CHLORIDE 106   CO2 28   GLUCOSE 90   BUN 10   CREATININE 0.81   CALCIUM 8.9                   Imaging    Assessment/Plan  * Stroke (cerebrum) (HCC)- (present on admission)  Assessment & Plan  MRI: showed acute infarcts in the right MCA territory as described above predominantly involving the frontal lobe;          scattered foci of acute infarction are also noted in the right parietal lobe;          tiny foci of infarction are also noted in the left frontoparietal region.  S/P TPA (at [place])  S/P thrombectomy but was unsuccessful  On Lipitor  On ASA    Thrombocytosis  Assessment & Plan  Pt reports chronic personal and family history of Hereditary Spherocytosis  He has had a splenectomy  Note: on ASA and proph LMWH  Monitor    Urinary retention- (present on admission)  Assessment & Plan  On Flomax

## 2021-11-04 NOTE — THERAPY
"Occupational Therapy  Daily Treatment     Patient Name: Fermín Gomez  Age:  66 y.o., Sex:  male  Medical Record #: [id number]  Today's Date: 11/4/2021     Precautions  Precautions: Fall Risk  Comments: Left hemiparesis, left neglect, ZIO patch with  in wheelchair pouch 10 FT limit    Safety   ADL Safety : Requires Supervision for Safety, Requires Cueing for Safety  Bathroom Safety: Requires Supervision for Safety, Requires Cuing for Safety  Comments: see below functional levels for ADL performance details.    Subjective    Pt seated in w/c upon arrival, pleasant and cooperative, agreeable to therapy. Spouse present     Objective       11/04/21 1101   Fine Motor / Dexterity    Comments  AROM L thumb ext/flexion - 'hitchhiking\" 2 sets x 15 with focus on full extension   Neuro-Muscular Treatments   Neuro-Muscular Treatments Weight Shift Left;Weight Shift Right;Anterior weight shift;Joint Approximation;Postural Facilitation;Electrical Stimulation;Facilitation;Sequencing;Tactile Cuing;Verbal Cuing   Comments LUE neuro re-ed: WB through UE's at bed rail - inclined modified push-up, BUE rail  with UE's in extension and hip flexion for low back stretch, WB through forearm at bed with trunk rotation to each side. w/c push-ups with focus on tricep extension. scapular retraction/protraction while standing. AAROM shoulder flexion to 90* while keeping elbow extended Mod A. all exercises 1 x 15   Interdisciplinary Plan of Care Collaboration   Patient Position at End of Therapy Seated;Call Light within Reach;Tray Table within Reach;Family / Friend in Room   OT Total Time Spent   OT Individual Total Time Spent (Mins) 30   OT Charge Group   OT Neuromuscular Re-education / Balance 2       Assessment    Pt completed LUE neuro re-ed exercises to the best of his abilities - tx focus on scapular mobility - requires motor planning cues for scapular retraction, tricep extension - fatigues and requires cues for full elbow " extension, shoulder stability with UE weightbearing, and thumb AROM in all planes    Strengths: Alert and oriented, Independent prior level of function, Motivated for self care and independence, Pleasant and cooperative, Supportive family, Willingly participates in therapeutic activities  Barriers: Hemiparesis, Decreased endurance, Home accessibility, Impaired activity tolerance, Impaired balance, Impaired functional cognition, Limited mobility, Fatigue (impaired termination and initiation, impaired sequencing)    Plan    Refer to Primary OT POC/goals    Occupational Therapy Goals (Active)     Problem: Functional Transfers     Dates: Start: 11/01/21       Goal: STG-Within one week, patient will transfer to step in shower with supervision using AE/DME as needed.     Dates: Start: 11/01/21             Problem: IADL's     Dates: Start: 11/01/21       Goal: STG-Within one week, patient will access kitchen area with CGA-SBA using LRAD.     Dates: Start: 11/01/21             Problem: OT Long Term Goals     Dates: Start: 10/20/21       Goal: LTG-By discharge, patient will complete basic self care tasks with CGA- mod I and AE as needed     Dates: Start: 10/20/21          Goal: LTG-By discharge, patient will perform bathroom transfers with CGA- mod I and AE as needed     Dates: Start: 10/20/21

## 2021-11-04 NOTE — CARE PLAN
Problem: Knowledge Deficit - Standard  Goal: Patient and family/care givers will demonstrate understanding of plan of care, disease process/condition, diagnostic tests and medications  Outcome: Progressing     Problem: Psychosocial  Goal: Patient's level of anxiety will decrease  Outcome: Progressing     Problem: Bladder / Voiding  Goal: Patient will establish and maintain regular urinary output  Outcome: Progressing     Problem: Bowel Elimination  Goal: Patient will participate in bowel management program  Outcome: Progressing     Problem: Skin Integrity  Goal: Patient's skin integrity will be maintained or improve  Outcome: Progressing

## 2021-11-04 NOTE — THERAPY
Occupational Therapy  Daily Treatment     Patient Name: Fermín Gomez  Age:  66 y.o., Sex:  male  Medical Record #: [id number]  Today's Date: 11/4/2021     Precautions  Precautions: (P) Fall Risk  Comments: (P) Left hemiparesis, left neglect, ZIO patch with  in wheelchair pouch 10 FT limit    Safety   ADL Safety : Requires Supervision for Safety, Requires Cueing for Safety  Bathroom Safety: Requires Supervision for Safety, Requires Cuing for Safety  Comments: see below functional levels for ADL performance details.    Subjective    Pt up in w/c, agreeable to OT session.     Objective       11/04/21 0831   Precautions   Precautions Fall Risk   Comments Left hemiparesis, left neglect, ZIO patch with  in wheelchair pouch 10 FT limit   Cognition    Level of Consciousness Alert   Functional Level of Assist   Grooming Modified Independent;Seated   Grooming Description Increased time   Toileting Supervision   Toileting Description Grab bar;Increased time;Supervision for safety   Toilet Transfers Supervised   Toilet Transfer Description Grab bar;Increased time;Set-up of equipment;Supervision for safety;Verbal cueing   Sitting Lower Body Exercises   Nustep Resistance Level 4  (for reciprocal patterning/endurance x15 min, 0 RB,1050 steps)   Comments pt able to maintain grasp with L hand without acewrap.   Interdisciplinary Plan of Care Collaboration   IDT Collaboration with  Family / Caregiver   Patient Position at End of Therapy Seated;Family / Friend in Room   Collaboration Comments spouse arrived at end of session   OT Total Time Spent   OT Individual Total Time Spent (Mins) 60   OT Charge Group   OT Self Care / ADL 1   OT Therapy Activity 2   OT Therapeutic Exercise  1     Activities to address LUE proximal strengthening, ROM,  strength and functional use:  -Pushbox exercises on tabletop 2x30 reps and up/down incline 2x30 reps.  -Folding washcloths (x20) and towels (x10) for bilateral  coordination  -Opening various pill bottles and containers  -Placement/removal of graded clothespins on vertical rung x20 reps with LUE, pt able to complete yellow, red, green and blue resistance levels.     Assessment    Pt tolerated OT session well. He demonstrated improved safety with toileting and toilet transfer and completed with indirect supv. Pt also cont to make gains with LUE ROM, strength and functional use.   Strengths: Alert and oriented, Independent prior level of function, Motivated for self care and independence, Pleasant and cooperative, Supportive family, Willingly participates in therapeutic activities  Barriers: Hemiparesis, Decreased endurance, Home accessibility, Impaired activity tolerance, Impaired balance, Impaired functional cognition, Limited mobility, Fatigue (impaired termination and initiation, impaired sequencing)    Plan  -7am ADLs tomorrow.  -30 min Family training (review shower chair options, check on status of GBs-family was having GB installed this week; LUE HEP)    LUE neuro re-ed (focus on proximal strengthening, weightbearing, NMES, vibration, Saebo MAS), ADLs, functional mobility/transfers, balance.    for left UE not to include wrist flexors / extensors-Plan for  as adjunct tx 2 to 3 times weekly  pending Therapy tech availability        Occupational Therapy Goals (Active)     Problem: Functional Transfers     Dates: Start: 11/01/21       Goal: STG-Within one week, patient will transfer to step in shower with supervision using AE/DME as needed.     Dates: Start: 11/01/21             Problem: IADL's     Dates: Start: 11/01/21       Goal: STG-Within one week, patient will access kitchen area with CGA-SBA using LRAD.     Dates: Start: 11/01/21             Problem: OT Long Term Goals     Dates: Start: 10/20/21       Goal: LTG-By discharge, patient will complete basic self care tasks with CGA- mod I and AE as needed     Dates: Start: 10/20/21          Goal: LTG-By  discharge, patient will perform bathroom transfers with CGA- mod I and AE as needed     Dates: Start: 10/20/21

## 2021-11-05 ENCOUNTER — TELEPHONE (OUTPATIENT)
Dept: CARDIOLOGY | Facility: MEDICAL CENTER | Age: 66
End: 2021-11-05

## 2021-11-05 LAB
ANION GAP SERPL CALC-SCNC: 9 MMOL/L (ref 7–16)
BASOPHILS # BLD AUTO: 1.9 % (ref 0–1.8)
BASOPHILS # BLD: 0.14 K/UL (ref 0–0.12)
BUN SERPL-MCNC: 13 MG/DL (ref 8–22)
CALCIUM SERPL-MCNC: 9 MG/DL (ref 8.5–10.5)
CHLORIDE SERPL-SCNC: 106 MMOL/L (ref 96–112)
CO2 SERPL-SCNC: 27 MMOL/L (ref 20–33)
CREAT SERPL-MCNC: 0.82 MG/DL (ref 0.5–1.4)
EOSINOPHIL # BLD AUTO: 0.27 K/UL (ref 0–0.51)
EOSINOPHIL NFR BLD: 3.7 % (ref 0–6.9)
ERYTHROCYTE [DISTWIDTH] IN BLOOD BY AUTOMATED COUNT: 45.3 FL (ref 35.9–50)
GLUCOSE SERPL-MCNC: 93 MG/DL (ref 65–99)
HCT VFR BLD AUTO: 43.9 % (ref 42–52)
HGB BLD-MCNC: 15.3 G/DL (ref 14–18)
IMM GRANULOCYTES # BLD AUTO: 0.04 K/UL (ref 0–0.11)
IMM GRANULOCYTES NFR BLD AUTO: 0.5 % (ref 0–0.9)
LYMPHOCYTES # BLD AUTO: 1.86 K/UL (ref 1–4.8)
LYMPHOCYTES NFR BLD: 25.3 % (ref 22–41)
MCH RBC QN AUTO: 33.4 PG (ref 27–33)
MCHC RBC AUTO-ENTMCNC: 34.9 G/DL (ref 33.7–35.3)
MCV RBC AUTO: 95.9 FL (ref 81.4–97.8)
MONOCYTES # BLD AUTO: 0.64 K/UL (ref 0–0.85)
MONOCYTES NFR BLD AUTO: 8.7 % (ref 0–13.4)
NEUTROPHILS # BLD AUTO: 4.39 K/UL (ref 1.82–7.42)
NEUTROPHILS NFR BLD: 59.9 % (ref 44–72)
NRBC # BLD AUTO: 0 K/UL
NRBC BLD-RTO: 0 /100 WBC
PLATELET # BLD AUTO: 805 K/UL (ref 164–446)
PMV BLD AUTO: 9.5 FL (ref 9–12.9)
POTASSIUM SERPL-SCNC: 4 MMOL/L (ref 3.6–5.5)
RBC # BLD AUTO: 4.58 M/UL (ref 4.7–6.1)
SODIUM SERPL-SCNC: 142 MMOL/L (ref 135–145)
WBC # BLD AUTO: 7.3 K/UL (ref 4.8–10.8)

## 2021-11-05 PROCEDURE — 80048 BASIC METABOLIC PNL TOTAL CA: CPT

## 2021-11-05 PROCEDURE — A9270 NON-COVERED ITEM OR SERVICE: HCPCS | Performed by: PHYSICAL MEDICINE & REHABILITATION

## 2021-11-05 PROCEDURE — 97112 NEUROMUSCULAR REEDUCATION: CPT

## 2021-11-05 PROCEDURE — 770010 HCHG ROOM/CARE - REHAB SEMI PRIVAT*

## 2021-11-05 PROCEDURE — 700102 HCHG RX REV CODE 250 W/ 637 OVERRIDE(OP): Performed by: PHYSICAL MEDICINE & REHABILITATION

## 2021-11-05 PROCEDURE — 85025 COMPLETE CBC W/AUTO DIFF WBC: CPT

## 2021-11-05 PROCEDURE — 99231 SBSQ HOSP IP/OBS SF/LOW 25: CPT | Performed by: PHYSICAL MEDICINE & REHABILITATION

## 2021-11-05 PROCEDURE — 36415 COLL VENOUS BLD VENIPUNCTURE: CPT

## 2021-11-05 PROCEDURE — 99232 SBSQ HOSP IP/OBS MODERATE 35: CPT | Performed by: HOSPITALIST

## 2021-11-05 PROCEDURE — 97530 THERAPEUTIC ACTIVITIES: CPT

## 2021-11-05 PROCEDURE — 97535 SELF CARE MNGMENT TRAINING: CPT

## 2021-11-05 PROCEDURE — 97116 GAIT TRAINING THERAPY: CPT

## 2021-11-05 PROCEDURE — 97110 THERAPEUTIC EXERCISES: CPT

## 2021-11-05 RX ADMIN — ASPIRIN 81 MG CHEWABLE TABLET 81 MG: 81 TABLET CHEWABLE at 08:38

## 2021-11-05 RX ADMIN — OMEPRAZOLE 20 MG: 20 CAPSULE, DELAYED RELEASE ORAL at 08:38

## 2021-11-05 RX ADMIN — ATORVASTATIN CALCIUM 80 MG: 40 TABLET, FILM COATED ORAL at 20:07

## 2021-11-05 RX ADMIN — TAMSULOSIN HYDROCHLORIDE 0.4 MG: 0.4 CAPSULE ORAL at 20:07

## 2021-11-05 RX ADMIN — MELATONIN TAB 3 MG 3 MG: 3 TAB at 20:07

## 2021-11-05 ASSESSMENT — ENCOUNTER SYMPTOMS
SHORTNESS OF BREATH: 0
ABDOMINAL PAIN: 0
NAUSEA: 0
FEVER: 0
VOMITING: 0
CHILLS: 0
NERVOUS/ANXIOUS: 0
DIARRHEA: 0

## 2021-11-05 ASSESSMENT — ACTIVITIES OF DAILY LIVING (ADL)
BED_CHAIR_WHEELCHAIR_TRANSFER_DESCRIPTION: INCREASED TIME;VERBAL CUEING;SUPERVISION FOR SAFETY
TOILET_TRANSFER_DESCRIPTION: GRAB BAR;SUPERVISION FOR SAFETY
TUB_SHOWER_TRANSFER_DESCRIPTION: GRAB BAR
TUB_SHOWER_TRANSFER_DESCRIPTION: GRAB BAR;SHOWER BENCH;SET-UP OF EQUIPMENT;SUPERVISION FOR SAFETY;VERBAL CUEING

## 2021-11-05 ASSESSMENT — GAIT ASSESSMENTS
DEVIATION: DECREASED BASE OF SUPPORT;BRADYKINETIC;DECREASED HEEL STRIKE;DECREASED TOE OFF
GAIT LEVEL OF ASSIST: CONTACT GUARD ASSIST
ASSISTIVE DEVICE: OTHER (COMMENTS)

## 2021-11-05 ASSESSMENT — PAIN DESCRIPTION - PAIN TYPE: TYPE: ACUTE PAIN

## 2021-11-05 NOTE — PROGRESS NOTES
"Rehab Progress Note     Chief Complaint: Follow-up stroke    Interval Events (Subjective)  Patient seen and examined at bedside. Patient reports, he feels great. Patient recently cleared to ambulate SBA with spouse in room. Patient feels stronger and is looking forward to going home next week. Denies HA, CP, SOB, light headedness or urinary urgency. Patient states he is sleeping well. No reported events over night.     Objective:  VITAL SIGNS: /66   Pulse 73   Temp 36.4 °C (97.6 °F) (Temporal)   Resp 18   Ht 1.558 m (5' 1.32\")   Wt 83 kg (182 lb 15.7 oz)   SpO2 91%   BMI 34.21 kg/m²     Gen: NAD, laying comfortably in bed using cell phone   Head: NC/AT  Eyes/ Nose/ Mouth: moist mcous membranes,  Cardio: RRR, good distal perfusion, warm extremities  Pulm: normal respiratory effort, no cyanosis, no witnessed wheezes or coughing   Abd: Soft NTND, negative borborygmi   Ext: No peripheral edema. No calf tenderness. No clubbing.      Recent Results (from the past 72 hour(s))   CBC WITH DIFFERENTIAL    Collection Time: 11/05/21  5:25 AM   Result Value Ref Range    WBC 7.3 4.8 - 10.8 K/uL    RBC 4.58 (L) 4.70 - 6.10 M/uL    Hemoglobin 15.3 14.0 - 18.0 g/dL    Hematocrit 43.9 42.0 - 52.0 %    MCV 95.9 81.4 - 97.8 fL    MCH 33.4 (H) 27.0 - 33.0 pg    MCHC 34.9 33.7 - 35.3 g/dL    RDW 45.3 35.9 - 50.0 fL    Platelet Count 805 (H) 164 - 446 K/uL    MPV 9.5 9.0 - 12.9 fL    Neutrophils-Polys 59.90 44.00 - 72.00 %    Lymphocytes 25.30 22.00 - 41.00 %    Monocytes 8.70 0.00 - 13.40 %    Eosinophils 3.70 0.00 - 6.90 %    Basophils 1.90 (H) 0.00 - 1.80 %    Immature Granulocytes 0.50 0.00 - 0.90 %    Nucleated RBC 0.00 /100 WBC    Neutrophils (Absolute) 4.39 1.82 - 7.42 K/uL    Lymphs (Absolute) 1.86 1.00 - 4.80 K/uL    Monos (Absolute) 0.64 0.00 - 0.85 K/uL    Eos (Absolute) 0.27 0.00 - 0.51 K/uL    Baso (Absolute) 0.14 (H) 0.00 - 0.12 K/uL    Immature Granulocytes (abs) 0.04 0.00 - 0.11 K/uL    NRBC (Absolute) 0.00 " K/uL   Basic Metabolic Panel    Collection Time: 11/05/21  5:25 AM   Result Value Ref Range    Sodium 142 135 - 145 mmol/L    Potassium 4.0 3.6 - 5.5 mmol/L    Chloride 106 96 - 112 mmol/L    Co2 27 20 - 33 mmol/L    Glucose 93 65 - 99 mg/dL    Bun 13 8 - 22 mg/dL    Creatinine 0.82 0.50 - 1.40 mg/dL    Calcium 9.0 8.5 - 10.5 mg/dL    Anion Gap 9.0 7.0 - 16.0   ESTIMATED GFR    Collection Time: 11/05/21  5:25 AM   Result Value Ref Range    GFR If African American >60 >60 mL/min/1.73 m 2    GFR If Non African American >60 >60 mL/min/1.73 m 2       Current Facility-Administered Medications   Medication Frequency   • traZODone (DESYREL) tablet 50 mg HS PRN   • senna-docusate (PERICOLACE or SENOKOT S) 8.6-50 MG per tablet 2 Tablet BID PRN    And   • polyethylene glycol/lytes (MIRALAX) PACKET 1 Packet QDAY PRN    And   • magnesium hydroxide (MILK OF MAGNESIA) suspension 30 mL QDAY PRN    And   • bisacodyl (DULCOLAX) suppository 10 mg QDAY PRN   • melatonin tablet 3 mg QHS   • omeprazole (PRILOSEC) capsule 20 mg QAM AC   • tamsulosin (FLOMAX) capsule 0.4 mg Q EVENING   • hydrOXYzine HCl (ATARAX) tablet 50 mg Q6HRS PRN   • QUEtiapine (Seroquel) tablet 25 mg TID PRN   • simethicone (MYLICON) chewable tab 80 mg TID PRN   • Respiratory Therapy Consult Continuous RT   • Pharmacy Consult Request ...Pain Management Review 1 Each PHARMACY TO DOSE   • hydrALAZINE (APRESOLINE) tablet 10 mg Q8HRS PRN   • acetaminophen (Tylenol) tablet 650 mg Q4HRS PRN   • sodium phosphate (Fleet) enema 133 mL QDAY PRN   • artificial tears ophthalmic solution 1 Drop PRN   • benzocaine-menthol (CEPACOL) lozenge 1 Lozenge Q2HRS PRN   • mag hydrox-al hydrox-simeth (MAALOX PLUS ES or MYLANTA DS) suspension 20 mL Q2HRS PRN   • ondansetron (ZOFRAN ODT) dispertab 4 mg 4X/DAY PRN    Or   • ondansetron (ZOFRAN) syringe/vial injection 4 mg 4X/DAY PRN   • sodium chloride (OCEAN) 0.65 % nasal spray 2 Spray PRN   • atorvastatin (LIPITOR) tablet 80 mg Q EVENING    • aspirin (ASA) chewable tab 81 mg DAILY       Orders Placed This Encounter   Procedures   • Diet Order Diet: Regular (meds whole 1:1 with thins)     Standing Status:   Standing     Number of Occurrences:   1     Order Specific Question:   Diet:     Answer:   Regular [1]     Comments:   meds whole 1:1 with thins     IDT Team Meeting 11/1/2021    Janet BRUNO D.O., was present and led the interdisciplinary team conference on 11/1/2021.  I led the IDT conference and agree with the IDT conference documentation and plan of care as noted below.     Barriers:   - monitoring PLT levels   - balance reactions  - left side inattention   -grab bars installation      DC/Disposition:  To home on 11/8 with assistance from family and HH services.       Assessment:  Active Hospital Problems    Diagnosis    • *Stroke (cerebrum) (HCC)    • Edema of left lower extremity    • Urinary retention    • Leukocytosis    • Impaired mobility and ADLs    • Dysphagia    • Left hemiparesis (HCC)    • Subdural hematoma (HCC)    • H/O splenectomy        Medical Decision Making and Plan:  Right MCA stroke  S/p tPA  S/p thrombectomy  Left hemiparesis, improved  Dysphagia, improved  Continue full rehab program  PT/OT/SLP, 1 hr each discipline, 5 days per week  Aspirin, Statin  Imaging  Reviewed with patient and wife  Ziopatch cardiac monitor placed 10/25 - this was a XT, patient needs a AT - with real time recording and gateway device, to be delivered this afternoon  Outpatient follow up with Dr. Cifuentes, stroke bridge clinic, referrals made  Outpatient follow up with cardiology, referral made     Subdural hematoma, small  Likely due to fall     Oral thrush, resolved  s/p Nystatin     Thrombocytosis  Stress reaction  Monitor with weekly labs  Plt with slight continued uptrend, will discuss with Hospitalist.    Plt improved from 1033-> 921 -> 805 as of 11/5      Leukocytosis, resolved   Checked UA, negative, then positive on 10/22  Culture with  pansensitive E Coli  Checked CXR - negative x 2  Appreciate hospitalist assistance  WBC 7.9 as of 11/1 ,      Suspected aspiration pneumonia  Continue antibiotics, improved WBC, afebrile   Appreciate hospitalist assistance  - completed levaquin      Hyperglycemia, resolved   Checked HgbA1c - 4.6  Likely stress response     GERD  Omeprazole     Insomnia, improved  Schedule melatonin and trazodone  - will change trazodone to PRN per patient's request      Bowel program  PRN bowel medications  Last BM 10/26     Bladder program  Urinary retention  Started Hytrin 2 mg  Check PVRs - 212 (10/28)  No longer retaining  Bladder scan for no voids  ICP for over 400 cc - last required 10/20, 10/25  Scheduled toileting     DVT prophylaxis  Started Lovenox 10/20, patient 8 days out from small SDH  - will stop lovenox, patient is ambulating > 400 feet in therapy     Total time:  20 minutes.  I spent greater than 50% of the time for patient care and coordination on this date, including unit/floor time, and face-to-face time with the patient as per assessment and plan above.    Janet Herrera, DO   Physical Medicine and Rehabilitation

## 2021-11-05 NOTE — THERAPY
"Physical Therapy   Daily Treatment     Patient Name: Fermín Gomez  Age:  66 y.o., Sex:  male  Medical Record #: [id number]  Today's Date: 11/5/2021     Precautions  Precautions: Fall Risk  Comments: Left hemiparesis, left neglect, ZIO patch with  in wheelchair pouch 10 FT limit    Subjective    Pt found in room with spouse at bedside.      Objective       11/04/21 1331   Gait Functional Level of Assist    Gait Level Of Assist Contact Guard Assist   Assistive Device Front Wheel Walker   Distance (Feet) 150   # of Times Distance was Traveled 2   Deviation Decreased Base Of Support;Bradykinetic;Decreased Heel Strike  (consistent cues L side heel strike, L head turns/scanning)   Transfer Functional Level of Assist   Bed, Chair, Wheelchair Transfer Stand by Assist   Bed Chair Wheelchair Transfer Description Increased time;Verbal cueing;Supervision for safety   Neuro-Muscular Treatments   Neuro-Muscular Treatments Anterior weight shift;Verbal Cuing;Tactile Cuing;Biofeedback;Weight Shift Left;Weight Shift Right   Comments STS training from 17\" H chair: x 10 slow controlled movements.  2 x 10 with fast repetitions all w/o UE support.  postural Re-ed seated elevated EOM, vc's for active sitting/ant pelvic tilt, maxA soft tissue mobilization for inc thor ext and dec L post thoracic rotation.  Edu on COG and improving midline posture to inc indep with gait & balance reactions.   Interdisciplinary Plan of Care Collaboration   IDT Collaboration with  Family / Caregiver   Patient Position at End of Therapy Seated;Call Light within Reach   Collaboration Comments re: edu on gait training   PT Total Time Spent   PT Individual Total Time Spent (Mins) 60   PT Charge Group   PT Gait Training 2   PT Neuromuscular Re-Education / Balance 2   Initiated gait training with spouse, reinforced use of gait belt, appropriate , cues for L side attention, being on pt's L side.      BWSTT performed on lite gait w/o unweighting, HR " restin, post/recovery:   x7min 50 sec at 1.0MPH, x 4min at 1.2MPH, total 1055ft   -focus on L LE attention, step through with heel strike, upright trunk/hip ext and axial ext.  -Dual task: walking/talking demo significant inc difficulty maintaining L LE attention and step through pattern  -variable assist for L LE swing through from SBA to modA dependent on attention and divided attention.  Edu on significance of dual task for home, inc risk for falls, reinforced cont use of call light.    Assessment    Improved L side swing through today on lite gait with assist for L LE 50-75% of time, vs last time 90% of time.  Significantly limited by divided attention with ability to self monitor L LE.    Strengths: Pleasant and cooperative, Willingly participates in therapeutic activities, Manages pain appropriately, Able to follow instructions  Barriers: Fatigue, Hemiparesis, Impaired balance    Plan  Continue  MWF, gait training  W/ R trekking pole vs FWW, family training, reassess Salgado & 10MWT     Passport items to be completed:  Get in/out of bed safely, in/out of a vehicle, safely use mobility device, walk or wheel around home/community, navigate up and down stairs, show how to get up/down from the ground, ensure home is accessible, demonstrate HEP, complete caregiver training    Physical Therapy Problems (Active)     Problem: PT-Long Term Goals     Dates: Start: 10/20/21       Goal: LTG-By discharge, patient will ambulate     Dates: Start: 10/20/21       Goal Note filed on 10/20/21 1514 by JEANIE Kam     1) Individualized goal:   Gait FWW/ FT, supervision at discharge  2) Interventions:  PT Gait Training, PT Therapeutic Exercises, PT Neuro Re-Ed/Balance, and PT Therapeutic Activity            Goal: LTG-By discharge, patient will transfer one surface to another     Dates: Start: 10/20/21       Goal Note filed on 10/20/21 151 by JEANIE Kam     1) Individualized goal:    Transfer 1 surface to another fww/spc supervision at discharge  2) Interventions:  PT Gait Training, PT Therapeutic Exercises, PT Neuro Re-Ed/Balance, and PT Therapeutic Activity            Goal: LTG-By discharge, patient will ambulate up/down 4-6 stairs     Dates: Start: 10/20/21       Goal Note filed on 10/20/21 1514 by JEANIE Kam     1) Individualized goal:   Up/down 4-6 stairs handrails, supervision at discharge  2) Interventions:  PT Gait Training, PT Therapeutic Exercises, PT Neuro Re-Ed/Balance, and PT Therapeutic Activity            Goal: LTG-By discharge, patient will transfer in/out of a car     Dates: Start: 10/20/21       Goal Note filed on 10/20/21 1514 by JEANIE Kam     1) Individualized goal:   Car transfer fww/spc supervision at discharge  2) Interventions:  PT Gait Training, PT Therapeutic Exercises, PT Neuro Re-Ed/Balance, and PT Therapeutic Activity

## 2021-11-05 NOTE — THERAPY
Occupational Therapy  Daily Treatment     Patient Name: Fermín Gomez  Age:  66 y.o., Sex:  male  Medical Record #: [id number]  Today's Date: 11/5/2021     Precautions  Precautions: (P) Fall Risk  Comments: (P) Left hemiparesis, left neglect, ZIO patch with  in wheelchair pouch 10 FT limit    Safety   ADL Safety : Requires Supervision for Safety, Requires Cueing for Safety  Bathroom Safety: Requires Supervision for Safety, Requires Cuing for Safety  Comments: see below functional levels for ADL performance details.    Subjective    Patient sitting in w/c with spouse in chair in room.  Agreeable to family training.     Objective       11/05/21 1001   Precautions   Precautions Fall Risk   Comments Left hemiparesis, left neglect, ZIO patch with  in wheelchair pouch 10 FT limit   Functional Level of Assist   Toilet Transfers Supervised   Toilet Transfer Description Grab bar;Supervision for safety   Tub / Shower Transfers Stand by Assist   Tub Shower Transfer Description Grab bar;Shower bench;Set-up of equipment;Supervision for safety;Verbal cueing  (dry stall shower transfer)   Interdisciplinary Plan of Care Collaboration   Patient Position at End of Therapy Seated;Self Releasing Lap Belt Applied;Call Light within Reach;Tray Table within Reach;Phone within Reach;Family / Friend in Room   OT Total Time Spent   OT Individual Total Time Spent (Mins) 30   OT Charge Group   OT Self Care / ADL 1   OT Therapy Activity 1     OT verbally reviewed patient's CLOF with self care and transfers, discussed keeping Zio Patch dry for the full 14 days after it was placed, and issued a few plastic bags and tape to use at home prn.  Completed dry stall shower transfer x 2 with OT and then spouse assisting.  Patient will left lateral lean during mobility with FWW requiring CGA/min A at times.  Also with loss of balance to the left with STS from w/c requiring min A.    Assessment    Patient and spouse feel prepared to have  patient d/c home early next week.  All questions answered by therapist.  Strengths: Alert and oriented, Independent prior level of function, Motivated for self care and independence, Pleasant and cooperative, Supportive family, Willingly participates in therapeutic activities  Barriers: Hemiparesis, Decreased endurance, Home accessibility, Impaired activity tolerance, Impaired balance, Impaired functional cognition, Limited mobility, Fatigue (impaired termination and initiation, impaired sequencing)    Plan  LUE neuro re-ed (focus on proximal strengthening, weightbearing, NMES, vibration, Saebo MAS), ADLs, functional mobility/transfers, balance.    for left UE not to include wrist flexors / extensors-Plan for  as adjunct tx 2 to 3 times weekly  pending Therapy tech availability       Occupational Therapy Goals (Active)     Problem: Functional Transfers     Dates: Start: 11/01/21       Goal: STG-Within one week, patient will transfer to step in shower with supervision using AE/DME as needed.     Dates: Start: 11/01/21             Problem: IADL's     Dates: Start: 11/01/21       Goal: STG-Within one week, patient will access kitchen area with CGA-SBA using LRAD.     Dates: Start: 11/01/21             Problem: OT Long Term Goals     Dates: Start: 10/20/21       Goal: LTG-By discharge, patient will complete basic self care tasks with CGA- mod I and AE as needed     Dates: Start: 10/20/21          Goal: LTG-By discharge, patient will perform bathroom transfers with CGA- mod I and AE as needed     Dates: Start: 10/20/21

## 2021-11-05 NOTE — CARE PLAN
"  Problem: Psychosocial  Goal: Patient's level of anxiety will decrease  Outcome: Progressing  Note: Pt calm and cooperative , no s/s of anxiety noted     Problem: Bladder / Voiding  Goal: Patient will establish and maintain regular urinary output  Outcome: Progressing  Note: Pt continent/ incontinent of urine, kept dry and clean.     Problem: Fall Risk - Rehab  Goal: Patient will remain free from falls  Outcome: Progressing  Note: Bita Barron Fall risk Assessment Score: 17    High fall risk Interventions   -- Bed and strip alarm   - Yellow sign by the door   - Yellow wrist band \"Fall risk\"  - Room near to the nurse station  - Do not leave patient unattended in the bathroom  - Fall risk education provided         The patient is Stable - Low risk of patient condition declining or worsening    Shift Goals  Clinical Goals: Safety  Patient Goals: Safety    Progress made toward(s) clinical / shift goals: Pt free from and injury.          "

## 2021-11-05 NOTE — PROGRESS NOTES
[facility] Medicine Daily Progress Note    Date of Service  11/5/2021    Chief Complaint:  LLE edema  Thrombocytosis    Interval History:  Discussed about his platelets improving recently.    Review of Systems  Review of Systems   Constitutional: Negative for chills and fever.   Respiratory: Negative for shortness of breath.    Cardiovascular: Negative for chest pain.   Gastrointestinal: Negative for abdominal pain, diarrhea, nausea and vomiting.   Psychiatric/Behavioral: The patient is not nervous/anxious.         Physical Exam  Temp:  [36.4 °C (97.6 °F)-36.6 °C (97.9 °F)] 36.4 °C (97.6 °F)  Pulse:  [71-73] 73  Resp:  [18] 18  BP: (124-135)/(66-68) 135/66  SpO2:  [91 %-96 %] 91 %    Physical Exam  Vitals and nursing note reviewed.   Constitutional:       Appearance: Normal appearance.   HENT:      Head: Atraumatic.   Eyes:      Conjunctiva/sclera: Conjunctivae normal.      Pupils: Pupils are equal, round, and reactive to light.   Cardiovascular:      Rate and Rhythm: Normal rate and regular rhythm.   Pulmonary:      Effort: Pulmonary effort is normal.      Breath sounds: Normal breath sounds.   Abdominal:      General: Bowel sounds are normal.      Palpations: Abdomen is soft.   Musculoskeletal:      Cervical back: Normal range of motion and neck supple.      Right lower leg: No edema.      Left lower leg: No edema.   Skin:     General: Skin is warm and dry.   Neurological:      Mental Status: He is alert and oriented to person, place, and time.   Psychiatric:         Mood and Affect: Mood normal.         Behavior: Behavior normal.         Fluids    Intake/Output Summary (Last 24 hours) at 11/5/2021 1010  Last data filed at 11/5/2021 0900  Gross per 24 hour   Intake 420 ml   Output 1100 ml   Net -680 ml       Laboratory  Recent Labs     11/05/21 0522   WBC 7.3   RBC 4.58*   HEMOGLOBIN 15.3   HEMATOCRIT 43.9   MCV 95.9   MCH 33.4*   MCHC 34.9   RDW 45.3   PLATELETCT 805*   MPV 9.5     Recent Labs     11/05/21 0509    SODIUM 142   POTASSIUM 4.0   CHLORIDE 106   CO2 27   GLUCOSE 93   BUN 13   CREATININE 0.82   CALCIUM 9.0                   Imaging    Assessment/Plan  * Stroke (cerebrum) (HCC)- (present on admission)  Assessment & Plan  MRI: showed acute infarcts in the right MCA territory as described above predominantly involving the frontal lobe;          scattered foci of acute infarction are also noted in the right parietal lobe;          tiny foci of infarction are also noted in the left frontoparietal region.  S/P TPA (at [place])  S/P thrombectomy but was unsuccessful  On Lipitor  On ASA    Thrombocytosis  Assessment & Plan  Pt reports chronic personal and family history of Hereditary Spherocytosis  He has had a splenectomy  Platelets improved recently (11/5)  Note: on ASA and proph LMWH  Monitor    Urinary retention- (present on admission)  Assessment & Plan  On Flomax

## 2021-11-05 NOTE — THERAPY
Physical Therapy   Daily Treatment     Patient Name: Fermín Gomez  Age:  66 y.o., Sex:  male  Medical Record #: [id number]  Today's Date: 11/5/2021     Precautions  Precautions: Fall Risk  Comments: Left hemiparesis, left neglect, ZIO patch with  in wheelchair pouch 10 FT limit    Subjective    Pt resting in bed upon arrival, wife at bedside. Pt needed to change into his shorts for our FES session     Objective       11/05/21 1401   Neuro-Muscular Treatments   Neuro-Muscular Treatments Electrical Stimulation;Verbal Cuing   Comments see note   Interdisciplinary Plan of Care Collaboration   IDT Collaboration with  Physical Therapist;Family / Caregiver   Patient Position at End of Therapy Edge of Bed;Family / Friend in Room   Collaboration Comments PT: POC, SO present for therapy   PT Total Time Spent   PT Individual Total Time Spent (Mins) 60   PT Charge Group   PT Gait Training 1   PT Therapeutic Exercise 1   PT Neuromuscular Re-Education / Balance 2   Supervising Physical Therapist Jaqueline Baker     Gait training before and after  with R hand trekking pole and SBA/CGA. Cues for inc heel strike        x 20 minutes distance 2.67 mi avg power 5.3W, avg asymmetry L 37%.    Assessment    Pt participated in FES cycle as indicated above, palpable mm contraction in all mm groups therefore no need to adjust stim levels.   Strengths: Pleasant and cooperative, Willingly participates in therapeutic activities, Manages pain appropriately, Able to follow instructions  Barriers: Fatigue, Hemiparesis, Impaired balance    Plan    Family training w/ dtr : CGA for gait/cues L side attention & car transfer, Sunday: Damian 10MWT     Passport items to be completed:  All completed, exception of car transfer       Physical Therapy Problems (Active)     Problem: PT-Long Term Goals     Dates: Start: 10/20/21       Goal: LTG-By discharge, patient will ambulate     Dates: Start: 10/20/21       Goal Note filed on 10/20/21 1514 by  JEANIE Kam     1) Individualized goal:   Gait FWW/ FT, supervision at discharge  2) Interventions:  PT Gait Training, PT Therapeutic Exercises, PT Neuro Re-Ed/Balance, and PT Therapeutic Activity            Goal: LTG-By discharge, patient will transfer one surface to another     Dates: Start: 10/20/21       Goal Note filed on 10/20/21 1514 by JEANIE Kam     1) Individualized goal:   Transfer 1 surface to another fww/spc supervision at discharge  2) Interventions:  PT Gait Training, PT Therapeutic Exercises, PT Neuro Re-Ed/Balance, and PT Therapeutic Activity            Goal: LTG-By discharge, patient will ambulate up/down 4-6 stairs     Dates: Start: 10/20/21       Goal Note filed on 10/20/21 1514 by JEANIE Kam     1) Individualized goal:   Up/down 4-6 stairs handrails, supervision at discharge  2) Interventions:  PT Gait Training, PT Therapeutic Exercises, PT Neuro Re-Ed/Balance, and PT Therapeutic Activity            Goal: LTG-By discharge, patient will transfer in/out of a car     Dates: Start: 10/20/21       Goal Note filed on 10/20/21 1514 by JEANIE Kam     1) Individualized goal:   Car transfer fww/spc supervision at discharge  2) Interventions:  PT Gait Training, PT Therapeutic Exercises, PT Neuro Re-Ed/Balance, and PT Therapeutic Activity

## 2021-11-05 NOTE — THERAPY
"Occupational Therapy  Daily Treatment     Patient Name: Fermín Gomez  Age:  66 y.o., Sex:  male  Medical Record #: [id number]  Today's Date: 11/5/2021     Precautions  Precautions: (P) Fall Risk  Comments: (P) Left hemiparesis, left neglect, ZIO patch with  in wheelchair pouch 10 FT limit    Safety   ADL Safety : Requires Supervision for Safety, Requires Cueing for Safety  Bathroom Safety: Requires Supervision for Safety, Requires Cuing for Safety  Comments: see below functional levels for ADL performance details.    Subjective    \" OK\"  Agreeable to shower as tx activity      Objective       11/05/21 0701   Precautions   Precautions Fall Risk   Comments Left hemiparesis, left neglect, ZIO patch with  in wheelchair pouch 10 FT limit   Functional Level of Assist   Eating Modified Independent   Grooming Modified Independent   Bathing Modified Independent   Bathing Description Grab bar;Hand held shower;Tub bench   Upper Body Dressing Stand by Assist  (clothing retrieval with FWW  don pullover shirt no assist )   Lower Body Dressing Stand by Assist  (clothing retrieval using 4WW  assist to don RONNI hose  don)   Lower Body Dressing Description   (underwear pants and shoes no assist )   Toileting   (no need to toilet this session )   Tub / Shower Transfers Supervised  (using FWW )   Tub Shower Transfer Description Grab bar   Interdisciplinary Plan of Care Collaboration   IDT Collaboration with  Occupational Therapist;Physical Therapist   Patient Position at End of Therapy Seated;Call Light within Reach;Tray Table within Reach   Collaboration Comments current functional status   should remain supervisied for  transfers at this time  due to  slow reactions and left lean.   OT  review  ADL status,  step in shower transfer ( will have grab bars and shower seat at home,    Water proofing of Zio patch for showers)    OT Total Time Spent   OT Individual Total Time Spent (Mins) 60   OT Charge Group   OT Self Care / " ADL 4       Assessment     demonstrates improved independence with ADL tasks   Demonstrates good safety and improved balance.     Strengths: Alert and oriented, Independent prior level of function, Motivated for self care and independence, Pleasant and cooperative, Supportive family, Willingly participates in therapeutic activities  Barriers: Hemiparesis, Decreased endurance, Home accessibility, Impaired activity tolerance, Impaired balance, Impaired functional cognition, Limited mobility, Fatigue (impaired termination and initiation, impaired sequencing)    Plan     Family training next OT session  Prep for d/c home Monday 11-8-21    LUE neuro re-ed (focus on proximal strengthening, weightbearing, NMES, vibration, Saebo MAS), ADLs, functional mobility/transfers, balance.    for left UE not to include wrist flexors / extensors-Plan for  as adjunct tx 2 to 3 times weekly  pending Therapy tech availability     Occupational Therapy Goals (Active)     Problem: Functional Transfers     Dates: Start: 11/01/21       Goal: STG-Within one week, patient will transfer to step in shower with supervision using AE/DME as needed.     Dates: Start: 11/01/21             Problem: IADL's     Dates: Start: 11/01/21       Goal: STG-Within one week, patient will access kitchen area with CGA-SBA using LRAD.     Dates: Start: 11/01/21             Problem: OT Long Term Goals     Dates: Start: 10/20/21       Goal: LTG-By discharge, patient will complete basic self care tasks with CGA- mod I and AE as needed     Dates: Start: 10/20/21          Goal: LTG-By discharge, patient will perform bathroom transfers with CGA- mod I and AE as needed     Dates: Start: 10/20/21

## 2021-11-05 NOTE — THERAPY
"Physical Therapy   Daily Treatment     Patient Name: Fermín Gomez  Age:  66 y.o., Sex:  male  Medical Record #: [id number]  Today's Date: 11/5/2021     Precautions  Precautions: Fall Risk  Comments: Left hemiparesis, left neglect, ZIO patch with  in wheelchair pouch 10 FT limit    Subjective    Pt and spouse ready for therapy.     Objective       11/05/21 1031   Gait Functional Level of Assist    Gait Level Of Assist Contact Guard Assist   Assistive Device Other (Comments)  (R trekking pole)   Distance (Feet)   (x150', x700'outdoors, x200' indoors)   # of Times Distance was Traveled 3   Deviation Decreased Base Of Support;Bradykinetic;Decreased Heel Strike;Decreased Toe Off   Stairs Functional Level of Assist   Level of Assist with Stairs Contact Guard Assist   # of Stairs Climbed 4  (up/down, 4, 6\" stairs with 1 HR)   Stairs Description Extra time;Hand rails;Requires incidental assist;Verbal cueing   Transfer Functional Level of Assist   Bed, Chair, Wheelchair Transfer Stand by Assist   Bed Chair Wheelchair Transfer Description Increased time;Verbal cueing;Supervision for safety   Standing Lower Body Exercises   Hamstring Curl 1 set of 10   Hip Extension 1 set of 10   Hip Abduction 1 set of 10   Marching 1 set of 10   Heel Rise 1 set of 10   Toe Rise 1 set of 10   Mini Squat Partial;1 set of 10   Other Exercises Performed in // bars with 1 UE support, HO given for HEP, reinforced balance and strengthening for home.   Bed Mobility    Supine to Sit Supervised   Sit to Supine Supervised   Sit to Stand Stand by Assist   Scooting Modified Independent   Rolling Modified Independent   PT Total Time Spent   PT Individual Total Time Spent (Mins) 60   PT Charge Group   PT Gait Training 2   PT Therapeutic Exercise 1   PT Neuromuscular Re-Education / Balance 1     Chair<>floor transfer performed : ant and lateral approach demo'd and vc's for pt.   Pt performed chair<>floor ant approach from standing x1 CGA cues for " "seuqencing, 2nd rep with SBA x1 instance of CGA min cues for R side coordination.    Gait training up/down 4,6 \" stairs, 1 8\" curb, and outdoor amb with spouse using gait belt and trekking pole.  Both demo great body mechanics and safety, no concerns with stairs.  Pt and spouse cleared to amb on unit with use of gait belt and CGA.      Assessment    Pt demo cont improvements with balance and balance reactions, cont to be limited by attention, L side inattention and L toe drag.    Strengths: Pleasant and cooperative, Willingly participates in therapeutic activities, Manages pain appropriately, Able to follow instructions  Barriers: Fatigue, Hemiparesis, Impaired balance    Plan    Family training w/ dtr : CGA for gait/cues L side attention & car transfer, Sunday: Damian, 10MWT    Passport items to be completed:  All completed, exception of car transfer    Physical Therapy Problems (Active)     Problem: PT-Long Term Goals     Dates: Start: 10/20/21       Goal: LTG-By discharge, patient will ambulate     Dates: Start: 10/20/21       Goal Note filed on 10/20/21 1514 by JEANIE Kam     1) Individualized goal:   Gait FWW/ FT, supervision at discharge  2) Interventions:  PT Gait Training, PT Therapeutic Exercises, PT Neuro Re-Ed/Balance, and PT Therapeutic Activity            Goal: LTG-By discharge, patient will transfer one surface to another     Dates: Start: 10/20/21       Goal Note filed on 10/20/21 1514 by JEANIE Kam     1) Individualized goal:   Transfer 1 surface to another fww/spc supervision at discharge  2) Interventions:  PT Gait Training, PT Therapeutic Exercises, PT Neuro Re-Ed/Balance, and PT Therapeutic Activity            Goal: LTG-By discharge, patient will ambulate up/down 4-6 stairs     Dates: Start: 10/20/21       Goal Note filed on 10/20/21 1514 by JEANIE Kam     1) Individualized goal:   Up/down 4-6 stairs handrails, supervision at discharge  2) Interventions:  PT " Gait Training, PT Therapeutic Exercises, PT Neuro Re-Ed/Balance, and PT Therapeutic Activity            Goal: LTG-By discharge, patient will transfer in/out of a car     Dates: Start: 10/20/21       Goal Note filed on 10/20/21 8945 by JEANIE Kam     1) Individualized goal:   Car transfer fww/spc supervision at discharge  2) Interventions:  PT Gait Training, PT Therapeutic Exercises, PT Neuro Re-Ed/Balance, and PT Therapeutic Activity

## 2021-11-06 PROCEDURE — 770010 HCHG ROOM/CARE - REHAB SEMI PRIVAT*

## 2021-11-06 PROCEDURE — 700102 HCHG RX REV CODE 250 W/ 637 OVERRIDE(OP): Performed by: PHYSICAL MEDICINE & REHABILITATION

## 2021-11-06 PROCEDURE — 99231 SBSQ HOSP IP/OBS SF/LOW 25: CPT | Performed by: HOSPITALIST

## 2021-11-06 PROCEDURE — A9270 NON-COVERED ITEM OR SERVICE: HCPCS | Performed by: PHYSICAL MEDICINE & REHABILITATION

## 2021-11-06 RX ADMIN — MELATONIN TAB 3 MG 3 MG: 3 TAB at 19:55

## 2021-11-06 RX ADMIN — OMEPRAZOLE 20 MG: 20 CAPSULE, DELAYED RELEASE ORAL at 08:11

## 2021-11-06 RX ADMIN — ASPIRIN 81 MG CHEWABLE TABLET 81 MG: 81 TABLET CHEWABLE at 08:11

## 2021-11-06 RX ADMIN — TAMSULOSIN HYDROCHLORIDE 0.4 MG: 0.4 CAPSULE ORAL at 19:55

## 2021-11-06 RX ADMIN — ATORVASTATIN CALCIUM 80 MG: 40 TABLET, FILM COATED ORAL at 19:55

## 2021-11-06 ASSESSMENT — ENCOUNTER SYMPTOMS
PALPITATIONS: 0
VOMITING: 0
DIZZINESS: 0
NAUSEA: 0
SHORTNESS OF BREATH: 0
HALLUCINATIONS: 0
HEADACHES: 0
BLURRED VISION: 0
FEVER: 0

## 2021-11-06 ASSESSMENT — PAIN DESCRIPTION - PAIN TYPE: TYPE: ACUTE PAIN

## 2021-11-06 NOTE — CARE PLAN
"  Problem: Fall Risk - Rehab  Goal: Patient will remain free from falls  Note: Bita Barron Fall risk Assessment Score: 21    High fall risk Interventions   - Alarming seatbelt  - Wander guard  - Bed and strip alarm   - Yellow sign by the door   - Yellow wrist band \"Fall risk\"  - Room near to the nurse station  - Do not leave patient unattended in the bathroom  - Fall risk education provided    Patient impulsive x1, Call light with in reach. Redirection to use call light for assistance. Non skid socks. Upper siderails up x2 while in bed.        The patient is Stable - Low risk of patient condition declining or worsening    Shift Goals  Clinical Goals: Safety  Patient Goals: Safety        "

## 2021-11-06 NOTE — CARE PLAN
Problem: Psychosocial  Goal: Patient's level of anxiety will decrease  Outcome: Progressing  Mood improved with new walking status    Problem: Bladder / Voiding  Goal: Patient will establish and maintain regular urinary output  Outcome: Progressing  Continent of urine during the day    Problem: Bowel Elimination  Goal: Patient will participate in bowel management program  Outcome: Progressing  Denies constipation

## 2021-11-06 NOTE — CARE PLAN
Problem: Skin Integrity  Goal: Patient's skin integrity will be maintained or improve  Outcome: Progressing     Problem: Mobility  Goal: Patient's capacity to carry out activities will improve  Outcome: Progressing   The patient is Stable - Low risk of patient condition declining or worsening    Shift Goals  Clinical Goals: Safety  Patient Goals: Safety    Progress made toward(s) clinical / shift goals:      Patient is not progressing towards the following goals:

## 2021-11-06 NOTE — PROGRESS NOTES
[facility] Medicine Daily Progress Note    Date of Service  11/6/2021    Chief Complaint:  LLE edema  Thrombocytosis    Interval History:  No significant events over night.    Review of Systems  Review of Systems   Constitutional: Negative for fever.   Eyes: Negative for blurred vision.   Respiratory: Negative for shortness of breath.    Cardiovascular: Negative for palpitations.   Gastrointestinal: Negative for nausea and vomiting.   Neurological: Negative for dizziness and headaches.   Psychiatric/Behavioral: Negative for hallucinations.        Physical Exam  Temp:  [36 °C (96.8 °F)-36.7 °C (98 °F)] 36.7 °C (98 °F)  Pulse:  [68-74] 74  Resp:  [17-18] 17  BP: (116-125)/(58-66) 116/58  SpO2:  [90 %-94 %] 90 %    Physical Exam  Vitals and nursing note reviewed.   Constitutional:       General: He is not in acute distress.  HENT:      Mouth/Throat:      Mouth: Mucous membranes are moist.      Pharynx: Oropharynx is clear.   Eyes:      General: No scleral icterus.  Cardiovascular:      Rate and Rhythm: Normal rate and regular rhythm.   Pulmonary:      Effort: Pulmonary effort is normal.      Breath sounds: No wheezing or rales.   Abdominal:      General: Bowel sounds are normal.      Palpations: Abdomen is soft.   Musculoskeletal:      Cervical back: No rigidity.      Right lower leg: No edema.      Left lower leg: No edema.   Skin:     General: Skin is warm and dry.   Neurological:      Mental Status: He is alert and oriented to person, place, and time.   Psychiatric:         Mood and Affect: Mood normal.         Behavior: Behavior normal.         Fluids    Intake/Output Summary (Last 24 hours) at 11/6/2021 1025  Last data filed at 11/6/2021 0830  Gross per 24 hour   Intake 240 ml   Output --   Net 240 ml       Laboratory  Recent Labs     11/05/21 0555   WBC 7.3   RBC 4.58*   HEMOGLOBIN 15.3   HEMATOCRIT 43.9   MCV 95.9   MCH 33.4*   MCHC 34.9   RDW 45.3   PLATELETCT 805*   MPV 9.5     Recent Labs     11/05/21 0527    SODIUM 142   POTASSIUM 4.0   CHLORIDE 106   CO2 27   GLUCOSE 93   BUN 13   CREATININE 0.82   CALCIUM 9.0                   Imaging    Assessment/Plan  * Stroke (cerebrum) (HCC)- (present on admission)  Assessment & Plan  MRI: showed acute infarcts in the right MCA territory as described above predominantly involving the frontal lobe;          scattered foci of acute infarction are also noted in the right parietal lobe;          tiny foci of infarction are also noted in the left frontoparietal region.  S/P TPA (at [place])  S/P thrombectomy but was unsuccessful  On Lipitor  On ASA    Thrombocytosis  Assessment & Plan  Pt reports chronic personal and family history of Hereditary Spherocytosis  He has had a splenectomy  Platelets improved recently (11/5)  Note: on ASA and proph LMWH  Monitor    Urinary retention- (present on admission)  Assessment & Plan  On Flomax

## 2021-11-07 PROCEDURE — A9270 NON-COVERED ITEM OR SERVICE: HCPCS | Performed by: PHYSICAL MEDICINE & REHABILITATION

## 2021-11-07 PROCEDURE — 97112 NEUROMUSCULAR REEDUCATION: CPT | Mod: CO

## 2021-11-07 PROCEDURE — 700102 HCHG RX REV CODE 250 W/ 637 OVERRIDE(OP): Performed by: PHYSICAL MEDICINE & REHABILITATION

## 2021-11-07 PROCEDURE — 99231 SBSQ HOSP IP/OBS SF/LOW 25: CPT | Performed by: HOSPITALIST

## 2021-11-07 PROCEDURE — 97110 THERAPEUTIC EXERCISES: CPT | Mod: CO

## 2021-11-07 PROCEDURE — 97530 THERAPEUTIC ACTIVITIES: CPT

## 2021-11-07 PROCEDURE — 97116 GAIT TRAINING THERAPY: CPT

## 2021-11-07 PROCEDURE — 97112 NEUROMUSCULAR REEDUCATION: CPT

## 2021-11-07 PROCEDURE — 770010 HCHG ROOM/CARE - REHAB SEMI PRIVAT*

## 2021-11-07 RX ADMIN — MELATONIN TAB 3 MG 3 MG: 3 TAB at 21:03

## 2021-11-07 RX ADMIN — ATORVASTATIN CALCIUM 80 MG: 40 TABLET, FILM COATED ORAL at 21:03

## 2021-11-07 RX ADMIN — OMEPRAZOLE 20 MG: 20 CAPSULE, DELAYED RELEASE ORAL at 09:05

## 2021-11-07 RX ADMIN — ASPIRIN 81 MG CHEWABLE TABLET 81 MG: 81 TABLET CHEWABLE at 09:05

## 2021-11-07 RX ADMIN — TAMSULOSIN HYDROCHLORIDE 0.4 MG: 0.4 CAPSULE ORAL at 21:02

## 2021-11-07 ASSESSMENT — GAIT ASSESSMENTS
DEVIATION: DECREASED HEEL STRIKE;DECREASED TOE OFF
DISTANCE (FEET): 50
DISTANCE (FEET): 100
GAIT LEVEL OF ASSIST: MODIFIED INDEPENDENT
DEVIATION: DECREASED HEEL STRIKE;DECREASED TOE OFF
GAIT LEVEL OF ASSIST: SUPERVISED

## 2021-11-07 ASSESSMENT — 10 METER WALK TEST (10METWT)
TRIAL 3: TIME TO WALK 10 METERS: 11.83
AVERAGE TIME - SECONDS: 11.38
TRIAL 1: TIME TO WALK 10 METERS: 8.6
TRIAL 1: TIME TO WALK 10 METERS: 10.96
TRIAL 2: TIME TO WALK 10 METERS: 11.35
AVERAGE VELOCITY - METERS PER SECOND: .53
TRIAL 2: TIME TO WALK 10 METERS: 8.23
TRIAL 3: TIME TO WALK 10 METERS: 8.5
AVERAGE VELOCITY - METERS PER SECOND: .71

## 2021-11-07 ASSESSMENT — ACTIVITIES OF DAILY LIVING (ADL)
TOILET_TRANSFER_LEVEL_OF_ASSIST: ABLE TO COMPLETE TOILET TRANSFER WITHOUT ASSIST
SHOWER_TRANSFER_LEVEL_OF_ASSIST: ABLE TO COMPLETE SHOWER TRANSFER WITHOUT ASSIST
BED_CHAIR_WHEELCHAIR_TRANSFER_DESCRIPTION: ADAPTIVE EQUIPMENT;INCREASED TIME
TOILETING_LEVEL_OF_ASSIST: ABLE TO COMPLETE TOILETING WITHOUT ASSIST

## 2021-11-07 ASSESSMENT — BALANCE ASSESSMENTS
STANDING UNSUPPORTED WITH FEET TOGETHER: 3
PICK UP OBJECT FROM THE FLOOR FROM A STANDING POSITION: 4
LONG VERSION TOTAL SCORE (MAX 56): 48
REACHING FORWARD WITH OUTSTRETCHED ARM WHILE STANDING: 4
TURN 360 DEGREES: 4
STANDING UNSUPPORTED ONE FOOT IN FRONT: 2
STANDING UNSUPPORTED WITH EYES CLOSED: 4
STANDING ON ONE LEG: 2
LONG VERSION TOTAL SCORE (MAX 56): 48
LOOK OVER LEFT AND RIGHT SHOULDERS WHILE STANDING: 3
SITTING TO STANDING: 4
SITTING UNSUPPORTED: 4
STANDING UNSUPPORTED: 4
STANDING TO SITTING: 4
PLACE ALTERNATE FOOT ON STEP OR STOOL WHILE STANDING UNSUPPORTED: 2
TRANSFERS: 4

## 2021-11-07 ASSESSMENT — PAIN DESCRIPTION - PAIN TYPE: TYPE: ACUTE PAIN

## 2021-11-07 ASSESSMENT — ENCOUNTER SYMPTOMS
NAUSEA: 0
ABDOMINAL PAIN: 0
FEVER: 0
DIARRHEA: 0
VOMITING: 0
SHORTNESS OF BREATH: 0
NERVOUS/ANXIOUS: 0
CHILLS: 0

## 2021-11-07 ASSESSMENT — FIBROSIS 4 INDEX: FIB4 SCORE: 0.73

## 2021-11-07 NOTE — CARE PLAN
Problem: OT Long Term Goals  Goal: LTG-By discharge, patient will complete basic self care tasks with CGA- mod I and AE as needed  Outcome: Met  Goal: LTG-By discharge, patient will perform bathroom transfers with CGA- mod I and AE as needed  Outcome: Met

## 2021-11-07 NOTE — CARE PLAN
"  Problem: Fall Risk - Rehab  Goal: Patient will remain free from falls  Note: Bita Barron Fall risk Assessment Score: 15    High fall risk Interventions   - Alarming seatbelt  - Wander guard  - Bed and strip alarm   - Yellow sign by the door   - Yellow wrist band \"Fall risk\"  - Room near to the nurse station  - Do not leave patient unattended in the bathroom  - Fall risk education provided           The patient is Stable - Low risk of patient condition declining or worsening    Shift Goals  Clinical Goals: Safety  Patient Goals: Safety      "

## 2021-11-07 NOTE — THERAPY
"Occupational Therapy  Daily Treatment     Patient Name: Fermín Gomez  Age:  66 y.o., Sex:  male  Medical Record #: [id number]  Today's Date: 11/7/2021     Precautions  Precautions: Other (See Comments)  Comments: (P) Left hemiparesis, left neglect, ZIO patch with  in wheelchair pouch 10 FT limit    Safety   ADL Safety : Requires Supervision for Safety,Requires Cueing for Safety  Bathroom Safety: Requires Supervision for Safety,Requires Cuing for Safety  Comments: see below functional levels for ADL performance details.    Subjective      \" I like that .\" referring to  as a tx activity option this session      Objective       11/07/21 1231   Precautions   Comments Left hemiparesis, left neglect, ZIO patch with  in wheelchair pouch 10 FT limit   Neuro-Muscular Treatments   Neuro-Muscular Treatments Electrical Stimulation   Comments   total 29 minutes 35 seconds  time off motor  15  time on motor  14 minutes 35 seconds   4.4 miles distance  symmetry 5% left         Interdisciplinary Plan of Care Collaboration   IDT Collaboration with  Family / Caregiver   Patient Position at End of Therapy Seated;Family / Friend in Room  (daughter transporting  Alvarez back to room via w/c)   Collaboration Comments Daughter Brown present throughout session    OT Total Time Spent   OT Individual Total Time Spent (Mins) 60   OT Charge Group   OT Neuromuscular Re-education / Balance 4      reviewed hand out for HEP  That includes supine exercises he has been performing during  tx sessions   Reviewed   Not yet cleared to drive.   Follow up with Dr Cifuentes for  Referral for out patient therapy for driving evaluation after HH   Therapy is complete.     Assessment     completed tx with no complaints.  Verbalized understanding  Regarding not driving at this time.     Strengths: Alert and oriented,Independent prior level of function,Motivated for self care and independence,Pleasant and cooperative,Supportive " family,Willingly participates in therapeutic activities  Barriers: Hemiparesis,Decreased endurance,Home accessibility,Impaired activity tolerance,Impaired balance,Impaired functional cognition,Limited mobility,Fatigue (impaired termination and initiation, impaired sequencing)    Plan     d/c home 11-8-21    Passport  Complete     Occupational Therapy Goals (Active)     Problem: Functional Transfers     Dates: Start: 11/01/21       Goal: STG-Within one week, patient will transfer to step in shower with supervision using AE/DME as needed.     Dates: Start: 11/01/21             Problem: IADL's     Dates: Start: 11/01/21       Goal: STG-Within one week, patient will access kitchen area with CGA-SBA using LRAD.     Dates: Start: 11/01/21             Problem: OT Long Term Goals     Dates: Start: 10/20/21       Goal: LTG-By discharge, patient will complete basic self care tasks with CGA- mod I and AE as needed     Dates: Start: 10/20/21          Goal: LTG-By discharge, patient will perform bathroom transfers with CGA- mod I and AE as needed     Dates: Start: 10/20/21

## 2021-11-07 NOTE — PROGRESS NOTES
[facility] Medicine Daily Progress Note    Date of Service  11/7/2021    Chief Complaint:  LLE edema  Thrombocytosis    Interval History:  No complaints.  Doing ok.    Review of Systems  Review of Systems   Constitutional: Negative for chills and fever.   Respiratory: Negative for shortness of breath.    Cardiovascular: Negative for chest pain.   Gastrointestinal: Negative for abdominal pain, diarrhea, nausea and vomiting.   Psychiatric/Behavioral: The patient is not nervous/anxious.         Physical Exam  Temp:  [36.6 °C (97.8 °F)-36.9 °C (98.4 °F)] 36.6 °C (97.8 °F)  Pulse:  [63-73] 73  Resp:  [18-19] 19  BP: (119-125)/(58-74) 120/74  SpO2:  [92 %-96 %] 92 %    Physical Exam  Vitals and nursing note reviewed.   Constitutional:       Appearance: Normal appearance.   HENT:      Head: Atraumatic.   Eyes:      Conjunctiva/sclera: Conjunctivae normal.      Pupils: Pupils are equal, round, and reactive to light.   Cardiovascular:      Rate and Rhythm: Normal rate and regular rhythm.      Heart sounds: No murmur heard.      Pulmonary:      Effort: Pulmonary effort is normal.      Breath sounds: No stridor. No wheezing or rales.   Abdominal:      General: There is no distension.      Palpations: Abdomen is soft.      Tenderness: There is no abdominal tenderness.   Musculoskeletal:      Cervical back: Normal range of motion and neck supple.      Right lower leg: No edema.      Left lower leg: No edema.   Skin:     General: Skin is warm and dry.      Findings: No rash.   Neurological:      Mental Status: He is alert and oriented to person, place, and time.   Psychiatric:         Mood and Affect: Mood normal.         Behavior: Behavior normal.         Fluids    Intake/Output Summary (Last 24 hours) at 11/7/2021 0950  Last data filed at 11/6/2021 1955  Gross per 24 hour   Intake 600 ml   Output 200 ml   Net 400 ml       Laboratory  Recent Labs     11/05/21  0525   WBC 7.3   RBC 4.58*   HEMOGLOBIN 15.3   HEMATOCRIT 43.9   MCV  95.9   MCH 33.4*   MCHC 34.9   RDW 45.3   PLATELETCT 805*   MPV 9.5     Recent Labs     11/05/21  0525   SODIUM 142   POTASSIUM 4.0   CHLORIDE 106   CO2 27   GLUCOSE 93   BUN 13   CREATININE 0.82   CALCIUM 9.0                   Imaging    Assessment/Plan  * Stroke (cerebrum) (HCC)- (present on admission)  Assessment & Plan  MRI: showed acute infarcts in the right MCA territory as described above predominantly involving the frontal lobe;          scattered foci of acute infarction are also noted in the right parietal lobe;          tiny foci of infarction are also noted in the left frontoparietal region.  S/P TPA (at [place])  S/P thrombectomy but was unsuccessful  On Lipitor  On ASA    Thrombocytosis  Assessment & Plan  Pt reports chronic personal and family history of Hereditary Spherocytosis  He has had a splenectomy  Platelets improved recently (11/5)  Note: on ASA and proph LMWH  Monitor    Urinary retention- (present on admission)  Assessment & Plan  On Flomax

## 2021-11-07 NOTE — THERAPY
Physical Therapy   Daily Treatment     Patient Name: Fermín Gomez  Age:  66 y.o., Sex:  male  Medical Record #: [id number]  Today's Date: 11/7/2021     Precautions  Precautions: (P) Other (See Comments)  Comments: Left hemiparesis, left neglect, ZIO patch with  in wheelchair pouch 10 FT limit    Subjective    Pt received in the room with his daughter for family training. Pt agreeable to PT session.     Objective       11/07/21 1031   Precautions   Precautions Other (See Comments)   Pain   Intervention Declines   Cognition    Level of Consciousness Alert   Sleep/Wake Cycle   Sleep & Rest Awake;Out of bed   Gait Functional Level of Assist    Gait Level Of Assist Modified Independent   Assistive Device   (R trekking pole)   Distance (Feet) 50  (300ft with supervision, 30ft outdoors with SBA)   Deviation Decreased Heel Strike;Decreased Toe Off  (Occasional L shuffle step, <10% of the time)   Transfer Functional Level of Assist   Bed, Chair, Wheelchair Transfer Modified Independent   Bed Chair Wheelchair Transfer Description Adaptive equipment;Increased time   Bed Mobility    Supine to Sit Modified Independent   Sit to Supine Modified Independent   Sit to Stand Modified Independent  (trekking pole on R)   Scooting Modified Independent   Rolling Modified Independent   10 Meter Walk Test   Normal - Trial 1 10.96 seconds   Normal - Trial 2 11.35 seconds   Normal - Trial 3 11.83 seconds   Fast - Trial 1 8.6 seconds   Fast - Trial 2 8.23 seconds   Fast - Trial 3 8.5 seconds   Normal Average Time 11.38 seconds   Normal Average Velocity (m/s) 0.53   Fast Average Time 8.44 seconds   Fast Average Velocity (m/s) 0.71   Salgado Balance Scale   Sitting Unsupported (Score 0-4) 4   Change Of Positon: Sitting To Standing (Score 0-4) 4   Change Of Positon: Standing To Sitting (Score 0-4) 4   Transfers (Score 0-4) 4   Standing Unsupported (Score 0-4) 4   Standing With Eyes Closed (Score 0-4) 4   Standing With Feet Together (Score  0-4) 3   Tandem Standing (Score 0-4) 2   Standing On One Leg (Score 0-4) 2   Turning Trunk (Feet Fixed) (Score 0-4) 3   Retrieving Objects From Floor (Score 0-4) 4   Turning 360 Degrees (Score 0-4) 4   Stool Stepping (Score 0-4) 2   Reaching Forward While Standing (Score 0-4) 4   Salgado Balance Total Score (0-56) 48   Interdisciplinary Plan of Care Collaboration   IDT Collaboration with  Certified Nursing Assistant;Nursing   Patient Position at End of Therapy Seated;Call Light within Reach;Tray Table within Reach;Phone within Reach;Family / Friend in Room   Collaboration Comments Pt cleared to be mod I in room with trekking pole, SBA on unit with family or staff. Pt a low fall risk per Salgado scores and demonstrated ability to perform sup<>sit and walk to the bathroom safely.   PT Total Time Spent   PT Individual Total Time Spent (Mins) 60   PT Charge Group   PT Gait Training 2   PT Therapeutic Activities 2     Car transfer: supervised with cues for sitting down first prior to bringing LE into the truck. Daughter present. Pt multiple attempts to open the door with his L hand. Recommending family assist with this. Deferred questions about return to driving to medical team and OT to explain process.    10MWT: completed with R trekking pole, likely contributing to slower time as compared to with FWW prior.    See flow sheet for discharge IRF-VANESA scores completed.    Assessment    Pt tolerated therapy session well and is at a low risk for falls based on his Salgado Balance Scale score of 48. Reviewed family training recommendations with pt's daughter and completed IRF-VANESA items. Pt cleared to be mod I in the room with the trekking pole or wheelchair transfers to get to the bathroom.  Strengths: Pleasant and cooperative,Willingly participates in therapeutic activities,Manages pain appropriately,Able to follow instructions  Barriers: Fatigue,Hemiparesis,Impaired balance    Plan    Complete passport, continue balance training. Pt  to DC home tomorrow.      Physical Therapy Problems (Active)     Problem: PT-Long Term Goals     Dates: Start: 10/20/21       Goal: LTG-By discharge, patient will ambulate     Dates: Start: 10/20/21       Goal Note filed on 10/20/21 1514 by JEANIE Kam     1) Individualized goal:   Gait FWW/ FT, supervision at discharge  2) Interventions:  PT Gait Training, PT Therapeutic Exercises, PT Neuro Re-Ed/Balance, and PT Therapeutic Activity            Goal: LTG-By discharge, patient will transfer one surface to another     Dates: Start: 10/20/21       Goal Note filed on 10/20/21 1514 by JEANIE Kam     1) Individualized goal:   Transfer 1 surface to another fww/spc supervision at discharge  2) Interventions:  PT Gait Training, PT Therapeutic Exercises, PT Neuro Re-Ed/Balance, and PT Therapeutic Activity            Goal: LTG-By discharge, patient will ambulate up/down 4-6 stairs     Dates: Start: 10/20/21       Goal Note filed on 10/20/21 1514 by JEANIE Kam     1) Individualized goal:   Up/down 4-6 stairs handrails, supervision at discharge  2) Interventions:  PT Gait Training, PT Therapeutic Exercises, PT Neuro Re-Ed/Balance, and PT Therapeutic Activity            Goal: LTG-By discharge, patient will transfer in/out of a car     Dates: Start: 10/20/21       Goal Note filed on 10/20/21 1514 by JEANIE Kam     1) Individualized goal:   Car transfer fww/spc supervision at discharge  2) Interventions:  PT Gait Training, PT Therapeutic Exercises, PT Neuro Re-Ed/Balance, and PT Therapeutic Activity

## 2021-11-07 NOTE — THERAPY
"Occupational Therapy  Daily Treatment     Patient Name: Fermín Gomez  Age:  66 y.o., Sex:  male  Medical Record #: [id number]  Today's Date: 11/7/2021     Precautions  Precautions: (P) Fall Risk  Comments: Left hemiparesis, left neglect, ZIO patch with  in wheelchair pouch 10 FT limit    Safety   ADL Safety : Requires Supervision for Safety,Requires Cueing for Safety  Bathroom Safety: Requires Supervision for Safety,Requires Cuing for Safety  Comments: see below functional levels for ADL performance details.    Subjective    \" OK\"  Agreeable to tx activity      Objective       11/07/21 0831   Precautions   Precautions Fall Risk   OT Total Time Spent   OT Individual Total Time Spent (Mins) 30   OT Charge Group   OT Therapeutic Exercise  2      supine ther ex performed as follows     Left UE  Chest presses  X 5 reps x 3   With full extension of elbow no cueing needed. 5 reps  X 3 with 1lb weight    straight arm raises   X  5 reps  5  Cues to adduct the left arm  Prior to prevent compensatory abduction.     Performed initially to 90 degrees   Increasing  To approximately 130 degrees of flexion   Initial difficulty with maintaining elbow extension but  This improved as exercise progressed   attempted straight arm raise with 1lb weight however unable to complete with compensatory  Elbow flexion and shoulder abduction.       Transfer  W/c  <-> supine on mat    Independent.   Assessment     continues with improving left UE functional return.     Strengths: Alert and oriented,Independent prior level of function,Motivated for self care and independence,Pleasant and cooperative,Supportive family,Willingly participates in therapeutic activities  Barriers: Hemiparesis,Decreased endurance,Home accessibility,Impaired activity tolerance,Impaired balance,Impaired functional cognition,Limited mobility,Fatigue (impaired termination and initiation, impaired sequencing)    Plan      in  PM session  Issue  UE supine HEP ,  " Review return to driving  Considerations / requirements         Occupational Therapy Goals (Active)     Problem: Functional Transfers     Dates: Start: 11/01/21       Goal: STG-Within one week, patient will transfer to step in shower with supervision using AE/DME as needed.     Dates: Start: 11/01/21             Problem: IADL's     Dates: Start: 11/01/21       Goal: STG-Within one week, patient will access kitchen area with CGA-SBA using LRAD.     Dates: Start: 11/01/21             Problem: OT Long Term Goals     Dates: Start: 10/20/21       Goal: LTG-By discharge, patient will complete basic self care tasks with CGA- mod I and AE as needed     Dates: Start: 10/20/21          Goal: LTG-By discharge, patient will perform bathroom transfers with CGA- mod I and AE as needed     Dates: Start: 10/20/21

## 2021-11-08 VITALS
TEMPERATURE: 97.7 F | HEIGHT: 61 IN | DIASTOLIC BLOOD PRESSURE: 66 MMHG | OXYGEN SATURATION: 92 % | HEART RATE: 73 BPM | SYSTOLIC BLOOD PRESSURE: 129 MMHG | WEIGHT: 184 LBS | BODY MASS INDEX: 34.74 KG/M2 | RESPIRATION RATE: 16 BRPM

## 2021-11-08 PROCEDURE — A9270 NON-COVERED ITEM OR SERVICE: HCPCS | Performed by: PHYSICAL MEDICINE & REHABILITATION

## 2021-11-08 PROCEDURE — 99239 HOSP IP/OBS DSCHRG MGMT >30: CPT | Performed by: PHYSICAL MEDICINE & REHABILITATION

## 2021-11-08 PROCEDURE — 99231 SBSQ HOSP IP/OBS SF/LOW 25: CPT | Performed by: HOSPITALIST

## 2021-11-08 PROCEDURE — 700102 HCHG RX REV CODE 250 W/ 637 OVERRIDE(OP): Performed by: PHYSICAL MEDICINE & REHABILITATION

## 2021-11-08 RX ORDER — TAMSULOSIN HYDROCHLORIDE 0.4 MG/1
0.4 CAPSULE ORAL EVERY EVENING
Qty: 30 CAPSULE | Refills: 0 | Status: SHIPPED | OUTPATIENT
Start: 2021-11-08 | End: 2021-12-01

## 2021-11-08 RX ADMIN — ASPIRIN 81 MG CHEWABLE TABLET 81 MG: 81 TABLET CHEWABLE at 08:52

## 2021-11-08 RX ADMIN — OMEPRAZOLE 20 MG: 20 CAPSULE, DELAYED RELEASE ORAL at 08:52

## 2021-11-08 ASSESSMENT — ENCOUNTER SYMPTOMS
BRUISES/BLEEDS EASILY: 0
NAUSEA: 0
EYES NEGATIVE: 1
COUGH: 0
ABDOMINAL PAIN: 0
VOMITING: 0
CHILLS: 0
FEVER: 0
SHORTNESS OF BREATH: 0
MUSCULOSKELETAL NEGATIVE: 1
PALPITATIONS: 0
POLYDIPSIA: 0

## 2021-11-08 NOTE — CARE PLAN
The patient is Stable - Low risk of patient condition declining or worsening      Problem: Knowledge Deficit - Standard  Goal: Patient and family/care givers will demonstrate understanding of plan of care, disease process/condition, diagnostic tests and medications  Outcome: Met     Problem: Psychosocial  Goal: Patient's level of anxiety will decrease  Outcome: Met  Goal: Patient's ability to verbalize feelings about condition will improve  Outcome: Met  Goal: Patient's ability to re-evaluate and adapt role responsibilities will improve  Outcome: Met  Goal: Patient and family will demonstrate ability to cope with life altering diagnosis and/or procedure  Outcome: Met  Goal: Spiritual and cultural needs incorporated into hospitalization  Outcome: Met     Problem: Bladder / Voiding  Goal: Patient will establish and maintain regular urinary output  Outcome: Met  Goal: Patient will establish and maintain bladder regimen  Outcome: Met     Problem: Bowel Elimination  Goal: Patient will participate in bowel management program  Outcome: Met     Problem: Skin Integrity  Goal: Patient's skin integrity will be maintained or improve  Outcome: Met     Problem: Mobility  Goal: Patient's capacity to carry out activities will improve  Outcome: Met     Problem: Infection  Goal: Patient will remain free from infection  Outcome: Met     Problem: VTE Prevention  Goal: Patient will remain free from venous thromboembolism (VTE)  Outcome: Met     Problem: Skin Integrity  Goal: Skin integrity is maintained or improved  Outcome: Met

## 2021-11-08 NOTE — PROGRESS NOTES
Patient discharged to home per order.  Discharge instructions reviewed with patient and spouse; they verbalize understanding and signed copies placed in chart.  Patient has all belongings; signed copy of form in chart.  Patient left facility at 1000

## 2021-11-08 NOTE — CARE PLAN
"  Problem: Fall Risk - Rehab  Goal: Patient will remain free from falls  Note: Bita Barron Fall risk Assessment Score: 15    High fall risk Interventions   - Alarming seatbelt  - Wander guard  - Bed and strip alarm   - Yellow sign by the door   - Yellow wrist band \"Fall risk\"  - Room near to the nurse station  - Do not leave patient unattended in the bathroom  - Fall risk education provided    Patient for discharge home 11/8/21. Stroke, Dysphagia, Left hemiparesis, SDH, Urinary retention, Leukocytosis, Thrombocytosis, BLE edema. Hx Splenectomy. Continent of bladder and bowel. Regular, thin liquids diet.       The patient is Stable - Low risk of patient condition declining or worsening    Shift Goals  Clinical Goals: Safety  Patient Goals: Safety      "

## 2021-11-08 NOTE — PROGRESS NOTES
Hospital Medicine Daily Progress Note      Chief Complaint:  Leukocytosis    Interval History:  No 24 hour clinical changes.    Review of Systems  Review of Systems   Constitutional: Negative for chills and fever.   HENT: Negative.    Eyes: Negative.    Respiratory: Negative for cough and shortness of breath.    Cardiovascular: Negative for chest pain and palpitations.   Gastrointestinal: Negative for abdominal pain, nausea and vomiting.   Musculoskeletal: Negative.    Skin: Negative for itching and rash.   Neurological: Focal weakness:      Endo/Heme/Allergies: Negative for polydipsia. Does not bruise/bleed easily.        Physical Exam  Temp:  [36.4 °C (97.6 °F)-36.7 °C (98.1 °F)] 36.5 °C (97.7 °F)  Pulse:  [60-73] 73  Resp:  [16-20] 16  BP: (109-129)/(46-66) 129/66  SpO2:  [92 %-97 %] 92 %    Physical Exam  Vitals reviewed.   Constitutional:       General: He is not in acute distress.     Appearance: Normal appearance. He is not ill-appearing.   HENT:      Head: Normocephalic and atraumatic.      Right Ear: External ear normal.      Left Ear: External ear normal.      Nose: Nose normal.      Mouth/Throat:      Pharynx: Oropharynx is clear.   Eyes:      General:         Right eye: No discharge.         Left eye: No discharge.      Extraocular Movements: Extraocular movements intact.      Conjunctiva/sclera: Conjunctivae normal.   Cardiovascular:      Rate and Rhythm: Normal rate and regular rhythm.   Pulmonary:      Effort: No respiratory distress.      Breath sounds: No wheezing.      Comments: Decreased BS   Abdominal:      General: Bowel sounds are normal. There is no distension.      Palpations: Abdomen is soft.      Tenderness: There is no abdominal tenderness.   Musculoskeletal:      Cervical back: Normal range of motion and neck supple.      Right lower leg: No edema.      Left lower leg: No edema.   Skin:     General: Skin is warm and dry.   Neurological:      Mental Status: He is alert and oriented to  person, place, and time.         Fluids    Intake/Output Summary (Last 24 hours) at 11/8/2021 1035  Last data filed at 11/8/2021 0900  Gross per 24 hour   Intake 700 ml   Output --   Net 700 ml       Laboratory                  Assessment/Plan  * Stroke (cerebrum) (HCC)- (present on admission)  Assessment & Plan  S/P TPA at [facility]  S/P unsuccessful thrombectomy  On ASA and Lipitor    Thrombocytosis  Assessment & Plan  Pt reports chronic personal and family history of Hereditary Spherocytosis  He is S/P splenectomy  Suspect Platelet counts are reactive but query etiology of pt's recent stroke  Pt already on a baby ASA, consider increase to full dose ASA  Elevated Platelet counts improving  Consider Hematology evaluation    Urinary retention- (present on admission)  Assessment & Plan  Monitor for orthostatic hypotension on Flomax     Leukocytosis- (present on admission)  Assessment & Plan  UCx 10-50,000 E Coli, pansensitive  CXR negative acute  Completed abx  WBC resolved    Full Code

## 2021-11-08 NOTE — CARE PLAN
"The patient is Stable - Low risk of patient condition declining or worsening      Problem: Fall Risk - Rehab  Goal: Patient will remain free from falls  Note: Bita Barron Fall risk Assessment Score: 15    High fall risk Interventions   - Alarming seatbelt  - Wander guard  - Bed and strip alarm   - Yellow sign by the door   - Yellow wrist band \"Fall risk\"  - Room near to the nurse station  - Do not leave patient unattended in the bathroom  - Fall risk education provided         "

## 2021-11-08 NOTE — THERAPY
Physical Therapy   Daily Treatment     Patient Name: Fermín Gomez  Age:  66 y.o., Sex:  male  Medical Record #: [id number]  Today's Date: 11/7/2021     Precautions  Precautions: Other (See Comments)  Comments: Left hemiparesis, left neglect, ZIO patch with  in wheelchair pouch 10 FT limit    Subjective    Pt received in the wheelchair and agreeable to PT session.     Objective       11/07/21 1401   Pain   Intervention Declines   Cognition    Level of Consciousness Alert   Sleep/Wake Cycle   Sleep & Rest Awake;Out of bed   Gait Functional Level of Assist    Gait Level Of Assist Supervised   Assistive Device   (R trekking pole)   Distance (Feet) 100   # of Times Distance was Traveled 2   Deviation Decreased Heel Strike;Decreased Toe Off   Neuro-Muscular Treatments   Comments Dynamic gait for balance in the parallel bars 3 laps back and forth each: braiding, high step walking, side steps, head turns up/down, head turns L/R. Intermittent assist from the parallel bars. Pt required most assist with hand on the bars for head turns L/R.    Interdisciplinary Plan of Care Collaboration   Patient Position at End of Therapy Seated;Call Light within Reach;Tray Table within Reach;Phone within Reach   PT Total Time Spent   PT Individual Total Time Spent (Mins) 30   PT Charge Group   PT Neuromuscular Re-Education / Balance 2       Assessment    Pt tolerated therapy session working on dynamic gait with intermittent UE support to challenge balance. Pt had the most difficulty with head turns left and right, requiring more time spent with UE support.  Strengths: Pleasant and cooperative,Willingly participates in therapeutic activities,Manages pain appropriately,Able to follow instructions  Barriers: Fatigue,Hemiparesis,Impaired balance    Plan    DC home tomorrow    Physical Therapy Problems (Active)     Problem: PT-Long Term Goals     Dates: Start: 10/20/21       Goal: LTG-By discharge, patient will ambulate     Dates: Start:  10/20/21       Goal Note filed on 10/20/21 1514 by JEANIE Kam     1) Individualized goal:   Gait FWW/ FT, supervision at discharge  2) Interventions:  PT Gait Training, PT Therapeutic Exercises, PT Neuro Re-Ed/Balance, and PT Therapeutic Activity            Goal: LTG-By discharge, patient will transfer one surface to another     Dates: Start: 10/20/21       Goal Note filed on 10/20/21 1514 by JEANIE Kam     1) Individualized goal:   Transfer 1 surface to another fww/spc supervision at discharge  2) Interventions:  PT Gait Training, PT Therapeutic Exercises, PT Neuro Re-Ed/Balance, and PT Therapeutic Activity            Goal: LTG-By discharge, patient will ambulate up/down 4-6 stairs     Dates: Start: 10/20/21       Goal Note filed on 10/20/21 1514 by JEANIE Kam     1) Individualized goal:   Up/down 4-6 stairs handrails, supervision at discharge  2) Interventions:  PT Gait Training, PT Therapeutic Exercises, PT Neuro Re-Ed/Balance, and PT Therapeutic Activity            Goal: LTG-By discharge, patient will transfer in/out of a car     Dates: Start: 10/20/21       Goal Note filed on 10/20/21 1514 by JEANIE Kam     1) Individualized goal:   Car transfer fww/spc supervision at discharge  2) Interventions:  PT Gait Training, PT Therapeutic Exercises, PT Neuro Re-Ed/Balance, and PT Therapeutic Activity

## 2021-11-08 NOTE — DISCHARGE SUMMARY
"Rehab Discharge Summary    Admission Date: 10/19/2021    Discharge Date: 11/8/2021    Attending Provider:  Janet Herrera DO     Admission Diagnosis:   Active Hospital Problems    Diagnosis    • *Stroke (cerebrum) (HCC)    • Thrombocytosis    • Edema of left lower extremity    • Urinary retention    • Leukocytosis    • Impaired mobility and ADLs    • Dysphagia    • Left hemiparesis (HCC)    • Subdural hematoma (HCC)    • H/O splenectomy        Discharge Diagnosis:  Active Hospital Problems    Diagnosis    • *Stroke (cerebrum) (HCC)    • Thrombocytosis    • Edema of left lower extremity    • Urinary retention    • Leukocytosis    • Impaired mobility and ADLs    • Dysphagia    • Left hemiparesis (HCC)    • Subdural hematoma (HCC)    • H/O splenectomy        HPI per H&P:  The patient is a 66 y.o. right-handed male with spherocytosis s/p splenectomy, GERD; who presented on 10/12/2021  4:56 PM as a transfer from [facility] after a slip and fall on ice. Reportedly patient had left-sided deficits upon arrival to [place] patient was deemed to be a candidate for TPA based on his imaging, documentation of CT angiogram of the head and neck revealed a right M1 occlusion.  Patient was then transferred to [facility] for higher level of care and thrombectomy.  Patient underwent a right MCA thrombectomy, dominant inferior division of right M2 partial occlusion which was unable to be recannulized, TICI 2A.  Per documentation, patient had a significant improvement in his symptoms after TPA administration.  Per the neurology consult NIH was 12 which improved to an NIH of 2.  Status post mechanical thrombectomy, patient has was admitted to the ICU. HgbA1c not tested, LDL 87, ECHO EF 60 %. Elevated T bili. Repeat Head imaging with small left SDH over frontal lobe. \"     Patient was admitted to [facility] on 10/19/2021.     Hospital Course by Problem List:  Right MCA stroke  S/p tPA  S/p thrombectomy  Left " hemiparesis, improved  Dysphagia, improved  Continue full rehab program  PT/OT/SLP, 1 hr each discipline, 5 days per week  Aspirin, Statin  Imaging  Reviewed with patient and wife  Ziopatch cardiac monitor placed 10/25 - this was a XT, patient needs a AT - with real time recording and gateway device, to be delivered this afternoon  Outpatient follow up with Dr. Cifuentes, stroke bridge clinic, referrals made  Outpatient follow up with cardiology, referral made     Subdural hematoma, small  Likely due to fall     Oral thrush, resolved  s/p Nystatin     Thrombocytosis  Stress reaction  Monitor with weekly labs  Plt with slight continued uptrend, will discuss with Hospitalist.    Plt improved from 1033-> 921 -> 805 as of 11/5      Leukocytosis, resolved   Checked UA, negative, then positive on 10/22  Culture with pansensitive E Coli  Checked CXR - negative x 2  Appreciate hospitalist assistance  WBC 7.9 as of 11/1 ,      Suspected aspiration pneumonia  Continue antibiotics, improved WBC, afebrile   Appreciate hospitalist assistance  - completed levaquin      Hyperglycemia, resolved   Checked HgbA1c - 4.6  Likely stress response     GERD  Omeprazole     Insomnia, improved  Schedule melatonin and trazodone  - will change trazodone to PRN per patient's request      Bowel program  PRN bowel medications  Last BM 10/26     Bladder program  Urinary retention  Started Hytrin 2 mg  Check PVRs - 212 (10/28)  No longer retaining  Bladder scan for no voids  ICP for over 400 cc - last required 10/20, 10/25  Scheduled toileting    Functional Status at Discharge  Eating:  Modified Independent  Eating Description:  Set-up of equipment or meal/tube feeding (set up of items on tray)  Grooming:  Modified Independent  Grooming Description:  Increased time  Bathing:  Modified Independent  Bathing Description:  Grab bar,Hand held shower,Tub bench  Upper Body Dressing:  Stand by Assist (clothing retrieval with FWW  don pullover shirt no assist  )  Upper Body Dressing Description:  Increased time (don/doff pullover shirt)  Lower Body Dressing:  Stand by Assist (clothing retrieval using 4WW  assist to don RONNI hose  don)  Lower Body Dressing Description:   (underwear pants and shoes no assist )  Discharge Location : Home  Patient Discharging with Assist of: Family   Level of Supervision Required: Intermittent Supervision  Recommended Equipment for Discharge: Shower Chair;Grab Bars in Tub / Shower  Recommended Services Upon Discharge: Home Health Occupational Therapy;Outpatient Occupational Therapy  Long Term Goals Met:  CGA to mod I basic self care and  bathroom trasnfers   Criteria for Termination of Services: Maximum Function Achieved for Inpatient Rehabilitation  Walk:  Supervised  Distance Walked:  100  Number of Times Distance Was Traveled:  2  Assistive Device:   (R trekking pole)  Gait Deviation:  Decreased Heel Strike,Decreased Toe Off  Wheelchair:   (n/a)  Distance Propelled:   (n/a)   Wheelchair Description:   (n/a)  Stairs Contact Guard Assist  Stairs Description Extra time,Hand rails,Requires incidental assist,Verbal cueing  Discharge Location: Home  Patient Discharging with Assist of: Spouse / Significant Other  Level of Supervision Required Upon Discharge: Twenty Four Hour Supervision  Recommended Equipment for Discharge: Single Point Cane  Recommeded Services Upon Discharge: Home Health Physical Therapy  Criteria for Termination of Services: Maximum Function Achieved for Inpatient Rehabilitation  Comprehension:  Independent  Comprehension Description:  Glasses  Expression:  Independent  Expression Description:     Social Interaction:  Independent  Social Interaction Description:  Increased time  Problem Solving:  Independent  Problem Solving Description:  Increased time  Memory:  Moderate Assist  Memory Description:  Bed/chair alarm,Therapy schedule,Verbal cueing       Janet BRUNO D.O., personally performed a complete drug regimen review  and no potential clinically significant medication issues were identified.   Discharge Medication:     Medication List      START taking these medications      Instructions   tamsulosin 0.4 MG capsule  Commonly known as: FLOMAX   Take 1 Capsule by mouth every evening.  Dose: 0.4 mg        CONTINUE taking these medications      Instructions   aspirin 81 MG Chew chewable tablet  Commonly known as: ASA   Chew 1 Tablet every day.  Dose: 81 mg     atorvastatin 80 MG tablet  Commonly known as: LIPITOR   Take 1 Tablet by mouth every evening.  Dose: 80 mg     melatonin 5 mg Tabs   Take 1 Tablet by mouth at bedtime.  Dose: 5 mg     omeprazole 20 MG delayed-release capsule  Commonly known as: PRILOSEC   Take 20 mg by mouth every morning.  Dose: 20 mg        STOP taking these medications    acetaminophen 325 MG Tabs  Commonly known as: Tylenol     bisacodyl 10 MG Supp  Commonly known as: DULCOLAX     magnesium hydroxide 400 MG/5ML Susp  Commonly known as: MILK OF MAGNESIA     polyethylene glycol/lytes 17 g Pack  Commonly known as: MIRALAX     senna-docusate 8.6-50 MG Tabs  Commonly known as: PERICOLACE or SENOKOT S     Vitamin C 1000 MG Tabs            Discharge Diet:  Regular diet with, 1:1 with thin liquids     Discharge Activity:  Supervision level with [employer] website, 100 ft x2     Disposition:  Patient to discharge home with family support and community resources.     Equipment:  Oncodesign     Follow-up & Discharge Instructions:  Follow up with your primary care provider (PCP) within 7-10 days of discharge to review your medications and take over your care.     If you develop chest pain, fever, chills, change in neurologic function (weakness, sensation changes, vision changes), or other concerning sxs, seek immediate medical attention or call 911.      Condition on Discharge:  Good    More than 35 minutes was spent on discharging this patient, including face-to-face time, prescription management, and the dictation  of this note.    Janet Herrera D.O.    Date of Service: 11/8/2021

## 2021-11-08 NOTE — DISCHARGE PLANNING
Case management Summary:   Referral made to [facility], they are going through authorizations and will call if they can not accept patient.     During hospitalization, Case Management has provided support and education and have been available for questions and information during hours of operation, communicated with therapy team and MD along with providing links/resources  to outside services.    Patient verbalizes agreement with all plans and has an understanding of the next steps within the post acute services.

## 2021-11-08 NOTE — DISCHARGE INSTRUCTIONS
[facility] NURSING DISCHARGE INSTRUCTIONS    Blood Pressure: 109/62  Weight: 83.5 kg (184 lb)  Nursing recommendations for Fermín Gomez at time of discharge are as follows:  Client verbalized understanding of all discharge instructions and prescriptions.     Review all your home medications and newly ordered medications with your doctor and/or pharmacist. Follow medication instructions as directed by your doctor and/or pharmacist.    Pain Management:   Discharge Pain Medication Instructions:  Comfort Goal: Comfort with Movement,Perform Activity,Stay Alert  Notify your primary care provider if pain is unrelieved with these measures, if the pain is new, or increased in intensity.    Discharge Skin Characteristics:    Discharge Skin Exam:       Skin / Wound Care Instructions: Please contact your primary care physician for any change in skin integrity.     If You Have Surgical Incisions / Wounds:  Monitor surgical site(s) for signs of increased swelling, redness or symptoms of drainage from the site or fever as this could indicate signs and symptoms of infection. If these symptoms are noted, notifiy your primary care provider.      Discharge Safety Instructions: Should Not Be Left Alone In The House     Discharge Safety Concerns:    The interdisciplinary team has made recommendation that you should not be left alone  in the house due to weakness  Anti-embolic stockings are required during the day and off at night to increase circulation to the lower extremities.    Discharge Diet: Regular     Discharge Liquids: Thin  Discharge Bowel Function: Continent  Please contact your primary care physician for any changes in bowel habits.  Discharge Bowel Program:    Discharge Bladder Function: Continent  Discharge Urinary Devices:        Nursing Discharge Plan:        Case Management Discharge Instructions:   Discharge Location:    Agency Name/Address/Phone:    Home Health:    Outpatient Services:     DME Provider/Phone:    Medical Equipment Ordered:    Prescription Faxed to:        Discharge Medication Instructions:  Below are the medications your physician expects you to take upon discharge:      Fall Prevention in the Home, Adult  Falls can cause injuries and can affect people from all age groups. There are many simple things that you can do to make your home safe and to help prevent falls. Ask for help when making these changes, if needed.  What actions can I take to prevent falls?  General instructions  · Use good lighting in all rooms. Replace any light bulbs that burn out.  · Turn on lights if it is dark. Use night-lights.  · Place frequently used items in easy-to-reach places. Lower the shelves around your home if necessary.  · Set up furniture so that there are clear paths around it. Avoid moving your furniture around.  · Remove throw rugs and other tripping hazards from the floor.  · Avoid walking on wet floors.  · Fix any uneven floor surfaces.  · Add color or contrast paint or tape to grab bars and handrails in your home. Place contrasting color strips on the first and last steps of stairways.  · When you use a stepladder, make sure that it is completely opened and that the sides are firmly locked. Have someone hold the ladder while you are using it. Do not climb a closed stepladder.  · Be aware of any and all pets.  What can I do in the bathroom?         · Keep the floor dry. Immediately clean up any water that spills onto the floor.  · Remove soap buildup in the tub or shower on a regular basis.  · Use non-skid mats or decals on the floor of the tub or shower.  · Attach bath mats securely with double-sided, non-slip rug tape.  · If you need to sit down while you are in the shower, use a plastic, non-slip stool.  · Install grab bars by the toilet and in the tub and shower. Do not use towel bars as grab bars.  What can I do in the bedroom?  · Make sure that a bedside light is easy to reach.  · Do  not use oversized bedding that drapes onto the floor.  · Have a firm chair that has side arms to use for getting dressed.  What can I do in the kitchen?  · Clean up any spills right away.  · If you need to reach for something above you, use a sturdy step stool that has a grab bar.  · Keep electrical cables out of the way.  · Do not use floor polish or wax that makes floors slippery. If you must use wax, make sure that it is non-skid floor wax.  What can I do in the stairways?  · Do not leave any items on the stairs.  · Make sure that you have a light switch at the top of the stairs and the bottom of the stairs. Have them installed if you do not have them.  · Make sure that there are handrails on both sides of the stairs. Fix handrails that are broken or loose. Make sure that handrails are as long as the stairways.  · Install non-slip stair treads on all stairs in your home.  · Avoid having throw rugs at the top or bottom of stairways, or secure the rugs with carpet tape to prevent them from moving.  · Choose a carpet design that does not hide the edge of steps on the stairway.  · Check any carpeting to make sure that it is firmly attached to the stairs. Fix any carpet that is loose or worn.  What can I do on the outside of my home?  · Use bright outdoor lighting.  · Regularly repair the edges of walkways and driveways and fix any cracks.  · Remove high doorway thresholds.  · Trim any shrubbery on the main path into your home.  · Regularly check that handrails are securely fastened and in good repair. Both sides of any steps should have handrails.  · Install guardrails along the edges of any raised decks or porches.  · Clear walkways of debris and clutter, including tools and rocks.  · Have leaves, snow, and ice cleared regularly.  · Use sand or salt on walkways during winter months.  · In the garage, clean up any spills right away, including grease or oil spills.  What other actions can I take?  · Wear closed-toe  "shoes that fit well and support your feet. Wear shoes that have rubber soles or low heels.  · Use mobility aids as needed, such as canes, walkers, scooters, and crutches.  · Review your medicines with your health care provider. Some medicines can cause dizziness or changes in blood pressure, which increase your risk of falling.  Talk with your health care provider about other ways that you can decrease your risk of falls. This may include working with a physical therapist or  to improve your strength, balance, and endurance.  Where to find more information  · Centers for Disease Control and Prevention, [employer]: https://www.cdc.gov  · National [place] on A[place]: https://jx4kxri.wiliam.nih.gov  Contact a health care provider if:  · You are afraid of falling at home.  · You feel weak, drowsy, or dizzy at home.  · You fall at home.  Summary  · There are many simple things that you can do to make your home safe and to help prevent falls.  · Ways to make your home safe include removing tripping hazards and installing grab bars in the bathroom.  · Ask for help when making these changes in your home.  This information is not intended to replace advice given to you by your health care provider. Make sure you discuss any questions you have with your health care provider.  Document Released: 12/08/2003 Document Revised: 11/30/2018 Document Reviewed: 08/02/2018  [employer] Patient Education © 2020 [employer]    Prevent Falls in Your Home    \"Falling once doubles your chance of falling again\"        -Center for Disease Control and Prevention    Falls in the home can lead to serious injury (fractures, brain injuries), hospitalizations, increased medical costs, and could even be fatal.  The good news is, there are many precautions you can take to avoid falls in your home and help keep you safe:     · If prescribed an assistive device (walker, crutches), use as instructed by the healthcare provider\"   · Remove any tripping " hazards from your home, including loose cords, throw rugs and clutter  · Keep a nightlight on in dark (hallways, bathrooms, etc)   · Get up slowly, to make sure you feel okay before getting up  · Be aware of any side effects of your medications: some medications may make you dizzy  · Place a non-skid rubber mat in your shower or tub-consider a shower bench or chair if unsteady on your feet  · Wear supportive shoes or non-skid socks when moving around  · Start an exercise program once approved by your provider.  If you are feeling weak following a hospital stay, talk to your doctor about home health or outpatient therapy programs designed to help rebuild your strength and endurance    Depression / Suicide Risk    As you are discharged from this [facility] facility, it is important to learn how to keep safe from harming yourself.    Recognize the warning signs:  · Abrupt changes in personality, positive or negative- including increase in energy   · Giving away possessions  · Change in eating patterns- significant weight changes-  positive or negative  · Change in sleeping patterns- unable to sleep or sleeping all the time   · Unwillingness or inability to communicate  · Depression  · Unusual sadness, discouragement and loneliness  · Talk of wanting to die  · Neglect of personal appearance   · Rebelliousness- reckless behavior  · Withdrawal from people/activities they love  · Confusion- inability to concentrate     If you or a loved one observes any of these behaviors or has concerns about self-harm, here's what you can do:  · Talk about it- your feelings and reasons for harming yourself  · Remove any means that you might use to hurt yourself (examples: pills, rope, extension cords, firearm)  · Get professional help from the community (Mental Health, Substance Abuse, psychological counseling)  · Do not be alone:Call your Safe Contact- someone whom you trust who will be there for you.  · Call your local CRISIS  HOTLINE 816-2536 or 001-475-8402  · Call your local Children's Mobile Crisis Response Team [place] (393) 625-4698 or www.Edkimo  · Call the toll free National Suicide Prevention Hotlines   · National Suicide Prevention Lifeline 910-649-WQCV (4780)  · National Hope Line Network 800-SUICIDE (541-7283)            Occupational Therapy Discharge Instructions for Fermín Gomez    11/7/2021    Level of Assist Required for Eating: Able to Complete Eating without Assist  Level of Assist Required for Grooming: Able to Complete Grooming without Assist  Level of Assist Required for Dressing: Able to Complete Dressing without Assist  Have someone with you when your up on  Your feet getting  Your clothes out of the closet / dresser  Etc.     Level of Assist Required for Toileting: Able to Complete Toileting without Assist  Level of Assist Required for Toilet Transfer: Able to Complete Toilet Transfer without Assist  Level of Assist Required for Bathing: Able to Complete Bathing without Assist  Equipment for Bathing: Shower Chair,Grab Bars in Tub / Shower   Have some one with you the first few times getting in / out of the shower   Level of Assist Required for Shower Transfer: Able to Complete Shower Transfer without Assist  Level of Assist Required for Home Mgmt: Requires Physical Assist with Home Management  Driving: May not Drive, Please Contact Physician for Further Information  Home Exercise Program: Refer to Home Exercise Program Handout for Details    Great job ! You made great gains during your rehab stay  Best of luck  I enjoyed working with  You!  Carlos DUGAN

## 2021-11-10 ENCOUNTER — OFFICE VISIT (OUTPATIENT)
Dept: CARDIOLOGY | Facility: MEDICAL CENTER | Age: 66
End: 2021-11-10
Attending: PHYSICAL MEDICINE & REHABILITATION
Payer: COMMERCIAL

## 2021-11-10 VITALS
SYSTOLIC BLOOD PRESSURE: 124 MMHG | DIASTOLIC BLOOD PRESSURE: 60 MMHG | WEIGHT: 183 LBS | BODY MASS INDEX: 25.62 KG/M2 | HEIGHT: 71 IN | OXYGEN SATURATION: 96 %

## 2021-11-10 DIAGNOSIS — D75.839 THROMBOCYTOSIS: ICD-10-CM

## 2021-11-10 DIAGNOSIS — I63.9 CEREBROVASCULAR ACCIDENT (CVA), UNSPECIFIED MECHANISM (HCC): ICD-10-CM

## 2021-11-10 DIAGNOSIS — Z90.81 H/O SPLENECTOMY: ICD-10-CM

## 2021-11-10 PROCEDURE — 99203 OFFICE O/P NEW LOW 30 MIN: CPT | Performed by: INTERNAL MEDICINE

## 2021-11-10 RX ORDER — MULTIVIT-MIN/IRON/FOLIC ACID/K 18-600-40
CAPSULE ORAL
COMMUNITY

## 2021-11-10 ASSESSMENT — FIBROSIS 4 INDEX: FIB4 SCORE: 0.73

## 2021-11-10 NOTE — PROGRESS NOTES
Chief Complaint   Patient presents with   • Possible Stroke     hospital follow up       Subjective     Fermín Gomez is a 66 y.o. male who presents today for initial consultation regarding stroke.  Smokes marijuana no letter smoke cigarettes drinks but not excessively does not use other drugs.  Presented to the hospital with an acute right cerebral thrombotic stroke status post TPA and mechanical thrombectomy and improvement of symptomology.  Continued to convalesce from this October 2021.  CTA of the head and neck at [facility] prior to transfer to [facility] for catheter directed therapy reportedly showed no carotid dissection or stenosis and no intracranial stenosis.  Echocardiogram was unremarkable.  Initial Zio patch monitor shows no atrial fibrillation.  Of note he has significant thrombocytosis throughout his hospital stay in persistent.  Has a history of splenectomy.    Past Medical History:   Diagnosis Date   • GERD (gastroesophageal reflux disease)    • Spherocytosis (HCC)      Past Surgical History:   Procedure Laterality Date   • HERNIA REPAIR     • SPLENECTOMY     • TONSILLECTOMY       History reviewed. No pertinent family history.  Social History     Socioeconomic History   • Marital status:      Spouse name: Not on file   • Number of children: Not on file   • Years of education: Not on file   • Highest education level: Not on file   Occupational History   • Not on file   Tobacco Use   • Smoking status: Never Smoker   • Smokeless tobacco: Never Used   Substance and Sexual Activity   • Alcohol use: Yes     Alcohol/week: 4.2 oz     Types: 7 Glasses of wine per week   • Drug use: Yes     Types: Marijuana   • Sexual activity: Not on file   Other Topics Concern   • Not on file   Social History Narrative   • Not on file     Social Determinants of Health     Financial Resource Strain:    • Difficulty of Paying Living Expenses: Not on file   Food Insecurity:    • Worried About Running Out of Food in the  Last Year: Not on file   • Ran Out of Food in the Last Year: Not on file   Transportation Needs:    • Lack of Transportation (Medical): Not on file   • Lack of Transportation (Non-Medical): Not on file   Physical Activity:    • Days of Exercise per Week: Not on file   • Minutes of Exercise per Session: Not on file   Stress:    • Feeling of Stress : Not on file   Social Connections:    • Frequency of Communication with Friends and Family: Not on file   • Frequency of Social Gatherings with Friends and Family: Not on file   • Attends Yazidi Services: Not on file   • Active Member of Clubs or Organizations: Not on file   • Attends Club or Organization Meetings: Not on file   • Marital Status: Not on file   Intimate Partner Violence:    • Fear of Current or Ex-Partner: Not on file   • Emotionally Abused: Not on file   • Physically Abused: Not on file   • Sexually Abused: Not on file   Housing Stability:    • Unable to Pay for Housing in the Last Year: Not on file   • Number of Places Lived in the Last Year: Not on file   • Unstable Housing in the Last Year: Not on file     Allergies   Allergen Reactions   • Penicillins      Outpatient Encounter Medications as of 11/10/2021   Medication Sig Dispense Refill   • Ascorbic Acid (VITAMIN C) 500 MG Cap Take  by mouth.     • VITAMIN D, CHOLECALCIFEROL, PO Take  by mouth.     • tamsulosin (FLOMAX) 0.4 MG capsule Take 1 Capsule by mouth every evening. 30 Capsule 0   • aspirin (ASA) 81 MG Chew Tab chewable tablet Chew 1 Tablet every day. 100 Tablet    • atorvastatin (LIPITOR) 80 MG tablet Take 1 Tablet by mouth every evening. 30 Tablet    • omeprazole (PRILOSEC) 20 MG delayed-release capsule Take 20 mg by mouth every morning.     • [DISCONTINUED] melatonin 5 mg Tab Take 1 Tablet by mouth at bedtime. (Patient not taking: Reported on 11/10/2021) 30 Tablet    • [DISCONTINUED] traZODone (DESYREL) tablet 50 mg      • [DISCONTINUED] senna-docusate (PERICOLACE or SENOKOT S) 8.6-50 MG  "per tablet 2 Tablet      • [DISCONTINUED] polyethylene glycol/lytes (MIRALAX) PACKET 1 Packet      • [DISCONTINUED] magnesium hydroxide (MILK OF MAGNESIA) suspension 30 mL      • [DISCONTINUED] bisacodyl (DULCOLAX) suppository 10 mg      • [DISCONTINUED] melatonin tablet 3 mg      • [DISCONTINUED] omeprazole (PRILOSEC) capsule 20 mg      • [DISCONTINUED] tamsulosin (FLOMAX) capsule 0.4 mg      • [DISCONTINUED] hydrOXYzine HCl (ATARAX) tablet 50 mg      • [DISCONTINUED] QUEtiapine (Seroquel) tablet 25 mg      • [DISCONTINUED] simethicone (MYLICON) chewable tab 80 mg      • [DISCONTINUED] Respiratory Therapy Consult      • [DISCONTINUED] Pharmacy Consult Request ...Pain Management Review 1 Each      • [DISCONTINUED] hydrALAZINE (APRESOLINE) tablet 10 mg      • [DISCONTINUED] acetaminophen (Tylenol) tablet 650 mg      • [DISCONTINUED] sodium phosphate (Fleet) enema 133 mL      • [DISCONTINUED] artificial tears ophthalmic solution 1 Drop      • [DISCONTINUED] benzocaine-menthol (CEPACOL) lozenge 1 Lozenge      • [DISCONTINUED] mag hydrox-al hydrox-simeth (MAALOX PLUS ES or MYLANTA DS) suspension 20 mL      • [DISCONTINUED] ondansetron (ZOFRAN ODT) dispertab 4 mg      • [DISCONTINUED] ondansetron (ZOFRAN) syringe/vial injection 4 mg      • [DISCONTINUED] sodium chloride (OCEAN) 0.65 % nasal spray 2 Spray      • [DISCONTINUED] atorvastatin (LIPITOR) tablet 80 mg      • [DISCONTINUED] aspirin (ASA) chewable tab 81 mg        No facility-administered encounter medications on file as of 11/10/2021.     Review of Systems   All other systems reviewed and are negative.             Objective     /60 (BP Location: Left arm, Patient Position: Sitting)   Ht 1.803 m (5' 11\")   Wt 83 kg (183 lb)   SpO2 96%   BMI 25.52 kg/m²     Physical Exam  Vitals reviewed.   Constitutional:       General: He is not in acute distress.     Appearance: He is well-developed. He is not diaphoretic.   HENT:      Head: Normocephalic and " atraumatic.      Right Ear: External ear normal.      Left Ear: External ear normal.   Eyes:      General: No scleral icterus.        Right eye: No discharge.         Left eye: No discharge.      Conjunctiva/sclera: Conjunctivae normal.      Pupils: Pupils are equal, round, and reactive to light.   Neck:      Thyroid: No thyromegaly.      Vascular: No JVD.      Trachea: No tracheal deviation.   Cardiovascular:      Rate and Rhythm: Normal rate and regular rhythm.      Chest Wall: PMI is not displaced.      Pulses:           Carotid pulses are 2+ on the right side and 2+ on the left side.       Radial pulses are 2+ on the left side.        Popliteal pulses are 2+ on the right side and 2+ on the left side.        Dorsalis pedis pulses are 2+ on the right side and 2+ on the left side.        Posterior tibial pulses are 2+ on the right side and 2+ on the left side.      Heart sounds: No murmur heard.  No friction rub. No gallop.    Pulmonary:      Effort: Pulmonary effort is normal. No respiratory distress.      Breath sounds: Normal breath sounds. No wheezing or rales.   Chest:      Chest wall: No tenderness.   Abdominal:      General: Bowel sounds are normal. There is no distension.      Palpations: Abdomen is soft.      Tenderness: There is no abdominal tenderness.   Musculoskeletal:         General: No tenderness or deformity. Normal range of motion.      Cervical back: Normal range of motion and neck supple.   Skin:     General: Skin is warm and dry.      Coloration: Skin is not pale.      Findings: No erythema or rash.   Neurological:      Mental Status: He is alert and oriented to person, place, and time.      Cranial Nerves: No cranial nerve deficit (cranial nerves II through XII grossly intact).      Coordination: Coordination normal.   Psychiatric:         Behavior: Behavior normal.         Thought Content: Thought content normal.       Imaging reviewed as above         Assessment & Plan     1. Cerebrovascular  accident (CVA), unspecified mechanism (HCC)     2. H/O splenectomy     3. Thrombocytosis         Medical Decision Making: Today's Assessment/Status/Plan:          Multiple areas of right-sided acute and subacute CVA concerning for artery to artery versus cardioembolic embolism.  Also has thrombocytosis raises the possibility of hypercoagulable state.  He is on aspirin currently.  No evidence of a dysrhythmia for cardiac source of embolism, has a second Holter monitor on currently.  Once this is reviewed if it is unremarkable with no atrial fibrillation noted that I would recommend placement of an implantable loop recorder for long-term rhythm monitoring.  Defer further stroke management to neurology but would recommend consideration of hematologic evaluation for thrombocytosis and its contribution to his stroke syndrome.    Follow-up after the Holter monitor is resulted.

## 2021-11-11 ENCOUNTER — TELEPHONE (OUTPATIENT)
Dept: HEALTH INFORMATION MANAGEMENT | Facility: OTHER | Age: 66
End: 2021-11-11

## 2021-11-11 NOTE — TELEPHONE ENCOUNTER
TCN spoke with pt who reports he is doing well at this time. PT to be seen by Rahel CRUM later this date. TCN emailed pt TCN contact information. Pt will call TCN with future concerns or needs.

## 2021-11-17 ENCOUNTER — APPOINTMENT (OUTPATIENT)
Dept: NEUROLOGY | Facility: MEDICAL CENTER | Age: 66
End: 2021-11-17
Payer: COMMERCIAL

## 2021-11-18 ENCOUNTER — TELEPHONE (OUTPATIENT)
Dept: CARDIOLOGY | Facility: MEDICAL CENTER | Age: 66
End: 2021-11-18

## 2021-11-18 ENCOUNTER — OFFICE VISIT (OUTPATIENT)
Dept: PHYSICAL MEDICINE AND REHAB | Facility: REHABILITATION | Age: 66
End: 2021-11-18
Payer: COMMERCIAL

## 2021-11-18 VITALS
HEART RATE: 72 BPM | RESPIRATION RATE: 18 BRPM | TEMPERATURE: 97.6 F | OXYGEN SATURATION: 96 % | SYSTOLIC BLOOD PRESSURE: 112 MMHG | DIASTOLIC BLOOD PRESSURE: 60 MMHG

## 2021-11-18 DIAGNOSIS — R26.89 IMPAIRED BALANCE AS LATE EFFECT OF CEREBROVASCULAR ACCIDENT: ICD-10-CM

## 2021-11-18 DIAGNOSIS — I69.398 IMPAIRED BALANCE AS LATE EFFECT OF CEREBROVASCULAR ACCIDENT: ICD-10-CM

## 2021-11-18 DIAGNOSIS — G81.94 LEFT HEMIPARESIS (HCC): ICD-10-CM

## 2021-11-18 DIAGNOSIS — I63.9 CEREBROVASCULAR ACCIDENT (CVA), UNSPECIFIED MECHANISM (HCC): Primary | ICD-10-CM

## 2021-11-18 DIAGNOSIS — I63.9 CEREBROVASCULAR ACCIDENT (CVA), UNSPECIFIED MECHANISM (HCC): ICD-10-CM

## 2021-11-18 DIAGNOSIS — Z99.89 AMBULATES WITH CANE: ICD-10-CM

## 2021-11-18 DIAGNOSIS — Z74.09 IMPAIRED MOBILITY AND ENDURANCE: ICD-10-CM

## 2021-11-18 PROCEDURE — 93248 EXT ECG>7D<15D REV&INTERPJ: CPT | Performed by: STUDENT IN AN ORGANIZED HEALTH CARE EDUCATION/TRAINING PROGRAM

## 2021-11-18 PROCEDURE — 93246 EXT ECG>7D<15D RECORDING: CPT | Performed by: STUDENT IN AN ORGANIZED HEALTH CARE EDUCATION/TRAINING PROGRAM

## 2021-11-18 PROCEDURE — 99215 OFFICE O/P EST HI 40 MIN: CPT | Performed by: PHYSICAL MEDICINE & REHABILITATION

## 2021-11-18 NOTE — PROGRESS NOTES
[facility], [place], operated by [facility]  [facility]  [address] [place] [address]  Ph: [phone]    NEW PATIENT EVALUATION    *Patient established in PM&R practice, however, patient new to writer as patient is transferring care. Therefore, patient billed as established.     Patient Name: Fermín Gomez   Patient : 1955  Patient Age: 66 y.o.     Examining Physician: Dr. Blessing Cifuentes, DO    PM&R History to Date - Background Information:  Dates of Admission/Discharge to ARU: 10/19/2021-2021    SUBJECTIVE:   Patient Identification: Fermín Gomez is a 66 y.o. male with PMH significant for spherocytosis s/p splenectomy, GERD and rehabilitation history significant for right MCA ischemic stroke s/p thrombectomy and TPA c/b small left SDH over frontal lobe 10/12/21 and is presenting to PM&R clinic for a NEW OUTPATIENT evaluation with the following chief complaint/s:    Chief Complaint: ARU Discharge Follow Up    Accompanied by Today: Wife  History of Present Illness:    - Records reviewed: Acute rehab discharge summary reviewed in its entirety.  - Therapy: Established with HH  - Using walking stick for balance issues.   - No falls at home.   - Does some furniture surfing at home.   - Sleeping well.   - No neuropathic pain  - Urinating well.   - Bowel movements are normal.   - No spasticity on the left side.   - Cognition less impaired. SLP stopped by HH.   - Discouraged by not being able to do simple things like getting dressed.   - Still having some left sided weakness.   - Has not met with [facility] yet.   - No longer has Ziopatch. It was negative. Will have loop recorder placed.  Patient not sure he would like to have loop recorder placed after researching.    Review of Systems:  All other pertinent positive review of systems are noted above in HPI.   All other systems reviewed and are negative.    Past Medical History:  Past Medical History:   Diagnosis Date   • GERD (gastroesophageal reflux disease)    •  Spherocytosis (HCC)       Past Surgical History:   Procedure Laterality Date   • HERNIA REPAIR     • SPLENECTOMY     • TONSILLECTOMY          Current Outpatient Medications:   •  Ascorbic Acid (VITAMIN C) 500 MG Cap, Take  by mouth., Disp: , Rfl:   •  VITAMIN D, CHOLECALCIFEROL, PO, Take  by mouth., Disp: , Rfl:   •  tamsulosin (FLOMAX) 0.4 MG capsule, Take 1 Capsule by mouth every evening., Disp: 30 Capsule, Rfl: 0  •  aspirin (ASA) 81 MG Chew Tab chewable tablet, Chew 1 Tablet every day., Disp: 100 Tablet, Rfl:   •  atorvastatin (LIPITOR) 80 MG tablet, Take 1 Tablet by mouth every evening., Disp: 30 Tablet, Rfl:   •  omeprazole (PRILOSEC) 20 MG delayed-release capsule, Take 20 mg by mouth every morning., Disp: , Rfl:   Allergies   Allergen Reactions   • Penicillins         Past Social History:  Social History     Socioeconomic History   • Marital status:      Spouse name: Not on file   • Number of children: Not on file   • Years of education: Not on file   • Highest education level: Not on file   Occupational History   • Not on file   Tobacco Use   • Smoking status: Never Smoker   • Smokeless tobacco: Never Used   Substance and Sexual Activity   • Alcohol use: Yes     Alcohol/week: 4.2 oz     Types: 7 Glasses of wine per week   • Drug use: Yes     Types: Marijuana   • Sexual activity: Not on file   Other Topics Concern   • Not on file   Social History Narrative   • Not on file     Social Determinants of Health     Financial Resource Strain:    • Difficulty of Paying Living Expenses: Not on file   Food Insecurity:    • Worried About Running Out of Food in the Last Year: Not on file   • Ran Out of Food in the Last Year: Not on file   Transportation Needs:    • Lack of Transportation (Medical): Not on file   • Lack of Transportation (Non-Medical): Not on file   Physical Activity:    • Days of Exercise per Week: Not on file   • Minutes of Exercise per Session: Not on file   Stress:    • Feeling of Stress : Not  on file   Social Connections:    • Frequency of Communication with Friends and Family: Not on file   • Frequency of Social Gatherings with Friends and Family: Not on file   • Attends Gnosticist Services: Not on file   • Active Member of Clubs or Organizations: Not on file   • Attends Club or Organization Meetings: Not on file   • Marital Status: Not on file   Intimate Partner Violence:    • Fear of Current or Ex-Partner: Not on file   • Emotionally Abused: Not on file   • Physically Abused: Not on file   • Sexually Abused: Not on file   Housing Stability:    • Unable to Pay for Housing in the Last Year: Not on file   • Number of Places Lived in the Last Year: Not on file   • Unstable Housing in the Last Year: Not on file        Family History:  History reviewed. No pertinent family history.    Depression and Opioid Screening  PHQ-9:  Depression Screen (PHQ-2/PHQ-9) 11/2/2021 11/3/2021 11/6/2021   PHQ-2 Total Score 0 0 0     Interpretation of PHQ-9 Total Score   Score Severity   1-4 No Depression   5-9 Mild Depression   10-14 Moderate Depression   15-19 Moderately Severe Depression   20-27 Severe Depression     OBJECTIVE:   Vital Signs:  Vitals:    11/18/21 0741   BP: 112/60   Pulse: 72   Resp: 18   Temp: 36.4 °C (97.6 °F)   SpO2: 96%        Pertinent Labs:  Lab Results   Component Value Date/Time    SODIUM 142 11/05/2021 05:25 AM    POTASSIUM 4.0 11/05/2021 05:25 AM    CHLORIDE 106 11/05/2021 05:25 AM    CO2 27 11/05/2021 05:25 AM    GLUCOSE 93 11/05/2021 05:25 AM    BUN 13 11/05/2021 05:25 AM    CREATININE 0.82 11/05/2021 05:25 AM       Lab Results   Component Value Date/Time    HBA1C 4.6 10/20/2021 05:37 AM       Lab Results   Component Value Date/Time    WBC 7.3 11/05/2021 05:25 AM    RBC 4.58 (L) 11/05/2021 05:25 AM    HEMOGLOBIN 15.3 11/05/2021 05:25 AM    HEMATOCRIT 43.9 11/05/2021 05:25 AM    MCV 95.9 11/05/2021 05:25 AM    MCH 33.4 (H) 11/05/2021 05:25 AM    MCHC 34.9 11/05/2021 05:25 AM    MPV 9.5 11/05/2021  05:25 AM    NEUTSPOLYS 59.90 11/05/2021 05:25 AM    LYMPHOCYTES 25.30 11/05/2021 05:25 AM    MONOCYTES 8.70 11/05/2021 05:25 AM    EOSINOPHILS 3.70 11/05/2021 05:25 AM    BASOPHILS 1.90 (H) 11/05/2021 05:25 AM       Lab Results   Component Value Date/Time    ASTSGOT 62 (H) 10/29/2021 05:25 AM    ALTSGPT 45 10/29/2021 05:25 AM        Physical Exam:   GEN: No apparent distress  HEENT: Head normocephalic, atraumatic.  Sclera nonicteric bilaterally, no ocular discharge appreciated bilaterally.  CV: Extremities warm and well-perfused, no peripheral edema appreciated bilaterally.  PULMONARY: Breathing nonlabored on room air, no respiratory accessory muscle use.  Not requiring supplemental oxygen.  SKIN: No appreciable skin breakdown on exposed areas of skin.  PSYCH: Mood and affect within normal limits.  NEURO: Awake alert.  Conversational.  Logical thought content.  Answers questions appropriately.    Motor Exam Upper Extremities   ? Myotome R L   Shoulder Abduction C5 5 4-   Elbow flexion C5 5 4   Wrist extension C6 5 4   Elbow extension C7 5 4-   Finger flexion C8 5 4-   Finger abduction T1 5 4-     Motor Exam Lower Extremities  ? Myotome R L   Hip flexion L2 5 4   Knee extension L3 5 4   Ankle dorsiflexion L4 5 5*   Toe extension L5 5 5*   Ankle plantarflexion S1 5 5*   *On manual muscle testing patient strength is generally intact, however, dynamically his subtle weakness is more prominent when ambulating.  Reflexes more brisk left lower extremity at L4 compared to right lower extremity at L4 at 2+/4 and 1+/4, respectively.  Ambulatory with 1 walking stick.    Imaging:   MRI Brain 10/13/2021  Acute infarcts in the right MCA territory as described above predominantly involving the frontal lobe. Scattered foci of acute infarction are also noted in the right parietal lobe.  Tiny foci of infarction are also noted in the left frontoparietal region.  The distribution of the infarctions suggests a proximal cardiovascular  embolic source.    CT head 10/13/2021  1.  Small focus of subdural hemorrhage along the left side of the falx in the frontal area is again identified with no change.  2.  There is some localized brain swelling and edema in the right basal ganglia which could represent subacute infarcts.  3.  No mass effect or midline shift identified.    ASSESSMENT/PLAN: Fermín Gomez  is a 66 y.o. male with rehabilitation history significant for right MCA ischemic stroke s/p thrombectomy and TPA c/b small left SDH over frontal lobe, here for evaluation. The following plan was discussed with the patient who is in agreement.     Visit Diagnoses     ICD-10-CM   1. Cerebrovascular accident (CVA), unspecified mechanism (HCC)  I63.9   2. Impaired balance as late effect of cerebrovascular accident  I69.398    R26.89      Wife assists with history.    Rehab/Neuro:   1. Right MCA ischemic stroke s/p thrombectomy and TPA c/b small left SDH over frontal lobe  -Records reviewed as aforementioned in the HPI.  -Consultants: Stroke Bridge follow-up 12/8; interventional cardiology  -Secondary stroke prophylaxis: Aspirin + statin  -Counseling: Patient currently declining to have loop recorder placed.  Counseled in detail that if the loop recorder was placed and revealed positive atrial fibrillation this would change medical management which could assist with prevention of future stroke.  If he remains on his current stroke prophylaxis and does have atrial fibrillation he is not protected, based on medical guidelines, from future risk of stroke given that he is not fully anticoagulated.  Wife and patient will discuss in further detail.  -Function: 11/2021 function improving per patient report.  Still imbalanced due to left hemiparesis.  Ambulates with 1 walking stick and needs it most of the time.  Occasionally will furniture surf within the home, however, he still feels more comfortable with his walking stick.  Denies any significant cognitive  deficits.  Discharged from speech therapy.  More frustrated with inability to do small tasks like buttoning his shirt due to reduced dexterity.  -Therapy: Established with [facility] for up to 9 weeks  -Occupation: Retired  -Driving status: Had been actively driving.  Counseled on abstaining from driving until further neuro recovery at that point we will place referral for driving evaluation through occupational therapy.    Spasticity: No spasticity on exam nor per patient report.    Neuropathic Pain: Denies any neuropathic pain.    Neurogenic Bladder: Had required ICP for urinary retention while inpatient.  This is resolved.  -Med management: Counseled on capability to stop Flomax, monitor urination.    Neurogenic Bowel: Continent, volitional.    Sleep: Sleeping well.    GI/Diet: Good appetite.      Follow up: 6-8 weeks for functional reevaluation and referral for driving evaluation if appropriate, follow-up voiding after cessation of Flomax    Total time spent was 55 minutes.  Included in this time is the time spent preparing for the visit including record review, my exam and evaluation, counseling and education regarding that which is aforementioned in the assessment and plan. Included this time as the time spent obtaining history from someone other than the patient. Discussion involved the patient and wife.    Please note that this dictation was created using voice recognition software. I have made every reasonable attempt to correct obvious errors but there may be errors of grammar and content that I may have overlooked prior to finalization of this note.    Dr. Blessing Cifuentes DO, MS  Department of Physical Medicine & Rehabilitation  [facility]  [facility]

## 2021-11-18 NOTE — TELEPHONE ENCOUNTER
Message  Received: Today  MYA Kincaid R.N.  Caller: Unspecified (Today, 10:34 AM)  Thank you! It has been read. No evidence of afib, would likely need implanted loop recorder per Dr. Bell's note

## 2021-11-18 NOTE — TELEPHONE ENCOUNTER
Zio results received. Ordered by outside provider. Patient last saw TW on 11/10/21.          To HK: Please read Zio as ADD. TW is out of office.

## 2021-11-18 NOTE — TELEPHONE ENCOUNTER
Patient Call    Nando Sánchez M.D.  You; Naina Schuler R.N. 2 hours ago (10:43 AM)         Actually nevermind - Dr. Chung read a different one. Could I have a different event monitor encounter/order so that I can read/charge?    Message text      Nando Sánchez M.D.  You; Naina Schuler R.N. 2 hours ago (10:42 AM)         It looks like Dr. Chung had already read it.    Message text      __________________________________________________________________________    Spoke to Shanell ALVAREZ regarding Zio, new order placed per her instruction. 14 day monitor attached to new order. HK notified.

## 2021-12-01 ENCOUNTER — OFFICE VISIT (OUTPATIENT)
Dept: MEDICAL GROUP | Facility: MEDICAL CENTER | Age: 66
End: 2021-12-01
Payer: COMMERCIAL

## 2021-12-01 VITALS
HEIGHT: 71 IN | HEART RATE: 80 BPM | TEMPERATURE: 98.3 F | RESPIRATION RATE: 14 BRPM | WEIGHT: 190.26 LBS | OXYGEN SATURATION: 94 % | SYSTOLIC BLOOD PRESSURE: 112 MMHG | BODY MASS INDEX: 26.64 KG/M2 | DIASTOLIC BLOOD PRESSURE: 62 MMHG

## 2021-12-01 DIAGNOSIS — Z86.73 HISTORY OF ISCHEMIC RIGHT MCA STROKE: ICD-10-CM

## 2021-12-01 DIAGNOSIS — R47.1 DYSARTHRIA: ICD-10-CM

## 2021-12-01 DIAGNOSIS — Z78.9 IMPAIRED MOBILITY AND ADLS: ICD-10-CM

## 2021-12-01 DIAGNOSIS — Z23 NEED FOR DIPHTHERIA-TETANUS-PERTUSSIS (TDAP) VACCINE: ICD-10-CM

## 2021-12-01 DIAGNOSIS — D75.839 THROMBOCYTOSIS: ICD-10-CM

## 2021-12-01 DIAGNOSIS — R13.10 DYSPHAGIA, UNSPECIFIED TYPE: ICD-10-CM

## 2021-12-01 DIAGNOSIS — D58.0 SPHEROCYTOSIS (HCC): ICD-10-CM

## 2021-12-01 DIAGNOSIS — Z74.09 IMPAIRED MOBILITY AND ADLS: ICD-10-CM

## 2021-12-01 DIAGNOSIS — I63.411 CEREBROVASCULAR ACCIDENT (CVA) DUE TO EMBOLISM OF RIGHT MIDDLE CEREBRAL ARTERY (HCC): ICD-10-CM

## 2021-12-01 DIAGNOSIS — Z90.81 POST-SPLENECTOMY: ICD-10-CM

## 2021-12-01 DIAGNOSIS — G81.94 LEFT HEMIPARESIS (HCC): ICD-10-CM

## 2021-12-01 DIAGNOSIS — S06.5XAA SUBDURAL HEMATOMA (HCC): ICD-10-CM

## 2021-12-01 PROBLEM — D72.829 LEUKOCYTOSIS: Status: RESOLVED | Noted: 2021-10-20 | Resolved: 2021-12-01

## 2021-12-01 PROBLEM — R33.9 URINARY RETENTION: Status: RESOLVED | Noted: 2021-10-20 | Resolved: 2021-12-01

## 2021-12-01 PROBLEM — B37.0 ORAL THRUSH: Status: RESOLVED | Noted: 2021-10-19 | Resolved: 2021-12-01

## 2021-12-01 PROBLEM — I63.9 STROKE (CEREBRUM) (HCC): Status: RESOLVED | Noted: 2021-10-12 | Resolved: 2021-12-01

## 2021-12-01 PROCEDURE — 99214 OFFICE O/P EST MOD 30 MIN: CPT | Mod: 25 | Performed by: FAMILY MEDICINE

## 2021-12-01 PROCEDURE — 90471 IMMUNIZATION ADMIN: CPT | Performed by: FAMILY MEDICINE

## 2021-12-01 PROCEDURE — 90715 TDAP VACCINE 7 YRS/> IM: CPT | Performed by: FAMILY MEDICINE

## 2021-12-01 RX ORDER — ASPIRIN 81 MG/1
TABLET, COATED ORAL
COMMUNITY
Start: 2021-11-08 | End: 2021-12-01

## 2021-12-01 RX ORDER — ATORVASTATIN CALCIUM 80 MG/1
80 TABLET, FILM COATED ORAL EVERY EVENING
Qty: 90 TABLET | Refills: 3 | Status: SHIPPED | OUTPATIENT
Start: 2021-12-01 | End: 2021-12-08

## 2021-12-01 ASSESSMENT — FIBROSIS 4 INDEX: FIB4 SCORE: 0.73

## 2021-12-01 NOTE — PROGRESS NOTES
Chief Complaint   Patient presents with   • Dysphagia   • Other     weakness, dysarthria   • Extremity Weakness       Subjective:     HPI:   Fermín Gomez presents today with the followin. Cerebrovascular accident (CVA) due to embolism of right middle cerebral artery (HCC)  Patient had a completely unexpected stroke  this year while hiking.  Likely a fellow hiker found him and obtained assistance.  Patient does not have hypertension.  No history of blood clots in the past.  He had multiple emboli which led to a thorough cardiac work-up.  No cardiac etiology has been detected.  No atrial fib or other arrhythmia detected.  The only abnormality the patient has which may have led to this is his thrombocytosis.  He did receive TPA and also had thrombectomy.  He had significant effects from this ischemic stroke which are resolving.  He understands that he can no longer hike alone.  He is now on low-dose aspirin with a plan for this to be lifelong.    2. Subdural hematoma (HCC)  Small, no follow up planned at this time.    3. History of ischemic right MCA stroke  Had TPA and thrombectomy.  Did well, recovering.    4. 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  His platelet count was in the mid to high 400s and low 500s for many years.  Over this last year it has risen rapidly.  At one point after his hospitalization he and his wife tell me that the platelet count was at 1200 at rehab.  Discussed with the patient obtaining a consultation with hematology.    5. Left hemiparesis (HCC)/Dysphagia, unspecified type/Dysarthria/Impaired mobility and ADLs  Patient had a right MCA stroke .  At that point he was hemiparetic and unable to stand or walk.  The left hand was particularly weak.  He was seriously dysarthric.  He and his wife to where it was very difficult to understand him.  He also  had significant dysphagia.  The dysarthria is still noticeable but he is now quite understandable and he and his wife feel that he is improving.  He continues to work with physiatry.  His dysphagia has resolved.  The left hemiparesis is not completely resolved but greatly improved.  He is walking using a hiking pole.    6. Need for diphtheria-tetanus-pertussis (Tdap) vaccine  He is due, this is administered today.  He will plan on his Covid booster in 2 weeks.        Patient Active Problem List    Diagnosis Date Noted   • History of ischemic right MCA stroke 12/01/2021   • Dysarthria 12/01/2021   • Thrombocytosis 10/27/2021   • Edema of left lower extremity 10/22/2021   • Impaired mobility and ADLs 10/20/2021   • Other insomnia 10/20/2021   • Gastroesophageal reflux disease with esophagitis without hemorrhage 10/19/2021   • Dysphagia 10/13/2021   • Left hemiparesis (HCC) 10/13/2021   • Subdural hematoma (HCC) 10/13/2021   • Penile mass 05/10/2019   • Primary insomnia 06/09/2017   • Post-splenectomy    • History of pneumonia 09/01/2016   • Spherocytosis (HCC)        Current medicines (including changes today)  Current Outpatient Medications   Medication Sig Dispense Refill   • atorvastatin (LIPITOR) 80 MG tablet Take 1 Tablet by mouth every evening. 90 Tablet 3   • Ascorbic Acid (VITAMIN C) 500 MG Cap Take  by mouth.     • aspirin (ASA) 81 MG Chew Tab chewable tablet Chew 1 Tablet every day. 100 Tablet    • omeprazole (PRILOSEC) 20 MG delayed-release capsule Take 20 mg by mouth every morning.     • vitamin D (CHOLECALCIFEROL) 1000 Unit Tab Take 1 tablet by mouth every day. 30 tablet 11     No current facility-administered medications for this visit.       Allergies   Allergen Reactions   • Pcn [Penicillins] Rash   • Penicillins        ROS: As per HPI       Objective:     /62 (BP Location: Right arm, Patient Position: Sitting, BP Cuff Size: Large adult)   Pulse 80   Temp 36.8 °C (98.3 °F) (Temporal)   Resp 14  "  Ht 1.803 m (5' 11\")   Wt 86.3 kg (190 lb 4.1 oz)   SpO2 94%  Body mass index is 26.54 kg/m².    Physical Exam:  Constitutional: Well-developed and well-nourished. Not diaphoretic. No distress. Lucid and fluent.  Patient, physician and staff all wearing masks.  Skin: Skin is warm and dry. No rash noted.  Head: Atraumatic without lesions.  Somewhat dysarthric but understandable.  Eyes: Conjunctivae and extraocular motions are normal. Pupils are equal, round, and reactive to light. No scleral icterus.   Ears:  External ears unremarkable.   Neck: Supple, trachea midline. No thyromegaly present. No cervical or supraclavicular lymphadenopathy. No JVD or carotid bruits appreciated  Cardiovascular: Regular rate and rhythm.  Normal S1, S2 without murmur appreciated.  Chest: Effort normal. Clear to auscultation throughout. No adventitious sounds.   Abdomen: Soft, non tender, and without distention. Active bowel sounds in all four quadrants. No rebound, guarding, masses or hepatosplenomegaly.  Extremities: No cyanosis, clubbing, erythema, nor edema.   Neurological: Alert and oriented x 3. No tremor appreciated.  Psychiatric:  Behavior, mood, and affect are appropriate.    Imaging, lab results and heart testing reviewed and discussed.  Patient has very significant thrombocytosis.  Echocardiogram and heart monitoring have been normal.  He had a small subdural on CT scan.  MRI showed numerous areas in the right cerebral MCA area and basal ganglia showing ischemia.  Speech/CD evaluation did show residual barium in the upper esophagus with slow motility and did show aspiration but immediate cough.  Patient had been able to progress.    Assessment and Plan:     66 y.o. male with the following issues:    1. Cerebrovascular accident (CVA) due to embolism of right middle cerebral artery (HCC)  atorvastatin (LIPITOR) 80 MG tablet   2. Subdural hematoma (HCC)  atorvastatin (LIPITOR) 80 MG tablet   3. History of ischemic right MCA " stroke     4. Thrombocytosis  Referral to Hematology Oncology    CBC WITH DIFFERENTIAL   5. Spherocytosis (HCC)  Referral to Hematology Oncology    CBC WITH DIFFERENTIAL   6. Post-splenectomy  Referral to Hematology Oncology    CBC WITH DIFFERENTIAL   7. Left hemiparesis (HCC)     8. Dysphagia, unspecified type     9. Dysarthria     10. Impaired mobility and ADLs     11. Need for diphtheria-tetanus-pertussis (Tdap) vaccine  Tdap =>8yo IM         Followup: Return in about 4 months (around 4/1/2022).

## 2021-12-07 ENCOUNTER — TELEPHONE (OUTPATIENT)
Dept: MEDICAL GROUP | Facility: MEDICAL CENTER | Age: 66
End: 2021-12-07

## 2021-12-07 NOTE — TELEPHONE ENCOUNTER
Dr. Juan has suggested that I do an E consult.  I am trying to get that set up.  Please ask him to get his blood count checked again next week.

## 2021-12-07 NOTE — TELEPHONE ENCOUNTER
"Aye called for pt about the referral that was sent over. They tried to get scheduled with Dr. Juan, but they were informed to contact us, since a message was sent to our offices.    I looked and the only msg I see is, \"Per Dr Juan, he would like this changed to an E-Consult.  Referring provider notified.\" Please let me know what I should do.  "

## 2021-12-08 ENCOUNTER — OFFICE VISIT (OUTPATIENT)
Dept: NEUROLOGY | Facility: MEDICAL CENTER | Age: 66
End: 2021-12-08
Attending: NURSE PRACTITIONER
Payer: COMMERCIAL

## 2021-12-08 VITALS
HEIGHT: 71 IN | DIASTOLIC BLOOD PRESSURE: 86 MMHG | WEIGHT: 193.56 LBS | SYSTOLIC BLOOD PRESSURE: 128 MMHG | BODY MASS INDEX: 27.1 KG/M2 | TEMPERATURE: 98.2 F | HEART RATE: 62 BPM | OXYGEN SATURATION: 98 % | RESPIRATION RATE: 12 BRPM

## 2021-12-08 DIAGNOSIS — I69.30 LATE EFFECT OF STROKE: ICD-10-CM

## 2021-12-08 DIAGNOSIS — D75.839 THROMBOCYTOSIS: ICD-10-CM

## 2021-12-08 PROCEDURE — 99215 OFFICE O/P EST HI 40 MIN: CPT | Performed by: NURSE PRACTITIONER

## 2021-12-08 PROCEDURE — 99212 OFFICE O/P EST SF 10 MIN: CPT | Performed by: NURSE PRACTITIONER

## 2021-12-08 PROCEDURE — 99417 PROLNG OP E/M EACH 15 MIN: CPT | Performed by: NURSE PRACTITIONER

## 2021-12-08 RX ORDER — ATORVASTATIN CALCIUM 40 MG/1
40 TABLET, FILM COATED ORAL
Qty: 90 TABLET | Refills: 0 | Status: SHIPPED | OUTPATIENT
Start: 2021-12-08 | End: 2022-08-29 | Stop reason: SDUPTHER

## 2021-12-08 ASSESSMENT — ENCOUNTER SYMPTOMS
NERVOUS/ANXIOUS: 0
FOCAL WEAKNESS: 1
HEARTBURN: 0
BACK PAIN: 0
WEAKNESS: 0
SENSORY CHANGE: 0
HEADACHES: 0
BLURRED VISION: 0
VOMITING: 0
FALLS: 0
SPEECH CHANGE: 0
SHORTNESS OF BREATH: 0
NAUSEA: 0
TINGLING: 0
DEPRESSION: 0
DIZZINESS: 0
PALPITATIONS: 0
BRUISES/BLEEDS EASILY: 0
NECK PAIN: 0
FEVER: 0
COUGH: 0

## 2021-12-08 ASSESSMENT — FIBROSIS 4 INDEX: FIB4 SCORE: 0.73

## 2021-12-08 NOTE — PROGRESS NOTES
Subjective     HPI  Fermín Gomez is a 66 y.o. right handed male who presents to [facility] for evaluation of right MCA infarcts as well as tiny left frontoparietal infarct.     He presented to [facility] on 10/12/2021 with acute onset of left sided weakness, he was given IV tPA.  CTA showed right MCA occlusion, he was transferred to [facility] for thrombectomy, thrombectomy was unsuccessful.   He was discharged to Rehab on 10/19/2021.     PMH includes spherocytosis, thrombocytosis, s/p splenectomy, insomnia.  Social History:  Denies history of smoking, 1 drink daily, recreational marijuana use. He is retired, he was a .     He was discharged on ASA and atorvastatin 80mg.       He is here with his wife today, he ambulates with a walking stick.  Denies falls, he is getting home therapy and now wants to continue outpatient therapy at a therapy facility.   He does not check his blood pressure at home.  His left hand remains weak and incoordinated.     HPI    Review of Systems   Constitutional: Negative for fever.   HENT: Negative for nosebleeds.    Eyes: Negative for blurred vision.   Respiratory: Negative for cough and shortness of breath.    Cardiovascular: Negative for chest pain and palpitations.   Gastrointestinal: Negative for heartburn, nausea and vomiting.   Musculoskeletal: Negative for back pain, falls and neck pain.   Skin: Negative for rash.   Neurological: Positive for focal weakness. Negative for dizziness, tingling, sensory change, speech change, weakness and headaches.   Endo/Heme/Allergies: Does not bruise/bleed easily.   Psychiatric/Behavioral: Negative for depression. The patient is not nervous/anxious.        Objective      I personally reviewed imaging below and agree with the findings  MRI brain 10/13/2021  Acute infarcts in the right MCA territory as described above predominantly involving the frontal lobe. Scattered foci of acute infarction are  "also noted in the right parietal lobe.     Tiny foci of infarction are also noted in the left frontoparietal region.    IR thrombectomy 10/14/2021  66-year-old presented with right M1 occlusion at [facility] underwent IV TPA therapy and was transferred to [facility] for emergent mechanical thrombectomy. At the time of initial angiogram, nonocclusive thrombus was   identified in the distal right M1 segment with occlusive thrombus in the inferior division M2 trunk with sluggish antegrade flow into the right inferior division MCA branches.     Multiple attempts at thrombectomy were unsuccessful in recanalizing the clot at the inferior division trunk. The final angiographic images demonstrated persistent inferior division origin thrombus with sluggish antegrade flow into the inferior division   branches. There is also some flow limitation to the superior division branches due to thrombus encroaching upon the smaller superior division origin. Patient will be recovered in the ICU and the systolic blood pressure will be maintained around 160 mmHg   to encourage coverage leptomeningeal collateralization.      TTE: LVEF 60%, LA size WNL, JOSE 26, bubble negative.      14 Day ZIO 10/26/2021-11/9/2021 : negative for atrial arrhythmias.   He also had a 1 day monitor on 10/25/2021, there were 2 atrial runs, up to 8 beats.  No atrial fibrillation.     Stroke Labs:  Creat 0.81, LDL 87, A1C 4.6. Platelet count 1033    Encounter Vitals  Standard Vitals  Vitals  Blood Pressure: 128/86  Temperature: 36.8 °C (98.2 °F)  Temp src: Temporal  Pulse: 62  Respiration: 12  Pulse Oximetry: 98 %  Height: 180.3 cm (5' 11\")  Weight: 87.8 kg (193 lb 9 oz)  Encounter Vitals  Temperature: 36.8 °C (98.2 °F)  Temp src: Temporal  Blood Pressure: 128/86  Pulse: 62  Respiration: 12  Pulse Oximetry: 98 %  Weight: 87.8 kg (193 lb 9 oz)  Height: 180.3 cm (5' 11\")  BMI (Calculated): 27      PHYSICAL " ASSESSMENT  Constitutional:  Alert, no apparent distress,  Psych:   mood and affect WNL  Muskuloskeletal:  strength 5/5 RUE/RLE, 4/5 LUE, 4+/5 LLE, slight drift LUE  NEUROLOGICAL ASSESSMENT  Oriented X 4, speech fluent, naming and memory intact  CN II: Visual fields are full to confrontation. Fundoscopic exam is normal with sharp discs and no vascular changes. Pupils are 3 mm and briskly reactive to light.   CN III: IV, VI  , no ptosis  CN V: Facial sensation is intact to pinprick in all 3 divisions bilaterally. Corneal responses are intact.  CN VII: slight left smile droop and left NLF flattening    CN VIII Hearing is normal to rubbing fingers  CN IX, X: Palate elevates symmetrically. Phonation is normal.  CN XI: Head turning and shoulder shrug are intact  CN XII: Tongue is midline with normal movements and no atrophy.                           Sensation to PP equal bilaterally                 No limb ataxia out of proportion to weakness with finger to nose and heel to shin                 Ambulates with walking stick                                 Biceps,brachioradialis, tricep, and patellar reflexex all 2+     Cardiovascular:    S1S2, no abnormal rhythm auscultated, no peripheral edema  Neck:                     No carotid bruits noted   Pulmonary:            Respirations easy, lungs clear to auscultation all fields.     Skin:                     No obvious rashes.    Iniital NIHSS  Unknown    Current NIHSS    1a. LOC: 0  1b. LOC Questions: 0  1c. LOC Commands: 0  2. Best Gaze:0  3. Visual Fields: 0  4. Facial Paresis: 1  5a. Motor arm left: 1  5b. Motor arm right: 0  6a. Motor leg left: 0  6b. Motor leg right: 0  7. Sensory: 0  8. Best Language: 0  9. Limb Ataxia: 0  10. Dysarthria: 0  11. Extinction/Inattention: 0  Total Score Current 2          Current mRS  2        Assessment & Plan      1. Late Effect of Stroke   RMCA infarct, tiny L parietal infarct, likely secondary to thrombocytosis.  Some residual left  sided weakness, current NIHSS 2, mRS 2.         Presumed Mechanism by Toast  : __  Large Artery Atherosclerosis  __  Small Vessel (lacunar)  __   Cardioembolic  __XXX   Other (Sickle cell, vasculitis, hypercoagulable)  Thromboctyosis.  __   Unknown  ____ESUS     Referral to outpatient physical therapy      Needs Hematology consult for thrombocytosis.  He has a referral to [facility], it does not appear to have been approved yet, I also put in a referral to [facility].    Stroke risk factors include thrombocytosis        Blood pressure goal less than 130/80, currently at goal      Continue Aspirin 81mg PO daily,  discussed side effects, signs of bleeding      LDL goal < 70, current 87 (pre-statin), decrease Atorvastatin to 40mg, no need for 80mg,  discussed medication side effects, will need follow up with primary care evaluate liver function at intervals and refill  Exercise at least 30 minutes daily, avoid red meat, fried foods, butter, cheese.   Eat 5-6 servings of vegetables and fruits daily, choose lean white meat without skin (chicken, turkey, white fish)--baked, broiled or grilled.    If any new signs of stroke:  sudden weakness, numbness, speech difficulty (slurring or difficulty finding words), imbalance, incoordination, worse headache of life or vision loss occur, call 911.      Take all medications as prescribed unless instructed by your provider.      I spent a total of 62 minutes caring for patient,  my time includes counseling, review of systems, HPI and assessment, review of images, labs and testing as above.  I reviewed the hospital records, PMH, social and family history.   I have counseled patient on stroke prevention strategies, stroke symptoms and mimics.  Diet and exercise modifications.  We discussed medication side effects and instructions.       I have provided patient a written personalized stroke prevention plan, it is filed under the media tab under ‘[facility]”.      Follow up PRN

## 2021-12-09 ENCOUNTER — TELEPHONE (OUTPATIENT)
Dept: CARDIOLOGY | Facility: MEDICAL CENTER | Age: 66
End: 2021-12-09

## 2021-12-09 ENCOUNTER — TELEMEDICINE (OUTPATIENT)
Dept: CARDIOLOGY | Facility: MEDICAL CENTER | Age: 66
End: 2021-12-09
Payer: COMMERCIAL

## 2021-12-09 VITALS — WEIGHT: 193.9 LBS | BODY MASS INDEX: 27.15 KG/M2 | HEIGHT: 71 IN

## 2021-12-09 DIAGNOSIS — Z74.09 IMPAIRED MOBILITY AND ADLS: ICD-10-CM

## 2021-12-09 DIAGNOSIS — D75.839 THROMBOCYTOSIS: ICD-10-CM

## 2021-12-09 DIAGNOSIS — G81.94 LEFT HEMIPARESIS (HCC): ICD-10-CM

## 2021-12-09 DIAGNOSIS — Z86.73 HISTORY OF ISCHEMIC RIGHT MCA STROKE: ICD-10-CM

## 2021-12-09 DIAGNOSIS — D58.0 SPHEROCYTOSIS (HCC): ICD-10-CM

## 2021-12-09 DIAGNOSIS — I63.9 CEREBROVASCULAR ACCIDENT (CVA), UNSPECIFIED MECHANISM (HCC): ICD-10-CM

## 2021-12-09 DIAGNOSIS — Z90.81 POST-SPLENECTOMY: ICD-10-CM

## 2021-12-09 DIAGNOSIS — Z78.9 IMPAIRED MOBILITY AND ADLS: ICD-10-CM

## 2021-12-09 PROCEDURE — 99214 OFFICE O/P EST MOD 30 MIN: CPT | Mod: 95 | Performed by: NURSE PRACTITIONER

## 2021-12-09 ASSESSMENT — FIBROSIS 4 INDEX: FIB4 SCORE: 0.73

## 2021-12-09 NOTE — PROGRESS NOTES
"Virtual Visit: Established Patient   This visit was conducted via Zoom using secure and encrypted videoconferencing technology.   The patient was in a private location in the state of [place].    The patient's identity was confirmed and verbal consent was obtained for this virtual visit.    Subjective:   CC:   Chief Complaint   Patient presents with   • Possible Stroke     F/V Dx: Cerebrovascular accident (CVA), unspecified mechanism (HCC)     Fermín Gomez is a 66 y.o. male with current medical problems including s/p right MCA CVA with left hemiparesis and dysarthria, spherocytosis, and  Thrombocytosis.    Interim History:  Mr. Gomez was last seen in this cardiology office by Dr. Bell.  At that time a 2-week event monitor was ordered was ordered to rule out atrial fibrillation.  No A. fib noted on monitor, per Dr. Bell's recommendations a \"long-term monitor such as a implantable loop recorder should be ordered if no A. fib noted\"      He has followed up with PCP and a neurologist and they both feel that the stroke secondary to thrombocytosis given history of spherocytosis.  They feel that the ILR would not be necessary and the patient agrees and prefers to not have this placed at this time    Currently patient is slowly recovering and regaining function to his left side and speech    He has a referral placed to hematology, no appt. Made yet.    Patient endorses medication compliance.    ROS   He denies palpitations, chest pain, shortness of breath, dyspnea on exertion, dizziness or syncopal episodes, orthopnea, PND, lower extremity swelling, and recent weight gain.     Current medicines (including changes today)  Current Outpatient Medications   Medication Sig Dispense Refill   • atorvastatin (LIPITOR) 40 MG Tab Take 1 Tablet by mouth at bedtime. 90 Tablet 0   • Ascorbic Acid (VITAMIN C) 500 MG Cap Take  by mouth.     • aspirin (ASA) 81 MG Chew Tab chewable tablet Chew 1 Tablet every day. 100 Tablet  " "  • omeprazole (PRILOSEC) 20 MG delayed-release capsule Take 20 mg by mouth every morning.     • vitamin D (CHOLECALCIFEROL) 1000 Unit Tab Take 1 tablet by mouth every day. 30 tablet 11     No current facility-administered medications for this visit.       Patient Active Problem List    Diagnosis Date Noted   • Late effect of stroke 12/08/2021   • History of ischemic right MCA stroke 12/01/2021   • Dysarthria 12/01/2021   • Thrombocytosis 10/27/2021   • Edema of left lower extremity 10/22/2021   • Impaired mobility and ADLs 10/20/2021   • Other insomnia 10/20/2021   • Gastroesophageal reflux disease with esophagitis without hemorrhage 10/19/2021   • Dysphagia 10/13/2021   • Left hemiparesis (HCC) 10/13/2021   • Subdural hematoma (HCC) 10/13/2021   • Penile mass 05/10/2019   • Primary insomnia 06/09/2017   • Post-splenectomy    • History of pneumonia 09/01/2016   • Spherocytosis ([facility])         Objective:   Ht 1.803 m (5' 11\")   Wt 88 kg (193 lb 14.4 oz)   BMI 27.04 kg/m²     Physical Exam:  Constitutional: Alert, no distress, well-groomed.  Skin: No rashes in visible areas.  Eye: Round. Conjunctiva clear, lids normal. No icterus.   ENMT: Lips pink without lesions, good dentition, moist mucous membranes. Phonation normal.  Neck: No masses, no thyromegaly. Moves freely without pain.  Respiratory: Unlabored respiratory effort, no cough or audible wheeze  Psych: Alert and oriented x3, normal affect and mood.     Assessment and Plan:   The following treatment plan was discussed:     1. Cerebrovascular accident (CVA), unspecified mechanism (HCC)    2. Thrombocytosis    3. Post-splenectomy    4. Spherocytosis (HCC)    5. Left hemiparesis (HCC)    6. Impaired mobility and ADLs    7. History of ischemic right MCA stroke    Given that patient has come to the consensus after talking to his PCP and neurologist feel his stroke was secondary to thrombocytosis.  Patient feels comfortable with not pursuing the implantation of the " ILR to rule out atrial fibrillation because of stroke.     Follow-up: Return if symptoms worsen or fail to improve.

## 2021-12-09 NOTE — PROGRESS NOTES
"      Cardiology Clinic Follow-up Note    Date of note:    12/9/2021  Primary Care Provider: Sudhir Brewer M.D.    Name:             Fermín Gomez  YOB: 1955  MRN:               [id number]    CC: F/you for CVA    Primary Cardiologist: Dr. Bell.    Patient HPI:   Fermín Gomez is a 66 y.o. male with current medical problems including s/p right MCA CVA with left hemiparesis and dysarthria, spherocytosis, and  thrombocytosis    Interim History:  Mr. Gomez was last seen in this cardiology office by Dr. Bell.  At that time a 2-week event monitor was ordered was ordered to rule out atrial fibrillation.  No A. fib noted on monitor, per Dr. Bell's recommendations a \"long-term monitor such as a implantable loop recorder should be ordered if no A. fib noted\"    Currently patient is residing ***    Follow-up with hematology ***    Patient endorses medication compliance    Cardiovascular Risk Factors:  1. Smoking status: ***  2. Type II Diabetes Mellitus: ***   Lab Results   Component Value Date/Time    HBA1C 4.6 10/20/2021 05:37 AM     3. Hypertension: ***  4. Dyslipidemia: ***   Cholesterol,Tot   Date Value Ref Range Status   10/13/2021 150 100 - 199 mg/dL Final     LDL   Date Value Ref Range Status   10/13/2021 87 <100 mg/dL Final     HDL   Date Value Ref Range Status   10/13/2021 50 >=40 mg/dL Final     Triglycerides   Date Value Ref Range Status   10/13/2021 65 0 - 149 mg/dL Final     5. Family history of early Coronary Artery Disease in a first degree relative (Male less than 55 years of age; Female less than 65 years of age): ***  6.  Obesity and/or Metabolic Syndrome: ***  7. Sedentary lifestyle: ***    Review of systems:  All others systems reviewed and negative except for what is outlined in the above HPI    Past Medical History:   Diagnosis Date   • Dysarthria 12/1/2021   • GERD (gastroesophageal reflux disease)    • History of ischemic right MCA stroke 12/1/2021   • History of " pneumonia 9/2016   • Oral thrush 10/19/2021   • Penile mass     left penile shaft, warty appearance. 1.5 cm   • Post-splenectomy    • Spherocytosis (HCC)    • Stroke (cerebrum) (HCC) 10/12/2021   • Urinary retention 10/20/2021   • Varicose veins of both lower extremities      Past Surgical History:   Procedure Laterality Date   • COLONOSCOPY  1/18/13    normal, due 2023   • PB REMOVAL SPLEEN, TOTAL  1962   • HERNIA REPAIR     • INGUINAL HERNIA REPAIR BILATERAL      Dr. Padilla   • SPLENECTOMY     • TONSILLECTOMY       Family History   Problem Relation Age of Onset   • Blood Disease Mother         spherocytosis   • Cancer Mother         esophageal   • Diabetes Father    • Heart Disease Father         CABG 75     Social History     Socioeconomic History   • Marital status:      Spouse name: Not on file   • Number of children: Not on file   • Years of education: Not on file   • Highest education level: Not on file   Occupational History   • Occupation: [employer]     Employer: [employer]   Tobacco Use   • Smoking status: Never Smoker   • Smokeless tobacco: Never Used   Vaping Use   • Vaping Use: Never used   Substance and Sexual Activity   • Alcohol use: Yes     Alcohol/week: 4.2 oz     Types: 7 Glasses of wine per week     Comment: maybe 1 drink daily   • Drug use: Yes     Types: Marijuana     Comment: occasional marijuana   • Sexual activity: Yes   Other Topics Concern   • Not on file   Social History Narrative    ** Merged History Encounter **          Social Determinants of Health     Financial Resource Strain:    • Difficulty of Paying Living Expenses: Not on file   Food Insecurity:    • Worried About Running Out of Food in the Last Year: Not on file   • Ran Out of Food in the Last Year: Not on file   Transportation Needs:    • Lack of Transportation (Medical): Not on file   • Lack of Transportation (Non-Medical): Not on file   Physical Activity:    • Days of Exercise per Week: Not on file   •  Minutes of Exercise per Session: Not on file   Stress:    • Feeling of Stress : Not on file   Social Connections:    • Frequency of Communication with Friends and Family: Not on file   • Frequency of Social Gatherings with Friends and Family: Not on file   • Attends Lutheran Services: Not on file   • Active Member of Clubs or Organizations: Not on file   • Attends Club or Organization Meetings: Not on file   • Marital Status: Not on file   Intimate Partner Violence:    • Fear of Current or Ex-Partner: Not on file   • Emotionally Abused: Not on file   • Physically Abused: Not on file   • Sexually Abused: Not on file   Housing Stability:    • Unable to Pay for Housing in the Last Year: Not on file   • Number of Places Lived in the Last Year: Not on file   • Unstable Housing in the Last Year: Not on file     Allergies   Allergen Reactions   • Pcn [Penicillins] Rash   • Penicillins      Current Outpatient Medications   Medication Sig Dispense Refill   • atorvastatin (LIPITOR) 40 MG Tab Take 1 Tablet by mouth at bedtime. 90 Tablet 0   • Ascorbic Acid (VITAMIN C) 500 MG Cap Take  by mouth.     • aspirin (ASA) 81 MG Chew Tab chewable tablet Chew 1 Tablet every day. 100 Tablet    • omeprazole (PRILOSEC) 20 MG delayed-release capsule Take 20 mg by mouth every morning.     • vitamin D (CHOLECALCIFEROL) 1000 Unit Tab Take 1 tablet by mouth every day. 30 tablet 11     No current facility-administered medications for this visit.       Physical Exam:  Ambulatory Vitals  There were no vitals taken for this visit.   BP Readings from Last 4 Encounters:   12/08/21 128/86   12/01/21 112/62   11/18/21 112/60   11/10/21 124/60       Weight/BMI: There is no height or weight on file to calculate BMI.  Wt Readings from Last 4 Encounters:   12/08/21 87.8 kg (193 lb 9 oz)   12/01/21 86.3 kg (190 lb 4.1 oz)   11/10/21 83 kg (183 lb)   11/07/21 83.5 kg (184 lb)       General: No apparent distress. Well nourished.   Neck: No JVD. No caroid  bruits, trachea midline  Lungs: CTAB. Normal effort, with*** crackles/rhonchi, no wheezing  Heart: RRR. Normal S1/S2, *** murmur, no rub. *** lower extremity edema. 2+ radial pulses, 2+ DT pulses  Ext: No clubbing or cyanosis.  Abdomen: soft, non tender, non distended, no brennon hepatomegaly.  Neurological: No focal deficits, no facial asymmetry.  Normal speech.  Psychiatric: Appropriate affect, alert and oriented x 4.   Skin: Warm and dry, no rash.    Lab Data Review:  Lab Results   Component Value Date/Time    CHOLSTRLTOT 150 10/13/2021 03:20 AM    LDL 87 10/13/2021 03:20 AM    HDL 50 10/13/2021 03:20 AM    TRIGLYCERIDE 65 10/13/2021 03:20 AM       Lab Results   Component Value Date/Time    SODIUM 142 11/05/2021 05:25 AM    POTASSIUM 4.0 11/05/2021 05:25 AM    CHLORIDE 106 11/05/2021 05:25 AM    CO2 27 11/05/2021 05:25 AM    GLUCOSE 93 11/05/2021 05:25 AM    BUN 13 11/05/2021 05:25 AM    CREATININE 0.82 11/05/2021 05:25 AM    CREATININE 0.89 12/08/2012 07:41 AM    BUNCREATRAT 18 03/21/2015 08:18 AM    BUNCREATRAT 16 12/08/2012 07:41 AM    GLOMRATE >59 11/13/2010 12:00 AM     Lab Results   Component Value Date/Time    ALKPHOSPHAT 75 10/29/2021 05:25 AM    ASTSGOT 62 (H) 10/29/2021 05:25 AM    ALTSGPT 45 10/29/2021 05:25 AM    TBILIRUBIN 0.7 10/29/2021 05:25 AM      Lab Results   Component Value Date/Time    WBC 7.3 11/05/2021 05:25 AM    WBC 9.2 12/08/2012 07:41 AM       Cardiac Imaging and Procedures Review:      EKG 10/12/21:  My Personal interpretation reveals SR 71    Echo 10/13/21:  No prior study is available for comparison.   Normal left ventricular size, wall thickness, diastolic and systolic   function.  Normal right ventricular size and systolic function.  No hemodynamically significant valvular heart disease.  Agitated saline (bubble) study was performed with and without Valsalva   maneuver. No evidence of right to left shunt.    Cardiac event monitor 11/18/21  The patient was monitored for 14 days.   Predominant underlying rhythm was sinus rhythm.  Minimum heart rate 44 bpm, maximum heart rate 185 bpm, and average heart rate 69 bpm.  5 runs of ventricular tachycardia occurred, the run with the fastest and longest interval lasting 6 beats with a max rate of 156 bpm.  20 runs of supraventricular tachycardia occurred, the run with the fastest interval lasting 8 beats with a max rate of 185 bpm, and the longest lasting 12 beats with an average rate of 161 bpm.  Rare PACs less than 1%.  Rare PVCs less than 1%.  No evidence of atrial fibrillation.  No symptoms reported.       Assessment and Clinical Decision Makin. Cerebrovascular accident (CVA), unspecified mechanism (HCC)     2. Thrombocytosis     3. Post-splenectomy     4. Spherocytosis (HCC)     5. Left hemiparesis (HCC)     6. Impaired mobility and ADLs     7. History of ischemic right MCA stroke       The following treatment plan was discussed      Plan reviewed in detail with the patient, verbalizes understanding and is in agreement.  Pt is to follow up with *** in ***  Encouraged Pt to follow up with us over the phone or electronically using my [employer] [facility] as cardiac issues/concerns arise.      PLEASE NOTE: This dictation was created using voice recognition software. I have made every reasonable attempt to correct obvious errors, but I expect that there are errors of grammar and possibly content that I did not discover before finalizing the note.       SIRISHA Jack.   [facility]  [phone]    Collaborating Physician: Dr. Bartlett

## 2021-12-09 NOTE — TELEPHONE ENCOUNTER
Attempt #1:    Tried calling pt no answer went straight to DOE COURTNEYM for pt to call back and with instructions that I would be calling back at 11 am for his hortensia VV.

## 2021-12-10 NOTE — THERAPY
"Occupational Therapy  Daily Treatment     Patient Name: Fermín Gomez  Age:  66 y.o., Sex:  male  Medical Record #: [id number]  Today's Date: 10/26/2021     Precautions  Precautions: Fall Risk, Swallow Precautions ( See Comments)  Comments: Left hemiparesis         Subjective    \" I did the leg on Saturday with Andry .\"     Objective       10/26/21 0931   Precautions   Precautions Fall Risk;Swallow Precautions ( See Comments)   Comments left hemiparesis    Neuro-Muscular Treatments   Neuro-Muscular Treatments Electrical Stimulation   Comments setup on    FES for left UE     settings  do not include wrist flexors and extensor.,   focus on  shoulder , biceps and triceps.    distance  2.39 miles  symmerty  1 % right  1.3 Cohen average power, 22 minutes total for therapy session  motor on  8 minutes 40 seconds   and off for 13 minutes  27 seconds.    Interdisciplinary Plan of Care Collaboration   IDT Collaboration with  Family / Caregiver   Patient Position at End of Therapy Seated;Family / Friend in Room   Collaboration Comments daughter Brown present during tx session   educated both regarding purpose and benfits of FES bike use        OT Total Time Spent   OT Individual Total Time Spent (Mins) 60   OT Charge Group   OT Neuromuscular Re-education / Balance 4      instructed in performing shoulder shrug exercise  At 10 reps intervals as homework in room        Arm pad applied to  Left arm rest for improved positioning of left UE to decrease subluxation.   Assessment     tolerated FES   with no complaints.    Presents with beginning subluxation or left shoulder. No report of pain     Strengths: Alert and oriented, Independent prior level of function, Motivated for self care and independence, Pleasant and cooperative, Supportive family, Willingly participates in therapeutic activities  Barriers: Hemiparesis, Decreased endurance, Home accessibility, Impaired activity tolerance, Impaired balance, Impaired " functional cognition, Limited mobility, Fatigue (impaired termination and initiation, impaired sequencing)    Plan  Aggressive left UE neuro  Re ed with  Primary focus on shoulder.  Weight bearing,  NMES  , Vibration ,  MAS   SAEBO    for left UE not to include wrist flexors / extensors   Plan for  as adjunct tx 2 to 3 times weekly  pending  Therapy tech availability    ADL , related transfer and mobility      Passport items to be completed:  Perform bathroom transfers, complete dressing, complete feeding, get ready for the day, prepare a simple meal, participate in household tasks, adapt home for safety needs, demonstrate home exercise program, complete caregiver training            Occupational Therapy Goals (Active)     Problem: OT Long Term Goals     Dates: Start: 10/20/21       Goal: LTG-By discharge, patient will complete basic self care tasks with CGA- mod I and AE as needed     Dates: Start: 10/20/21          Goal: LTG-By discharge, patient will perform bathroom transfers with CGA- mod I and AE as needed     Dates: Start: 10/20/21                 7

## 2021-12-17 ENCOUNTER — HOSPITAL ENCOUNTER (OUTPATIENT)
Dept: LAB | Facility: MEDICAL CENTER | Age: 66
End: 2021-12-17
Attending: FAMILY MEDICINE
Payer: COMMERCIAL

## 2021-12-17 ENCOUNTER — HOSPITAL ENCOUNTER (OUTPATIENT)
Dept: LAB | Facility: MEDICAL CENTER | Age: 66
End: 2021-12-17
Attending: INTERNAL MEDICINE
Payer: COMMERCIAL

## 2021-12-17 DIAGNOSIS — D75.839 THROMBOCYTOSIS: ICD-10-CM

## 2021-12-17 DIAGNOSIS — Z90.81 POST-SPLENECTOMY: ICD-10-CM

## 2021-12-17 DIAGNOSIS — D58.0 SPHEROCYTOSIS (HCC): ICD-10-CM

## 2021-12-17 PROCEDURE — 88291 CYTO/MOLECULAR REPORT: CPT

## 2021-12-17 PROCEDURE — 36415 COLL VENOUS BLD VENIPUNCTURE: CPT

## 2021-12-17 PROCEDURE — 83615 LACTATE (LD) (LDH) ENZYME: CPT

## 2021-12-17 PROCEDURE — 85025 COMPLETE CBC W/AUTO DIFF WBC: CPT

## 2021-12-17 PROCEDURE — 81270 JAK2 GENE: CPT

## 2021-12-17 PROCEDURE — 85046 RETICYTE/HGB CONCENTRATE: CPT

## 2021-12-18 LAB
BASOPHILS # BLD AUTO: 1.3 % (ref 0–1.8)
BASOPHILS # BLD: 0.11 K/UL (ref 0–0.12)
EOSINOPHIL # BLD AUTO: 0.12 K/UL (ref 0–0.51)
EOSINOPHIL NFR BLD: 1.4 % (ref 0–6.9)
ERYTHROCYTE [DISTWIDTH] IN BLOOD BY AUTOMATED COUNT: 45 FL (ref 35.9–50)
HCT VFR BLD AUTO: 52.7 % (ref 42–52)
HGB BLD-MCNC: 18 G/DL (ref 14–18)
HGB RETIC QN AUTO: 34.1 PG/CELL (ref 29–35)
IMM GRANULOCYTES # BLD AUTO: 0.05 K/UL (ref 0–0.11)
IMM GRANULOCYTES NFR BLD AUTO: 0.6 % (ref 0–0.9)
IMM RETICS NFR: 6.7 % (ref 9.3–17.4)
LDH SERPL L TO P-CCNC: 340 U/L (ref 107–266)
LYMPHOCYTES # BLD AUTO: 1.47 K/UL (ref 1–4.8)
LYMPHOCYTES NFR BLD: 17 % (ref 22–41)
MCH RBC QN AUTO: 32.5 PG (ref 27–33)
MCHC RBC AUTO-ENTMCNC: 34.2 G/DL (ref 33.7–35.3)
MCV RBC AUTO: 95.1 FL (ref 81.4–97.8)
MONOCYTES # BLD AUTO: 0.56 K/UL (ref 0–0.85)
MONOCYTES NFR BLD AUTO: 6.5 % (ref 0–13.4)
NEUTROPHILS # BLD AUTO: 6.34 K/UL (ref 1.82–7.42)
NEUTROPHILS NFR BLD: 73.2 % (ref 44–72)
NRBC # BLD AUTO: 0 K/UL
NRBC BLD-RTO: 0 /100 WBC
PLATELET # BLD AUTO: 677 K/UL (ref 164–446)
PMV BLD AUTO: 10.1 FL (ref 9–12.9)
RBC # BLD AUTO: 5.54 M/UL (ref 4.7–6.1)
RETICS # AUTO: 0.17 M/UL (ref 0.04–0.06)
RETICS/RBC NFR: 3.1 % (ref 0.8–2.1)
WBC # BLD AUTO: 8.7 K/UL (ref 4.8–10.8)

## 2021-12-27 LAB
CHROM ANALY INTERPHASE BLD/MAR FISH-IMP: NORMAL
PATHOLOGY STUDY: NORMAL

## 2022-01-03 LAB
JAK2 P.V617F BLD/T QL: DETECTED
SPECIMEN SOURCE: ABNORMAL

## 2022-01-10 ENCOUNTER — OFFICE VISIT (OUTPATIENT)
Dept: PHYSICAL MEDICINE AND REHAB | Facility: REHABILITATION | Age: 67
End: 2022-01-10
Payer: COMMERCIAL

## 2022-01-10 VITALS
OXYGEN SATURATION: 94 % | RESPIRATION RATE: 18 BRPM | DIASTOLIC BLOOD PRESSURE: 62 MMHG | HEART RATE: 80 BPM | SYSTOLIC BLOOD PRESSURE: 118 MMHG | TEMPERATURE: 96.8 F

## 2022-01-10 DIAGNOSIS — I63.9 CEREBROVASCULAR ACCIDENT (CVA), UNSPECIFIED MECHANISM (HCC): Primary | ICD-10-CM

## 2022-01-10 DIAGNOSIS — I69.398 IMPAIRED BALANCE AS LATE EFFECT OF CEREBROVASCULAR ACCIDENT: ICD-10-CM

## 2022-01-10 DIAGNOSIS — R26.89 IMPAIRED BALANCE AS LATE EFFECT OF CEREBROVASCULAR ACCIDENT: ICD-10-CM

## 2022-01-10 DIAGNOSIS — R41.3 IMPAIRED MEMORY: ICD-10-CM

## 2022-01-10 PROCEDURE — 99213 OFFICE O/P EST LOW 20 MIN: CPT | Performed by: PHYSICAL MEDICINE & REHABILITATION

## 2022-01-10 NOTE — PROGRESS NOTES
[facility]  [facility]  [address] [place] [address]  Ph: [phone]    FOLLOW UP PATIENT EVALUATION      Patient Name: Fermín Gomez   Patient : 1955  Patient Age: 66 y.o.     Examining Physician: Dr. Blessing Cifuentes, DO    PM&R History to Date - Background Information:  Dates of Admission/Discharge to ARU: 10/19/2021-2021    SUBJECTIVE:   Patient Identification: Fermín Gomez is a 66 y.o. male with PMH significant for spherocytosis s/p splenectomy, GERD and rehabilitation history significant for right MCA ischemic stroke s/p thrombectomy and TPA c/b small left SDH over frontal lobe 10/12/21 and is presenting to PM&R clinic for a FOLLOW UP OUTPATIENT evaluation with the following chief complaint/s:    Chief Complaint: Stroke follow up    History of Present Illness:    - Saw hematology and will be on new medication for essential thrombocytosis.   - Also would like to have bone marrow biopsy.   - No longer walking with walking stick.   - No falls.   - No longer with home health.   - No visual changes.   - Still has difficulty with some mutli-tasking and processing. Seems to have slower responses still.   - Outpatient OT and PT referral was placed but was unable to schedule until end of March.   - Off of Flomax without issue. Voiding volitionally.     Review of Systems:  All other pertinent positive review of systems are noted above in HPI.   All other systems reviewed and are negative.    Past Medical History:  Past Medical History:   Diagnosis Date   • History of ischemic right MCA stroke 2021   • Dysarthria 2021   • Urinary retention 10/20/2021   • Oral thrush 10/19/2021   • Stroke (cerebrum) (HCC) 10/12/2021   • History of pneumonia 2016   • GERD (gastroesophageal reflux disease)    • Penile mass     left penile shaft, warty appearance. 1.5 cm   • Post-splenectomy    • Spherocytosis (HCC)    • Varicose veins of both lower extremities       Past Surgical  History:   Procedure Laterality Date   • COLONOSCOPY  1/18/13    normal, due 2023   • [place] REMOVAL SPLEEN, TOTAL  1962   • HERNIA REPAIR     • INGUINAL HERNIA REPAIR BILATERAL      Dr. Padilla   • SPLENECTOMY     • TONSILLECTOMY          Current Outpatient Medications:   •  atorvastatin (LIPITOR) 40 MG Tab, Take 1 Tablet by mouth at bedtime., Disp: 90 Tablet, Rfl: 0  •  Ascorbic Acid (VITAMIN C) 500 MG Cap, Take  by mouth., Disp: , Rfl:   •  aspirin (ASA) 81 MG Chew Tab chewable tablet, Chew 1 Tablet every day., Disp: 100 Tablet, Rfl:   •  omeprazole (PRILOSEC) 20 MG delayed-release capsule, Take 20 mg by mouth every morning., Disp: , Rfl:   •  vitamin D (CHOLECALCIFEROL) 1000 Unit Tab, Take 1 tablet by mouth every day., Disp: 30 tablet, Rfl: 11  Allergies   Allergen Reactions   • Pcn [Penicillins] Rash   • Penicillins         Past Social History:  Social History     Socioeconomic History   • Marital status:      Spouse name: Not on file   • Number of children: Not on file   • Years of education: Not on file   • Highest education level: Not on file   Occupational History   • Occupation: [employer]     Employer: [employer]   Tobacco Use   • Smoking status: Never Smoker   • Smokeless tobacco: Never Used   Vaping Use   • Vaping Use: Never used   Substance and Sexual Activity   • Alcohol use: Yes     Alcohol/week: 4.2 oz     Types: 7 Glasses of wine per week     Comment: maybe 1 drink daily   • Drug use: Yes     Types: Marijuana     Comment: occasional marijuana   • Sexual activity: Yes   Other Topics Concern   • Not on file   Social History Narrative    ** Merged History Encounter **          Social Determinants of Health     Financial Resource Strain:    • Difficulty of Paying Living Expenses: Not on file   Food Insecurity:    • Worried About Running Out of Food in the Last Year: Not on file   • Ran Out of Food in the Last Year: Not on file   Transportation Needs:    • Lack of Transportation (Medical):  Not on file   • Lack of Transportation (Non-Medical): Not on file   Physical Activity:    • Days of Exercise per Week: Not on file   • Minutes of Exercise per Session: Not on file   Stress:    • Feeling of Stress : Not on file   Social Connections:    • Frequency of Communication with Friends and Family: Not on file   • Frequency of Social Gatherings with Friends and Family: Not on file   • Attends Synagogue Services: Not on file   • Active Member of Clubs or Organizations: Not on file   • Attends Club or Organization Meetings: Not on file   • Marital Status: Not on file   Intimate Partner Violence:    • Fear of Current or Ex-Partner: Not on file   • Emotionally Abused: Not on file   • Physically Abused: Not on file   • Sexually Abused: Not on file   Housing Stability:    • Unable to Pay for Housing in the Last Year: Not on file   • Number of Places Lived in the Last Year: Not on file   • Unstable Housing in the Last Year: Not on file        Family History:  Family History   Problem Relation Age of Onset   • Blood Disease Mother         spherocytosis   • Cancer Mother         esophageal   • Diabetes Father    • Heart Disease Father         CABG 75       Depression and Opioid Screening  PHQ-9:  Depression Screen (PHQ-2/PHQ-9) 11/2/2021 11/3/2021 11/6/2021   PHQ-2 Total Score 0 0 0   PHQ-2 Total Score - - -   PHQ-2 Total Score - - -     Interpretation of PHQ-9 Total Score   Score Severity   1-4 No Depression   5-9 Mild Depression   10-14 Moderate Depression   15-19 Moderately Severe Depression   20-27 Severe Depression     OBJECTIVE:   Vital Signs:  Vitals:    01/10/22 1040   BP: 118/62   Pulse: 80   Resp: 18   Temp: 36 °C (96.8 °F)   SpO2: 94%        Pertinent Labs:  Lab Results   Component Value Date/Time    SODIUM 142 11/05/2021 05:25 AM    POTASSIUM 4.0 11/05/2021 05:25 AM    CHLORIDE 106 11/05/2021 05:25 AM    CO2 27 11/05/2021 05:25 AM    GLUCOSE 93 11/05/2021 05:25 AM    BUN 13 11/05/2021 05:25 AM    CREATININE  0.82 11/05/2021 05:25 AM    CREATININE 0.89 12/08/2012 07:41 AM    BUNCREATRAT 18 03/21/2015 08:18 AM    BUNCREATRAT 16 12/08/2012 07:41 AM    GLOMRATE >59 11/13/2010 12:00 AM       Lab Results   Component Value Date/Time    HBA1C 4.6 10/20/2021 05:37 AM       Lab Results   Component Value Date/Time    WBC 8.7 12/17/2021 01:20 PM    WBC 9.2 12/08/2012 07:41 AM    RBC 5.54 12/17/2021 01:20 PM    RBC 5.19 12/08/2012 07:41 AM    HEMOGLOBIN 18.0 12/17/2021 01:20 PM    HEMATOCRIT 52.7 (H) 12/17/2021 01:20 PM    MCV 95.1 12/17/2021 01:20 PM    MCV 94 12/08/2012 07:41 AM    MCH 32.5 12/17/2021 01:20 PM    MCH 33.3 (H) 12/08/2012 07:41 AM    MCHC 34.2 12/17/2021 01:20 PM    MPV 10.1 12/17/2021 01:20 PM    NEUTSPOLYS 73.20 (H) 12/17/2021 01:20 PM    LYMPHOCYTES 17.00 (L) 12/17/2021 01:20 PM    MONOCYTES 6.50 12/17/2021 01:20 PM    EOSINOPHILS 1.40 12/17/2021 01:20 PM    BASOPHILS 1.30 12/17/2021 01:20 PM       Lab Results   Component Value Date/Time    ASTSGOT 62 (H) 10/29/2021 05:25 AM    ALTSGPT 45 10/29/2021 05:25 AM        Physical Exam:   GEN: No apparent distress  HEENT: Head normocephalic, atraumatic.  Sclera nonicteric bilaterally, no ocular discharge appreciated bilaterally.  CV: Extremities warm and well-perfused, no peripheral edema appreciated bilaterally.  PULMONARY: Breathing nonlabored on room air, no respiratory accessory muscle use.  Not requiring supplemental oxygen.  SKIN: No appreciable skin breakdown on exposed areas of skin.  PSYCH: Mood and affect within normal limits.  NEURO: Awake alert.  Conversational.  Answers questions appropriately.     Motor Exam Upper Extremities   ? Myotome R L   Shoulder Abduction C5 5 4   Elbow flexion C5 5 4   Wrist extension C6 5 4   Elbow extension C7 5 4   Finger flexion C8 5 4   Finger abduction T1 5 4     Motor Exam Lower Extremities  ? Myotome R L   Hip flexion L2 5 5   Knee extension L3 5 5   Ankle dorsiflexion L4 5 5   Toe extension L5 5 5   Ankle plantarflexion S1  5 5     No clonus BL ankles.     ASSESSMENT/PLAN: Fermní Gomez  is a 66 y.o. male with rehabilitation history significant for right MCA ischemic stroke s/p thrombectomy and TPA c/b small left SDH over frontal lobe, here for evaluation. The following plan was discussed with the patient who is in agreement.     Visit Diagnoses     ICD-10-CM   1. Cerebrovascular accident (CVA), unspecified mechanism (HCC)  I63.9   2. Impaired balance as late effect of cerebrovascular accident  I69.398    R26.89   3. Impaired memory  R41.3      Wife assists with history.    Rehab/Neuro:   1. Right MCA ischemic stroke s/p thrombectomy and TPA c/b small left SDH over frontal lobe  2. Essential Thrombocytosis - follows with hematology. On chemo.   -Secondary stroke prophylaxis: Aspirin + statin  -Function: 11/2021 function improving per patient report.  Still imbalanced due to left hemiparesis.  Ambulates with 1 walking stick and needs it most of the time.  Occasionally will furniture surf within the home, however, he still feels more comfortable with his walking stick.  Denies any significant cognitive deficits.  Discharged from speech therapy.  More frustrated with inability to do small tasks like buttoning his shirt due to reduced dexterity.  -Occupation: Retired  -Driving status: Referral to Occupational Therapy for driving evaluation.  Biggest concern will be patient's continued cognitive impairments including difficulty multitasking, processing, executive function.  Referring to speech therapy as well.  Patient can repeat driving evaluation should he perform poorly with guided therapy recommendations based on performance.  -Referral: Speech therapy for continued cognitive therapy  -Outpatient therapy (PT/OT) not scheduling out until March at [facility].  Recommend patient find a location closer to their home which might be able to fit them in earlier.  Counseled on the importance of continuing to do therapy during this acute period of  neuro recovery.    Neurogenic Bladder: Had required ICP for urinary retention while inpatient.  This is resolved.  -Med management: Has stopped Flomax.  -Status: Continent and volitional.  No issues voiding.    Follow up: 2 months after therapy and driving evaluation.    Total time spent was 23 minutes.  Included in this time is the time spent preparing for the visit including record review, my exam and evaluation, counseling and education regarding that which is aforementioned in the assessment and plan. Time was spent ordering the appropriate labs, tests, procedures, referrals, medications. Included this time as the time spent obtaining history from someone other than the patient. Discussion involved the patient and wife.     Please note that this dictation was created using voice recognition software. I have made every reasonable attempt to correct obvious errors but there may be errors of grammar and content that I may have overlooked prior to finalization of this note.    Dr. Blessing Cifuentes DO, MS  Department of Physical Medicine & Rehabilitation  Neuro Rehabilitation Clinic  [facility]

## 2022-01-18 ENCOUNTER — TELEPHONE (OUTPATIENT)
Dept: OCCUPATIONAL THERAPY | Facility: MEDICAL CENTER | Age: 67
End: 2022-01-18

## 2022-01-18 NOTE — THERAPY
Called patient between 75 and 120 days after discharge. Assessed Modified [place] Scale to be 1

## 2022-01-24 ENCOUNTER — HOSPITAL ENCOUNTER (OUTPATIENT)
Dept: RADIOLOGY | Facility: MEDICAL CENTER | Age: 67
End: 2022-01-24
Attending: INTERNAL MEDICINE
Payer: COMMERCIAL

## 2022-01-24 DIAGNOSIS — Z90.81 POSTSPLENECTOMY SEPSIS (HCC): ICD-10-CM

## 2022-01-24 DIAGNOSIS — D75.838 SECONDARY THROMBOCYTOSIS: ICD-10-CM

## 2022-01-24 DIAGNOSIS — A41.9 POSTSPLENECTOMY SEPSIS (HCC): ICD-10-CM

## 2022-01-24 DIAGNOSIS — T81.44XA POSTSPLENECTOMY SEPSIS (HCC): ICD-10-CM

## 2022-01-24 PROCEDURE — 76700 US EXAM ABDOM COMPLETE: CPT

## 2022-01-25 DIAGNOSIS — I63.9 CEREBROVASCULAR ACCIDENT (CVA), UNSPECIFIED MECHANISM (HCC): Primary | ICD-10-CM

## 2022-01-25 DIAGNOSIS — G81.94 LEFT HEMIPARESIS (HCC): ICD-10-CM

## 2022-03-03 ENCOUNTER — OCCUPATIONAL THERAPY (OUTPATIENT)
Dept: OCCUPATIONAL THERAPY | Facility: REHABILITATION | Age: 67
End: 2022-03-03
Attending: PHYSICAL MEDICINE & REHABILITATION
Payer: COMMERCIAL

## 2022-03-03 DIAGNOSIS — I63.9 CEREBROVASCULAR ACCIDENT (CVA), UNSPECIFIED MECHANISM (HCC): ICD-10-CM

## 2022-03-03 DIAGNOSIS — R41.3 IMPAIRED MEMORY: ICD-10-CM

## 2022-03-03 PROCEDURE — 97750 PHYSICAL PERFORMANCE TEST: CPT

## 2022-03-03 ASSESSMENT — BALANCE ASSESSMENTS
BALANCE - SITTING STATIC: GOOD
BALANCE - SITTING DYNAMIC: GOOD
BALANCE - STANDING STATIC: FAIR +
BALANCE - STANDING DYNAMIC: FAIR
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
## 2022-03-10 ENCOUNTER — TELEPHONE (OUTPATIENT)
Dept: PHYSICAL MEDICINE AND REHAB | Facility: REHABILITATION | Age: 67
End: 2022-03-10

## 2022-03-10 ENCOUNTER — OFFICE VISIT (OUTPATIENT)
Dept: PHYSICAL MEDICINE AND REHAB | Facility: REHABILITATION | Age: 67
End: 2022-03-10
Payer: COMMERCIAL

## 2022-03-10 VITALS
HEART RATE: 85 BPM | TEMPERATURE: 98.3 F | DIASTOLIC BLOOD PRESSURE: 80 MMHG | RESPIRATION RATE: 18 BRPM | OXYGEN SATURATION: 98 % | SYSTOLIC BLOOD PRESSURE: 120 MMHG

## 2022-03-10 DIAGNOSIS — I63.9 CEREBROVASCULAR ACCIDENT (CVA), UNSPECIFIED MECHANISM (HCC): Primary | ICD-10-CM

## 2022-03-10 DIAGNOSIS — G81.94 LEFT HEMIPARESIS (HCC): ICD-10-CM

## 2022-03-10 DIAGNOSIS — R41.89 COGNITIVE DEFICITS: ICD-10-CM

## 2022-03-10 DIAGNOSIS — M25.512 CHRONIC LEFT SHOULDER PAIN: ICD-10-CM

## 2022-03-10 DIAGNOSIS — G89.29 CHRONIC LEFT SHOULDER PAIN: ICD-10-CM

## 2022-03-10 PROCEDURE — 99214 OFFICE O/P EST MOD 30 MIN: CPT | Performed by: PHYSICAL MEDICINE & REHABILITATION

## 2022-03-10 RX ORDER — DICLOFENAC SODIUM 30 MG/G
1 GEL TOPICAL 4 TIMES DAILY PRN
Qty: 100 G | Refills: 1 | Status: SHIPPED | OUTPATIENT
Start: 2022-03-10 | End: 2022-06-06

## 2022-03-10 ASSESSMENT — PATIENT HEALTH QUESTIONNAIRE - PHQ9
5. POOR APPETITE OR OVEREATING: 0 - NOT AT ALL
CLINICAL INTERPRETATION OF PHQ2 SCORE: 1
SUM OF ALL RESPONSES TO PHQ QUESTIONS 1-9: 2
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
## 2022-03-14 NOTE — TELEPHONE ENCOUNTER
"PA for diclofenac sodium 3% gel denied. Pt does not meet criteria: pt must have a Dx of \"actinic keratosis,\" pt must be 18+, quantity requested must not exceed 100g tube per 90 days.   "

## 2022-03-30 ENCOUNTER — OUTPATIENT INFUSION SERVICES (OUTPATIENT)
Dept: ONCOLOGY | Facility: MEDICAL CENTER | Age: 67
End: 2022-03-30
Attending: INTERNAL MEDICINE
Payer: COMMERCIAL

## 2022-03-30 VITALS
SYSTOLIC BLOOD PRESSURE: 127 MMHG | RESPIRATION RATE: 18 BRPM | HEIGHT: 70 IN | DIASTOLIC BLOOD PRESSURE: 79 MMHG | WEIGHT: 203.04 LBS | TEMPERATURE: 98.2 F | OXYGEN SATURATION: 97 % | HEART RATE: 77 BPM | BODY MASS INDEX: 29.07 KG/M2

## 2022-03-30 DIAGNOSIS — D58.0 SPHEROCYTOSIS (HCC): ICD-10-CM

## 2022-03-30 LAB
HCT VFR BLD CALC: 47 % (ref 42–52)
HGB BLD-MCNC: 16 G/DL (ref 14–18)

## 2022-03-30 PROCEDURE — 85014 HEMATOCRIT: CPT

## 2022-03-30 PROCEDURE — 36415 COLL VENOUS BLD VENIPUNCTURE: CPT

## 2022-03-30 PROCEDURE — 99195 PHLEBOTOMY: CPT

## 2022-03-30 ASSESSMENT — FIBROSIS 4 INDEX: FIB4 SCORE: 0.87

## 2022-03-30 NOTE — PROGRESS NOTES
Fermín arrives for the therapeutic phlebotomy. Labs drawn as ordered by MD.  Fermín's Hgb 16 , Hct 47 %. 500 cc blood removed. Fermín tolerated well. Orthostatic vitals stable, see flowsheet. Fermín denies chest pain, shortness of breath, dizziness, or lightheadedness after treatment. Fermín to follow up with MD for future plan of care. Discharged to self care; no apparent distress noted.

## 2022-04-01 ENCOUNTER — APPOINTMENT (OUTPATIENT)
Dept: OCCUPATIONAL THERAPY | Facility: REHABILITATION | Age: 67
End: 2022-04-01
Attending: PHYSICAL MEDICINE & REHABILITATION
Payer: COMMERCIAL

## 2022-04-04 ENCOUNTER — SPEECH THERAPY (OUTPATIENT)
Dept: SPEECH THERAPY | Facility: REHABILITATION | Age: 67
End: 2022-04-04
Attending: PHYSICAL MEDICINE & REHABILITATION
Payer: COMMERCIAL

## 2022-04-04 DIAGNOSIS — R41.89 COGNITIVE DEFICITS: ICD-10-CM

## 2022-04-04 PROCEDURE — 92523 SPEECH SOUND LANG COMPREHEN: CPT

## 2022-04-04 NOTE — OP THERAPY EVALUATION
"  Outpatient Speech Therapy  INITIAL EVALUATION    [place] [address]  [address]  [place] [address]  Phone:  [phone]  Fax:  [phone]    Date of Evaluation: 04/04/2022    Patient: Fermín Gomez  YOB: 1955  MRN: [id number]     Referring Provider: Blessing Cifuentes D.O.  [address]  [address]  [place] [address]   Referring Diagnosis Cognitive deficits [R41.89]     Time Calculation    Start time: 0903  Stop time: 0945 Time Calculation (min): 42 minutes           Chief Complaint: Poor Memory    Visit Diagnoses     ICD-10-CM   1. Cognitive deficits  R41.89     Subjective:   Reason for Therapy:     Reason For Evaluation:  CVA and Cognition  Social Support:     Patient Mental Status:  Alert and Responsive  Progress Factors:     Progression:  Getting Better  Therapy History:     Previous Treatments and Dates:  Currently getting PT 2/wk   Additional Subjective Comments:      Pt is a 66 y.o. male with history of CVA in October 2021.  Primary complaint of \"fuzzy thinking.\"  Patient has not received speech therapy since discharged form hospital, but was refered following failed driving assessment in March. Patient feels that speech is OK, but having difficulty with thinking.  Patient has been referred to speech therapy for a cognitive linguistic evaluation.    Past Medical History:   Diagnosis Date   • Dysarthria 12/1/2021   • GERD (gastroesophageal reflux disease)    • History of ischemic right MCA stroke 12/1/2021   • History of pneumonia 9/2016   • Oral thrush 10/19/2021   • Penile mass     left penile shaft, warty appearance. 1.5 cm   • Post-splenectomy    • Spherocytosis (HCC)    • Stroke (cerebrum) (HCC) 10/12/2021   • Urinary retention 10/20/2021   • Varicose veins of both lower extremities      Past Surgical History:   Procedure Laterality Date   • COLONOSCOPY  1/18/13    normal, due 2023   • MO REMOVAL SPLEEN, TOTAL  1962   • HERNIA REPAIR     • " INGUINAL HERNIA REPAIR BILATERAL      Dr. Padilla   • SPLENECTOMY     • TONSILLECTOMY       Objective:   Other Treatment Interventions:  Cognitive Linsuistic Evaluation  Treatment Intervention tool(s) used:  Seaview Cognitive Assessment (MoCA), Cognitive Linguistic Quick Test (CLQT)  Objective Details:  The Seaview Cognitive Assessment was administered with the following results:    Trail Making Task:   Cube Copy:  0  Clock Drawin/3    Naming: 3/3    Attention:  Digit Repetition:    Inverse Digit Repetition:    Sustained Attention:    Serial Subtraction:  3/3    Language:  Sentence Repetition:    Generative Namin    Abstraction:      Delayed Recall:    Memory Index Score:  8/15    Orientation:      Overall Score:     Mild cognitive impairment   __________________________________________    Patient was administered the Cognitive Linguistic Quick Test (CLQT) with the following scores:     Personal Facts:   (Criterion cut off for ages 18-69:  8, for 70-: 8)   - This task assesses memory and language abilities.     Symbol Cancellation  (Criterion cut off for ages 18-69: 11, for 70-89: 10)    -  This is a non-linguistic task of visual attention and perception.      Confrontation Naming: 10/10 (Criterion cut off for ages 18-69: 10, for 70-89: 10)   - This language task is for word retrieval.     Clock Drawin/13 (Criterion cut off for ages 18-69: 12, for 70-89: 11)   - This serves a mini-screening tool for all cognitive domains analyzing visuospatial, planning, number and time concepts.     Story retell: 8/10 (Criterion cut off for ages 18-69: 6, for 70-89: 5)   - This assesses auditory memory and comprehension, working memory, and language output skills.      Symbol trails: 4/10 (Criterion cut off for ages 18-69: 9, for 70-89: 6)   - This is a non-linguistic task used to assess planning, self-monitoring, working memory and visual attention.     Generative  Namin/9 (Criterion cut off for ages 18-69: 5, for 70-89: 4)   - This task assesses word search and retrieval skills in semantic and phonemic categories.      Design Memory:  (Criterion cut off for ages 18-69: 5, for 70-89: 4)   - This is a non-linguistic task that can provide information about visual discrimination and analysis, attention, and visual memory.      Mazes:  (Criterion cut off for ages 18-69: 7, for 70-89: 4)   - This task requires planning, mental flexibility, self-monitoring, and visual discrimination.      Design generation:  (Criterion cut off for ages 18-69: 6, for 70-89: 5)   - This nonlinguistic task is for creativity and mental flexibly.      These scores are then placed in to the 5 cognitive domain areas:    Attention:  172 Mild  Memory:  159 WNL  Executive Function: 22 Mild  Language: 31 WNL  Visuospatial skills:  79 Mild  Clock Drawing Severity Ratin WNL    These overall scores combine to create a composite severity rating of 3.4 indicating that cognition is Mildly impaired at this time.       Speech Therapy Assessment:     Cognitive Linguistic Assessment:     Patient attention divided: Minimal    Patient orientation to day: WFL    Patient orientation to month: WFL    Patient orientation to self: [facility]    Patient orientation to place: WF    Patient immediate memory: Minimal    Patient recent and short term memory: Moderate    Patient complex reasoning ability: Moderate    Patient complex problem solving ability: Moderate    Patient executive functioning ability: Moderate    Patient three step sequencing ability: Minimal    Patient spontaneous clock drawing ability: Minimal      Speech Mechanism Assessment:     Patient speaks fluently: Minimal    Patient exhibits articulatory precision: Minimal    Patient dysarthria: Minimal    Patient uses adequate breath support: Yes    Speech Therapy Plan :   Prognosis & Recommendations  Impression Summary:  Patient presents with mild  cognitive linguistic impairments, higher level cognitive processing, visual reasoning and memory skills.  Patient would benefit from continued skilled speech therapy to improve higher level cognitive skills for greater independence within the home and community.  Patient verbalized understanding and agreement with current plan of care.    Prognosis:  Good  Goals  Short Term Goals:  1. Patient will improve memory recall, including working memory, short term and time delayed recall, implementing compensatory strategies as necessary, and completing time delayed recall to newly learned information with 80% or greater accuracy with min cues.  2.  Patient will complete problem solving/reasoning tasks including deductive reasoning puzzles and verbal math story problems given minimal  cues completing to 85% accuracy.   3.  Patient will complete visuospatial and scanning tasks with 80% accuracy  Short Term Goal Duration (Weeks):  4-6 weeks  Long Term Goals: Patient will develop functional, cognitive-linguistic based skills and utilize compensatory strategies to communicate wants and needs effectively to different conversational partners, maintain safety, and participate socially in a functional living environment to 85% accuracy, independent.   Long Term Goal Duration (Weeks):  4-6 weeks  Therapy Recommendations  Recommendation:  Individual Speech Therapy,  Planned Therapy Interventions:  Compensatory Strategy Training, Patient/Caregiver Education, Home Program and Cognitive-Linguistic training,   Frequency:  1x week  Duration (in visits):  6 (6 X [address]2507)  Duration (in weeks):  6        Referring provider co-signature:  I have reviewed this plan of care and my co-signature certifies the need for services.    Certification Period: 04/04/2022 to  05/16/22    Physician Signature: ________________________________ Date: ______________

## 2022-04-08 ENCOUNTER — APPOINTMENT (OUTPATIENT)
Dept: OCCUPATIONAL THERAPY | Facility: REHABILITATION | Age: 67
End: 2022-04-08
Attending: PHYSICAL MEDICINE & REHABILITATION
Payer: COMMERCIAL

## 2022-04-11 ENCOUNTER — SPEECH THERAPY (OUTPATIENT)
Dept: SPEECH THERAPY | Facility: REHABILITATION | Age: 67
End: 2022-04-11
Attending: PHYSICAL MEDICINE & REHABILITATION
Payer: COMMERCIAL

## 2022-04-11 DIAGNOSIS — R41.89 COGNITIVE DEFICITS: ICD-10-CM

## 2022-04-11 DIAGNOSIS — Z86.73 HISTORY OF ISCHEMIC RIGHT MCA STROKE: ICD-10-CM

## 2022-04-11 PROCEDURE — 92507 TX SP LANG VOICE COMM INDIV: CPT

## 2022-04-11 NOTE — OP THERAPY DAILY TREATMENT
Outpatient Speech Therapy  DAILY TREATMENT     [facility] Speech [facility]  [address]  [address]  [place] [address]  Phone:  [phone]  Fax:  [phone]    Date: 04/11/2022    Patient: Fermín Gomez  YOB: 1955  MRN: [id number]     Time Calculation    Start time: 1116  Stop time: 1203 Time Calculation (min): 47 minutes         Chief Complaint: Other (Executive functions)    Visit #: 2    Subjective:   Reason for Therapy:     Reason For Evaluation:  CVA and Cognition    Onset Date:  10/12/2021  Social Support:     Patient Mental Status:  Alert and Responsive  Progress Factors:     Progression:  Getting Better  Therapy History:     Previous Treatments and Dates:  Currently getting PT 2/wk     Hearing:  No Deficits    ST Subjective Vision Subjective: glassess when driving.  Additional Subjective Comments:      Patient reported he has been completing various puzzles in puzzle books at home.  Patient is motivated to drive again.       Objective:   Treatments/Interventions Performed:  Patient/Caregiver education, Home program and Cognitive-Linguistic training  Objective Details:  1. Patient will improve memory recall, including working memory, short term and time delayed recall, implementing compensatory strategies as necessary, and completing time delayed recall to newly learned information with 80% or greater accuracy with min cues.  --Not formally addressed  2.  Patient will complete problem solving/reasoning tasks including deductive reasoning puzzles and verbal math story problems given minimal  cues completing to 85% accuracy.   --Patient completed 3-5 step picture sequencing tasks 100% with no cues.  3.  Patient will complete visuospatial and scanning tasks with 80% accuracy  --Progressing, patient completed two symbol cancellation task with 100% with no self corrections.  Patient completed trail making task with alternating abstract shapes of increasing size 100% no cues.   Patient completed 4 cube puzzle with mod-max cues-patient did not initially understand that all cubes were the same and equally usable in puzzle.         Speech Therapy Assessment:     Cognitive Linguistic Assessment:     Patient attention divided: Minimal    Patient immediate memory: Minimal    Patient recent and short term memory: Moderate    Patient complex reasoning ability: Moderate    Patient complex problem solving ability: Moderate    Patient executive functioning ability: Moderate    Patient three step sequencing ability: Minimal    Patient spontaneous clock drawing ability: Minimal      Speech Mechanism Assessment:     Patient speaks fluently: Minimal    Patient exhibits articulatory precision: Minimal    Patient dysarthria: Minimal    Patient uses adequate breath support: Yes      Speech Therapy Plan :   Prognosis & Recommendations  Impression Summary:  Patient actively participated in therapy activities.  Patient presents with mild cognitive linguistic impairments, higher level cognitive processing, visual reasoning and memory skills.  Patient would benefit from continued skilled speech therapy to improve higher level cognitive skills for greater independence within the home and community.  Patient verbalized understanding and agreement with current plan of care.    Prognosis:  Good  Goals  Short Term Goals:  1. Patient will improve memory recall, including working memory, short term and time delayed recall, implementing compensatory strategies as necessary, and completing time delayed recall to newly learned information with 80% or greater accuracy with min cues.  2.  Patient will complete problem solving/reasoning tasks including deductive reasoning puzzles and verbal math story problems given minimal  cues completing to 85% accuracy.   3.  Patient will complete visuospatial and scanning tasks with 80% accuracy  Short Term Goal Duration (Weeks):  4-6 weeks  Long Term Goals:  Patient will develop  functional, cognitive-linguistic based skills and utilize compensatory strategies to communicate wants and needs effectively to different conversational partners, maintain safety, and participate socially in a functional living environment to 85% accuracy, independent.   Long Term Goal Duration (Weeks):  4-6 weeks  Therapy Recommendations  Recommendation:  Individual Speech Therapy,  Planned Therapy Interventions:  Compensatory Strategy Training, Patient/Caregiver Education, Home Program and Cognitive-Linguistic training,   Frequency:  1x week  Duration (in visits):  6 (6 X [address]2507)  Duration (in weeks):  6

## 2022-04-14 ENCOUNTER — APPOINTMENT (OUTPATIENT)
Dept: OCCUPATIONAL THERAPY | Facility: REHABILITATION | Age: 67
End: 2022-04-14
Attending: PHYSICAL MEDICINE & REHABILITATION
Payer: COMMERCIAL

## 2022-04-18 ENCOUNTER — SPEECH THERAPY (OUTPATIENT)
Dept: SPEECH THERAPY | Facility: REHABILITATION | Age: 67
End: 2022-04-18
Attending: PHYSICAL MEDICINE & REHABILITATION
Payer: COMMERCIAL

## 2022-04-18 DIAGNOSIS — Z86.73 HISTORY OF ISCHEMIC RIGHT MCA STROKE: ICD-10-CM

## 2022-04-18 DIAGNOSIS — R41.89 COGNITIVE DEFICITS: ICD-10-CM

## 2022-04-18 PROCEDURE — 92507 TX SP LANG VOICE COMM INDIV: CPT

## 2022-04-18 NOTE — OP THERAPY DAILY TREATMENT
Outpatient Speech Therapy  DAILY TREATMENT     [facility] Speech [facility]  [address]  [address]  [place] [address]  Phone:  [phone]  Fax:  [phone]    Date: 04/18/2022    Patient: Fermín Gomez  YOB: 1955  MRN: [id number]     Time Calculation    Start time: 0947  Stop time: 1030 Time Calculation (min): 43 minutes         Chief Complaint: Other (Cognition/executive function)    Visit #: 3    Subjective:   Reason for Therapy:     Reason For Evaluation:  CVA and Cognition    Onset Date:  10/12/2021  Social Support:     Patient Mental Status:  Alert and Responsive  Progress Factors:     Progression:  Getting Better  Therapy History:     Previous Treatments and Dates:  Currently getting PT 2/wk     Hearing:  No Deficits    ST Subjective Vision Subjective: glassess when driving.  Additional Subjective Comments:      Patient reported he has been completing various puzzles in puzzle books at home.       Objective:   Treatments/Interventions Performed:  Patient/Caregiver education, Home program and Cognitive-Linguistic training  Objective Details:  1. Patient will improve memory recall, including working memory, short term and time delayed recall, implementing compensatory strategies as necessary, and completing time delayed recall to newly learned information with 80% or greater accuracy with min cues.  --Progressing, Patient completed 10-pair word associations task with 100% accuracy and good use of association/visualization strategies.  2.  Patient will complete problem solving/reasoning tasks including deductive reasoning puzzles and verbal math story problems given minimal  cues completing to 85% accuracy.   --Progressing, patient completed executive function/reasoning task of organizing locations sequences for theme park activity.  Patient did not verbalize or demonstrate use of organization strategies prior to task, but came to appropriate solution.  3.  Patient will  complete visuospatial and scanning tasks with 80% accuracy  --Progressing, patient completed complex maze task with mildly additional processing time.         Speech Therapy Assessment:     Cognitive Linguistic Assessment:     Patient attention divided: Minimal    Patient immediate memory: Minimal    Patient recent and short term memory: Moderate    Patient complex reasoning ability: Moderate    Patient complex problem solving ability: Moderate    Patient executive functioning ability: Moderate    Patient three step sequencing ability: Minimal    Patient spontaneous clock drawing ability: Minimal      Speech Mechanism Assessment:     Patient speaks fluently: Minimal    Patient exhibits articulatory precision: Minimal    Patient dysarthria: Minimal    Patient uses adequate breath support: Yes      Speech Therapy Plan :   Prognosis & Recommendations  Impression Summary: Patient actively participated in therapy activities. Patient presents with mild cognitive linguistic impairments, higher level cognitive processing, visual reasoning and memory skills.  Patient would benefit from continued skilled speech therapy to improve higher level cognitive skills for greater independence within the home and community.  Patient verbalized understanding and agreement with current plan of care.    Prognosis:  Good  Goals  Short Term Goals:  1. Patient will improve memory recall, including working memory, short term and time delayed recall, implementing compensatory strategies as necessary, and completing time delayed recall to newly learned information with 80% or greater accuracy with min cues.  2.  Patient will complete problem solving/reasoning tasks including deductive reasoning puzzles and verbal math story problems given minimal  cues completing to 85% accuracy.   3.  Patient will complete visuospatial and scanning tasks with 80% accuracy  Short Term Goal Duration (Weeks):  4-6 weeks  Long Term Goals:  Patient will develop  functional, cognitive-linguistic based skills and utilize compensatory strategies to communicate wants and needs effectively to different conversational partners, maintain safety, and participate socially in a functional living environment to 85% accuracy, independent.   Long Term Goal Duration (Weeks):  4-6 weeks  Patient Stated Goal:  Drive again  Therapy Recommendations  Recommendation:  Individual Speech Therapy,  Planned Therapy Interventions:  Compensatory Strategy Training, Patient/Caregiver Education, Home Program and Cognitive-Linguistic training,   Frequency:  1x week  Duration (in visits):  6 (6 X 92507)  Duration (in weeks):  6

## 2022-04-21 ENCOUNTER — APPOINTMENT (OUTPATIENT)
Dept: OCCUPATIONAL THERAPY | Facility: REHABILITATION | Age: 67
End: 2022-04-21
Attending: PHYSICAL MEDICINE & REHABILITATION
Payer: COMMERCIAL

## 2022-04-25 ENCOUNTER — APPOINTMENT (OUTPATIENT)
Dept: OCCUPATIONAL THERAPY | Facility: REHABILITATION | Age: 67
End: 2022-04-25
Attending: PHYSICAL MEDICINE & REHABILITATION
Payer: COMMERCIAL

## 2022-04-25 ENCOUNTER — SPEECH THERAPY (OUTPATIENT)
Dept: SPEECH THERAPY | Facility: REHABILITATION | Age: 67
End: 2022-04-25
Attending: PHYSICAL MEDICINE & REHABILITATION
Payer: COMMERCIAL

## 2022-04-25 DIAGNOSIS — R41.844 EXECUTIVE FUNCTION DEFICIT: ICD-10-CM

## 2022-04-25 PROCEDURE — 92507 TX SP LANG VOICE COMM INDIV: CPT

## 2022-04-25 NOTE — OP THERAPY DAILY TREATMENT
Outpatient Speech Therapy  DAILY TREATMENT     [facility] Speech [facility]  [address]  [address]  [place] [address]  Phone:  [phone]  Fax:  [phone]    Date: 04/25/2022    Patient: Fermín Gomez  YOB: 1955  MRN: [id number]     Time Calculation    Start time: 1000  Stop time: 1031 Time Calculation (min): 31 minutes         Chief Complaint: Other (cognition)    Visit #: 4    Subjective:   Reason for Therapy:     Reason For Evaluation:  CVA and Cognition    Onset Date:  10/12/2021  Social Support:     Patient Mental Status:  Alert and Responsive  Progress Factors:     Progression:  Getting Better  Therapy History:     Previous Treatments and Dates:  Currently getting PT 2/wk     Hearing:  No Deficits    ST Subjective Vision Subjective: glassess when driving.  Additional Subjective Comments:      Patient reported he has been completing word search puzzles and reads daily at home.      Objective:   Treatments/Interventions Performed:  Patient/Caregiver education, Home program and Cognitive-Linguistic training  Objective Details:  1. Patient will improve memory recall, including working memory, short term and time delayed recall, implementing compensatory strategies as necessary, and completing time delayed recall to newly learned information with 80% or greater accuracy with min cues.  --Progressing, Patient completed divided attention/scanning task with mod cues 90%.  Patient was able to switch focus between two divided attention tasks.  2.  Patient will complete problem solving/reasoning tasks including deductive reasoning puzzles and verbal math story problems given minimal  cues completing to 85% accuracy.   --Progressing, patient completed 4X2 deductive reasoning puzzle with mod cues initially, able to complete second puzzle with faded cues.  3.  Patient will complete visuospatial and scanning tasks with 80% accuracy  --Progressing, patient completed 16 cube puzzle 100%  with mild additional processing time.         Speech Therapy Assessment:     Cognitive Linguistic Assessment:     Patient attention divided: Minimal    Patient immediate memory: Minimal    Patient recent and short term memory: Moderate    Patient complex reasoning ability: Moderate    Patient complex problem solving ability: Moderate    Patient executive functioning ability: Moderate    Patient three step sequencing ability: Minimal    Patient spontaneous clock drawing ability: Minimal      Speech Mechanism Assessment:     Patient speaks fluently: Minimal    Patient exhibits articulatory precision: Minimal    Patient dysarthria: Minimal    Patient uses adequate breath support: Yes      Speech Therapy Plan :   Prognosis & Recommendations  Impression Summary: Patient actively participated in therapy activities. Patient presents with mild cognitive linguistic impairments, higher level cognitive processing, visual reasoning and memory skills.  Patient would benefit from continued skilled speech therapy to improve higher level cognitive skills for greater independence within the home and community.  Patient verbalized understanding and agreement with current plan of care.    Prognosis:  Good  Goals  Short Term Goals:  1. Patient will improve memory recall, including working memory, short term and time delayed recall, implementing compensatory strategies as necessary, and completing time delayed recall to newly learned information with 80% or greater accuracy with min cues.  2.  Patient will complete problem solving/reasoning tasks including deductive reasoning puzzles and verbal math story problems given minimal  cues completing to 85% accuracy.   3.  Patient will complete visuospatial and scanning tasks with 80% accuracy  Short Term Goal Duration (Weeks):  4-6 weeks  Long Term Goals:  Patient will develop functional, cognitive-linguistic based skills and utilize compensatory strategies to communicate wants and needs  effectively to different conversational partners, maintain safety, and participate socially in a functional living environment to 85% accuracy, independent.   Long Term Goal Duration (Weeks):  4-6 weeks  Patient Stated Goal:  Drive again  Therapy Recommendations  Recommendation:  Individual Speech Therapy,  Planned Therapy Interventions:  Compensatory Strategy Training, Patient/Caregiver Education, Home Program and Cognitive-Linguistic training,   Frequency:  1x week  Duration (in visits):  6 (6 X [id number])  Duration (in weeks):  6

## 2022-04-28 ENCOUNTER — APPOINTMENT (OUTPATIENT)
Dept: OCCUPATIONAL THERAPY | Facility: REHABILITATION | Age: 67
End: 2022-04-28
Attending: PHYSICAL MEDICINE & REHABILITATION
Payer: COMMERCIAL

## 2022-05-02 ENCOUNTER — APPOINTMENT (OUTPATIENT)
Dept: OCCUPATIONAL THERAPY | Facility: REHABILITATION | Age: 67
End: 2022-05-02
Attending: PHYSICAL MEDICINE & REHABILITATION
Payer: COMMERCIAL

## 2022-05-02 ENCOUNTER — SPEECH THERAPY (OUTPATIENT)
Dept: SPEECH THERAPY | Facility: REHABILITATION | Age: 67
End: 2022-05-02
Attending: PHYSICAL MEDICINE & REHABILITATION
Payer: COMMERCIAL

## 2022-05-02 DIAGNOSIS — R41.844 EXECUTIVE FUNCTION DEFICIT: ICD-10-CM

## 2022-05-02 PROCEDURE — 92507 TX SP LANG VOICE COMM INDIV: CPT

## 2022-05-02 NOTE — OP THERAPY DAILY TREATMENT
Outpatient Speech Therapy  DAILY TREATMENT     [facility] [place] [address]  [address]  [place] [address]  Phone:  [phone]  Fax:  [phone]    Date: 05/02/2022    Patient: Fermín Gomez  YOB: 1955  MRN: [id number]     Time Calculation    Start time: 0947  Stop time: 1028 Time Calculation (min): 41 minutes         Chief Complaint: Other (Executive function)    Visit #: 5    Subjective:   Reason for Therapy:     Reason For Evaluation:  CVA and Cognition    Onset Date:  10/12/2021  Social Support:     Patient Mental Status:  Alert and Responsive  Progress Factors:     Progression:  Getting Better  Therapy History:     Previous Treatments and Dates:  Currently getting PT 2/wk     Hearing:  No Deficits    ST Subjective Vision Subjective: glassess when driving.  Additional Subjective Comments:      Patient reported no new significant changes, continuing cognitive games at home.      Objective:   Treatments/Interventions Performed:  Patient/Caregiver education, Home program and Cognitive-Linguistic training  Objective Details:  1. Patient will improve memory recall, including working memory, short term and time delayed recall, implementing compensatory strategies as necessary, and completing time delayed recall to newly learned information with 80% or greater accuracy with min cues.  --Progressing, Patient completed sustained attention to decision making task with 100% accuracy and mildly delayed processing time.  2.  Patient will complete problem solving/reasoning tasks including deductive reasoning puzzles and verbal math story problems given minimal  cues completing to 85% accuracy.   --Progressing, patient completed 4X4 deductive reasoning puzzle with mod cues 100%.  3.  Patient will complete visuospatial and scanning tasks with 80% accuracy  --Progressing, patient completed trail making task with alternating shapes increasing in size 100% with appropriate  processing time.         Speech Therapy Assessment:     Cognitive Linguistic Assessment:     Patient attention divided: Minimal    Patient immediate memory: Minimal    Patient recent and short term memory: Moderate    Patient complex reasoning ability: Moderate    Patient complex problem solving ability: Moderate    Patient executive functioning ability: Moderate    Patient three step sequencing ability: Minimal    Patient spontaneous clock drawing ability: Minimal      Speech Mechanism Assessment:     Patient speaks fluently: Minimal    Patient exhibits articulatory precision: Minimal    Patient dysarthria: Minimal    Patient uses adequate breath support: Yes      Speech Therapy Plan :   Prognosis & Recommendations  Impression Summary: Patient actively participated in therapy activities. Patient presents with mild cognitive linguistic impairments, higher level cognitive processing, visual reasoning and memory skills.  Patient would benefit from continued skilled speech therapy to improve higher level cognitive skills for greater independence within the home and community.  Patient verbalized understanding and agreement with current plan of care.    Prognosis:  Good  Goals  Short Term Goals:  1. Patient will improve memory recall, including working memory, short term and time delayed recall, implementing compensatory strategies as necessary, and completing time delayed recall to newly learned information with 80% or greater accuracy with min cues.  2.  Patient will complete problem solving/reasoning tasks including deductive reasoning puzzles and verbal math story problems given minimal  cues completing to 85% accuracy.   3.  Patient will complete visuospatial and scanning tasks with 80% accuracy  Short Term Goal Duration (Weeks):  4-6 weeks  Long Term Goals:  Patient will develop functional, cognitive-linguistic based skills and utilize compensatory strategies to communicate wants and needs effectively to different  conversational partners, maintain safety, and participate socially in a functional living environment to 85% accuracy, independent.   Long Term Goal Duration (Weeks):  4-6 weeks  Patient Stated Goal:  Drive again  Therapy Recommendations  Recommendation:  Individual Speech Therapy,  Planned Therapy Interventions:  Compensatory Strategy Training, Patient/Caregiver Education, Home Program and Cognitive-Linguistic training,

## 2022-05-05 ENCOUNTER — APPOINTMENT (OUTPATIENT)
Dept: OCCUPATIONAL THERAPY | Facility: REHABILITATION | Age: 67
End: 2022-05-05
Attending: PHYSICAL MEDICINE & REHABILITATION
Payer: COMMERCIAL

## 2022-05-09 ENCOUNTER — SPEECH THERAPY (OUTPATIENT)
Dept: SPEECH THERAPY | Facility: REHABILITATION | Age: 67
End: 2022-05-09
Attending: PHYSICAL MEDICINE & REHABILITATION
Payer: COMMERCIAL

## 2022-05-09 DIAGNOSIS — R41.844 EXECUTIVE FUNCTION DEFICIT: ICD-10-CM

## 2022-05-09 DIAGNOSIS — I63.9 CEREBROVASCULAR ACCIDENT (CVA), UNSPECIFIED MECHANISM (HCC): ICD-10-CM

## 2022-05-09 DIAGNOSIS — R41.89 COGNITIVE DEFICITS: ICD-10-CM

## 2022-05-09 DIAGNOSIS — G81.94 LEFT HEMIPARESIS (HCC): ICD-10-CM

## 2022-05-09 PROCEDURE — 92507 TX SP LANG VOICE COMM INDIV: CPT

## 2022-05-09 NOTE — OP THERAPY DAILY TREATMENT
Outpatient Speech Therapy  DAILY TREATMENT     [facility] [place] [address]  [address]  [place] [address]  Phone:  [phone]  Fax:  [phone]    Date: 05/09/2022    Patient: Fermín Gomez  YOB: 1955  MRN: [id number]     Time Calculation    Start time: 0945  Stop time: 1027 Time Calculation (min): 42 minutes         Chief Complaint: Other (Executive function)    Visit #: 6    Subjective:   Reason for Therapy:     Reason For Evaluation:  CVA and Cognition    Onset Date:  10/12/2021  Social Support:     Patient Mental Status:  Alert and Responsive  Progress Factors:     Progression:  Getting Better  Therapy History:     Hearing:  No Deficits    ST Subjective Vision Subjective: glassess when driving.  Additional Subjective Comments:      Patient reported no new significant changes, continuing cognitive games at home.      Objective:   Treatments/Interventions Performed:  Patient/Caregiver education, Home program and Cognitive-Linguistic training  Objective Details:  1. Patient will improve memory recall, including working memory, short term and time delayed recall, implementing compensatory strategies as necessary, and completing time delayed recall to newly learned information with 80% or greater accuracy with min cues.  --Progressing, Patient completed alternating word generation task with min cues 100%  2.  Patient will complete problem solving/reasoning tasks including deductive reasoning puzzles and verbal math story problems given minimal  cues completing to 85% accuracy.   --Progressing, patient completed 4X4 deductive reasoning puzzle with mod cues 100%.  3.  Patient will complete visuospatial and scanning tasks with 80% accuracy  --Progressing, patient completed trail making task with alternating letter and numbers to 14/M 100%         Speech Therapy Assessment:     Cognitive Linguistic Assessment:     Patient attention divided: Minimal    Patient immediate  memory: Minimal    Patient recent and short term memory: Moderate    Patient complex reasoning ability: Moderate    Patient complex problem solving ability: Moderate    Patient executive functioning ability: Moderate    Patient three step sequencing ability: Minimal    Patient spontaneous clock drawing ability: Minimal      Speech Mechanism Assessment:     Patient speaks fluently: Minimal    Patient exhibits articulatory precision: Minimal    Patient dysarthria: Minimal    Patient uses adequate breath support: Yes      Speech Therapy Plan :   Prognosis & Recommendations  Impression Summary: Patient actively participated in therapy activities. Patient presents with mild cognitive linguistic impairments, higher level cognitive processing, visual reasoning and memory skills.  Patient would benefit from continued skilled speech therapy to improve higher level cognitive skills for greater independence within the home and community.  Patient verbalized understanding and agreement with current plan of care.    Prognosis:  Good  Goals  Short Term Goals:  1. Patient will improve memory recall, including working memory, short term and time delayed recall, implementing compensatory strategies as necessary, and completing time delayed recall to newly learned information with 80% or greater accuracy with min cues.  2.  Patient will complete problem solving/reasoning tasks including deductive reasoning puzzles and verbal math story problems given minimal  cues completing to 85% accuracy.   3.  Patient will complete visuospatial and scanning tasks with 80% accuracy  Short Term Goal Duration (Weeks):  4-6 weeks  Long Term Goals:  Patient will develop functional, cognitive-linguistic based skills and utilize compensatory strategies to communicate wants and needs effectively to different conversational partners, maintain safety, and participate socially in a functional living environment to 85% accuracy, independent.   Long Term Goal  Duration (Weeks):  4-6 weeks  Patient Stated Goal:  Drive again  Therapy Recommendations  Recommendation:  Individual Speech Therapy,  Planned Therapy Interventions:  Compensatory Strategy Training, Patient/Caregiver Education, Home Program and Cognitive-Linguistic training,

## 2022-05-16 ENCOUNTER — APPOINTMENT (OUTPATIENT)
Dept: SPEECH THERAPY | Facility: REHABILITATION | Age: 67
End: 2022-05-16
Attending: PHYSICAL MEDICINE & REHABILITATION
Payer: COMMERCIAL

## 2022-05-23 ENCOUNTER — SPEECH THERAPY (OUTPATIENT)
Dept: SPEECH THERAPY | Facility: REHABILITATION | Age: 67
End: 2022-05-23
Attending: PHYSICAL MEDICINE & REHABILITATION
Payer: COMMERCIAL

## 2022-05-23 DIAGNOSIS — R41.844 EXECUTIVE FUNCTION DEFICIT: ICD-10-CM

## 2022-05-23 PROCEDURE — 92507 TX SP LANG VOICE COMM INDIV: CPT

## 2022-05-23 NOTE — OP THERAPY DISCHARGE SUMMARY
Outpatient Speech Therapy  DISCHARGE SUMMARY NOTE      [facility] Speech Therapy [facility]  [address]  [address]  [place] [address]  Phone:  [phone]  Fax:  [phone]    Date of Visit: 05/23/2022    Patient: Fermín Gomez  YOB: 1955  MRN: [id number]     Referring Provider: Blessing Cifuentes D.O.  [address]  [place] [address]   Referring Diagnosis: Cognitive deficits [R41.89]     Visit #: 7    Time Calculation    Start time: 0949  Stop time: 1031 Time Calculation (min): 42 minutes           Chief Complaint: Other (Executive functions)    Visit Diagnoses     ICD-10-CM   1. Executive function deficit  R41.844       Subjective:   Reason for Therapy:     Reason For Evaluation:  CVA and Cognition    Onset Date:  10/12/2021  Social Support:     Patient Mental Status:  Alert and Responsive  Progress Factors:     Progression:  Getting Better  Therapy History:     Hearing:  No Deficits    ST Subjective Vision Subjective: glassess when driving.  Additional Subjective Comments:      Patient reported no new significant changes, continuing cognitive games at home.      Objective:   Treatments/Interventions Performed:  Patient/Caregiver education, Home program and Cognitive-Linguistic training  Objective Details:  1. Patient will improve memory recall, including working memory, short term and time delayed recall, implementing compensatory strategies as necessary, and completing time delayed recall to newly learned information with 80% or greater accuracy with min cues.  --Progressing, Patient recalled and organized sets of 4 words with initial cues 80%.  2.  Patient will complete problem solving/reasoning tasks including deductive reasoning puzzles and verbal math story problems given minimal  cues completing to 85% accuracy.   --Progressing, patient completed 4X4 deductive reasoning puzzle with mod cues 100%.  3.  Patient will complete visuospatial and scanning tasks with 80%  accuracy  --Progressing, patient completed trail making task with alternating letter and numbers to 14/M 100%.  Patient copied cube 100%.  Patient completed 16 piece cube puzzle I=with min-mod cues.      Speech Therapy Assessment:     Cognitive Linguistic Assessment:     Patient attention divided: WFL    Patient immediate memory: WFL    Patient recent and short term memory: Minimal    Patient complex reasoning ability: WFL and Minimal    Patient complex problem solving ability: WFL and Minimal    Patient executive functioning ability: Minimal    Patient three step sequencing ability: WFL    Patient spontaneous clock drawing ability: WFL      Speech Mechanism Assessment:     Patient speaks fluently: WFL    Patient exhibits articulatory precision: WFL    Patient dysarthria: WFL    Patient uses adequate breath support: Yes      Speech Therapy Plan :   Prognosis & Recommendations  Impression Summary:  Patient actively participated in therapy activities.  Goals for therapy have been met and Patient feels ready to discharge at this time. Patient is in the process of rescheduling driving assessment. Patient was instructed to contact doctor if new symptoms arise for new speech therapy referral.    Prognosis:  Good  Goals  Short Term Goals:  1. Patient will improve memory recall, including working memory, short term and time delayed recall, implementing compensatory strategies as necessary, and completing time delayed recall to newly learned information with 80% or greater accuracy with min cues.  2.  Patient will complete problem solving/reasoning tasks including deductive reasoning puzzles and verbal math story problems given minimal  cues completing to 85% accuracy.   3.  Patient will complete visuospatial and scanning tasks with 80% accuracy  Short Term Goal Duration (Weeks):  4-6 weeks  Patient progression on Short Term Goals:  Goals MET  Long Term Goals:  Patient will develop functional, cognitive-linguistic based  skills and utilize compensatory strategies to communicate wants and needs effectively to different conversational partners, maintain safety, and participate socially in a functional living environment to 85% accuracy, independent.   Long Term Goal Duration (Weeks):  4-6 weeks  Patient Stated Goal:  Drive again  Therapy Recommendations  Recommendation:  Individual Speech Therapy and No further speech therapy indicated at this time,

## 2022-05-23 NOTE — OP THERAPY DAILY TREATMENT
Outpatient Speech Therapy  DAILY TREATMENT     [facility] [address]  [address]  [facility] [address]  Phone:  [phone]  Fax:  [phone]    Date: 05/23/2022    Patient: Fermín Gomez  YOB: 1955  MRN: [id number]     Time Calculation    Start time: 0949  Stop time: 1031 Time Calculation (min): 42 minutes         Chief Complaint: Other (Executive functions)    Visit #: 7         Please refer to discharge summary

## 2022-05-24 ENCOUNTER — OCCUPATIONAL THERAPY (OUTPATIENT)
Dept: OCCUPATIONAL THERAPY | Facility: REHABILITATION | Age: 67
End: 2022-05-24
Attending: PHYSICAL MEDICINE & REHABILITATION
Payer: COMMERCIAL

## 2022-05-24 DIAGNOSIS — I63.9 CEREBROVASCULAR ACCIDENT (CVA), UNSPECIFIED MECHANISM (HCC): ICD-10-CM

## 2022-05-24 DIAGNOSIS — R41.89 COGNITIVE DEFICITS: ICD-10-CM

## 2022-05-24 DIAGNOSIS — G81.94 LEFT HEMIPARESIS (HCC): ICD-10-CM

## 2022-05-24 PROCEDURE — 97750 PHYSICAL PERFORMANCE TEST: CPT

## 2022-05-24 ASSESSMENT — BALANCE ASSESSMENTS
BALANCE - SITTING STATIC: NORMAL
BALANCE - SITTING DYNAMIC: NORMAL
BALANCE - STANDING STATIC: NORMAL
BALANCE - STANDING DYNAMIC: NORMAL
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
## 2022-06-06 ENCOUNTER — OFFICE VISIT (OUTPATIENT)
Dept: URGENT CARE | Facility: PHYSICIAN GROUP | Age: 67
End: 2022-06-06
Payer: MEDICARE

## 2022-06-06 VITALS
TEMPERATURE: 97.7 F | DIASTOLIC BLOOD PRESSURE: 66 MMHG | BODY MASS INDEX: 28 KG/M2 | WEIGHT: 200 LBS | HEART RATE: 75 BPM | OXYGEN SATURATION: 98 % | RESPIRATION RATE: 16 BRPM | HEIGHT: 71 IN | SYSTOLIC BLOOD PRESSURE: 132 MMHG

## 2022-06-06 DIAGNOSIS — L03.119 CELLULITIS OF FOOT: ICD-10-CM

## 2022-06-06 PROCEDURE — 99213 OFFICE O/P EST LOW 20 MIN: CPT | Performed by: PHYSICIAN ASSISTANT

## 2022-06-06 RX ORDER — HYDROXYUREA 500 MG/1
CAPSULE ORAL DAILY
COMMUNITY

## 2022-06-06 RX ORDER — DOXYCYCLINE HYCLATE 100 MG
100 TABLET ORAL 2 TIMES DAILY
Qty: 14 TABLET | Refills: 0 | Status: SHIPPED | OUTPATIENT
Start: 2022-06-06 | End: 2022-06-13

## 2022-06-06 ASSESSMENT — FIBROSIS 4 INDEX: FIB4 SCORE: 0.87

## 2022-06-06 NOTE — PROGRESS NOTES
"Subjective:   Fermín Gomez is a 66 y.o. male who presents for Foot Problem (Foot infection form camping x3-4days. Pt believes it started as a rash from poison oak.)      HPI  Patient is a 66-year-old male who presents to the clinic with possible foot infection to his left foot.  Over Memorial Day weekend he believes he was exposed to poison oak which caused small blisters that were itching.  For the past 3 to 4 days he has noticed an increased redness, swelling, tenderness to that area.  He sustained a mild abrasion to the foot.  Denies any fevers or chills.  He has tried calamine lotion          Medications:    • aspirin Chew  • atorvastatin Tabs  • diclofenac sodium Gel  • hydroxyurea Caps  • omeprazole  • Vitamin C Caps  • vitamin D Tabs    Allergies: Pcn [penicillins] and Penicillins    Problem List: Fermín Gomez does not have any pertinent problems on file.    Surgical History:  Past Surgical History:   Procedure Laterality Date   • COLONOSCOPY  1/18/13    normal, due 2023   • [place] REMOVAL SPLEEN, TOTAL  1962   • HERNIA REPAIR     • INGUINAL HERNIA REPAIR BILATERAL      Dr. Padilla   • SPLENECTOMY     • TONSILLECTOMY         Past Social Hx: Fermín Gomez  reports that he has never smoked. He has never used smokeless tobacco. He reports current alcohol use of about 4.2 oz of alcohol per week. He reports current drug use. Drug: Marijuana.     Past Family Hx:  Fermín Gomez family history includes Blood Disease in his mother; Cancer in his mother; Diabetes in his father; Heart Disease in his father.     Problem list, medications, and allergies reviewed by myself today in Epic.     Objective:     /66   Pulse 75   Temp 36.5 °C (97.7 °F)   Resp 16   Ht 1.803 m (5' 11\")   Wt 90.7 kg (200 lb)   SpO2 98%   BMI 27.89 kg/m²     Physical Exam  Vitals reviewed.   Constitutional:       General: He is not in acute distress.     Appearance: Normal appearance. He is not ill-appearing " or toxic-appearing.   Eyes:      Conjunctiva/sclera: Conjunctivae normal.      Pupils: Pupils are equal, round, and reactive to light.   Cardiovascular:      Rate and Rhythm: Normal rate.   Pulmonary:      Effort: Pulmonary effort is normal.   Musculoskeletal:      Cervical back: Neck supple.        Feet:    Feet:      Comments: Left dorsal foot. Small grouped blisters. There is a small abrasion with surrounding erythema and edema diffusely to the dorsal foot.  Mild TTP.  No fluctuance or abscess.  No proximal streaking.  Neurovascular intact distally  Skin:     General: Skin is warm and dry.   Neurological:      General: No focal deficit present.      Mental Status: He is alert and oriented to person, place, and time.   Psychiatric:         Mood and Affect: Mood normal.         Behavior: Behavior normal.         Diagnosis and associated orders:     1. Cellulitis of foot    - doxycycline (VIBRAMYCIN) 100 MG Tab; Take 1 Tablet by mouth 2 times a day for 7 days.  Dispense: 14 Tablet; Refill: 0       Comments/MDM:     • Treatment as above  • Wound was thoroughly irrigated with normal saline and dressed with nonstick dressing.  • Wound care discussed  • Patient may apply hydrocortisone to the itchy blisters.         I personally reviewed prior external notes and test results pertinent to today's visit. Supportive care, natural history, differential diagnoses, and indications for immediate follow-up discussed. Patient expresses understanding and agrees to plan. Patient denies any other questions or concerns.     Follow-up with the primary care physician for recheck, reevaluation, and consideration of further management.    Please note that this dictation was created using voice recognition software. I have made a reasonable attempt to correct obvious errors, but I expect that there are errors of grammar and possibly content that I did not discover before finalizing the note.    This note was electronically signed by Manuel  SRIRAM Abraham

## 2022-06-07 ENCOUNTER — HOSPITAL ENCOUNTER (OUTPATIENT)
Dept: LAB | Facility: MEDICAL CENTER | Age: 67
End: 2022-06-07
Attending: INTERNAL MEDICINE
Payer: MEDICARE

## 2022-06-07 LAB
BASOPHILS # BLD AUTO: 1.1 % (ref 0–1.8)
BASOPHILS # BLD: 0.12 K/UL (ref 0–0.12)
EOSINOPHIL # BLD AUTO: 0.15 K/UL (ref 0–0.51)
EOSINOPHIL NFR BLD: 1.4 % (ref 0–6.9)
ERYTHROCYTE [DISTWIDTH] IN BLOOD BY AUTOMATED COUNT: 48.6 FL (ref 35.9–50)
HCT VFR BLD AUTO: 52 % (ref 42–52)
HGB BLD-MCNC: 18.1 G/DL (ref 14–18)
IMM GRANULOCYTES # BLD AUTO: 0.05 K/UL (ref 0–0.11)
IMM GRANULOCYTES NFR BLD AUTO: 0.5 % (ref 0–0.9)
LYMPHOCYTES # BLD AUTO: 2.06 K/UL (ref 1–4.8)
LYMPHOCYTES NFR BLD: 18.8 % (ref 22–41)
MCH RBC QN AUTO: 34.8 PG (ref 27–33)
MCHC RBC AUTO-ENTMCNC: 34.8 G/DL (ref 33.7–35.3)
MCV RBC AUTO: 100 FL (ref 81.4–97.8)
MONOCYTES # BLD AUTO: 0.82 K/UL (ref 0–0.85)
MONOCYTES NFR BLD AUTO: 7.5 % (ref 0–13.4)
NEUTROPHILS # BLD AUTO: 7.76 K/UL (ref 1.82–7.42)
NEUTROPHILS NFR BLD: 70.7 % (ref 44–72)
NRBC # BLD AUTO: 0 K/UL
NRBC BLD-RTO: 0 /100 WBC
PLATELET # BLD AUTO: 584 K/UL (ref 164–446)
PMV BLD AUTO: 10 FL (ref 9–12.9)
RBC # BLD AUTO: 5.2 M/UL (ref 4.7–6.1)
WBC # BLD AUTO: 11 K/UL (ref 4.8–10.8)

## 2022-06-07 PROCEDURE — 36415 COLL VENOUS BLD VENIPUNCTURE: CPT

## 2022-06-07 PROCEDURE — 85025 COMPLETE CBC W/AUTO DIFF WBC: CPT

## 2022-06-14 ENCOUNTER — OFFICE VISIT (OUTPATIENT)
Dept: PHYSICAL MEDICINE AND REHAB | Facility: REHABILITATION | Age: 67
End: 2022-06-14
Payer: MEDICARE

## 2022-06-14 VITALS
OXYGEN SATURATION: 95 % | SYSTOLIC BLOOD PRESSURE: 102 MMHG | DIASTOLIC BLOOD PRESSURE: 68 MMHG | HEART RATE: 78 BPM | TEMPERATURE: 97.4 F | RESPIRATION RATE: 18 BRPM

## 2022-06-14 DIAGNOSIS — I63.9 CEREBROVASCULAR ACCIDENT (CVA), UNSPECIFIED MECHANISM (HCC): Primary | ICD-10-CM

## 2022-06-14 DIAGNOSIS — G89.29 CHRONIC LEFT SHOULDER PAIN: ICD-10-CM

## 2022-06-14 DIAGNOSIS — M25.512 CHRONIC LEFT SHOULDER PAIN: ICD-10-CM

## 2022-06-14 DIAGNOSIS — G81.94 LEFT HEMIPARESIS (HCC): ICD-10-CM

## 2022-06-14 PROCEDURE — 99213 OFFICE O/P EST LOW 20 MIN: CPT | Performed by: PHYSICAL MEDICINE & REHABILITATION
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
## 2022-06-23 DIAGNOSIS — U07.1 COVID-19 VIRUS INFECTION: ICD-10-CM

## 2022-08-15 ENCOUNTER — OUTPATIENT INFUSION SERVICES (OUTPATIENT)
Dept: ONCOLOGY | Facility: MEDICAL CENTER | Age: 67
End: 2022-08-15
Attending: INTERNAL MEDICINE
Payer: MEDICARE

## 2022-08-15 VITALS
TEMPERATURE: 98.2 F | OXYGEN SATURATION: 93 % | HEIGHT: 69 IN | DIASTOLIC BLOOD PRESSURE: 75 MMHG | RESPIRATION RATE: 18 BRPM | BODY MASS INDEX: 30.01 KG/M2 | SYSTOLIC BLOOD PRESSURE: 128 MMHG | WEIGHT: 202.6 LBS | HEART RATE: 85 BPM

## 2022-08-15 DIAGNOSIS — D58.0 SPHEROCYTOSIS (HCC): ICD-10-CM

## 2022-08-15 LAB
HCT VFR BLD CALC: 48 % (ref 42–52)
HGB BLD-MCNC: 16.3 G/DL (ref 14–18)

## 2022-08-15 PROCEDURE — 85014 HEMATOCRIT: CPT

## 2022-08-15 PROCEDURE — 99195 PHLEBOTOMY: CPT

## 2022-08-15 ASSESSMENT — FIBROSIS 4 INDEX: FIB4 SCORE: 1.03

## 2022-08-15 NOTE — PROGRESS NOTES
Fermín arrives for the therapeutic phlebotomy. Labs drawn as ordered by MD.  Fermín's Hgb 16.3, Hct 48 %. 500 cc blood removed. Fermín tolerated well. Orthostatic vitals stable, see flowsheet. Fermín denies chest pain, shortness of breath, dizziness, or lightheadedness after treatment. Fermín to follow up with MD for future plan of care. Discharged to self care; no apparent distress noted.

## 2022-08-22 ENCOUNTER — HOSPITAL ENCOUNTER (OUTPATIENT)
Dept: LAB | Facility: MEDICAL CENTER | Age: 67
End: 2022-08-22
Attending: INTERNAL MEDICINE
Payer: MEDICARE

## 2022-08-22 LAB
ALBUMIN SERPL BCP-MCNC: 4.7 G/DL (ref 3.2–4.9)
ALBUMIN/GLOB SERPL: 2 G/DL
ALP SERPL-CCNC: 77 U/L (ref 30–99)
ALT SERPL-CCNC: 28 U/L (ref 2–50)
ANION GAP SERPL CALC-SCNC: 14 MMOL/L (ref 7–16)
AST SERPL-CCNC: 32 U/L (ref 12–45)
BASOPHILS # BLD AUTO: 1.3 % (ref 0–1.8)
BASOPHILS # BLD: 0.1 K/UL (ref 0–0.12)
BILIRUB SERPL-MCNC: 1.8 MG/DL (ref 0.1–1.5)
BUN SERPL-MCNC: 13 MG/DL (ref 8–22)
CALCIUM SERPL-MCNC: 9.5 MG/DL (ref 8.5–10.5)
CHLORIDE SERPL-SCNC: 106 MMOL/L (ref 96–112)
CO2 SERPL-SCNC: 21 MMOL/L (ref 20–33)
CREAT SERPL-MCNC: 0.84 MG/DL (ref 0.5–1.4)
EOSINOPHIL # BLD AUTO: 0.08 K/UL (ref 0–0.51)
EOSINOPHIL NFR BLD: 1.1 % (ref 0–6.9)
ERYTHROCYTE [DISTWIDTH] IN BLOOD BY AUTOMATED COUNT: 60.8 FL (ref 35.9–50)
FERRITIN SERPL-MCNC: 85.3 NG/ML (ref 22–322)
GFR SERPLBLD CREATININE-BSD FMLA CKD-EPI: 97 ML/MIN/1.73 M 2
GLOBULIN SER CALC-MCNC: 2.4 G/DL (ref 1.9–3.5)
GLUCOSE SERPL-MCNC: 149 MG/DL (ref 65–99)
HCT VFR BLD AUTO: 50.4 % (ref 42–52)
HGB BLD-MCNC: 18 G/DL (ref 14–18)
IMM GRANULOCYTES # BLD AUTO: 0.02 K/UL (ref 0–0.11)
IMM GRANULOCYTES NFR BLD AUTO: 0.3 % (ref 0–0.9)
IRON SATN MFR SERPL: 64 % (ref 15–55)
IRON SERPL-MCNC: 115 UG/DL (ref 50–180)
LYMPHOCYTES # BLD AUTO: 1.28 K/UL (ref 1–4.8)
LYMPHOCYTES NFR BLD: 17.2 % (ref 22–41)
MCH RBC QN AUTO: 37.5 PG (ref 27–33)
MCHC RBC AUTO-ENTMCNC: 35.7 G/DL (ref 33.7–35.3)
MCV RBC AUTO: 105 FL (ref 81.4–97.8)
MONOCYTES # BLD AUTO: 0.53 K/UL (ref 0–0.85)
MONOCYTES NFR BLD AUTO: 7.1 % (ref 0–13.4)
NEUTROPHILS # BLD AUTO: 5.43 K/UL (ref 1.82–7.42)
NEUTROPHILS NFR BLD: 73 % (ref 44–72)
NRBC # BLD AUTO: 0.02 K/UL
NRBC BLD-RTO: 0.3 /100 WBC
PLATELET # BLD AUTO: 471 K/UL (ref 164–446)
PMV BLD AUTO: 9.7 FL (ref 9–12.9)
POTASSIUM SERPL-SCNC: 4.2 MMOL/L (ref 3.6–5.5)
PROT SERPL-MCNC: 7.1 G/DL (ref 6–8.2)
RBC # BLD AUTO: 4.8 M/UL (ref 4.7–6.1)
SODIUM SERPL-SCNC: 141 MMOL/L (ref 135–145)
TIBC SERPL-MCNC: 179 UG/DL (ref 250–450)
UIBC SERPL-MCNC: 64 UG/DL (ref 110–370)
WBC # BLD AUTO: 7.4 K/UL (ref 4.8–10.8)

## 2022-08-22 PROCEDURE — 83540 ASSAY OF IRON: CPT

## 2022-08-22 PROCEDURE — 80053 COMPREHEN METABOLIC PANEL: CPT

## 2022-08-22 PROCEDURE — 82728 ASSAY OF FERRITIN: CPT

## 2022-08-22 PROCEDURE — 36415 COLL VENOUS BLD VENIPUNCTURE: CPT

## 2022-08-22 PROCEDURE — 83550 IRON BINDING TEST: CPT

## 2022-08-22 PROCEDURE — 85025 COMPLETE CBC W/AUTO DIFF WBC: CPT

## 2022-09-28 ENCOUNTER — HOSPITAL ENCOUNTER (OUTPATIENT)
Dept: LAB | Facility: MEDICAL CENTER | Age: 67
End: 2022-09-28
Attending: INTERNAL MEDICINE
Payer: MEDICARE

## 2022-09-28 LAB
ANISOCYTOSIS BLD QL SMEAR: ABNORMAL
BASOPHILS # BLD AUTO: 0.8 % (ref 0–1.8)
BASOPHILS # BLD: 0.06 K/UL (ref 0–0.12)
EOSINOPHIL # BLD AUTO: 0 K/UL (ref 0–0.51)
EOSINOPHIL NFR BLD: 0 % (ref 0–6.9)
ERYTHROCYTE [DISTWIDTH] IN BLOOD BY AUTOMATED COUNT: 67.5 FL (ref 35.9–50)
HCT VFR BLD AUTO: 50.9 % (ref 42–52)
HGB BLD-MCNC: 18 G/DL (ref 14–18)
LYMPHOCYTES # BLD AUTO: 0.84 K/UL (ref 1–4.8)
LYMPHOCYTES NFR BLD: 11.1 % (ref 22–41)
MACROCYTES BLD QL SMEAR: ABNORMAL
MANUAL DIFF BLD: NORMAL
MCH RBC QN AUTO: 39.7 PG (ref 27–33)
MCHC RBC AUTO-ENTMCNC: 35.4 G/DL (ref 33.7–35.3)
MCV RBC AUTO: 112.4 FL (ref 81.4–97.8)
MICROCYTES BLD QL SMEAR: ABNORMAL
MONOCYTES # BLD AUTO: 1.04 K/UL (ref 0–0.85)
MONOCYTES NFR BLD AUTO: 13.7 % (ref 0–13.4)
MORPHOLOGY BLD-IMP: NORMAL
NEUTROPHILS # BLD AUTO: 5.65 K/UL (ref 1.82–7.42)
NEUTROPHILS NFR BLD: 74.4 % (ref 44–72)
NRBC # BLD AUTO: 0.03 K/UL
NRBC BLD-RTO: 0.4 /100 WBC
PLATELET # BLD AUTO: 418 K/UL (ref 164–446)
PLATELET BLD QL SMEAR: NORMAL
PMV BLD AUTO: 9.3 FL (ref 9–12.9)
POLYCHROMASIA BLD QL SMEAR: NORMAL
RBC # BLD AUTO: 4.53 M/UL (ref 4.7–6.1)
RBC BLD AUTO: PRESENT
WBC # BLD AUTO: 7.6 K/UL (ref 4.8–10.8)

## 2022-09-28 PROCEDURE — 85025 COMPLETE CBC W/AUTO DIFF WBC: CPT

## 2022-09-28 PROCEDURE — 36415 COLL VENOUS BLD VENIPUNCTURE: CPT

## 2022-09-28 PROCEDURE — 85007 BL SMEAR W/DIFF WBC COUNT: CPT

## 2022-10-02 ENCOUNTER — HOSPITAL ENCOUNTER (OUTPATIENT)
Facility: MEDICAL CENTER | Age: 67
End: 2022-10-02
Attending: NURSE PRACTITIONER
Payer: MEDICARE

## 2022-10-02 ENCOUNTER — OFFICE VISIT (OUTPATIENT)
Dept: URGENT CARE | Facility: PHYSICIAN GROUP | Age: 67
End: 2022-10-02
Payer: MEDICARE

## 2022-10-02 VITALS
BODY MASS INDEX: 28 KG/M2 | DIASTOLIC BLOOD PRESSURE: 84 MMHG | OXYGEN SATURATION: 94 % | SYSTOLIC BLOOD PRESSURE: 135 MMHG | RESPIRATION RATE: 16 BRPM | HEART RATE: 73 BPM | HEIGHT: 71 IN | WEIGHT: 200 LBS | TEMPERATURE: 97.4 F

## 2022-10-02 DIAGNOSIS — K13.79 MOUTH PAIN: ICD-10-CM

## 2022-10-02 LAB — COVID ORDER STATUS COVID19: NORMAL

## 2022-10-02 PROCEDURE — U0005 INFEC AGEN DETEC AMPLI PROBE: HCPCS

## 2022-10-02 PROCEDURE — U0003 INFECTIOUS AGENT DETECTION BY NUCLEIC ACID (DNA OR RNA); SEVERE ACUTE RESPIRATORY SYNDROME CORONAVIRUS 2 (SARS-COV-2) (CORONAVIRUS DISEASE [COVID-19]), AMPLIFIED PROBE TECHNIQUE, MAKING USE OF HIGH THROUGHPUT TECHNOLOGIES AS DESCRIBED BY CMS-2020-01-R: HCPCS

## 2022-10-02 PROCEDURE — 99213 OFFICE O/P EST LOW 20 MIN: CPT | Performed by: NURSE PRACTITIONER

## 2022-10-02 RX ORDER — LIDOCAINE HYDROCHLORIDE 20 MG/ML
5 SOLUTION OROPHARYNGEAL PRN
Qty: 100 ML | Refills: 1 | Status: SHIPPED | OUTPATIENT
Start: 2022-10-02 | End: 2022-10-09

## 2022-10-02 ASSESSMENT — ENCOUNTER SYMPTOMS
NAUSEA: 0
ABDOMINAL PAIN: 0
MYALGIAS: 0
FEVER: 0
COUGH: 1
DIARRHEA: 0
SORE THROAT: 0
HEADACHES: 0
VOMITING: 0
CHILLS: 0

## 2022-10-02 ASSESSMENT — FIBROSIS 4 INDEX: FIB4 SCORE: 0.94

## 2022-10-02 NOTE — PROGRESS NOTES
Subjective:     Fermín Gomez is a 67 y.o. male who presents for No chief complaint on file.      HPI  Pt presents for evaluation of a new problem. Alvarez is a pleasant 67 year old male who presents to  today with complaints of left sided tongue pain that started last week. He is on a chemotherapy agent Hydroxyurea that was recently increased. He states he was evaluated by his hematologist last week who noted no abnormal finding at this time but did suggest that this was likely due to use of hydroxyurea.  Due to this, his hemotologist recommended he stop taking medication.  He has stopped use of this medication however, his symptoms persist.  He notes worsening pain when his tongue bumps up against his teeth and with chewing.  His pain is rated as a 6/10.  He has been using ibuprofen and Tylenol for relief of his symptoms however, this provides only mild relief.  Additionally, he also notes recent travel from  [place] and now has fatigue, cough, congestion.  He is concerned for a COVID infection.  He has not been using any medication for relief of his cough and congestion.    Review of Systems   Constitutional:  Positive for malaise/fatigue. Negative for chills and fever.   HENT:  Positive for congestion. Negative for ear pain and sore throat.    Respiratory:  Positive for cough.    Gastrointestinal:  Negative for abdominal pain, diarrhea, nausea and vomiting.   Musculoskeletal:  Negative for myalgias.   Neurological:  Negative for headaches.     PMH:   Past Medical History:   Diagnosis Date    Dysarthria 12/1/2021    GERD (gastroesophageal reflux disease)     History of ischemic right MCA stroke 12/1/2021    History of pneumonia 9/2016    Oral thrush 10/19/2021    Penile mass     left penile shaft, warty appearance. 1.5 cm    Post-splenectomy     Spherocytosis (HCC)     Stroke (cerebrum) (HCC) 10/12/2021    Urinary retention 10/20/2021    Varicose veins of both lower extremities      ALLERGIES:   Allergies  "  Allergen Reactions    Pcn [Penicillins] Rash    Penicillins      SURGHX:   Past Surgical History:   Procedure Laterality Date    COLONOSCOPY  1/18/13    normal, due 2023    [place] REMOVAL SPLEEN, TOTAL  1962    HERNIA REPAIR      INGUINAL HERNIA REPAIR BILATERAL      Dr. Padilla    SPLENECTOMY      TONSILLECTOMY       SOCHX:   Social History     Socioeconomic History    Marital status:    Occupational History    Occupation: [employer]     Employer: [employer]   Tobacco Use    Smoking status: Never    Smokeless tobacco: Never   Vaping Use    Vaping Use: Never used   Substance and Sexual Activity    Alcohol use: Yes     Alcohol/week: 4.2 oz     Types: 7 Glasses of wine per week     Comment: maybe 1 drink daily    Drug use: Yes     Types: Marijuana     Comment: occasional marijuana    Sexual activity: Yes   Social History Narrative    ** Merged History Encounter **          FH:   Family History   Problem Relation Age of Onset    Blood Disease Mother         spherocytosis    Cancer Mother         esophageal    Diabetes Father     Heart Disease Father         CABG 75         Objective:   /84   Pulse 73   Temp 36.3 °C (97.4 °F)   Resp 16   Ht 1.803 m (5' 11\")   Wt 90.7 kg (200 lb)   SpO2 94%   BMI 27.89 kg/m²     Physical Exam  Vitals and nursing note reviewed.   Constitutional:       General: He is not in acute distress.     Appearance: Normal appearance. He is normal weight. He is not ill-appearing.   HENT:      Head: Normocephalic and atraumatic.      Comments: No abnormal findings of tongue present.  Negative for swelling, lesions or erythema.     Right Ear: Tympanic membrane, ear canal and external ear normal. There is no impacted cerumen.      Left Ear: Tympanic membrane, ear canal and external ear normal. There is no impacted cerumen.      Nose: No congestion or rhinorrhea.      Mouth/Throat:      Mouth: Mucous membranes are moist.      Pharynx: No oropharyngeal exudate or posterior " oropharyngeal erythema.   Eyes:      Extraocular Movements: Extraocular movements intact.      Pupils: Pupils are equal, round, and reactive to light.   Cardiovascular:      Rate and Rhythm: Normal rate and regular rhythm.      Pulses: Normal pulses.      Heart sounds: Normal heart sounds.   Pulmonary:      Effort: Pulmonary effort is normal.      Breath sounds: Normal breath sounds.   Abdominal:      General: Abdomen is flat. Bowel sounds are normal.      Palpations: Abdomen is soft.      Tenderness: There is no abdominal tenderness. There is no right CVA tenderness or left CVA tenderness.   Musculoskeletal:         General: Normal range of motion.      Cervical back: Normal range of motion and neck supple. No tenderness.   Lymphadenopathy:      Cervical: No cervical adenopathy.   Skin:     General: Skin is warm and dry.      Capillary Refill: Capillary refill takes less than 2 seconds.   Neurological:      General: No focal deficit present.      Mental Status: He is alert and oriented to person, place, and time. Mental status is at baseline.   Psychiatric:         Mood and Affect: Mood normal.         Behavior: Behavior normal.         Thought Content: Thought content normal.         Judgment: Judgment normal.       Assessment/Plan:   Assessment    1. Mouth pain  lidocaine (XYLOCAINE) 2 % Solution    SARS-CoV-2 PCR (24 hour In-House): Collect [facility] swab in [facility]      Unfortunately, there was no abnormal findings of his tongue and I do believe that his symptoms are likely related to chemotherapy agent.  I did suggest use of Magic mouthwash for relief of discomfort.  Lidocaine sent to pharmacy and patient educated to mix 5 mL of lidocaine with 5 mL of Maalox and children's Benadryl.  Patient to swish and spit as needed for relief of pain.  COVID PCR sent to lab.  We discussed supportive measures including humidifier, warm salt water gargles, over-the-counter Cepacol throat lozenges, rest  and increased fluids. Pt was  encouraged to seek treatment back in the ER or urgent care for worsening symptoms,  fever greater than 100.5, wheezes or shortness of breath.

## 2022-10-03 LAB
SARS-COV-2 RNA RESP QL NAA+PROBE: NOTDETECTED
SPECIMEN SOURCE: NORMAL

## 2022-10-31 ENCOUNTER — OUTPATIENT INFUSION SERVICES (OUTPATIENT)
Dept: ONCOLOGY | Facility: MEDICAL CENTER | Age: 67
End: 2022-10-31
Attending: INTERNAL MEDICINE
Payer: MEDICARE

## 2022-10-31 VITALS
DIASTOLIC BLOOD PRESSURE: 86 MMHG | SYSTOLIC BLOOD PRESSURE: 137 MMHG | HEART RATE: 86 BPM | HEIGHT: 69 IN | OXYGEN SATURATION: 95 % | BODY MASS INDEX: 30.56 KG/M2 | TEMPERATURE: 96.9 F | RESPIRATION RATE: 18 BRPM | WEIGHT: 206.35 LBS

## 2022-10-31 DIAGNOSIS — D58.0 SPHEROCYTOSIS (HCC): ICD-10-CM

## 2022-10-31 LAB
HCT VFR BLD AUTO: 46.7 % (ref 42–52)
HGB BLD-MCNC: 16.6 G/DL (ref 14–18)

## 2022-10-31 PROCEDURE — 99195 PHLEBOTOMY: CPT

## 2022-10-31 PROCEDURE — 85014 HEMATOCRIT: CPT

## 2022-10-31 PROCEDURE — 85018 HEMOGLOBIN: CPT

## 2022-10-31 ASSESSMENT — FIBROSIS 4 INDEX: FIB4 SCORE: 0.94

## 2022-10-31 NOTE — PROGRESS NOTES
Pt arrives for therapeutic phlebotomy for spherocytosis.  Discussed plan of care with pt.  PIV established to R-AC and H&H was drawn.  Hemoglobin is 16.6 and hematocrit is 46.7% today.  Pt meets parameters for TP.  500ml of whole blood removed.  Bottles of water given to pt.   Orthostatic VS are WNL.  Pt denies dizziness or feeling light headed.  PIV removed and site wrapped with pressure dressing.  Pt has appt with Dr. Wooten on 11/10/22 and will have next TP when ordered by MD.  Pt dc home in stable condition.

## 2022-11-28 DIAGNOSIS — I69.30 LATE EFFECT OF STROKE: ICD-10-CM

## 2022-11-28 RX ORDER — ATORVASTATIN CALCIUM 40 MG/1
40 TABLET, FILM COATED ORAL
Qty: 90 TABLET | Refills: 3 | Status: SHIPPED | OUTPATIENT
Start: 2022-11-28 | End: 2023-05-01 | Stop reason: SDUPTHER

## 2023-04-04 ENCOUNTER — HOSPITAL ENCOUNTER (OUTPATIENT)
Facility: MEDICAL CENTER | Age: 68
End: 2023-04-04
Attending: INTERNAL MEDICINE
Payer: MEDICARE

## 2023-04-04 LAB
ALBUMIN SERPL BCP-MCNC: 4.5 G/DL (ref 3.2–4.9)
ALBUMIN/GLOB SERPL: 1.9 G/DL
ALP SERPL-CCNC: 84 U/L (ref 30–99)
ALT SERPL-CCNC: 23 U/L (ref 2–50)
ANION GAP SERPL CALC-SCNC: 9 MMOL/L (ref 7–16)
AST SERPL-CCNC: 23 U/L (ref 12–45)
BILIRUB SERPL-MCNC: 1.3 MG/DL (ref 0.1–1.5)
BUN SERPL-MCNC: 15 MG/DL (ref 8–22)
CALCIUM ALBUM COR SERPL-MCNC: 9.7 MG/DL (ref 8.5–10.5)
CALCIUM SERPL-MCNC: 10.1 MG/DL (ref 8.5–10.5)
CHLORIDE SERPL-SCNC: 105 MMOL/L (ref 96–112)
CO2 SERPL-SCNC: 27 MMOL/L (ref 20–33)
CREAT SERPL-MCNC: 0.87 MG/DL (ref 0.5–1.4)
FERRITIN SERPL-MCNC: 40.2 NG/ML (ref 22–322)
GFR SERPLBLD CREATININE-BSD FMLA CKD-EPI: 95 ML/MIN/1.73 M 2
GLOBULIN SER CALC-MCNC: 2.4 G/DL (ref 1.9–3.5)
GLUCOSE SERPL-MCNC: 130 MG/DL (ref 65–99)
POTASSIUM SERPL-SCNC: 5.4 MMOL/L (ref 3.6–5.5)
PROT SERPL-MCNC: 6.9 G/DL (ref 6–8.2)
SODIUM SERPL-SCNC: 141 MMOL/L (ref 135–145)

## 2023-04-04 PROCEDURE — 82728 ASSAY OF FERRITIN: CPT

## 2023-04-04 PROCEDURE — 83615 LACTATE (LD) (LDH) ENZYME: CPT

## 2023-04-04 PROCEDURE — 80053 COMPREHEN METABOLIC PANEL: CPT

## 2023-04-05 LAB — LDH SERPL L TO P-CCNC: 327 U/L (ref 107–266)

## 2023-04-05 RX ORDER — SODIUM CHLORIDE 9 MG/ML
500 INJECTION, SOLUTION INTRAVENOUS ONCE
Status: CANCELLED | OUTPATIENT
Start: 2023-04-26 | End: 2023-04-26

## 2023-04-12 ENCOUNTER — OFFICE VISIT (OUTPATIENT)
Dept: MEDICAL GROUP | Facility: MEDICAL CENTER | Age: 68
End: 2023-04-12
Payer: MEDICARE

## 2023-04-12 VITALS
WEIGHT: 210.1 LBS | OXYGEN SATURATION: 91 % | HEART RATE: 87 BPM | BODY MASS INDEX: 31.12 KG/M2 | DIASTOLIC BLOOD PRESSURE: 74 MMHG | SYSTOLIC BLOOD PRESSURE: 132 MMHG | TEMPERATURE: 98 F | HEIGHT: 69 IN

## 2023-04-12 DIAGNOSIS — B35.1 ONYCHOMYCOSIS: ICD-10-CM

## 2023-04-12 DIAGNOSIS — Z15.89 JAK2 GENE MUTATION: ICD-10-CM

## 2023-04-12 DIAGNOSIS — Z98.890 STATUS POST DILATION OF ESOPHAGEAL NARROWING: ICD-10-CM

## 2023-04-12 DIAGNOSIS — R35.0 FREQUENT URINATION: ICD-10-CM

## 2023-04-12 DIAGNOSIS — Z86.79 HISTORY OF SUBDURAL HEMATOMA: ICD-10-CM

## 2023-04-12 DIAGNOSIS — Z90.81 POST-SPLENECTOMY: ICD-10-CM

## 2023-04-12 DIAGNOSIS — Z87.01 HISTORY OF PNEUMONIA: ICD-10-CM

## 2023-04-12 DIAGNOSIS — Z78.9 IMPAIRED MOBILITY AND ADLS: ICD-10-CM

## 2023-04-12 DIAGNOSIS — R53.82 CHRONIC FATIGUE: ICD-10-CM

## 2023-04-12 DIAGNOSIS — G81.94 LEFT HEMIPARESIS (HCC): ICD-10-CM

## 2023-04-12 DIAGNOSIS — Z74.09 IMPAIRED MOBILITY AND ADLS: ICD-10-CM

## 2023-04-12 DIAGNOSIS — D75.839 THROMBOCYTOSIS: ICD-10-CM

## 2023-04-12 DIAGNOSIS — Z87.19 STATUS POST DILATION OF ESOPHAGEAL NARROWING: ICD-10-CM

## 2023-04-12 DIAGNOSIS — Z86.16 HISTORY OF 2019 NOVEL CORONAVIRUS DISEASE (COVID-19): ICD-10-CM

## 2023-04-12 DIAGNOSIS — I69.30 LATE EFFECT OF STROKE: ICD-10-CM

## 2023-04-12 DIAGNOSIS — K21.00 GASTROESOPHAGEAL REFLUX DISEASE WITH ESOPHAGITIS WITHOUT HEMORRHAGE: ICD-10-CM

## 2023-04-12 DIAGNOSIS — Z00.00 MEDICARE ANNUAL WELLNESS VISIT, SUBSEQUENT: ICD-10-CM

## 2023-04-12 DIAGNOSIS — R73.09 ELEVATED GLUCOSE: ICD-10-CM

## 2023-04-12 DIAGNOSIS — D58.0 SPHEROCYTOSIS (HCC): ICD-10-CM

## 2023-04-12 DIAGNOSIS — R47.1 DYSARTHRIA: ICD-10-CM

## 2023-04-12 DIAGNOSIS — Z86.73 HISTORY OF ISCHEMIC RIGHT MCA STROKE: ICD-10-CM

## 2023-04-12 DIAGNOSIS — Z12.11 COLON CANCER SCREENING: ICD-10-CM

## 2023-04-12 PROBLEM — R60.0 EDEMA OF LEFT LOWER EXTREMITY: Status: RESOLVED | Noted: 2021-10-22 | Resolved: 2023-04-12

## 2023-04-12 PROBLEM — N48.89 PENILE MASS: Status: RESOLVED | Noted: 2019-05-10 | Resolved: 2023-04-12

## 2023-04-12 PROBLEM — U07.1 COVID-19 VIRUS INFECTION: Status: RESOLVED | Noted: 2022-06-23 | Resolved: 2023-04-12

## 2023-04-12 PROBLEM — S06.5XAA SUBDURAL HEMATOMA (HCC): Status: RESOLVED | Noted: 2021-10-13 | Resolved: 2023-04-12

## 2023-04-12 PROBLEM — R13.10 DYSPHAGIA: Status: RESOLVED | Noted: 2021-10-13 | Resolved: 2023-04-12

## 2023-04-12 LAB
HBA1C MFR BLD: 5.3 % (ref ?–5.8)
POCT INT CON NEG: NEGATIVE
POCT INT CON POS: POSITIVE

## 2023-04-12 PROCEDURE — 83036 HEMOGLOBIN GLYCOSYLATED A1C: CPT | Performed by: FAMILY MEDICINE

## 2023-04-12 PROCEDURE — G0438 PPPS, INITIAL VISIT: HCPCS | Performed by: FAMILY MEDICINE

## 2023-04-12 ASSESSMENT — PATIENT HEALTH QUESTIONNAIRE - PHQ9
CLINICAL INTERPRETATION OF PHQ2 SCORE: 2
SUM OF ALL RESPONSES TO PHQ QUESTIONS 1-9: 4
5. POOR APPETITE OR OVEREATING: 1 - SEVERAL DAYS

## 2023-04-12 ASSESSMENT — ACTIVITIES OF DAILY LIVING (ADL): BATHING_REQUIRES_ASSISTANCE: 0

## 2023-04-12 ASSESSMENT — FIBROSIS 4 INDEX: FIB4 SCORE: 0.75

## 2023-04-12 ASSESSMENT — ENCOUNTER SYMPTOMS: GENERAL WELL-BEING: GOOD

## 2023-04-12 NOTE — PROGRESS NOTES
Chief Complaint   Patient presents with    Annual Wellness Visit       HPI:  Fermín Gomez is a 67 y.o. here for Medicare Annual Wellness Visit     Patient Active Problem List    Diagnosis Date Noted    Status post dilation of esophageal narrowing 04/12/2023    History of subdural hematoma 04/12/2023    History of 2019 novel coronavirus disease (COVID-19) 04/12/2023    JAK2 gene mutation 04/12/2023    Elevated glucose 04/12/2023    Late effect of stroke 12/08/2021    History of ischemic right MCA stroke 12/01/2021    Dysarthria 12/01/2021    Thrombocytosis 10/27/2021    Impaired mobility and ADLs 10/20/2021    Other insomnia 10/20/2021    Gastroesophageal reflux disease with esophagitis without hemorrhage 10/19/2021    Left hemiparesis (HCC) 10/13/2021    Primary insomnia 06/09/2017    Post-splenectomy     History of pneumonia 09/01/2016    Spherocytosis (HCC)        Current Outpatient Medications   Medication Sig Dispense Refill    atorvastatin (LIPITOR) 40 MG Tab Take 1 Tablet by mouth at bedtime. 90 Tablet 3    hydroxyurea (HYDREA) 500 MG Cap Take  by mouth every day. Indications: Essential Thrombocythemia      Ascorbic Acid (VITAMIN C) 500 MG Cap Take  by mouth.      aspirin (ASA) 81 MG Chew Tab chewable tablet Chew 1 Tablet every day. 100 Tablet     omeprazole (PRILOSEC) 20 MG delayed-release capsule Take 20 mg by mouth every morning.      vitamin D (CHOLECALCIFEROL) 1000 Unit Tab Take 1 tablet by mouth every day. 30 tablet 11     No current facility-administered medications for this visit.          Current supplements as per medication list.     Allergies: Pcn [penicillins] and Penicillins    Current social contact/activities: He is active with family.  They have been traveling quite a bit he and his wife.    He  reports that he has never smoked. He has never used smokeless tobacco. He reports current alcohol use of about 4.2 oz per week. He reports current drug use. Drug: Marijuana.  Counseling given:  Yes      ROS:    Gait: Uses a A hiking pole to stabilize  Ostomy: No  Other tubes: No  Amputations: No  Chronic oxygen use: No  Last eye exam: 04/01/2022  Wears hearing aids: No   : Denies any urinary leakage during the last 6 months    Screening/anticipatory guidance:  Due for colonoscopy, referral placed.  Patient always wears his seatbelt in a vehicle.  Patient is eating well and trying to become more active.  He is up-to-date on his immunizations.  Advised updating COVID by valent booster again this coming fall.  He has completed his pneumonia vaccines but may wish to consider another.  Tdap vaccine will not be due until 2031.  Recommend yearly wellness visits.  He continues to follow carefully with hematology oncology and physiatry.    Depression Screening  Little interest or pleasure in doing things?  1 - several days  Feeling down, depressed , or hopeless? 1 - several days  Trouble falling or staying asleep, or sleeping too much?  0 - not at all  Feeling tired or having little energy?  1 - several days  Poor appetite or overeating?  1 - several days  Feeling bad about yourself - or that you are a failure or have let yourself or your family down? 0 - not at all  Trouble concentrating on things, such as reading the newspaper or watching television? 0 - not at all  Moving or speaking so slowly that other people could have noticed.  Or the opposite - being so fidgety or restless that you have been moving around a lot more than usual?  0 - not at all  Thoughts that you would be better off dead, or of hurting yourself?  0 - not at all  Patient Health Questionnaire Score: 4    If depressive symptoms identified deferred to follow up visit unless specifically addressed in assessment and plan.    Interpretation of PHQ-9 Total Score   Score Severity   1-4 No Depression   5-9 Mild Depression   10-14 Moderate Depression   15-19 Moderately Severe Depression   20-27 Severe Depression    Screening for Cognitive  Impairment  Three Minute Recall (daughter, heaven, mountain) 3/3    Larry clock face with all 12 numbers and set the hands to show 10 past 11.  Yes    Cognitive concerns identified deferred for follow up unless specifically addressed in assessment and plan.    Fall Risk Assessment  Has the patient had two or more falls in the last year or any fall with injury in the last year?  No    Safety Assessment  Throw rugs on floor.  Yes  Handrails on all stairs.  Yes  Good lighting in all hallways.  Yes  Difficulty hearing.  No  Patient counseled about all safety risks that were identified.    Functional Assessment ADLs  Are there any barriers preventing you from cooking for yourself or meeting nutritional needs?  No.    Are there any barriers preventing you from driving safely or obtaining transportation?  No.    Are there any barriers preventing you from using a telephone or calling for help?  No    Are there any barriers preventing you from shopping?  No.    Are there any barriers preventing you from taking care of your own finances?  No    Are there any barriers preventing you from managing your medications?  No    Are there any barriers preventing you from showering, bathing or dressing yourself? No    Are you currently engaging in any exercise or physical activity?  Yes. Pt walks dog 1m per day.  What is your perception of your health? Good    Advance Care Planning  Do you have an Advance Directive, Living Will, Durable Power of , or POLST? Yes  Advance Directive       is on file      Health Maintenance Summary            Overdue - COLORECTAL CANCER SCREENING (COLONOSCOPY - Every 10 Years) Overdue since 1/18/2023 01/18/2013  COLONOSCOPY (Previously completed - normal)              Postponed - IMM ZOSTER VACCINES (1 of 2) Postponed until 9/12/2023      No completion history exists for this topic.              IMM PNEUMOCOCCAL VACCINE: 65+ Years (4 - PPSV23 if available, else PCV20) Next due on 5/10/2024       05/10/2019  Imm Admin: Pneumococcal polysaccharide vaccine (PPSV-23)    05/10/2019  Imm Admin: Pneumococcal polysaccharide vaccine (PPSV-23)    06/08/2017  Imm Admin: Pneumococcal Conjugate Vaccine (Prevnar/PCV-13)    06/08/2017  Imm Admin: Pneumococcal Conjugate Vaccine (Prevnar/PCV-13)    03/02/2007  Imm Admin: Pneumococcal Vaccine (UF) - HISTORICAL DATA              IMM DTaP/Tdap/Td Vaccine (4 - Td or Tdap) Next due on 12/1/2031 12/01/2021  Imm Admin: Tdap Vaccine    11/11/2010  Imm Admin: Tdap Vaccine    11/11/2010  Imm Admin: Tdap Vaccine              IMM HEP B VACCINE (Series Information) Completed      06/08/2017  Imm Admin: Hep A/HEP B Combined Vaccine (TwinRix)    06/08/2017  Imm Admin: Hep A/HEP B Combined Vaccine (TwinRix)    05/26/2011  Imm Admin: Hep A/HEP B Combined Vaccine (TwinRix)    05/26/2011  Imm Admin: Hep A/HEP B Combined Vaccine (TwinRix)    12/17/2010  Imm Admin: Hep A/HEP B Combined Vaccine (TwinRix)    Only the first 5 history entries have been loaded, but more history exists.              IMM INFLUENZA (Series Information) Completed      12/16/2022  Imm Admin: Influenza Vaccine, Quadrivalent, Adjuvanted (Pf)    10/19/2021  Imm Admin: Influenza Vaccine Quad Inj (Pf)    03/15/2021  Imm Admin: Influenza Vaccine Quad Inj (Pf)    03/15/2021  Imm Admin: Influenza Vaccine Quad Inj (Pf)              COVID-19 Vaccine (Series Information) Completed      12/16/2022  Imm Admin: PFIZER BIVALENT BOOSTER SARS-COV-2 VACCINE (12+)    12/06/2021  Imm Admin: PFIZER PURPLE CAP SARS-COV-2 VACCINATION (12+)    04/24/2021  Imm Admin: PFIZER PURPLE CAP SARS-COV-2 VACCINATION (12+)    04/24/2021  Imm Admin: PFIZER PURPLE CAP SARS-COV-2 VACCINATION (12+)    04/03/2021  Imm Admin: PFIZER PURPLE CAP SARS-COV-2 VACCINATION (12+)    Only the first 5 history entries have been loaded, but more history exists.              Annual Wellness Visit  Completed      04/12/2023  Visit Dx: Medicare annual wellness  visit, subsequent              Discontinued - IMM MENINGOCOCCAL ACWY VACCINE  Discontinued      No completion history exists for this topic.              Discontinued - IMM MENINGOCOCCAL B VACCINE AT RISK PATIENTS AGED 10+  Discontinued      No completion history exists for this topic.              Discontinued - HEPATITIS C SCREENING  Discontinued      No completion history exists for this topic.                    Patient Care Team:  Sudhir Brewer M.D. as PCP - General (Family Medicine)  Tatianna Estrada, PT, DPT as Transitional Care Navigator  Sudhir Brewer M.D.  Hilton Wooten M.D. (Medical Oncology)      Social History     Tobacco Use    Smoking status: Never    Smokeless tobacco: Never   Vaping Use    Vaping Use: Never used   Substance Use Topics    Alcohol use: Yes     Alcohol/week: 4.2 oz     Types: 7 Glasses of wine per week     Comment: maybe 1 drink daily    Drug use: Yes     Types: Marijuana     Comment: occasional marijuana     Family History   Problem Relation Age of Onset    Blood Disease Mother         spherocytosis    Cancer Mother         esophageal    Diabetes Father     Heart Disease Father         CABG 75    Diabetes Brother      He  has a past medical history of COVID-19 virus infection (6/23/2022), Dysarthria (12/1/2021), GERD (gastroesophageal reflux disease), History of 2019 novel coronavirus disease (COVID-19) (4/12/2023), History of ischemic right MCA stroke (12/1/2021), History of pneumonia (9/2016), History of subdural hematoma (4/12/2023), Oral thrush (10/19/2021), Penile mass, Post-splenectomy, Spherocytosis (HCC), Status post dilation of esophageal narrowing (4/12/2023), Stroke (cerebrum) (HCC) (10/12/2021), Subdural hematoma (HCC) (10/13/2021), Urinary retention (10/20/2021), and Varicose veins of both lower extremities.   Past Surgical History:   Procedure Laterality Date    COLONOSCOPY  1/18/13    normal, due 2023    AL REMOVAL SPLEEN, TOTAL  1962    HERNIA REPAIR       "INGUINAL HERNIA REPAIR BILATERAL      Dr. Padilla    SPLENECTOMY      TONSILLECTOMY         Exam:   /74 (BP Location: Right arm, Patient Position: Sitting, BP Cuff Size: Large adult)   Pulse 87   Temp 36.7 °C (98 °F) (Temporal)   Ht 1.76 m (5' 9.29\")   Wt 95.3 kg (210 lb 1.6 oz)   SpO2 91%  Body mass index is 30.77 kg/m².    Hearing good.    Dentition good  Alert, oriented in no acute distress.  Eye contact is good, speech goal directed, affect calm  HEENT:   EOMI, PERRLA.   Oropharynx is well hydrated with normal soft palate motion. No exudate or ulcerations noted. Ear canals are patent, normal cerumen, TMs are pearly grey and quiet in appearance.  Neck:       Full range of motion. No JVD or carotid bruits appreciated. No cervical adenopathy appreciated. No thyromegaly or neck masses appreciated.  No retractions appreciated.  Lungs:     Clear to auscultation A&P with good air movement.   Heart:      Regular rate and rhythm normal S1 and S2 without murmur appreciated.  Strong and symmetric radial and DP pulses.  Abd:        Soft, bowel sounds positive, nontender. No bloating noted. No hepatosplenomegaly or mass appreciated. No pulsatile mass appreciated.  Ext:         Extremities show symmetric and full range of motion with normal strength. No cyanosis or clubbing appreciated. No peripheral edema appreciated.  Neuro:    Gait is normal. Patient is lucid, fluent and appropriate. No significant tremor appreciated.  Skin:       Exhibit no rashes, pigmented lesions or ulcerations.    4/4/2023 glucose 130, kidney function is good.  LDH is elevated.  Hemoglobin and hematocrit normal.  His A1c here today is 5.3%.    Assessment and Plan. The following treatment and monitoring plan is recommended:      1. Medicare annual wellness visit, subsequent  His medical problems are generally stable    2. History of ischemic right MCA stroke/Left hemiparesis (HCC)/Dysarthria  Patient had a right middle cerebral artery " stroke in October 2021.  He was out hiking at the time and was fortunate that he was found quickly by another hiker who was able to call for help.  He was found to have multiple foci of ischemia involving both the right parietal and the right frontal lobe.  Recanalization of the vessel was attempted but could not fully be achieved.  He does have significant residual.  He had left hemiparesis but now has good though not full use of the left hand and is able to walk.  He does have some foot drop and leg weakness especially when he is tired.  He continues to work on being as active as possible.  He was an avid hiker and it is difficult for him to no longer be able to handle uneven terrain or go hiking by himself.  He has some residual dysarthria though he is very understandable.  He continues to follow with physiatry.  Stable problem.    3.  Late effect of stroke/Impaired mobility and ADLs  Patient is having the emotional lability associated with stroke.  This continues to be an intermittent problem.  He is not having nightmares.  He does have still some impairment of mobility and balance and typically uses a hiking pole to steady himself.  No recent falls.  Stable problem.    7. Spherocytosis (HCC)/Post-splenectomy/Thrombocytosis/JAK2 gene mutation  Patient has hereditary spherocytosis.  He is postsplenectomy.  He has significant thrombocytosis and he is being treated with Hydrea.  He has a JAK2 gene mutation.  He is following carefully with oncology.  Additional lab orders discussed and placed.  Stable problem.  - TSH; Future  - VITAMIN B12; Future  - VITAMIN B6; Future    8. History of subdural hematoma  There was a subdural hematoma that was found at the time of his stroke.  It is felt that as he fell he likely struck his head.  This is felt to be a resolved problem.    9. Status post dilation of esophageal narrowing/Gastroesophageal reflux disease with esophagitis without hemorrhage  Patient has history of  gastroesophageal reflux disease with esophagitis.  He did have eosinophilic esophagitis on biopsy.  He had an inflammatory stricture which was successfully dilated.  He avoids nonsteroidal anti-inflammatory drugs.  Denies dysarthria at this time.  Stable problem.    10. Elevated glucose  Patient did have elevated blood glucose on recent testing.  A1c here in the office today is well within the normal range.  The blood glucose was not a fasting test.  Stable problem.  - POCT  A1C    11. Frequent urination  Patient complains that he urinates frequently during the day.  In particular he and his wife noticed that there is urgency.  He was placed on tamsulosin in the past but feels that that actually aggravated his frequent urination.  Denies any nocturia.  He sleeps 6 to 8 hours a night and does not have to get up at all.  There seems to be an element of urinary urgency but also some hesitancy.  PSA ordered discussed and placed.  Patient denies any visible hematuria.  Denies any dysuria.  This problem has been going on ever since his stroke.  I believe this is a component of neurogenic bladder.  He and his wife state he was diagnosed with that but felt this was benign.  There may be a prostate component.  Stable problem.  - PROSTATE SPECIFIC AG DIAGNOSTIC; Future    12. Chronic fatigue  Patient finds he is chronically fatigued.  His general labs are quite favorable.  Have ordered a TSH which has not been done recently and vitamin testing.  No snoring reported.  Sleep apnea testing has been suggested by another provider but he does not want to pursue this as he would not accept the treatment.  Stable problem.  - TSH; Future  - VITAMIN B12; Future  - VITAMIN B6; Future    13. History of pneumonia  Patient did have pneumonia in 2016.  He has received pneumonia vaccinations in the past.  He is postsplenectomy.  He did receive another updated pneumonia vaccine after his infection.  He will consider having pneumonia vaccines  every 5 years.  No current shortness of breath or cough.  Stable problem.    14. History of 2019 novel coronavirus disease (COVID-19)  He did have COVID infection in June 2022.  He did very well with this.  He is fully vaccinated and boosted.  Stable problem.    15. Colon cancer screening  Patient is due for colon cancer screening, referral discussed and placed  - Referral to Gastroenterology    16. Onychomycosis  Patient does have significant onychomycosis of both great toes.  No current drainage or erythema.  Referral to podiatry discussed and placed.  - Referral to Podiatry      Services suggested: No services needed at this time  Health Care Screening: Age-appropriate preventive services recommended by USPTF and ACIP covered by Medicare were discussed today. Services ordered if indicated and agreed upon by the patient.  Referrals offered: Community-based lifestyle interventions to reduce health risks and promote self-management and wellness, fall prevention, nutrition, physical activity, tobacco-use cessation, weight loss, and mental health services as per orders if indicated.    Discussion today about general wellness and lifestyle habits:  discussed  Prevent falls and reduce trip hazards; Cautioned about securing or removing rugs.  Have a working fire alarm and carbon monoxide detector; has  Engage in regular physical activity and social activities     Follow-up: Return in about 6 months (around 10/12/2023), or if symptoms worsen or fail to improve.

## 2023-04-13 PROBLEM — F51.01 PRIMARY INSOMNIA: Status: RESOLVED | Noted: 2017-06-09 | Resolved: 2023-04-13

## 2023-04-13 PROBLEM — G47.09 OTHER INSOMNIA: Status: RESOLVED | Noted: 2021-10-20 | Resolved: 2023-04-13

## 2023-04-15 ENCOUNTER — HOSPITAL ENCOUNTER (OUTPATIENT)
Dept: LAB | Facility: MEDICAL CENTER | Age: 68
End: 2023-04-15
Attending: FAMILY MEDICINE
Payer: MEDICARE

## 2023-04-15 DIAGNOSIS — R35.0 FREQUENT URINATION: ICD-10-CM

## 2023-04-15 DIAGNOSIS — R53.82 CHRONIC FATIGUE: ICD-10-CM

## 2023-04-15 DIAGNOSIS — D75.839 THROMBOCYTOSIS: ICD-10-CM

## 2023-04-15 LAB
PSA SERPL-MCNC: 4.1 NG/ML (ref 0–4)
TSH SERPL DL<=0.005 MIU/L-ACNC: 1.87 UIU/ML (ref 0.38–5.33)
VIT B12 SERPL-MCNC: 605 PG/ML (ref 211–911)

## 2023-04-15 PROCEDURE — 84443 ASSAY THYROID STIM HORMONE: CPT

## 2023-04-15 PROCEDURE — 82607 VITAMIN B-12: CPT

## 2023-04-15 PROCEDURE — 36415 COLL VENOUS BLD VENIPUNCTURE: CPT

## 2023-04-15 PROCEDURE — 84153 ASSAY OF PSA TOTAL: CPT

## 2023-04-15 PROCEDURE — 84207 ASSAY OF VITAMIN B-6: CPT

## 2023-04-19 LAB — VIT B6 SERPL-MCNC: 46.5 NMOL/L (ref 20–125)

## 2023-05-01 DIAGNOSIS — I69.30 LATE EFFECT OF STROKE: ICD-10-CM

## 2023-05-01 RX ORDER — ATORVASTATIN CALCIUM 40 MG/1
40 TABLET, FILM COATED ORAL
Qty: 100 TABLET | Refills: 3 | Status: SHIPPED | OUTPATIENT
Start: 2023-05-01 | End: 2024-01-08 | Stop reason: SDUPTHER

## 2023-05-05 NOTE — PROGRESS NOTES
"Rehab Progress Note     Date of Service: 10/25/2021  Chief Complaint: follow up stroke    Interval Events (Subjective)    Patient seen and evaluated in the hallway today.   His wife is present.  We discussed positive UTI.  Also reached out to cards/neuro, monitor to be registered and brought over to place on patient.  Patient without complaints today.       ROS: No changes to bowel, bladder, pain, mood, or sleep.               Objective:  VITAL SIGNS: /70   Pulse (!) 58   Temp 36.8 °C (98.2 °F) (Oral)   Resp 18   Ht 1.558 m (5' 1.32\")   Wt 83 kg (182 lb 15.7 oz)   SpO2 99%   BMI 34.21 kg/m²   Gen: alert, no apparent distress  CV: regular rate and rhythm, no murmurs, no peripheral edema  Resp: clear to ascultation bilaterally, normal respiratory effort  GI: soft, non-tender abdomen, bowel sounds present  Neuro: notable for left hemiparesis          Recent Results (from the past 72 hour(s))   URINALYSIS    Collection Time: 10/22/21  6:30 PM    Specimen: Urine, Clean Catch   Result Value Ref Range    Color DK Yellow     Character Clear     Specific Gravity 1.030 <1.035    Ph 7.0 5.0 - 8.0    Glucose Negative Negative mg/dL    Ketones Trace (A) Negative mg/dL    Protein 30 (A) Negative mg/dL    Bilirubin Small (A) Negative    Urobilinogen, Urine 4.0 Negative    Nitrite Positive (A) Negative    Leukocyte Esterase Moderate (A) Negative    Occult Blood Negative Negative    Micro Urine Req Microscopic    URINE MICROSCOPIC (W/UA)    Collection Time: 10/22/21  6:30 PM   Result Value Ref Range    WBC  (A) /hpf    RBC 0-2 (A) /hpf    Bacteria Negative None /hpf    Epithelial Cells Negative /hpf    Hyaline Cast 6-10 (A) /lpf       Current Facility-Administered Medications   Medication Frequency   • levoFLOXacin (LEVAQUIN) tablet 500 mg DAILY   • metroNIDAZOLE (FLAGYL) tablet 500 mg Q8HRS   • senna-docusate (PERICOLACE or SENOKOT S) 8.6-50 MG per tablet 2 Tablet BID PRN    And   • polyethylene glycol/lytes " (MIRALAX) PACKET 1 Packet QDAY PRN    And   • magnesium hydroxide (MILK OF MAGNESIA) suspension 30 mL QDAY PRN    And   • bisacodyl (DULCOLAX) suppository 10 mg QDAY PRN   • melatonin tablet 3 mg QHS   • traZODone (DESYREL) tablet 50 mg QHS   • omeprazole (PRILOSEC) capsule 20 mg QAM AC   • tamsulosin (FLOMAX) capsule 0.4 mg Q EVENING   • enoxaparin (LOVENOX) inj 40 mg DAILY   • hydrOXYzine HCl (ATARAX) tablet 50 mg Q6HRS PRN   • QUEtiapine (Seroquel) tablet 25 mg TID PRN   • simethicone (MYLICON) chewable tab 80 mg TID PRN   • Respiratory Therapy Consult Continuous RT   • Pharmacy Consult Request ...Pain Management Review 1 Each PHARMACY TO DOSE   • hydrALAZINE (APRESOLINE) tablet 10 mg Q8HRS PRN   • acetaminophen (Tylenol) tablet 650 mg Q4HRS PRN   • sodium phosphate (Fleet) enema 133 mL QDAY PRN   • artificial tears ophthalmic solution 1 Drop PRN   • benzocaine-menthol (CEPACOL) lozenge 1 Lozenge Q2HRS PRN   • mag hydrox-al hydrox-simeth (MAALOX PLUS ES or MYLANTA DS) suspension 20 mL Q2HRS PRN   • ondansetron (ZOFRAN ODT) dispertab 4 mg 4X/DAY PRN    Or   • ondansetron (ZOFRAN) syringe/vial injection 4 mg 4X/DAY PRN   • sodium chloride (OCEAN) 0.65 % nasal spray 2 Spray PRN   • atorvastatin (LIPITOR) tablet 80 mg Q EVENING   • aspirin (ASA) chewable tab 81 mg DAILY   • nystatin (MYCOSTATIN) 168126 UNIT/ML suspension 500,000 Units 4X/DAY       Orders Placed This Encounter   Procedures   • Diet Order Diet: Level 6 - Soft and Bite Sized (meds whole 1:1 with thins); Liquid level: Level 0 - Thin     Standing Status:   Standing     Number of Occurrences:   1     Order Specific Question:   Diet:     Answer:   Level 6 - Soft and Bite Sized [23]     Comments:   meds whole 1:1 with thins     Order Specific Question:   Liquid level     Answer:   Level 0 - Thin       Assessment:  Active Hospital Problems    Diagnosis    • *Stroke (cerebrum) (HCC)    • Urinary retention    • Leukocytosis    • Impaired mobility and ADLs    •  Traumatic ecchymosis of left forearm, initial encounter Other insomnia    • Gastroesophageal reflux disease with esophagitis without hemorrhage    • Oral thrush    • Dysphagia    • Left hemiparesis (HCC)    • Subdural hematoma (HCC)    • H/O splenectomy      This patient is a 66 y.o. male admitted for acute inpatient rehabilitation with Stroke (cerebrum) (HCC).    I led and attended the weekly conference today, and agree with the IDT conference documentation and plan of care as noted below.    Date of conference: 10/25/2021    Goals and barriers: See IDT note.    Biggest barriers: left hemiparesis    CM/social support: wife supportive    Anticipated DC date: 11/11    Home health: PT/OT/RN    Equip: TBD    Follow up: PCP, stroke bridge, Dr. Cifuentes, cardiology        Medical Decision Making and Plan:    Right MCA stroke  S/p tPA  S/p thrombectomy  Left hemiparesis, improved  Dysphagia, improved  Continue full rehab program  PT/OT/SLP, 1 hr each discipline, 5 days per week    Aspirin  Statin    Reviewed imaging today with patient and wife  Ziopatch cardiac monitor placed 10/25    Outpatient follow up with Dr. Cifuentes, stroke bridge clinic, referrals made  Outpatient follow up with cardiology, referral made    Subdural hematoma, small  Likely due to fall     Oral thrush  Continue Nystatin     Thrombocytosis  Stress reaction  Monitor with weekly labs    Leukocytosis, increased  Checked UA, negative, now positive on 10/22  Culture pending  Checked CXR - negative x 2  Consulted hospitalist    Suspected aspiration pneumonia  Continue antibiotics  Appreciate hospitalist assistance     Hyperglycemia  Checked HgbA1c - 4.6  Likely stress response     GERD  Omeprazole    Insomnia, improved  Schedule melatonin and trazodone    Bowel program  PRN bowel medications  Last BM 10/23    Bladder program  Urinary retention  Started Hytrin 2 mg  Check PVRs - 37, 95, 87  No longer retaining  Bladder scan for no voids  ICP for over 400 cc - last required 10/20  Scheduled toileting    DVT  prophylaxis  Started Lovenox 10/20, patient 8 days out from small SDH    Total time:  40 minutes.  I spent greater than 50% of the time for patient care, counseling, and coordination on this date, including patient face-to face time, unit/floor time with review of records/pertinent lab data and studies, as well as discussing diagnostic evaluation/work up, planned therapeutic interventions, and future disposition of care, as per the interval events/subjective and the assessment and plan as noted above.        Ruth Samuels M.D.   Physical Medicine and Rehabilitation

## 2023-05-23 ENCOUNTER — OUTPATIENT INFUSION SERVICES (OUTPATIENT)
Dept: ONCOLOGY | Facility: MEDICAL CENTER | Age: 68
End: 2023-05-23
Attending: INTERNAL MEDICINE
Payer: MEDICARE

## 2023-05-23 VITALS
WEIGHT: 208.34 LBS | OXYGEN SATURATION: 92 % | SYSTOLIC BLOOD PRESSURE: 133 MMHG | DIASTOLIC BLOOD PRESSURE: 82 MMHG | HEIGHT: 70 IN | TEMPERATURE: 97.2 F | BODY MASS INDEX: 29.83 KG/M2 | HEART RATE: 80 BPM | RESPIRATION RATE: 18 BRPM

## 2023-05-23 DIAGNOSIS — D58.0 SPHEROCYTOSIS (HCC): ICD-10-CM

## 2023-05-23 LAB
HCT VFR BLD AUTO: 52.8 % (ref 42–52)
HGB BLD-MCNC: 18.7 G/DL (ref 14–18)

## 2023-05-23 PROCEDURE — 85018 HEMOGLOBIN: CPT

## 2023-05-23 PROCEDURE — 306780 HCHG STAT FOR TRANSFUSION PER CASE

## 2023-05-23 PROCEDURE — 85014 HEMATOCRIT: CPT

## 2023-05-23 RX ORDER — SODIUM CHLORIDE 9 MG/ML
500 INJECTION, SOLUTION INTRAVENOUS ONCE
Status: CANCELLED | OUTPATIENT
Start: 2023-05-23 | End: 2023-05-23

## 2023-05-23 ASSESSMENT — FIBROSIS 4 INDEX: FIB4 SCORE: 0.75

## 2023-05-24 NOTE — PROGRESS NOTES
Alvarez presents to [facility] for therapeutic phlebotomy for spherocytosis.  Alvarez denies any new or acute changes, reports tolerating past treatments well.  PIV placed to right AC, labs collected and reviewed.  Istat Hct 52.8%, Hgb 18.7. Parameters met for TP.   500 cc whole blood removed. Alvarez tolerated well.  Orthostatic vital signs wnl, see flowesheet.  Alvarez denies s/s chest pain, shortness of breath, dizziness or light-headedness after treatment.  PIV flushed and removed, gauze and coban to site.  Alvarez discharged in NAD, next appointment confirmed.

## 2023-07-07 ENCOUNTER — TELEPHONE (OUTPATIENT)
Dept: HEALTH INFORMATION MANAGEMENT | Facility: OTHER | Age: 68
End: 2023-07-07

## 2023-08-24 ENCOUNTER — OUTPATIENT INFUSION SERVICES (OUTPATIENT)
Dept: ONCOLOGY | Facility: MEDICAL CENTER | Age: 68
End: 2023-08-24
Attending: INTERNAL MEDICINE
Payer: MEDICARE

## 2023-08-24 VITALS
DIASTOLIC BLOOD PRESSURE: 81 MMHG | OXYGEN SATURATION: 93 % | SYSTOLIC BLOOD PRESSURE: 138 MMHG | TEMPERATURE: 98.1 F | BODY MASS INDEX: 30.24 KG/M2 | HEIGHT: 70 IN | HEART RATE: 89 BPM | WEIGHT: 211.2 LBS | RESPIRATION RATE: 18 BRPM

## 2023-08-24 DIAGNOSIS — D58.0 SPHEROCYTOSIS (HCC): ICD-10-CM

## 2023-08-24 LAB
HCT VFR BLD AUTO: 48.1 % (ref 42–52)
HGB BLD-MCNC: 16.9 G/DL (ref 14–18)

## 2023-08-24 PROCEDURE — 85014 HEMATOCRIT: CPT

## 2023-08-24 PROCEDURE — 85018 HEMOGLOBIN: CPT

## 2023-08-24 PROCEDURE — 99195 PHLEBOTOMY: CPT

## 2023-08-24 RX ORDER — SODIUM CHLORIDE 9 MG/ML
500 INJECTION, SOLUTION INTRAVENOUS ONCE
OUTPATIENT
Start: 2023-08-24 | End: 2023-08-24

## 2023-08-24 ASSESSMENT — FIBROSIS 4 INDEX: FIB4 SCORE: 0.76

## 2023-08-25 NOTE — PROGRESS NOTES
Pt arrived ambulatory to [facility] for Q 3 month TP. POC discussed with pt and he agrees with plan.     PIV established brisk blood return noted. H/H drawn as ordered. Results reviewed, HCT 48.1% today. Pt meets parameters for TP. 500ml whole blood phlebotomized as ordered. Pt tolerated treatment without s/s adverse reaction. Orthostatic vitals stable, see flow sheet. PIV dc'd catheter tip intact, gauze and coban dressing applied. Pt discharged to self care, NAD. Pt states he will schedule his next TP after follow up with his doctor in 3 months.

## 2023-12-27 ENCOUNTER — APPOINTMENT (OUTPATIENT)
Dept: URGENT CARE | Facility: PHYSICIAN GROUP | Age: 68
End: 2023-12-27
Payer: MEDICARE

## 2023-12-30 ENCOUNTER — OFFICE VISIT (OUTPATIENT)
Dept: URGENT CARE | Facility: PHYSICIAN GROUP | Age: 68
End: 2023-12-30
Payer: MEDICARE

## 2023-12-30 VITALS
OXYGEN SATURATION: 90 % | WEIGHT: 211 LBS | BODY MASS INDEX: 29.54 KG/M2 | TEMPERATURE: 97.7 F | SYSTOLIC BLOOD PRESSURE: 138 MMHG | DIASTOLIC BLOOD PRESSURE: 82 MMHG | HEIGHT: 71 IN | HEART RATE: 83 BPM | RESPIRATION RATE: 20 BRPM

## 2023-12-30 DIAGNOSIS — J06.9 VIRAL URI WITH COUGH: ICD-10-CM

## 2023-12-30 DIAGNOSIS — Z86.73 HISTORY OF ISCHEMIC STROKE: ICD-10-CM

## 2023-12-30 PROCEDURE — 3075F SYST BP GE 130 - 139MM HG: CPT | Performed by: NURSE PRACTITIONER

## 2023-12-30 PROCEDURE — 99213 OFFICE O/P EST LOW 20 MIN: CPT | Performed by: NURSE PRACTITIONER

## 2023-12-30 PROCEDURE — 3079F DIAST BP 80-89 MM HG: CPT | Performed by: NURSE PRACTITIONER

## 2023-12-30 ASSESSMENT — ENCOUNTER SYMPTOMS
COUGH: 1
SHORTNESS OF BREATH: 0
FEVER: 0

## 2023-12-30 ASSESSMENT — FIBROSIS 4 INDEX: FIB4 SCORE: 0.76

## 2023-12-30 NOTE — PROGRESS NOTES
Subjective     Fermín Gomez is a 68 y.o. male who presents with Cough (X 6 days, dysphagia, nasal congestion )            Cough  This is a new problem. Episode onset: reports new onset of cold symptoms that started about 6 days ago. home covid test was negative 3 days ago. cough is dry. denies any SOB or wheezing. cough keeps him up at night. Pertinent negatives include no fever or shortness of breath. He has tried OTC cough suppressant (mucinex) for the symptoms. The treatment provided mild relief.       Review of Systems   Constitutional:  Negative for fever.   Respiratory:  Positive for cough. Negative for shortness of breath.    All other systems reviewed and are negative.         Past Medical History:   Diagnosis Date    COVID-19 virus infection 6/23/2022 6/22/2022    Dysarthria 12/1/2021    GERD (gastroesophageal reflux disease)     History of 2019 novel coronavirus disease (COVID-19) 4/12/2023 6/2022    History of ischemic right MCA stroke 12/1/2021    History of pneumonia 9/2016    History of subdural hematoma 4/12/2023    Oral thrush 10/19/2021    Penile mass     left penile shaft, warty appearance. 1.5 cm    Post-splenectomy     Spherocytosis (HCC)     Status post dilation of esophageal narrowing 4/12/2023    Stroke (cerebrum) (HCC) 10/12/2021    Subdural hematoma (HCC) 10/13/2021    Urinary retention 10/20/2021    Varicose veins of both lower extremities       Past Surgical History:   Procedure Laterality Date    COLONOSCOPY  1/18/13    normal, due 2023    TX REMOVAL SPLEEN, TOTAL  1962    HERNIA REPAIR      INGUINAL HERNIA REPAIR BILATERAL      Dr. Padilla    SPLENECTOMY      TONSILLECTOMY        Social History     Socioeconomic History    Marital status:      Spouse name: Not on file    Number of children: Not on file    Years of education: Not on file    Highest education level: Not on file   Occupational History    Occupation: [employer]     Employer: [employer]  "  Tobacco Use    Smoking status: Never    Smokeless tobacco: Never   Vaping Use    Vaping Use: Never used   Substance and Sexual Activity    Alcohol use: Yes     Alcohol/week: 4.2 oz     Types: 7 Glasses of wine per week     Comment: maybe 1 drink daily    Drug use: Yes     Types: Marijuana     Comment: occasional marijuana    Sexual activity: Yes   Other Topics Concern    Not on file   Social History Narrative    ** Merged History Encounter **          Social Determinants of Health     Financial Resource Strain: Not on file   Food Insecurity: Not on file   Transportation Needs: Not on file   Physical Activity: Not on file   Stress: Not on file   Social Connections: Not on file   Intimate Partner Violence: Not on file   Housing Stability: Not on file         Objective     /82   Pulse 83   Temp 36.5 °C (97.7 °F)   Resp 20   Ht 1.803 m (5' 11\")   Wt 95.7 kg (211 lb)   SpO2 90%   BMI 29.43 kg/m²      Physical Exam  Vitals and nursing note reviewed.   Constitutional:       Appearance: Normal appearance.   HENT:      Head: Normocephalic and atraumatic.      Right Ear: Tympanic membrane and external ear normal.      Left Ear: Tympanic membrane and external ear normal.      Nose: Nose normal.      Mouth/Throat:      Mouth: Mucous membranes are moist.      Pharynx: Oropharynx is clear.   Eyes:      Extraocular Movements: Extraocular movements intact.      Pupils: Pupils are equal, round, and reactive to light.   Cardiovascular:      Rate and Rhythm: Normal rate and regular rhythm.   Pulmonary:      Effort: Pulmonary effort is normal.      Breath sounds: Normal breath sounds.   Musculoskeletal:         General: Normal range of motion.      Cervical back: Normal range of motion and neck supple.   Skin:     General: Skin is warm and dry.      Capillary Refill: Capillary refill takes less than 2 seconds.   Neurological:      General: No focal deficit present.      Mental Status: He is alert and oriented to person, " place, and time. Mental status is at baseline.   Psychiatric:         Mood and Affect: Mood normal.         Thought Content: Thought content normal.         Judgment: Judgment normal.                             Assessment & Plan        1. Viral URI with cough    2. History of ischemic stroke    Discussed with patient his general exam is benign and reassuring  O2 sat on re-check was 92% on RA  Lungs clear  Encouraged him to continue OTC meds, rest and fluids  RTC if not getting better  Supportive care, differential diagnoses, and indications for immediate follow-up discussed with patient.    Pathogenesis of diagnosis discussed including typical length and natural progression.    Instructed to return to  or nearest emergency department if symptoms fail to improve, for any change in condition, further concerns, or new concerning symptoms.  Patient states understanding of the plan of care and discharge instructions.

## 2024-01-08 DIAGNOSIS — I69.30 LATE EFFECT OF STROKE: ICD-10-CM

## 2024-01-08 NOTE — TELEPHONE ENCOUNTER
I was instructed by the provider on schedule today, NAYELI Friedman, to instruct the pt that there was still some fluid in the lung, and that it would be best to go to the ER to drain his lung, especially since there was talk of travel.     I left a voicemail and he was instructed to call back with any questions.     
Yes

## 2024-01-09 RX ORDER — ATORVASTATIN CALCIUM 40 MG/1
40 TABLET, FILM COATED ORAL
Qty: 100 TABLET | Refills: 3 | Status: SHIPPED | OUTPATIENT
Start: 2024-01-09

## 2024-03-07 ENCOUNTER — DOCUMENTATION (OUTPATIENT)
Dept: HEALTH INFORMATION MANAGEMENT | Facility: OTHER | Age: 69
End: 2024-03-07
Payer: MEDICARE

## 2024-04-05 ENCOUNTER — APPOINTMENT (OUTPATIENT)
Dept: MEDICAL GROUP | Facility: MEDICAL CENTER | Age: 69
End: 2024-04-05
Payer: MEDICARE

## 2024-04-05 VITALS
DIASTOLIC BLOOD PRESSURE: 70 MMHG | SYSTOLIC BLOOD PRESSURE: 132 MMHG | HEART RATE: 80 BPM | WEIGHT: 210.76 LBS | OXYGEN SATURATION: 91 % | TEMPERATURE: 97.7 F | BODY MASS INDEX: 29.51 KG/M2 | RESPIRATION RATE: 16 BRPM | HEIGHT: 71 IN

## 2024-04-05 DIAGNOSIS — Z74.09 IMPAIRED MOBILITY AND ADLS: ICD-10-CM

## 2024-04-05 DIAGNOSIS — D75.839 THROMBOCYTOSIS: ICD-10-CM

## 2024-04-05 DIAGNOSIS — D58.0 SPHEROCYTOSIS (HCC): ICD-10-CM

## 2024-04-05 DIAGNOSIS — Z87.19 STATUS POST DILATION OF ESOPHAGEAL NARROWING: ICD-10-CM

## 2024-04-05 DIAGNOSIS — I69.30 LATE EFFECT OF STROKE: ICD-10-CM

## 2024-04-05 DIAGNOSIS — Z00.00 MEDICARE ANNUAL WELLNESS VISIT, SUBSEQUENT: ICD-10-CM

## 2024-04-05 DIAGNOSIS — E78.00 ELEVATED LDL CHOLESTEROL LEVEL: ICD-10-CM

## 2024-04-05 DIAGNOSIS — Z78.9 IMPAIRED MOBILITY AND ADLS: ICD-10-CM

## 2024-04-05 DIAGNOSIS — K21.00 GASTROESOPHAGEAL REFLUX DISEASE WITH ESOPHAGITIS WITHOUT HEMORRHAGE: ICD-10-CM

## 2024-04-05 DIAGNOSIS — Z98.890 STATUS POST DILATION OF ESOPHAGEAL NARROWING: ICD-10-CM

## 2024-04-05 DIAGNOSIS — Z12.11 COLON CANCER SCREENING: ICD-10-CM

## 2024-04-05 DIAGNOSIS — Z86.73 HISTORY OF ISCHEMIC RIGHT MCA STROKE: ICD-10-CM

## 2024-04-05 DIAGNOSIS — Z15.89 JAK2 GENE MUTATION: ICD-10-CM

## 2024-04-05 DIAGNOSIS — G47.69 SLEEP-RELATED MOVEMENT DISORDER: ICD-10-CM

## 2024-04-05 DIAGNOSIS — Z86.79 HISTORY OF SUBDURAL HEMATOMA: ICD-10-CM

## 2024-04-05 DIAGNOSIS — Z90.81 POST-SPLENECTOMY: ICD-10-CM

## 2024-04-05 DIAGNOSIS — Z87.01 HISTORY OF PNEUMONIA: ICD-10-CM

## 2024-04-05 DIAGNOSIS — Z12.5 PROSTATE CANCER SCREENING: ICD-10-CM

## 2024-04-05 DIAGNOSIS — R73.09 ELEVATED GLUCOSE: ICD-10-CM

## 2024-04-05 DIAGNOSIS — Z86.16 HISTORY OF 2019 NOVEL CORONAVIRUS DISEASE (COVID-19): ICD-10-CM

## 2024-04-05 DIAGNOSIS — R47.1 DYSARTHRIA: ICD-10-CM

## 2024-04-05 DIAGNOSIS — G81.94 LEFT HEMIPARESIS (HCC): ICD-10-CM

## 2024-04-05 PROCEDURE — 3075F SYST BP GE 130 - 139MM HG: CPT | Performed by: FAMILY MEDICINE

## 2024-04-05 PROCEDURE — 3078F DIAST BP <80 MM HG: CPT | Performed by: FAMILY MEDICINE

## 2024-04-05 PROCEDURE — 99214 OFFICE O/P EST MOD 30 MIN: CPT | Performed by: FAMILY MEDICINE

## 2024-04-05 ASSESSMENT — ACTIVITIES OF DAILY LIVING (ADL): BATHING_REQUIRES_ASSISTANCE: 0

## 2024-04-05 ASSESSMENT — ENCOUNTER SYMPTOMS: GENERAL WELL-BEING: FAIR

## 2024-04-05 ASSESSMENT — FIBROSIS 4 INDEX: FIB4 SCORE: 0.76

## 2024-04-05 ASSESSMENT — PATIENT HEALTH QUESTIONNAIRE - PHQ9: CLINICAL INTERPRETATION OF PHQ2 SCORE: 0

## 2024-04-05 NOTE — PROGRESS NOTES
Chief Complaint   Patient presents with    Annual Wellness Visit       HPI:  Fermín Gomez is a 68 y.o. here for Medicare Annual Wellness Visit     Patient Active Problem List    Diagnosis Date Noted    Sleep-related movement disorder 04/05/2024    Status post dilation of esophageal narrowing 04/12/2023    History of subdural hematoma 04/12/2023    History of 2019 novel coronavirus disease (COVID-19) 04/12/2023    JAK2 gene mutation 04/12/2023    Elevated glucose 04/12/2023    Late effect of stroke 12/08/2021    History of ischemic right MCA stroke 12/01/2021    Dysarthria 12/01/2021    Thrombocytosis 10/27/2021    Impaired mobility and ADLs 10/20/2021    Gastroesophageal reflux disease with esophagitis without hemorrhage 10/19/2021    Left hemiparesis (HCC) 10/13/2021    Post-splenectomy     History of pneumonia 09/01/2016    Spherocytosis (HCC)        Current Outpatient Medications   Medication Sig Dispense Refill    atorvastatin (LIPITOR) 40 MG Tab Take 1 Tablet by mouth at bedtime. 100 Tablet 3    hydroxyurea (HYDREA) 500 MG Cap Take  by mouth every day. Indications: Essential Thrombocythemia      Ascorbic Acid (VITAMIN C) 500 MG Cap Take  by mouth.      aspirin (ASA) 81 MG Chew Tab chewable tablet Chew 1 Tablet every day. 100 Tablet     omeprazole (PRILOSEC) 20 MG delayed-release capsule Take 20 mg by mouth every morning.      vitamin D (CHOLECALCIFEROL) 1000 Unit Tab Take 1 tablet by mouth every day. 30 tablet 11     No current facility-administered medications for this visit.          Current supplements as per medication list.     Allergies: Pcn [penicillins] and Penicillins    Current social contact/activities: walks the dog, active with wife and neighbors     He  reports that he has never smoked. He has never used smokeless tobacco. He reports current alcohol use of about 4.2 oz of alcohol per week. He reports current drug use. Drug: Marijuana.  Counseling given: Yes      ROS:    Gait: Uses no  assistive device.  Uses trecking pole if out on dirt trails.  Ostomy: No  Other tubes: No  Amputations: No  Chronic oxygen use: No  Last eye exam: 1/2023, will set up appointment  Wears hearing aids: No   : Denies any urinary leakage during the last 6 months    Screening/anticipatory guidance:  Seatbelt worn.  Yearly preventative examination is recommended.  Immunizations discussed.  Will be traveling soon and updated COVID-vaccine is recommended.  Is due for colonoscopy, referral placed again.  Due for prostate cancer screening as well, discussed.    Depression Screening  Little interest or pleasure in doing things?  0 - not at all  Feeling down, depressed , or hopeless? 0 - not at all  Patient Health Questionnaire Score: 0     If depressive symptoms identified deferred to follow up visit unless specifically addressed in assessment and plan.    Interpretation of PHQ-9 Total Score   Score Severity   1-4 No Depression   5-9 Mild Depression   10-14 Moderate Depression   15-19 Moderately Severe Depression   20-27 Severe Depression    Screening for Cognitive Impairment  Do you or any of your friends or family members have any concern about your memory? Yes  Three Minute Recall (Leader, Season, Table) 3/3    Larry clock face with all 12 numbers and set the hands to show 10 minutes after 11.  Yes    Cognitive concerns identified deferred for follow up unless specifically addressed in assessment and plan.    Fall Risk Assessment  Has the patient had two or more falls in the last year or any fall with injury in the last year?  No    Safety Assessment  Do you always wear your seatbelt?  Yes  Any changes to home needed to function safely? No  Difficulty hearing.  No  Patient counseled about all safety risks that were identified.    Functional Assessment ADLs  Are there any barriers preventing you from cooking for yourself or meeting nutritional needs?  No.    Are there any barriers preventing you from driving safely or  obtaining transportation?  No. Drives during the day  Are there any barriers preventing you from using a telephone or calling for help?  No    Are there any barriers preventing you from shopping?  No.    Are there any barriers preventing you from taking care of your own finances?  No    Are there any barriers preventing you from managing your medications?  No    Are there any barriers preventing you from showering, bathing or dressing yourself? No    Are there any barriers preventing you from doing housework or laundry? No  Are there any barriers preventing you from using the toilet?No  Are you currently engaging in any exercise or physical activity?  Yes. Is walking the dog daily    Self-Assessment of Health  What is your perception of your health? Fair  Do you sleep more than six hours a night? Yes  In the past 7 days, how much did pain keep you from doing your normal work? None  Do you spend quality time with family or friends (virtually or in person)? Yes  Do you usually eat a heart healthy diet that constists of a variety of fruits, vegetables, whole grains and fiber? Yes  Do you eat foods high in fat and/or Fast Food more than three times per week? No    Advance Care Planning  Do you have an Advance Directive, Living Will, Durable Power of , or POLST? Yes  Advance Directive   Durable Power of    is on file      Health Maintenance Summary            Ordered - Colorectal Cancer Screening (Colonoscopy - Every 10 Years) Ordered on 4/5/2024 01/18/2013  Colonoscopy (Previously completed - normal)              Overdue - COVID-19 Vaccine (7 - 2023-24 season) Overdue since 9/1/2023 12/16/2022  Imm Admin: PFIZER BIVALENT SARS-COV-2 VACCINE (12+)    12/06/2021  Imm Admin: PFIZER PURPLE CAP SARS-COV-2 VACCINATION (12+)    04/24/2021  Imm Admin: PFIZER PURPLE CAP SARS-COV-2 VACCINATION (12+)    04/24/2021  Imm Admin: PFIZER PURPLE CAP SARS-COV-2 VACCINATION (12+)    04/03/2021  Imm Admin: PFIZER  PURPLE CAP SARS-COV-2 VACCINATION (12+)    Only the first 5 history entries have been loaded, but more history exists.              Postponed - Zoster (Shingles) Vaccines (1 of 2) Postponed until 9/5/2024      No completion history exists for this topic.              Pneumococcal Vaccine: 65+ Years (4 of 4 - PPSV23 or PCV20) Next due on 5/10/2024      05/10/2019  Imm Admin: Pneumococcal polysaccharide vaccine (PPSV-23)    05/10/2019  Imm Admin: Pneumococcal polysaccharide vaccine (PPSV-23)    06/08/2017  Imm Admin: Pneumococcal Conjugate Vaccine (Prevnar/PCV-13)    06/08/2017  Imm Admin: Pneumococcal Conjugate Vaccine (Prevnar/PCV-13)    03/02/2007  Imm Admin: Pneumococcal Vaccine (UF) - HISTORICAL DATA    Only the first 5 history entries have been loaded, but more history exists.              Influenza Vaccine (Season Ended) Next due on 9/1/2024 12/16/2022  Imm Admin: Influenza Vaccine, Quadrivalent, Adjuvanted (Pf)    10/19/2021  Imm Admin: Influenza Vaccine Quad Inj (Pf)    03/15/2021  Imm Admin: Influenza Vaccine Quad Inj (Pf)    03/15/2021  Imm Admin: Influenza Vaccine Quad Inj (Pf)              Annual Wellness Visit (Yearly) Next due on 4/5/2025 04/05/2024  Visit Dx: Medicare annual wellness visit, subsequent    04/12/2023  Visit Dx: Medicare annual wellness visit, subsequent              IMM DTaP/Tdap/Td Vaccine (4 - Td or Tdap) Next due on 12/1/2031 12/01/2021  Imm Admin: Tdap Vaccine    11/11/2010  Imm Admin: Tdap Vaccine    11/11/2010  Imm Admin: Tdap Vaccine              Hepatitis A Vaccine (Hep A) (Series Information) Aged Out      06/08/2017  Imm Admin: Hep A/HEP B Combined Vaccine (TwinRix)    06/08/2017  Imm Admin: Hep A/HEP B Combined Vaccine (TwinRix)    05/26/2011  Imm Admin: Hep A/HEP B Combined Vaccine (TwinRix)    05/26/2011  Imm Admin: Hep A/HEP B Combined Vaccine (TwinRix)    12/17/2010  Imm Admin: Hep A/HEP B Combined Vaccine (TwinRix)    Only the first 5 history entries have  been loaded, but more history exists.              Hepatitis B Vaccine (Hep B) (Series Information) Completed      06/08/2017  Imm Admin: Hep A/HEP B Combined Vaccine (TwinRix)    06/08/2017  Imm Admin: Hep A/HEP B Combined Vaccine (TwinRix)    05/26/2011  Imm Admin: Hep A/HEP B Combined Vaccine (TwinRix)    05/26/2011  Imm Admin: Hep A/HEP B Combined Vaccine (TwinRix)    12/17/2010  Imm Admin: Hep A/HEP B Combined Vaccine (TwinRix)    Only the first 5 history entries have been loaded, but more history exists.              HPV Vaccines (Series Information) Aged Out      No completion history exists for this topic.              Polio Vaccine (Inactivated Polio) (Series Information) Aged Out      No completion history exists for this topic.              Discontinued - Meningococcal Immunization  Discontinued      No completion history exists for this topic.              Discontinued - Hepatitis C Screening  Discontinued      No completion history exists for this topic.                    Patient Care Team:  Sudhir Brewer M.D. as PCP - General (Family Medicine)  Tatianna Estrada, PT, DPT as Transitional Care Navigator  Sudhir Brewer M.D.  Hilton Wooten M.D. (Medical Oncology)        Social History     Tobacco Use    Smoking status: Never    Smokeless tobacco: Never   Vaping Use    Vaping Use: Never used   Substance Use Topics    Alcohol use: Yes     Alcohol/week: 4.2 oz     Types: 7 Glasses of wine per week     Comment: maybe 1 drink daily    Drug use: Yes     Types: Marijuana     Comment: occasional marijuana     Family History   Problem Relation Age of Onset    Blood Disease Mother         spherocytosis    Cancer Mother         esophageal    Diabetes Father     Heart Disease Father         CABG 75    Diabetes Brother      He  has a past medical history of COVID-19 virus infection (06/23/2022), Dysarthria (12/01/2021), GERD (gastroesophageal reflux disease), History of 2019 novel coronavirus disease  "(COVID-19) (04/12/2023), History of ischemic right MCA stroke (12/01/2021), History of pneumonia (09/2016), History of subdural hematoma (04/12/2023), Oral thrush (10/19/2021), Penile mass, Post-splenectomy, Sleep-related movement disorder (04/05/2024), Spherocytosis (HCC), Status post dilation of esophageal narrowing (04/12/2023), Stroke (cerebrum) (HCC) (10/12/2021), Subdural hematoma (HCC) (10/13/2021), Urinary retention (10/20/2021), and Varicose veins of both lower extremities.   Past Surgical History:   Procedure Laterality Date    COLONOSCOPY  1/18/13    normal, due 2023    [place] REMOVAL SPLEEN, TOTAL  1962    HERNIA REPAIR      INGUINAL HERNIA REPAIR BILATERAL      Dr. Padilla    SPLENECTOMY      TONSILLECTOMY         Exam:   /70   Pulse 80   Temp 36.5 °C (97.7 °F) (Temporal)   Resp 16   Ht 1.803 m (5' 11\")   Wt 95.6 kg (210 lb 12.2 oz)   SpO2 91%  Body mass index is 29.4 kg/m².    Hearing good.    Dentition good  Alert, oriented in no acute distress.  Eye contact is good, speech goal directed, affect calm  HEENT:   EOMI, PERRLA.     Neck:       Full range of motion. No JVD or carotid bruits appreciated. No cervical adenopathy appreciated. No thyromegaly or neck masses appreciated.  No retractions appreciated.  Lungs:     Clear to auscultation A&P with good air movement.   Heart:      Regular rate and rhythm normal S1 and S2 without murmur appreciated.  Strong and symmetric radial and DP pulses.  Abd:        Soft, bowel sounds positive, nontender. No bloating noted. No hepatosplenomegaly or mass appreciated. No pulsatile mass appreciated.  Ext:         Extremities show symmetric and full range of motion with normal strength. No cyanosis or clubbing appreciated. No peripheral edema appreciated.  Neuro:    Gait is slightly broad-based but overall pretty steady.  No definite left foot drop today.  Right facial droop pain. Patient is lucid, reasonably fluent and appropriate. No significant tremor " appreciated.  Skin:       Exhibit no rashes, pigmented lesions or ulcerations.    Assessment and Plan. The following treatment and monitoring plan is recommended:      1. Medicare annual wellness visit, subsequent  His medical problems are generally stable    2. Elevated LDL cholesterol level  Patient denies chest pain, chest pressure, palpitations or exertional shortness of breath. Patient denies muscle aches or muscle weakness from the atorvastatin medication. Patient is a never smoker. Patient takes 81 mg aspirin daily. Patient has no history of myocardial infarction or documented PVD.  Patient does have history of right middle cerebral artery stroke.  Follow-up lab orders discussed and placed.  The problem is clinically stable.  - Comp Metabolic Panel; Future  - TSH; Future  - Lipid Profile; Future    3. History of ischemic right MCA stroke/Left hemiparesis (HCC)/Impaired mobility and ADLs/ Late effect of stroke/Dysarthria  In October 2021 patient had an ischemic right middle cerebral artery stroke.  This resulted in multiple embolic foci with multiple areas affected.  Patient had very significant left hemiparesis and right facial hemiparesis initially but has recovered quite well.  He retains right facial hemiparesis though improved.  He also has slight dysarthria, also improved.  He is able to lift his left arm well and grasp with the fingers of the left hand with some reduction in fine motor skills.  His left leg continues to have some foot drop for him when he is very tired or ill.  However on a day-to-day he is able to ambulate without use of a cane.  When he hikes on uneven ground he uses a trekking pole.  His mobility has improved.  He still tires more easily than in the past and does have some impairment of ADLs.  He has assistance from his wife.  Follow-up neurology discussed and placed.  Stable problem.  - Referral to Neurology    4. Spherocytosis (HCC)/Thrombocytosis/Post-splenectomy/JAK2 gene  mutation  Patient has spherocytosis and thrombocytosis.  He is postsplenectomy many years ago.  He follows carefully with hematology and is on Hydrea.  He has a JAK2 gene mutation which is frequently associated with this disorder.  His hemoglobin has been borderline high but controlled.  Mean cell volume tends to be high.  He is tolerating the regimen well.  No further clots.  Currently stable problem.    5. History of subdural hematoma  This was at the time of his stroke in October 2021.  Most likely attributed to his fall when the stroke caused him to lose limb control.  It was small and resolved spontaneously.  No recurrence.  Patient has tolerated 81 mg aspirin.  Stable problem.    6. Elevated glucose  Glucose elevation has tended to be mild.  Glucose is over 140 have been seen in nonfasting test.  Patient denies increased thirst or dysuria or nocturia.  Follow-up orders discussed and placed.  Stable problem.  - Comp Metabolic Panel; Future  - HEMOGLOBIN A1C; Future    7. Gastroesophageal reflux disease with esophagitis without hemorrhage/Status post dilation of esophageal narrowing  The patient feels the current medication regimen of omeprazole is controlling the gastroesophageal reflux symptoms well. Denies current dysphagia, reflux symptoms, acidity, abdominal pain or visible blood or mucus in the stool.  Dilation of esophageal narrowing in the past was very helpful.  Denies vomiting or hematemesis. Denies burping or abdominal bloating. Patient avoids nonsteroidal anti-inflammatory drugs. Avoids heavy meals or eating within 2 hours of bedtime.  Follow-up lab order discussed and placed.  Stable problem.  - Comp Metabolic Panel; Future    8. History of 2019 novel coronavirus disease (COVID-19)  Patient had documented COVID June 2022.  He was ill again in July 2023 when his wife was ill.  He did not test though his symptoms were similar to hers and she was positive.  It is presumed that he was positive then as  well.  He did not take the new coronavirus vaccine this fall.  He will do so prior to upcoming international travel.  He tolerated the COVID infections quite well.  Stable problem.    9. History of pneumonia  He did have pneumonia in 2016.  Had updated pneumonia vaccine in 2017.  I believe he should consider that again.  Denies any cough or shortness of breath.  Stable problem.    10. Sleep-related movement disorder  He has a sleep-related movement disorder which actually predates his stroke by several years.  He is having some slowness of initiation of movement.  He finds he is a little more unsteady than in the past though that certainly may be due to the left hemiparesis from the stroke in 2021.  These things are quite stable but persistent.  Referral to neurology is placed.  Stable problems.  - Referral to Neurology    11. Colon cancer screening  Referral discussed and placed  - Referral to GI for Colonoscopy    12. Prostate cancer screening  Lab order discussed and placed.  - PROSTATE SPECIFIC AG SCREENING; Future      Services suggested: No services needed at this time  Health Care Screening: Age-appropriate preventive services recommended by USPTF and ACIP covered by Medicare were discussed today. Services ordered if indicated and agreed upon by the patient.  Referrals offered: Community-based lifestyle interventions to reduce health risks and promote self-management and wellness, fall prevention, nutrition, physical activity, tobacco-use cessation, weight loss, and mental health services as per orders if indicated.    Discussion today about general wellness and lifestyle habits:  discussed  Prevent falls and reduce trip hazards; Cautioned about securing or removing rugs.  Have a working fire alarm and carbon monoxide detector;  has  Engage in regular physical activity and social activities     Follow-up: Return in about 1 year (around 4/5/2025), or if symptoms worsen or fail to improve.

## 2024-04-22 ENCOUNTER — HOSPITAL ENCOUNTER (OUTPATIENT)
Dept: LAB | Facility: MEDICAL CENTER | Age: 69
End: 2024-04-22
Attending: FAMILY MEDICINE
Payer: MEDICARE

## 2024-04-22 DIAGNOSIS — Z12.5 PROSTATE CANCER SCREENING: ICD-10-CM

## 2024-04-22 DIAGNOSIS — E78.00 ELEVATED LDL CHOLESTEROL LEVEL: ICD-10-CM

## 2024-04-22 DIAGNOSIS — K21.00 GASTROESOPHAGEAL REFLUX DISEASE WITH ESOPHAGITIS WITHOUT HEMORRHAGE: ICD-10-CM

## 2024-04-22 DIAGNOSIS — R73.09 ELEVATED GLUCOSE: ICD-10-CM

## 2024-04-22 LAB
ALBUMIN SERPL BCP-MCNC: 4.6 G/DL (ref 3.2–4.9)
ALBUMIN/GLOB SERPL: 2.1 G/DL
ALP SERPL-CCNC: 74 U/L (ref 30–99)
ALT SERPL-CCNC: 19 U/L (ref 2–50)
ANION GAP SERPL CALC-SCNC: 13 MMOL/L (ref 7–16)
AST SERPL-CCNC: 26 U/L (ref 12–45)
BILIRUB SERPL-MCNC: 1.2 MG/DL (ref 0.1–1.5)
BUN SERPL-MCNC: 16 MG/DL (ref 8–22)
CALCIUM ALBUM COR SERPL-MCNC: 8.5 MG/DL (ref 8.5–10.5)
CALCIUM SERPL-MCNC: 9 MG/DL (ref 8.5–10.5)
CHLORIDE SERPL-SCNC: 106 MMOL/L (ref 96–112)
CHOLEST SERPL-MCNC: 128 MG/DL (ref 100–199)
CO2 SERPL-SCNC: 23 MMOL/L (ref 20–33)
CREAT SERPL-MCNC: 0.7 MG/DL (ref 0.5–1.4)
EST. AVERAGE GLUCOSE BLD GHB EST-MCNC: 111 MG/DL
FASTING STATUS PATIENT QL REPORTED: NORMAL
GFR SERPLBLD CREATININE-BSD FMLA CKD-EPI: 101 ML/MIN/1.73 M 2
GLOBULIN SER CALC-MCNC: 2.2 G/DL (ref 1.9–3.5)
GLUCOSE SERPL-MCNC: 100 MG/DL (ref 65–99)
HBA1C MFR BLD: 5.5 % (ref 4–5.6)
HDLC SERPL-MCNC: 52 MG/DL
LDLC SERPL CALC-MCNC: 66 MG/DL
POTASSIUM SERPL-SCNC: 4.5 MMOL/L (ref 3.6–5.5)
PROT SERPL-MCNC: 6.8 G/DL (ref 6–8.2)
PSA SERPL-MCNC: 2.87 NG/ML (ref 0–4)
SODIUM SERPL-SCNC: 142 MMOL/L (ref 135–145)
TRIGL SERPL-MCNC: 51 MG/DL (ref 0–149)
TSH SERPL DL<=0.005 MIU/L-ACNC: 2.63 UIU/ML (ref 0.38–5.33)

## 2024-04-22 PROCEDURE — 84153 ASSAY OF PSA TOTAL: CPT

## 2024-04-22 PROCEDURE — 80053 COMPREHEN METABOLIC PANEL: CPT

## 2024-04-22 PROCEDURE — 84443 ASSAY THYROID STIM HORMONE: CPT

## 2024-04-22 PROCEDURE — 80061 LIPID PANEL: CPT

## 2024-04-22 PROCEDURE — 83036 HEMOGLOBIN GLYCOSYLATED A1C: CPT

## 2024-04-22 PROCEDURE — 36415 COLL VENOUS BLD VENIPUNCTURE: CPT

## 2024-05-02 DIAGNOSIS — I69.30 LATE EFFECT OF STROKE: ICD-10-CM

## 2024-05-02 RX ORDER — ATORVASTATIN CALCIUM 40 MG/1
40 TABLET, FILM COATED ORAL
Qty: 100 TABLET | Refills: 3 | Status: SHIPPED | OUTPATIENT
Start: 2024-05-02

## 2024-05-17 ENCOUNTER — HOSPITAL ENCOUNTER (OUTPATIENT)
Dept: RADIOLOGY | Facility: MEDICAL CENTER | Age: 69
End: 2024-05-17
Payer: MEDICARE

## 2024-05-17 DIAGNOSIS — D68.52 HETEROZYGOUS FOR PROTHROMBIN G20210A MUTATION (HCC): ICD-10-CM

## 2024-06-18 ENCOUNTER — HOSPITAL ENCOUNTER (OUTPATIENT)
Dept: LAB | Facility: MEDICAL CENTER | Age: 69
End: 2024-06-18
Attending: INTERNAL MEDICINE
Payer: MEDICARE

## 2024-06-18 LAB
ALBUMIN SERPL BCP-MCNC: 4.3 G/DL (ref 3.2–4.9)
ALBUMIN/GLOB SERPL: 1.7 G/DL
ALP SERPL-CCNC: 81 U/L (ref 30–99)
ALT SERPL-CCNC: 21 U/L (ref 2–50)
ANION GAP SERPL CALC-SCNC: 10 MMOL/L (ref 7–16)
AST SERPL-CCNC: 30 U/L (ref 12–45)
BASOPHILS # BLD AUTO: 0.9 % (ref 0–1.8)
BASOPHILS # BLD: 0.09 K/UL (ref 0–0.12)
BILIRUB SERPL-MCNC: 1.4 MG/DL (ref 0.1–1.5)
BUN SERPL-MCNC: 10 MG/DL (ref 8–22)
CALCIUM ALBUM COR SERPL-MCNC: 9.1 MG/DL (ref 8.5–10.5)
CALCIUM SERPL-MCNC: 9.3 MG/DL (ref 8.5–10.5)
CHLORIDE SERPL-SCNC: 103 MMOL/L (ref 96–112)
CO2 SERPL-SCNC: 25 MMOL/L (ref 20–33)
CREAT SERPL-MCNC: 0.88 MG/DL (ref 0.5–1.4)
EOSINOPHIL # BLD AUTO: 0.12 K/UL (ref 0–0.51)
EOSINOPHIL NFR BLD: 1.2 % (ref 0–6.9)
ERYTHROCYTE [DISTWIDTH] IN BLOOD BY AUTOMATED COUNT: 48.3 FL (ref 35.9–50)
FASTING STATUS PATIENT QL REPORTED: NORMAL
GFR SERPLBLD CREATININE-BSD FMLA CKD-EPI: 94 ML/MIN/1.73 M 2
GLOBULIN SER CALC-MCNC: 2.5 G/DL (ref 1.9–3.5)
GLUCOSE SERPL-MCNC: 83 MG/DL (ref 65–99)
HCT VFR BLD AUTO: 52.2 % (ref 42–52)
HGB BLD-MCNC: 18.4 G/DL (ref 14–18)
IMM GRANULOCYTES # BLD AUTO: 0.02 K/UL (ref 0–0.11)
IMM GRANULOCYTES NFR BLD AUTO: 0.2 % (ref 0–0.9)
LDH SERPL L TO P-CCNC: 290 U/L (ref 107–266)
LYMPHOCYTES # BLD AUTO: 1.93 K/UL (ref 1–4.8)
LYMPHOCYTES NFR BLD: 19.9 % (ref 22–41)
MCH RBC QN AUTO: 35 PG (ref 27–33)
MCHC RBC AUTO-ENTMCNC: 35.2 G/DL (ref 32.3–36.5)
MCV RBC AUTO: 99.4 FL (ref 81.4–97.8)
MONOCYTES # BLD AUTO: 0.89 K/UL (ref 0–0.85)
MONOCYTES NFR BLD AUTO: 9.2 % (ref 0–13.4)
NEUTROPHILS # BLD AUTO: 6.66 K/UL (ref 1.82–7.42)
NEUTROPHILS NFR BLD: 68.6 % (ref 44–72)
NRBC # BLD AUTO: 0 K/UL
NRBC BLD-RTO: 0 /100 WBC (ref 0–0.2)
PLATELET # BLD AUTO: 263 K/UL (ref 164–446)
PMV BLD AUTO: 9.9 FL (ref 9–12.9)
POTASSIUM SERPL-SCNC: 4.4 MMOL/L (ref 3.6–5.5)
PROT SERPL-MCNC: 6.8 G/DL (ref 6–8.2)
RBC # BLD AUTO: 5.25 M/UL (ref 4.7–6.1)
SODIUM SERPL-SCNC: 138 MMOL/L (ref 135–145)
WBC # BLD AUTO: 9.7 K/UL (ref 4.8–10.8)

## 2024-06-18 PROCEDURE — 85025 COMPLETE CBC W/AUTO DIFF WBC: CPT

## 2024-06-18 PROCEDURE — 36415 COLL VENOUS BLD VENIPUNCTURE: CPT

## 2024-06-18 PROCEDURE — 83615 LACTATE (LD) (LDH) ENZYME: CPT

## 2024-06-18 PROCEDURE — 80053 COMPREHEN METABOLIC PANEL: CPT

## 2024-10-24 ENCOUNTER — TELEPHONE (OUTPATIENT)
Dept: HEALTH INFORMATION MANAGEMENT | Facility: OTHER | Age: 69
End: 2024-10-24
Payer: MEDICARE

## 2024-11-21 NOTE — TELEPHONE ENCOUNTER
Called LVM to call back to schedule a home visit [facility] [phone]

## 2024-12-16 ENCOUNTER — HOSPITAL ENCOUNTER (OUTPATIENT)
Dept: LAB | Facility: MEDICAL CENTER | Age: 69
End: 2024-12-16
Attending: INTERNAL MEDICINE
Payer: MEDICARE

## 2024-12-16 LAB
BASOPHILS # BLD AUTO: 0.9 % (ref 0–1.8)
BASOPHILS # BLD: 0.09 K/UL (ref 0–0.12)
EOSINOPHIL # BLD AUTO: 0.06 K/UL (ref 0–0.51)
EOSINOPHIL NFR BLD: 0.6 % (ref 0–6.9)
ERYTHROCYTE [DISTWIDTH] IN BLOOD BY AUTOMATED COUNT: 52.1 FL (ref 35.9–50)
HCT VFR BLD AUTO: 54.2 % (ref 42–52)
HGB BLD-MCNC: 18.8 G/DL (ref 14–18)
IMM GRANULOCYTES # BLD AUTO: 0.03 K/UL (ref 0–0.11)
IMM GRANULOCYTES NFR BLD AUTO: 0.3 % (ref 0–0.9)
LYMPHOCYTES # BLD AUTO: 1.49 K/UL (ref 1–4.8)
LYMPHOCYTES NFR BLD: 15.6 % (ref 22–41)
MCH RBC QN AUTO: 36.1 PG (ref 27–33)
MCHC RBC AUTO-ENTMCNC: 34.7 G/DL (ref 32.3–36.5)
MCV RBC AUTO: 104 FL (ref 81.4–97.8)
MONOCYTES # BLD AUTO: 0.67 K/UL (ref 0–0.85)
MONOCYTES NFR BLD AUTO: 7 % (ref 0–13.4)
NEUTROPHILS # BLD AUTO: 7.24 K/UL (ref 1.82–7.42)
NEUTROPHILS NFR BLD: 75.6 % (ref 44–72)
NRBC # BLD AUTO: 0 K/UL
NRBC BLD-RTO: 0 /100 WBC (ref 0–0.2)
PLATELET # BLD AUTO: 509 K/UL (ref 164–446)
PMV BLD AUTO: 9.8 FL (ref 9–12.9)
RBC # BLD AUTO: 5.21 M/UL (ref 4.7–6.1)
WBC # BLD AUTO: 9.6 K/UL (ref 4.8–10.8)

## 2024-12-16 PROCEDURE — 85025 COMPLETE CBC W/AUTO DIFF WBC: CPT

## 2024-12-16 PROCEDURE — 83615 LACTATE (LD) (LDH) ENZYME: CPT

## 2024-12-16 PROCEDURE — 80053 COMPREHEN METABOLIC PANEL: CPT

## 2024-12-16 PROCEDURE — 36415 COLL VENOUS BLD VENIPUNCTURE: CPT

## 2024-12-17 LAB
ALBUMIN SERPL BCP-MCNC: 4.3 G/DL (ref 3.2–4.9)
ALBUMIN/GLOB SERPL: 1.7 G/DL
ALP SERPL-CCNC: 81 U/L (ref 30–99)
ALT SERPL-CCNC: 19 U/L (ref 2–50)
ANION GAP SERPL CALC-SCNC: 5 MMOL/L (ref 7–16)
AST SERPL-CCNC: 27 U/L (ref 12–45)
BILIRUB SERPL-MCNC: 1.2 MG/DL (ref 0.1–1.5)
BUN SERPL-MCNC: 14 MG/DL (ref 8–22)
CALCIUM ALBUM COR SERPL-MCNC: 9.3 MG/DL (ref 8.5–10.5)
CALCIUM SERPL-MCNC: 9.5 MG/DL (ref 8.5–10.5)
CHLORIDE SERPL-SCNC: 108 MMOL/L (ref 96–112)
CO2 SERPL-SCNC: 24 MMOL/L (ref 20–33)
CREAT SERPL-MCNC: 1.04 MG/DL (ref 0.5–1.4)
GFR SERPLBLD CREATININE-BSD FMLA CKD-EPI: 78 ML/MIN/1.73 M 2
GLOBULIN SER CALC-MCNC: 2.6 G/DL (ref 1.9–3.5)
GLUCOSE SERPL-MCNC: 109 MG/DL (ref 65–99)
LDH SERPL L TO P-CCNC: 270 U/L (ref 107–266)
POTASSIUM SERPL-SCNC: 4.1 MMOL/L (ref 3.6–5.5)
PROT SERPL-MCNC: 6.9 G/DL (ref 6–8.2)
SODIUM SERPL-SCNC: 137 MMOL/L (ref 135–145)

## 2025-03-07 ENCOUNTER — TELEPHONE (OUTPATIENT)
Dept: HEALTH INFORMATION MANAGEMENT | Facility: OTHER | Age: 70
End: 2025-03-07

## 2025-03-10 ENCOUNTER — HOSPITAL ENCOUNTER (OUTPATIENT)
Dept: LAB | Facility: MEDICAL CENTER | Age: 70
End: 2025-03-10
Attending: INTERNAL MEDICINE
Payer: MEDICARE

## 2025-03-10 LAB
ALBUMIN SERPL BCP-MCNC: 4.4 G/DL (ref 3.2–4.9)
ALBUMIN/GLOB SERPL: 1.6 G/DL
ALP SERPL-CCNC: 84 U/L (ref 30–99)
ALT SERPL-CCNC: 23 U/L (ref 2–50)
ANION GAP SERPL CALC-SCNC: 11 MMOL/L (ref 7–16)
AST SERPL-CCNC: 30 U/L (ref 12–45)
BASOPHILS # BLD AUTO: 1.2 % (ref 0–1.8)
BASOPHILS # BLD: 0.11 K/UL (ref 0–0.12)
BILIRUB SERPL-MCNC: 1.3 MG/DL (ref 0.1–1.5)
BUN SERPL-MCNC: 15 MG/DL (ref 8–22)
CALCIUM ALBUM COR SERPL-MCNC: 9.2 MG/DL (ref 8.5–10.5)
CALCIUM SERPL-MCNC: 9.5 MG/DL (ref 8.5–10.5)
CHLORIDE SERPL-SCNC: 107 MMOL/L (ref 96–112)
CO2 SERPL-SCNC: 24 MMOL/L (ref 20–33)
CREAT SERPL-MCNC: 0.99 MG/DL (ref 0.5–1.4)
EOSINOPHIL # BLD AUTO: 0.05 K/UL (ref 0–0.51)
EOSINOPHIL NFR BLD: 0.5 % (ref 0–6.9)
ERYTHROCYTE [DISTWIDTH] IN BLOOD BY AUTOMATED COUNT: 54.4 FL (ref 35.9–50)
GFR SERPLBLD CREATININE-BSD FMLA CKD-EPI: 83 ML/MIN/1.73 M 2
GLOBULIN SER CALC-MCNC: 2.7 G/DL (ref 1.9–3.5)
GLUCOSE SERPL-MCNC: 128 MG/DL (ref 65–99)
HCT VFR BLD AUTO: 56.8 % (ref 42–52)
HGB BLD-MCNC: 19.6 G/DL (ref 14–18)
IMM GRANULOCYTES # BLD AUTO: 0.03 K/UL (ref 0–0.11)
IMM GRANULOCYTES NFR BLD AUTO: 0.3 % (ref 0–0.9)
LDH SERPL L TO P-CCNC: 265 U/L (ref 107–266)
LYMPHOCYTES # BLD AUTO: 2.19 K/UL (ref 1–4.8)
LYMPHOCYTES NFR BLD: 23.4 % (ref 22–41)
MCH RBC QN AUTO: 36.8 PG (ref 27–33)
MCHC RBC AUTO-ENTMCNC: 34.5 G/DL (ref 32.3–36.5)
MCV RBC AUTO: 106.6 FL (ref 81.4–97.8)
MONOCYTES # BLD AUTO: 0.76 K/UL (ref 0–0.85)
MONOCYTES NFR BLD AUTO: 8.1 % (ref 0–13.4)
NEUTROPHILS # BLD AUTO: 6.23 K/UL (ref 1.82–7.42)
NEUTROPHILS NFR BLD: 66.5 % (ref 44–72)
NRBC # BLD AUTO: 0 K/UL
NRBC BLD-RTO: 0 /100 WBC (ref 0–0.2)
PLATELET # BLD AUTO: 219 K/UL (ref 164–446)
PMV BLD AUTO: 9.9 FL (ref 9–12.9)
POTASSIUM SERPL-SCNC: 4.3 MMOL/L (ref 3.6–5.5)
PROT SERPL-MCNC: 7.1 G/DL (ref 6–8.2)
RBC # BLD AUTO: 5.33 M/UL (ref 4.7–6.1)
SODIUM SERPL-SCNC: 142 MMOL/L (ref 135–145)
WBC # BLD AUTO: 9.4 K/UL (ref 4.8–10.8)

## 2025-03-10 PROCEDURE — 85025 COMPLETE CBC W/AUTO DIFF WBC: CPT

## 2025-03-10 PROCEDURE — 36415 COLL VENOUS BLD VENIPUNCTURE: CPT

## 2025-03-10 PROCEDURE — 80053 COMPREHEN METABOLIC PANEL: CPT

## 2025-03-10 PROCEDURE — 83615 LACTATE (LD) (LDH) ENZYME: CPT

## 2025-03-12 RX ORDER — ATROPINE SULFATE 1 MG/ML
0.5 INJECTION, SOLUTION INTRAVENOUS PRN
Status: CANCELLED | OUTPATIENT
Start: 2025-03-12

## 2025-03-12 RX ORDER — SODIUM CHLORIDE 9 MG/ML
500 INJECTION, SOLUTION INTRAVENOUS ONCE
Status: CANCELLED | OUTPATIENT
Start: 2025-03-12 | End: 2025-03-12

## 2025-03-25 ENCOUNTER — OUTPATIENT INFUSION SERVICES (OUTPATIENT)
Dept: ONCOLOGY | Facility: MEDICAL CENTER | Age: 70
End: 2025-03-25
Attending: INTERNAL MEDICINE
Payer: MEDICARE

## 2025-03-25 VITALS
BODY MASS INDEX: 30.09 KG/M2 | HEART RATE: 84 BPM | TEMPERATURE: 97.5 F | RESPIRATION RATE: 16 BRPM | DIASTOLIC BLOOD PRESSURE: 84 MMHG | OXYGEN SATURATION: 89 % | WEIGHT: 214.95 LBS | HEIGHT: 71 IN | SYSTOLIC BLOOD PRESSURE: 133 MMHG

## 2025-03-25 DIAGNOSIS — D58.0 SPHEROCYTOSIS (HCC): ICD-10-CM

## 2025-03-25 LAB
HCT VFR BLD AUTO: 54.1 % (ref 42–52)
HGB BLD-MCNC: 18.8 G/DL (ref 14–18)

## 2025-03-25 PROCEDURE — 85018 HEMOGLOBIN: CPT

## 2025-03-25 PROCEDURE — 85014 HEMATOCRIT: CPT

## 2025-03-25 PROCEDURE — 99195 PHLEBOTOMY: CPT

## 2025-03-25 RX ORDER — ATROPINE SULFATE 1 MG/ML
0.5 INJECTION, SOLUTION INTRAVENOUS PRN
OUTPATIENT
Start: 2025-03-25

## 2025-03-25 RX ORDER — SODIUM CHLORIDE 9 MG/ML
500 INJECTION, SOLUTION INTRAVENOUS ONCE
OUTPATIENT
Start: 2025-03-25 | End: 2025-03-25

## 2025-03-25 RX ORDER — AMANTADINE HYDROCHLORIDE 100 MG/1
100 CAPSULE, GELATIN COATED ORAL EVERY MORNING
COMMUNITY
Start: 2025-03-10

## 2025-03-25 ASSESSMENT — FIBROSIS 4 INDEX: FIB4 SCORE: 1.91

## 2025-03-25 NOTE — PROGRESS NOTES
Pt presented to Infusion for Therapeutic Phlebotomy. POC discussed and pt verbalized understanding. PIV placed without difficulty in the right AC; brisk blood return noted, line flushes with ease and labs drawn. Hgb 18.8 and Hct 54.1. Pt meets parameters for phlebotomy. 500ml whole blood removed per [facility] policy. Pt observed for 10 to 15 mins. Orthostatic VS taken and pt within defined parameters to discharge home. Pt denies s/s of syncope, dizziness, lightheadedness, chest pain, and reports feels well enough to discharge home. Educated pt on when to seek provider evaluation for symptoms and pt verbalized understanding. PIV flushed, removed with tip intact and site covered with sterile gauze/coban. Pt confirmed next appt and discharged to self care. Pt in NAD at time of discharge.

## 2025-05-20 ENCOUNTER — OUTPATIENT INFUSION SERVICES (OUTPATIENT)
Dept: ONCOLOGY | Facility: MEDICAL CENTER | Age: 70
End: 2025-05-20
Attending: INTERNAL MEDICINE
Payer: MEDICARE

## 2025-05-20 VITALS
RESPIRATION RATE: 16 BRPM | HEIGHT: 71 IN | BODY MASS INDEX: 29.94 KG/M2 | DIASTOLIC BLOOD PRESSURE: 79 MMHG | TEMPERATURE: 97 F | HEART RATE: 83 BPM | WEIGHT: 213.85 LBS | SYSTOLIC BLOOD PRESSURE: 132 MMHG | OXYGEN SATURATION: 93 %

## 2025-05-20 DIAGNOSIS — D58.0 SPHEROCYTOSIS (HCC): Primary | ICD-10-CM

## 2025-05-20 LAB
HCT VFR BLD AUTO: 49.9 % (ref 42–52)
HGB BLD-MCNC: 17.7 G/DL (ref 14–18)

## 2025-05-20 PROCEDURE — 85018 HEMOGLOBIN: CPT

## 2025-05-20 PROCEDURE — 85014 HEMATOCRIT: CPT

## 2025-05-20 PROCEDURE — 99195 PHLEBOTOMY: CPT

## 2025-05-20 RX ORDER — ATROPINE SULFATE 1 MG/ML
0.5 INJECTION, SOLUTION INTRAVENOUS PRN
OUTPATIENT
Start: 2025-05-20

## 2025-05-20 RX ORDER — SODIUM CHLORIDE 9 MG/ML
500 INJECTION, SOLUTION INTRAVENOUS ONCE
OUTPATIENT
Start: 2025-05-20 | End: 2025-05-20

## 2025-05-20 ASSESSMENT — FIBROSIS 4 INDEX: FIB4 SCORE: 1.91

## 2025-05-21 NOTE — PROGRESS NOTES
Pt [facility]. POC discussed with patient and patient verbalizes agreement.  PIV placed, brisk blood return observed.  Labs collected as ordered. Hct 49.9. 500 ml whole blood removed. Pt  tolerated well. Orthostatic vitals stable, see flowsheet. Pt denies chest pain, shortness of breath, dizziness, or lightheadedness after treatment. PIV flushed and removed, gauze and coban to site.  Pt DC'd home to self care in [facility], next appointment confirmed.

## 2025-06-15 DIAGNOSIS — I69.30 LATE EFFECT OF STROKE: ICD-10-CM

## 2025-06-16 RX ORDER — ATORVASTATIN CALCIUM 40 MG/1
40 TABLET, FILM COATED ORAL
Qty: 100 TABLET | Refills: 0 | Status: SHIPPED | OUTPATIENT
Start: 2025-06-16

## 2025-07-15 ENCOUNTER — OUTPATIENT INFUSION SERVICES (OUTPATIENT)
Dept: ONCOLOGY | Facility: MEDICAL CENTER | Age: 70
End: 2025-07-15
Attending: INTERNAL MEDICINE
Payer: MEDICARE

## 2025-07-15 VITALS
HEIGHT: 71 IN | HEART RATE: 67 BPM | OXYGEN SATURATION: 91 % | RESPIRATION RATE: 18 BRPM | SYSTOLIC BLOOD PRESSURE: 153 MMHG | WEIGHT: 210.32 LBS | TEMPERATURE: 97.9 F | BODY MASS INDEX: 29.44 KG/M2 | DIASTOLIC BLOOD PRESSURE: 82 MMHG

## 2025-07-15 DIAGNOSIS — D58.0 SPHEROCYTOSIS (HCC): Primary | ICD-10-CM

## 2025-07-15 LAB
HCT VFR BLD AUTO: 49.3 % (ref 42–52)
HGB BLD-MCNC: 17.1 G/DL (ref 14–18)

## 2025-07-15 PROCEDURE — 85018 HEMOGLOBIN: CPT

## 2025-07-15 PROCEDURE — 99195 PHLEBOTOMY: CPT

## 2025-07-15 PROCEDURE — 85014 HEMATOCRIT: CPT

## 2025-07-15 RX ORDER — ATROPINE SULFATE 1 MG/ML
0.5 INJECTION, SOLUTION INTRAVENOUS PRN
OUTPATIENT
Start: 2025-07-15

## 2025-07-15 RX ORDER — SODIUM CHLORIDE 9 MG/ML
500 INJECTION, SOLUTION INTRAVENOUS ONCE
OUTPATIENT
Start: 2025-07-15 | End: 2025-07-15

## 2025-07-15 ASSESSMENT — FIBROSIS 4 INDEX: FIB4 SCORE: 1.94

## 2025-07-15 NOTE — PROGRESS NOTES
Mr Gomez is here today for therapeutic phlebotomy.     He reports feeling well and has no new concerns to report. Has tolerated Tp's in the past well without issue.     IV placed to his right arm. Labs drawn.  HCT 49.3     500 ml of whole blood removed and was tolerated well.     VS remained stable, he denied any dizziness or light headedness.     IV removed and pt discharged in stable condition.